# Patient Record
Sex: FEMALE | Race: WHITE | Employment: UNEMPLOYED | URBAN - METROPOLITAN AREA
[De-identification: names, ages, dates, MRNs, and addresses within clinical notes are randomized per-mention and may not be internally consistent; named-entity substitution may affect disease eponyms.]

---

## 2022-05-27 ENCOUNTER — APPOINTMENT (OUTPATIENT)
Dept: NON INVASIVE DIAGNOSTICS | Age: 82
DRG: 246 | End: 2022-05-27
Attending: HOSPITALIST
Payer: MEDICARE

## 2022-05-27 ENCOUNTER — HOSPITAL ENCOUNTER (INPATIENT)
Age: 82
LOS: 5 days | Discharge: HOME HEALTH | DRG: 246 | End: 2022-06-01
Attending: EMERGENCY MEDICINE | Admitting: HOSPITALIST
Payer: MEDICARE

## 2022-05-27 ENCOUNTER — APPOINTMENT (OUTPATIENT)
Dept: GENERAL RADIOLOGY | Age: 82
DRG: 246 | End: 2022-05-27
Attending: EMERGENCY MEDICINE
Payer: MEDICARE

## 2022-05-27 ENCOUNTER — APPOINTMENT (OUTPATIENT)
Dept: GENERAL RADIOLOGY | Age: 82
DRG: 246 | End: 2022-05-27
Attending: HOSPITALIST
Payer: MEDICARE

## 2022-05-27 DIAGNOSIS — I21.4 NSTEMI (NON-ST ELEVATED MYOCARDIAL INFARCTION) (HCC): ICD-10-CM

## 2022-05-27 DIAGNOSIS — I50.23 ACUTE ON CHRONIC SYSTOLIC CONGESTIVE HEART FAILURE (HCC): ICD-10-CM

## 2022-05-27 DIAGNOSIS — I50.9 CONGESTIVE HEART FAILURE, UNSPECIFIED HF CHRONICITY, UNSPECIFIED HEART FAILURE TYPE (HCC): Primary | ICD-10-CM

## 2022-05-27 LAB
ALBUMIN SERPL-MCNC: 3.8 G/DL (ref 3.5–5)
ALBUMIN/GLOB SERPL: 1.3 {RATIO} (ref 1.1–2.2)
ALP SERPL-CCNC: 49 U/L (ref 45–117)
ALT SERPL-CCNC: 33 U/L (ref 12–78)
ANION GAP SERPL CALC-SCNC: 13 MMOL/L (ref 5–15)
AST SERPL W P-5'-P-CCNC: 32 U/L (ref 15–37)
BASOPHILS # BLD: 0 K/UL (ref 0–0.1)
BASOPHILS NFR BLD: 1 % (ref 0–1)
BILIRUB SERPL-MCNC: 1.2 MG/DL (ref 0.2–1)
BNP SERPL-MCNC: ABNORMAL PG/ML
BUN SERPL-MCNC: 13 MG/DL (ref 6–20)
BUN/CREAT SERPL: 16 (ref 12–20)
CA-I BLD-MCNC: 9.1 MG/DL (ref 8.5–10.1)
CHLORIDE SERPL-SCNC: 99 MMOL/L (ref 97–108)
CO2 SERPL-SCNC: 19 MMOL/L (ref 21–32)
CREAT SERPL-MCNC: 0.83 MG/DL (ref 0.55–1.02)
DIFFERENTIAL METHOD BLD: ABNORMAL
EOSINOPHIL # BLD: 0 K/UL (ref 0–0.4)
EOSINOPHIL NFR BLD: 0 % (ref 0–7)
ERYTHROCYTE [DISTWIDTH] IN BLOOD BY AUTOMATED COUNT: 14.6 % (ref 11.5–14.5)
GLOBULIN SER CALC-MCNC: 2.9 G/DL (ref 2–4)
GLUCOSE SERPL-MCNC: 252 MG/DL (ref 65–100)
HCT VFR BLD AUTO: 39.9 % (ref 35–47)
HGB BLD-MCNC: 13 G/DL (ref 11.5–16)
IMM GRANULOCYTES # BLD AUTO: 0 K/UL (ref 0–0.04)
IMM GRANULOCYTES NFR BLD AUTO: 0 % (ref 0–0.5)
LACTATE SERPL-SCNC: 2.2 MMOL/L (ref 0.4–2)
LYMPHOCYTES # BLD: 0.6 K/UL (ref 0.8–3.5)
LYMPHOCYTES NFR BLD: 8 % (ref 12–49)
MCH RBC QN AUTO: 28.4 PG (ref 26–34)
MCHC RBC AUTO-ENTMCNC: 32.6 G/DL (ref 30–36.5)
MCV RBC AUTO: 87.1 FL (ref 80–99)
MONOCYTES # BLD: 0.5 K/UL (ref 0–1)
MONOCYTES NFR BLD: 6 % (ref 5–13)
NEUTS SEG # BLD: 6.4 K/UL (ref 1.8–8)
NEUTS SEG NFR BLD: 85 % (ref 32–75)
NRBC # BLD: 0 K/UL (ref 0–0.01)
NRBC BLD-RTO: 0 PER 100 WBC
PLATELET # BLD AUTO: 376 K/UL (ref 150–400)
PMV BLD AUTO: 8.7 FL (ref 8.9–12.9)
POTASSIUM SERPL-SCNC: 4.2 MMOL/L (ref 3.5–5.1)
PROT SERPL-MCNC: 6.7 G/DL (ref 6.4–8.2)
RBC # BLD AUTO: 4.58 M/UL (ref 3.8–5.2)
SODIUM SERPL-SCNC: 131 MMOL/L (ref 136–145)
TROPONIN-HIGH SENSITIVITY: 114 NG/L (ref 0–51)
WBC # BLD AUTO: 7.6 K/UL (ref 3.6–11)

## 2022-05-27 PROCEDURE — 93005 ELECTROCARDIOGRAM TRACING: CPT

## 2022-05-27 PROCEDURE — 65270000029 HC RM PRIVATE

## 2022-05-27 PROCEDURE — 71045 X-RAY EXAM CHEST 1 VIEW: CPT

## 2022-05-27 PROCEDURE — 99285 EMERGENCY DEPT VISIT HI MDM: CPT

## 2022-05-27 PROCEDURE — 84484 ASSAY OF TROPONIN QUANT: CPT

## 2022-05-27 PROCEDURE — 80053 COMPREHEN METABOLIC PANEL: CPT

## 2022-05-27 PROCEDURE — 87040 BLOOD CULTURE FOR BACTERIA: CPT

## 2022-05-27 PROCEDURE — 83605 ASSAY OF LACTIC ACID: CPT

## 2022-05-27 PROCEDURE — C8929 TTE W OR WO FOL WCON,DOPPLER: HCPCS

## 2022-05-27 PROCEDURE — 74011250636 HC RX REV CODE- 250/636: Performed by: HOSPITALIST

## 2022-05-27 PROCEDURE — 85025 COMPLETE CBC W/AUTO DIFF WBC: CPT

## 2022-05-27 PROCEDURE — 83880 ASSAY OF NATRIURETIC PEPTIDE: CPT

## 2022-05-27 PROCEDURE — 74011000250 HC RX REV CODE- 250: Performed by: HOSPITALIST

## 2022-05-27 PROCEDURE — 36415 COLL VENOUS BLD VENIPUNCTURE: CPT

## 2022-05-27 PROCEDURE — 74011250636 HC RX REV CODE- 250/636: Performed by: EMERGENCY MEDICINE

## 2022-05-27 RX ORDER — ASPIRIN 81 MG/1
81 TABLET ORAL DAILY
Status: ON HOLD | COMMUNITY

## 2022-05-27 RX ORDER — SODIUM CHLORIDE 0.9 % (FLUSH) 0.9 %
5-40 SYRINGE (ML) INJECTION AS NEEDED
Status: DISCONTINUED | OUTPATIENT
Start: 2022-05-27 | End: 2022-06-01 | Stop reason: HOSPADM

## 2022-05-27 RX ORDER — ACETAMINOPHEN 650 MG/1
650 SUPPOSITORY RECTAL
Status: DISCONTINUED | OUTPATIENT
Start: 2022-05-27 | End: 2022-06-01 | Stop reason: HOSPADM

## 2022-05-27 RX ORDER — CHOLECALCIFEROL (VITAMIN D3) 125 MCG
5 CAPSULE ORAL
Status: DISCONTINUED | OUTPATIENT
Start: 2022-05-27 | End: 2022-06-01 | Stop reason: HOSPADM

## 2022-05-27 RX ORDER — ONDANSETRON 4 MG/1
4 TABLET, ORALLY DISINTEGRATING ORAL
Status: DISCONTINUED | OUTPATIENT
Start: 2022-05-27 | End: 2022-06-01 | Stop reason: HOSPADM

## 2022-05-27 RX ORDER — ONDANSETRON 2 MG/ML
4 INJECTION INTRAMUSCULAR; INTRAVENOUS
Status: DISCONTINUED | OUTPATIENT
Start: 2022-05-27 | End: 2022-06-01 | Stop reason: HOSPADM

## 2022-05-27 RX ORDER — ACETAMINOPHEN 325 MG/1
650 TABLET ORAL
Status: DISCONTINUED | OUTPATIENT
Start: 2022-05-27 | End: 2022-06-01 | Stop reason: HOSPADM

## 2022-05-27 RX ORDER — SODIUM CHLORIDE 0.9 % (FLUSH) 0.9 %
5-10 SYRINGE (ML) INJECTION AS NEEDED
Status: DISCONTINUED | OUTPATIENT
Start: 2022-05-27 | End: 2022-06-01 | Stop reason: HOSPADM

## 2022-05-27 RX ORDER — ENOXAPARIN SODIUM 100 MG/ML
40 INJECTION SUBCUTANEOUS DAILY
Status: DISCONTINUED | OUTPATIENT
Start: 2022-05-28 | End: 2022-06-01 | Stop reason: HOSPADM

## 2022-05-27 RX ORDER — SODIUM CHLORIDE 0.9 % (FLUSH) 0.9 %
5-40 SYRINGE (ML) INJECTION EVERY 8 HOURS
Status: DISCONTINUED | OUTPATIENT
Start: 2022-05-27 | End: 2022-06-01 | Stop reason: HOSPADM

## 2022-05-27 RX ORDER — FUROSEMIDE 10 MG/ML
20 INJECTION INTRAMUSCULAR; INTRAVENOUS
Status: COMPLETED | OUTPATIENT
Start: 2022-05-27 | End: 2022-05-27

## 2022-05-27 RX ORDER — POLYETHYLENE GLYCOL 3350 17 G/17G
17 POWDER, FOR SOLUTION ORAL DAILY PRN
Status: DISCONTINUED | OUTPATIENT
Start: 2022-05-27 | End: 2022-06-01 | Stop reason: HOSPADM

## 2022-05-27 RX ADMIN — PERFLUTREN 2 ML: 6.52 INJECTION, SUSPENSION INTRAVENOUS at 16:46

## 2022-05-27 RX ADMIN — FUROSEMIDE 20 MG: 10 INJECTION, SOLUTION INTRAMUSCULAR; INTRAVENOUS at 14:35

## 2022-05-27 RX ADMIN — SODIUM CHLORIDE, PRESERVATIVE FREE 10 ML: 5 INJECTION INTRAVENOUS at 14:36

## 2022-05-27 RX ADMIN — SODIUM CHLORIDE, PRESERVATIVE FREE 10 ML: 5 INJECTION INTRAVENOUS at 22:25

## 2022-05-27 NOTE — PROGRESS NOTES
Verbal shift change report given to [facility] (oncoming nurse) by Koby Granger (offgoing nurse). Report included the following information SBAR.

## 2022-05-27 NOTE — PROGRESS NOTES
Reason for Admission:  CHF                     RUR Score:  N/A                Plan for utilizing home health:   Not at this time        PCP: First and Last name:  None     Name of Practice:    Are you a current patient: Yes/No:    Approximate date of last visit:    Can you participate in a virtual visit with your PCP:                     Current Advanced Directive/Advance Care Plan: Full Code      Healthcare Decision Maker:   Click here to complete [address] including selection of the Healthcare Decision Maker Relationship (ie \"Primary\")   Nay Buckley,  of 59 years, [phone]                          Transition of Care Plan:      Met f/f with Pt and her . Pt stated that she lives with her  of 59 years. Pt stated that they have 3 children and [facility]. Pt stated no HH, do not uses any \"medical messes\"  Pt stated she has an old walker from her . Pt stated that she is independent with  ADL. Pt stated that it has been her choice but that she has not seen a doctor since 1988 and that she takes no medication, and does not have a pharmacy. Pt stated that if she needs medication then they can call it in to [facility] on [address] [address]. Pt  stated that he will give Pt a ride home when she is D/C. Pt is on oxygen in the ED, it appears that she is struggling to breath, Pt may need home 02 when she is D/C. CM Dispo: Home and TBD.

## 2022-05-27 NOTE — ROUTINE PROCESS
TRANSFER - OUT REPORT:    Verbal report given to 5 JENNIFER Villalpando on Carmelina Guzman  being transferred to [address] [place] [address] for routine progression of care       Report consisted of patients Situation, Background, Assessment and   Recommendations(SBAR). Information from the following report(s) SBAR was reviewed with the receiving nurse. Lines:       Opportunity for questions and clarification was provided.       Patient transported with:   O2 @ 2 liters   Monitor

## 2022-05-27 NOTE — Clinical Note
Sheath #1: Sheath: inserted. Sheath inserted/placed in the right radial  artery.  6 [facility]

## 2022-05-27 NOTE — Clinical Note
Contrast Dose Calculator:   Patient's age: 79.   Patient's sex: Female. Patient weight (kg) = 60.8. Creatinine level (mg/dL) = 0.47. Creatinine clearance (mL/min): 89.   Contrast concentration (mg/mL) = 370. MACD = 300 mL. Max Contrast dose per Creatinine Cl calculator = 200.25 mL.

## 2022-05-27 NOTE — Clinical Note
TRANSFER - OUT REPORT:     Verbal report given to: Brittany (at bedside). Report consisted of patient's Situation, Background, Assessment and   Recommendations(SBAR). Opportunity for questions and clarification was provided. Patient transported with a Registered Nurse and Monitor. Patient transported to: PACU.

## 2022-05-27 NOTE — H&P
History and Physical    Subjective:   Chief Complaint : shortness of breath since 1-2 days  Source of information : patient     History of present illness:     79F, who is a JW, with shortness of breath since 1 day    She began experiencing shortness of breath on exertion, mostly when going to bathroom and then noticed orthopnea and got concerned and came to ED. She also mentioned that her legs were skin and bones and now appears swollen    ED: required 2 L NC, and CXR showed pulmonary edema with increased BNP      History reviewed. No pertinent past medical history. History reviewed. No pertinent surgical history. History reviewed. No pertinent family history. Social History     Tobacco Use    Smoking status: Former Smoker    Smokeless tobacco: Never Used   Substance Use Topics    Alcohol use: Never       Prior to Admission medications    Not on File     No Known Allergies          Review of Systems:  Review of Systems   Constitutional: Negative. Negative for appetite change, chills, fatigue and fever. HENT: Negative. Negative for congestion and sinus pain. Eyes: Negative. Negative for pain and visual disturbance. Respiratory: Positive for shortness of breath. Negative for chest tightness. Cardiovascular: Negative. Negative for chest pain. Gastrointestinal: Positive for nausea. Negative for abdominal pain, diarrhea and vomiting. Genitourinary: Negative. Negative for difficulty urinating. No discharge   Musculoskeletal: Negative. Negative for arthralgias. Skin: Negative. Negative for rash. Neurological: Negative. Negative for weakness and headaches. Hematological: Negative. Psychiatric/Behavioral: Negative. Negative for agitation. The patient is not nervous/anxious. All other systems reviewed and are negative.     Vitals:     Visit Vitals  /81 (BP 1 Location: Left upper arm)   Pulse (!) 112   Temp 97.6 °F (36.4 °C)   Resp 24   Ht 5' 6\" (1.676 m)   Wt 60.8 kg (134 lb)   SpO2 92%   BMI 21.63 kg/m²     Physical Exam  Vitals and nursing note reviewed. Constitutional:       General: She is not in acute distress. Appearance: She is well-developed. HENT:      Head: Normocephalic and atraumatic. Nose: Nose normal.      Mouth/Throat:      Mouth: Mucous membranes are moist.      Pharynx: Oropharynx is clear. No oropharyngeal exudate. Eyes:      General:         Right eye: No discharge. Left eye: No discharge. Conjunctiva/sclera: Conjunctivae normal.      Pupils: Pupils are equal, round, and reactive to light. Cardiovascular:      Rate and Rhythm: Regular rhythm. Tachycardia present. Chest Wall: PMI is not displaced. No thrill. Heart sounds: Normal heart sounds. No murmur heard. No friction rub. No gallop. Pulmonary:      Effort: Pulmonary effort is normal. Tachypnea present. No respiratory distress. Breath sounds: Normal breath sounds. No wheezing or rales. Chest:      Chest wall: Edema present. No tenderness. Abdominal:      General: Bowel sounds are normal. There is no distension. Palpations: Abdomen is soft. There is no mass. Tenderness: There is no abdominal tenderness. There is no guarding or rebound. Musculoskeletal:         General: Normal range of motion. Cervical back: Normal range of motion and neck supple. Lymphadenopathy:      Cervical: No cervical adenopathy. Skin:     General: Skin is warm and dry. Capillary Refill: Capillary refill takes less than 2 seconds. Findings: No erythema or rash. Neurological:      Mental Status: She is alert and oriented to person, place, and time. Cranial Nerves: No cranial nerve deficit.       Coordination: Coordination normal.   Psychiatric:         Mood and Affect: Mood normal.         Behavior: Behavior normal.       Data Review:   Recent Results (from the past 24 hour(s))   CBC WITH AUTOMATED DIFF    Collection Time: 05/27/22 11:54 AM Result Value Ref Range    WBC 7.6 3.6 - 11.0 K/uL    RBC 4.58 3.80 - 5.20 M/uL    HGB 13.0 11.5 - 16.0 g/dL    HCT 39.9 35.0 - 47.0 %    MCV 87.1 80.0 - 99.0 FL    MCH 28.4 26.0 - 34.0 PG    MCHC 32.6 30.0 - 36.5 g/dL    RDW 14.6 (H) 11.5 - 14.5 %    PLATELET 784 268 - 366 K/uL    MPV 8.7 (L) 8.9 - 12.9 FL    NRBC 0.0 0.0  WBC    ABSOLUTE NRBC 0.00 0.00 - 0.01 K/uL    NEUTROPHILS 85 (H) 32 - 75 %    LYMPHOCYTES 8 (L) 12 - 49 %    MONOCYTES 6 5 - 13 %    EOSINOPHILS 0 0 - 7 %    BASOPHILS 1 0 - 1 %    IMMATURE GRANULOCYTES 0 0 - 0.5 %    ABS. NEUTROPHILS 6.4 1.8 - 8.0 K/UL    ABS. LYMPHOCYTES 0.6 (L) 0.8 - 3.5 K/UL    ABS. MONOCYTES 0.5 0.0 - 1.0 K/UL    ABS. EOSINOPHILS 0.0 0.0 - 0.4 K/UL    ABS. BASOPHILS 0.0 0.0 - 0.1 K/UL    ABS. IMM. GRANS. 0.0 0.00 - 0.04 K/UL    DF AUTOMATED     METABOLIC PANEL, COMPREHENSIVE    Collection Time: 05/27/22 11:54 AM   Result Value Ref Range    Sodium 131 (L) 136 - 145 mmol/L    Potassium 4.2 3.5 - 5.1 mmol/L    Chloride 99 97 - 108 mmol/L    CO2 19 (L) 21 - 32 mmol/L    Anion gap 13 5 - 15 mmol/L    Glucose 252 (H) 65 - 100 mg/dL    BUN 13 6 - 20 mg/dL    Creatinine 0.83 0.55 - 1.02 mg/dL    BUN/Creatinine ratio 16 12 - 20      GFR est AA >60 >60 ml/min/1.73m2    GFR est non-AA >60 >60 ml/min/1.73m2    Calcium 9.1 8.5 - 10.1 mg/dL    Bilirubin, total 1.2 (H) 0.2 - 1.0 mg/dL    AST (SGOT) 32 15 - 37 U/L    ALT (SGPT) 33 12 - 78 U/L    Alk.  phosphatase 49 45 - 117 U/L    Protein, total 6.7 6.4 - 8.2 g/dL    Albumin 3.8 3.5 - 5.0 g/dL    Globulin 2.9 2.0 - 4.0 g/dL    A-G Ratio 1.3 1.1 - 2.2     TROPONIN-HIGH SENSITIVITY    Collection Time: 05/27/22 11:54 AM   Result Value Ref Range    Troponin-High Sensitivity 114 (H) 0 - 51 ng/L   NT-PRO BNP    Collection Time: 05/27/22 11:54 AM   Result Value Ref Range    NT pro-BNP 21,720 (H) <450 pg/mL   LACTIC ACID    Collection Time: 05/27/22 12:58 PM   Result Value Ref Range    Lactic acid 2.2 (HH) 0.4 - 2.0 mmol/L Assessment and Plan :     (1) acute hypoxic respiratory failurewean to keep saturation > 92    (2) probable new onset CHF:;l asix, [address] [place] [address] , cardiology consult     DVT ppx: lovenox     Dispo: home likely tomorrow once cleared by cards      Signed By: Jacy Osman MD     May 27, 2022

## 2022-05-27 NOTE — Clinical Note
History and physical documented and up to date, allergies reviewed, lab results reviewed, pre-procedure education provided, patient verbalized understanding of procedure, procedural consent signed and patient is NPO. Improved

## 2022-05-27 NOTE — ED PROVIDER NOTES
EMERGENCY DEPARTMENT HISTORY AND PHYSICAL EXAM      Date: 5/27/2022  Patient Name: Laya Steinberg      History of Presenting Illness     Chief Complaint   Patient presents with    Shortness of Breath       History Provided By: Patient    HPI: Laya Steinberg, 79 y.o. female with a past medical history significant Unknown past medical history presents to the ED with cc of feeling weak and tired and short of breath. She says has been going on for a while but got worse over the past 24 hours. She says she has not been to a doctor in over 30 years. She denies any fever, chills, chest pain, rash, diarrhea, headache, night sweats. Have some element of nausea. There are no exacerbating relieving factors and she has not treated with anything. There are no other complaints, changes, or physical findings at this time. PCP: None    Current Facility-Administered Medications   Medication Dose Route Frequency Provider Last Rate Last Admin    sodium chloride (NS) flush 5-10 mL  5-10 mL IntraVENous PRN Kamryn Yu MD        furosemide (LASIX) injection 20 mg  20 mg IntraVENous NOW Kamryn Yu MD           Past History     Past Medical History:  History reviewed. No pertinent past medical history. Past Surgical History:  History reviewed. No pertinent surgical history. Family History:  History reviewed. No pertinent family history. Social History:  Social History     Tobacco Use    Smoking status: Former Smoker    Smokeless tobacco: Never Used   Substance Use Topics    Alcohol use: Never    Drug use: Never       Allergies:  No Known Allergies      Review of Systems     Review of Systems   Constitutional: Negative. Negative for appetite change, chills, fatigue and fever. HENT: Negative. Negative for congestion and sinus pain. Eyes: Negative. Negative for pain and visual disturbance. Respiratory: Positive for shortness of breath. Negative for chest tightness.     Cardiovascular: Negative. Negative for chest pain. Gastrointestinal: Positive for nausea. Negative for abdominal pain, diarrhea and vomiting. Genitourinary: Negative. Negative for difficulty urinating. No discharge   Musculoskeletal: Negative. Negative for arthralgias. Skin: Negative. Negative for rash. Neurological: Negative. Negative for weakness and headaches. Hematological: Negative. Psychiatric/Behavioral: Negative. Negative for agitation. The patient is not nervous/anxious. All other systems reviewed and are negative. Physical Exam     Physical Exam  Vitals and nursing note reviewed. Constitutional:       General: She is not in acute distress. Appearance: She is well-developed. HENT:      Head: Normocephalic and atraumatic. Nose: Nose normal.      Mouth/Throat:      Mouth: Mucous membranes are moist.      Pharynx: Oropharynx is clear. No oropharyngeal exudate. Eyes:      General:         Right eye: No discharge. Left eye: No discharge. Conjunctiva/sclera: Conjunctivae normal.      Pupils: Pupils are equal, round, and reactive to light. Cardiovascular:      Rate and Rhythm: Regular rhythm. Tachycardia present. Chest Wall: PMI is not displaced. No thrill. Heart sounds: Normal heart sounds. No murmur heard. No friction rub. No gallop. Pulmonary:      Effort: Pulmonary effort is normal. Tachypnea present. No respiratory distress. Breath sounds: Normal breath sounds. No wheezing or rales. Chest:      Chest wall: Edema present. No tenderness. Abdominal:      General: Bowel sounds are normal. There is no distension. Palpations: Abdomen is soft. There is no mass. Tenderness: There is no abdominal tenderness. There is no guarding or rebound. Musculoskeletal:         General: Normal range of motion. Cervical back: Normal range of motion and neck supple. Lymphadenopathy:      Cervical: No cervical adenopathy.    Skin:     General: Skin is warm and dry. Capillary Refill: Capillary refill takes less than 2 seconds. Findings: No erythema or rash. Neurological:      Mental Status: She is alert and oriented to person, place, and time. Cranial Nerves: No cranial nerve deficit. Coordination: Coordination normal.   Psychiatric:         Mood and Affect: Mood normal.         Behavior: Behavior normal.         Lab and Diagnostic Study Results     Labs -     Recent Results (from the past 12 hour(s))   CBC WITH AUTOMATED DIFF    Collection Time: 05/27/22 11:54 AM   Result Value Ref Range    WBC 7.6 3.6 - 11.0 K/uL    RBC 4.58 3.80 - 5.20 M/uL    HGB 13.0 11.5 - 16.0 g/dL    HCT 39.9 35.0 - 47.0 %    MCV 87.1 80.0 - 99.0 FL    MCH 28.4 26.0 - 34.0 PG    MCHC 32.6 30.0 - 36.5 g/dL    RDW 14.6 (H) 11.5 - 14.5 %    PLATELET 726 245 - 093 K/uL    MPV 8.7 (L) 8.9 - 12.9 FL    NRBC 0.0 0.0  WBC    ABSOLUTE NRBC 0.00 0.00 - 0.01 K/uL    NEUTROPHILS 85 (H) 32 - 75 %    LYMPHOCYTES 8 (L) 12 - 49 %    MONOCYTES 6 5 - 13 %    EOSINOPHILS 0 0 - 7 %    BASOPHILS 1 0 - 1 %    IMMATURE GRANULOCYTES 0 0 - 0.5 %    ABS. NEUTROPHILS 6.4 1.8 - 8.0 K/UL    ABS. LYMPHOCYTES 0.6 (L) 0.8 - 3.5 K/UL    ABS. MONOCYTES 0.5 0.0 - 1.0 K/UL    ABS. EOSINOPHILS 0.0 0.0 - 0.4 K/UL    ABS. BASOPHILS 0.0 0.0 - 0.1 K/UL    ABS. IMM.  GRANS. 0.0 0.00 - 0.04 K/UL    DF AUTOMATED     METABOLIC PANEL, COMPREHENSIVE    Collection Time: 05/27/22 11:54 AM   Result Value Ref Range    Sodium 131 (L) 136 - 145 mmol/L    Potassium 4.2 3.5 - 5.1 mmol/L    Chloride 99 97 - 108 mmol/L    CO2 19 (L) 21 - 32 mmol/L    Anion gap 13 5 - 15 mmol/L    Glucose 252 (H) 65 - 100 mg/dL    BUN 13 6 - 20 mg/dL    Creatinine 0.83 0.55 - 1.02 mg/dL    BUN/Creatinine ratio 16 12 - 20      GFR est AA >60 >60 ml/min/1.73m2    GFR est non-AA >60 >60 ml/min/1.73m2    Calcium 9.1 8.5 - 10.1 mg/dL    Bilirubin, total 1.2 (H) 0.2 - 1.0 mg/dL    AST (SGOT) 32 15 - 37 U/L    ALT (SGPT) 33 12 - 78 U/L    Alk. phosphatase 49 45 - 117 U/L    Protein, total 6.7 6.4 - 8.2 g/dL    Albumin 3.8 3.5 - 5.0 g/dL    Globulin 2.9 2.0 - 4.0 g/dL    A-G Ratio 1.3 1.1 - 2.2     TROPONIN-HIGH SENSITIVITY    Collection Time: 05/27/22 11:54 AM   Result Value Ref Range    Troponin-High Sensitivity 114 (H) 0 - 51 ng/L   NT-PRO BNP    Collection Time: 05/27/22 11:54 AM   Result Value Ref Range    NT pro-BNP 21,720 (H) <450 pg/mL   LACTIC ACID    Collection Time: 05/27/22 12:58 PM   Result Value Ref Range    Lactic acid 2.2 (HH) 0.4 - 2.0 mmol/L       Radiologic Studies -   [unfilled]  CT Results  (Last 48 hours)    None        CXR Results  (Last 48 hours)               05/27/22 1215  XR CHEST PORT Final result    Narrative:  Chest single view. Hazy central and basilar reticular markings suggests degree dependent   interstitial edema. Moderate to large left pleural effusion. Cardiac and mediastinal structures magnified on this AP view. No pneumothorax             Medical Decision Making and ED Course   - I am the first and primary provider for this patient AND AM THE PRIMARY PROVIDER OF RECORD. - I reviewed the vital signs, available nursing notes, past medical history, past surgical history, family history and social history. - Initial assessment performed. The patients presenting problems have been discussed, and the staff are in agreement with the care plan formulated and outlined with them. I have encouraged them to ask questions as they arise throughout their visit. Vital Signs-Reviewed the patient's vital signs. Patient Vitals for the past 12 hrs:   Temp Pulse Resp BP SpO2   05/27/22 1318 -- (!) 112 24 128/81 --   05/27/22 1135 97.6 °F (36.4 °C) (!) 115 20 (!) 141/86 92 %       EKG interpretation: (Preliminary): Performed at 1141, and read at 46  Ventricular rate 115 bpm, [place] interval unmeasurable, QRS duration 60 ms,  ms.   Interpretation: Accelerated junctional rhythm possible anterior infarct. Abnormal EKG. Assess with basic cardiac labs and chest x-ray  ED Course:              Provider Notes (Medical Decision Making):   54-year-old female with apparent new onset congestive heart failure as well as anemia. We will be admitting to the hospital given her oxygen requirement. MDM           Consultations:       Consultations: -  Hospitalist Consultant: Dr. Juliane Proctor: We have asked for emergent assistance with regard to this patient. We have discussed the patients HPI, ROS, PE and results this far. They will come and evaluate the patient for admission. Procedures and Critical Care       Performed by: Jami Mendez MD  PROCEDURES:  Procedures         HYPERTENSION COUNSELING: Education was provided to the patient today regarding their hypertension. Patient is made aware of their elevated blood pressure and is instructed to follow up this week with their Primary Care for a recheck. Patient is counseled regarding consequences of chronic, uncontrolled hypertension including kidney disease, heart disease, stroke or even death. Patient states their understanding and agrees to follow up this week. Additionally, during their visit, I discussed sodium restriction, maintaining ideal body weight and regular exercise program as physiologic means to achieve blood pressure control. The patient will strive towards this. Disposition     Disposition: Condition stable    Admitted  Diagnosis     Clinical Impression:   1. Congestive heart failure, unspecified HF chronicity, unspecified heart failure type [facility])        Attestations:    Jami Mendez MD    Please note that this dictation was completed with [employer], the computer voice recognition software. Quite often unanticipated grammatical, syntax, homophones, and other interpretive errors are inadvertently transcribed by the computer software. Please disregard these errors. Please excuse any errors that have escaped final proofreading.   Thank you.

## 2022-05-27 NOTE — ED TRIAGE NOTES
Pt c/o SOB over the past 2 days, with and \"uncomfortable feeling\" in her Left breast. New lower leg edema

## 2022-05-27 NOTE — CONSULTS
Consult    NAME: Kina Roth   :  1940   MRN:  [id number]     Date/Time:  2022 3:59 PM    Patient PCP: None  ________________________________________________________________________    Assessment:   Primary cardiologist: None    PROBLEM LIST:  1. Shortness of breath  2.  Remote history of tobacco use  3. Possible family history of premature coronary artery disease  4. Elevated cardiac enzymes in the indeterminate range  5. Elevated troponin-BNP (21,720 pg/mL)    6. Pleural effusion  7. Hyponatremia        []        High complexity decision making was performed        Subjective:   CHIEF COMPLAINT:     HISTORY OF PRESENT ILLNESS:     This 57-year-old  female with no known coronary artery disease presents for evaluation of shortness of breath. There is no history of previous myocardial infarction, or congestive heart failure. The patient indicates she has not seen a physician in over 30 years. She was in her usual state of health until recently when she began to experience increasing shortness of breath and dyspnea on exertion. She also describes orthopnea, and paroxysmal nocturnal dyspnea. There is bilateral lower extremity edema pitting edema noted. Because of the persistence of her symptoms he presented to the emergency department. In the emergency department her clinical history, laboratory findings, and imaging studies were consistent with acute decompensated heart failure. The patient is started on appropriate therapy including IV diuresis. Cardiology is consulted to assist in the evaluation and management. History reviewed. No pertinent past medical history. History reviewed. No pertinent surgical history. No Known Allergies   Meds:  See below  Social History     Tobacco Use    Smoking status: Former Smoker    Smokeless tobacco: Never Used   Substance Use Topics    Alcohol use: Never      History reviewed. No pertinent family history.     REVIEW OF SYSTEMS:    As above, otherwise noncontributory. Objective:      Physical Exam:    Last 24hrs VS reviewed since prior progress note. Most recent are:    Visit Vitals  /86 (BP Patient Position: At rest)   Pulse (!) 103   Temp 97.6 °F (36.4 °C)   Resp 30   Ht 5' 6\" (1.676 m)   Wt 60.8 kg (134 lb)   SpO2 97%   BMI 21.63 kg/m²     No intake or output data in the 24 hours ending 05/27/22 2689     General Appearance: Well developed, in no acute respiratory distress. Ears/Nose/Mouth/Throat: Atraumatic, normocephalic, PERRL. Neck: Supple. Full range of motion, without thyromegaly or lymphadenopathy. Chest: AP diameter for size, clear to auscultation all lung fields, without wheezing, rhonchi, or crackles. Cardiovascular: JVP is not elevated, PMI is not palpable, normal intensity S1 and S2, without S3  Abdomen: Soft, non-tender,-distended, bowel sounds present. Extremities: No edema bilaterally. Skin: Warm and dry. Neuro: CN II-XII grossly intact, without gross motor/sensory deficit. Data:      Telemetry:    EKG:  []  No new EKG for review  XR CHEST PORT   Final Result           Prior to Admission medications    Medication Sig Start Date End Date Taking? Authorizing Provider   aspirin delayed-release 81 mg tablet Take 81 mg by mouth daily.    Yes Provider, Historical       Recent Results (from the past 24 hour(s))   CBC WITH AUTOMATED DIFF    Collection Time: 05/27/22 11:54 AM   Result Value Ref Range    WBC 7.6 3.6 - 11.0 K/uL    RBC 4.58 3.80 - 5.20 M/uL    HGB 13.0 11.5 - 16.0 g/dL    HCT 39.9 35.0 - 47.0 %    MCV 87.1 80.0 - 99.0 FL    MCH 28.4 26.0 - 34.0 PG    MCHC 32.6 30.0 - 36.5 g/dL    RDW 14.6 (H) 11.5 - 14.5 %    PLATELET 090 425 - 469 K/uL    MPV 8.7 (L) 8.9 - 12.9 FL    NRBC 0.0 0.0  WBC    ABSOLUTE NRBC 0.00 0.00 - 0.01 K/uL    NEUTROPHILS 85 (H) 32 - 75 %    LYMPHOCYTES 8 (L) 12 - 49 %    MONOCYTES 6 5 - 13 %    EOSINOPHILS 0 0 - 7 %    BASOPHILS 1 0 - 1 %    IMMATURE GRANULOCYTES 0 0 - 0.5 %    ABS. NEUTROPHILS 6.4 1.8 - 8.0 K/UL    ABS. LYMPHOCYTES 0.6 (L) 0.8 - 3.5 K/UL    ABS. MONOCYTES 0.5 0.0 - 1.0 K/UL    ABS. EOSINOPHILS 0.0 0.0 - 0.4 K/UL    ABS. BASOPHILS 0.0 0.0 - 0.1 K/UL    ABS. IMM. GRANS. 0.0 0.00 - 0.04 K/UL    DF AUTOMATED     METABOLIC PANEL, COMPREHENSIVE    Collection Time: 05/27/22 11:54 AM   Result Value Ref Range    Sodium 131 (L) 136 - 145 mmol/L    Potassium 4.2 3.5 - 5.1 mmol/L    Chloride 99 97 - 108 mmol/L    CO2 19 (L) 21 - 32 mmol/L    Anion gap 13 5 - 15 mmol/L    Glucose 252 (H) 65 - 100 mg/dL    BUN 13 6 - 20 mg/dL    Creatinine 0.83 0.55 - 1.02 mg/dL    BUN/Creatinine ratio 16 12 - 20      GFR est AA >60 >60 ml/min/1.73m2    GFR est non-AA >60 >60 ml/min/1.73m2    Calcium 9.1 8.5 - 10.1 mg/dL    Bilirubin, total 1.2 (H) 0.2 - 1.0 mg/dL    AST (SGOT) 32 15 - 37 U/L    ALT (SGPT) 33 12 - 78 U/L    Alk. phosphatase 49 45 - 117 U/L    Protein, total 6.7 6.4 - 8.2 g/dL    Albumin 3.8 3.5 - 5.0 g/dL    Globulin 2.9 2.0 - 4.0 g/dL    A-G Ratio 1.3 1.1 - 2.2     TROPONIN-HIGH SENSITIVITY    Collection Time: 05/27/22 11:54 AM   Result Value Ref Range    Troponin-High Sensitivity 114 (H) 0 - 51 ng/L   NT-PRO BNP    Collection Time: 05/27/22 11:54 AM   Result Value Ref Range    NT pro-BNP 21,720 (H) <450 pg/mL   LACTIC ACID    Collection Time: 05/27/22 12:58 PM   Result Value Ref Range    Lactic acid 2.2 (HH) 0.4 - 2.0 mmol/L          Plan:   1. Admit to telemetry  2. Conclude serial cardiac enzymes   3. Monitor serum electrolytes, and renal function  4. Monitor fluid balance, and daily weights  5. Obtain complete 2D echocardiogram    6. Continue current cardiovascular medications including enoxaparin  7.   Add intravenous (IV) diureses; i.e furosemide   Russel Gale MD

## 2022-05-28 ENCOUNTER — APPOINTMENT (OUTPATIENT)
Dept: GENERAL RADIOLOGY | Age: 82
DRG: 246 | End: 2022-05-28
Attending: INTERNAL MEDICINE
Payer: MEDICARE

## 2022-05-28 LAB
ANION GAP SERPL CALC-SCNC: 7 MMOL/L (ref 5–15)
ANION GAP SERPL CALC-SCNC: 8 MMOL/L (ref 5–15)
ARTERIAL PATENCY WRIST A: ABNORMAL
ATRIAL RATE: 117 BPM
BASE EXCESS BLDA CALC-SCNC: 0.8 MMOL/L (ref 0–2)
BDY SITE: ABNORMAL
BUN SERPL-MCNC: 12 MG/DL (ref 6–20)
BUN SERPL-MCNC: 13 MG/DL (ref 6–20)
BUN/CREAT SERPL: 20 (ref 12–20)
BUN/CREAT SERPL: 21 (ref 12–20)
CA-I BLD-MCNC: 8.8 MG/DL (ref 8.5–10.1)
CA-I BLD-MCNC: 8.9 MG/DL (ref 8.5–10.1)
CALCULATED R AXIS, ECG10: -25 DEGREES
CALCULATED T AXIS, ECG11: 52 DEGREES
CHLORIDE SERPL-SCNC: 101 MMOL/L (ref 97–108)
CHLORIDE SERPL-SCNC: 98 MMOL/L (ref 97–108)
CO2 SERPL-SCNC: 24 MMOL/L (ref 21–32)
CO2 SERPL-SCNC: 24 MMOL/L (ref 21–32)
CREAT SERPL-MCNC: 0.56 MG/DL (ref 0.55–1.02)
CREAT SERPL-MCNC: 0.66 MG/DL (ref 0.55–1.02)
DIAGNOSIS, 93000: NORMAL
ECHO AO ASC DIAM: 2.8 CM
ECHO AO ASCENDING AORTA INDEX: 1.66 CM/M2
ECHO AO DESC DIAM: 1.8 CM
ECHO AO DESCENDING AORTA INDEX: 1.07 CM/M2
ECHO AO ROOT DIAM: 3 CM
ECHO AO ROOT INDEX: 1.78 CM/M2
ECHO AV AREA PEAK VELOCITY: 1.3 CM2
ECHO AV AREA VTI: 1.2 CM2
ECHO AV AREA/BSA PEAK VELOCITY: 0.8 CM2/M2
ECHO AV AREA/BSA VTI: 0.7 CM2/M2
ECHO AV MEAN GRADIENT: 2 MMHG
ECHO AV MEAN VELOCITY: 0.7 M/S
ECHO AV PEAK GRADIENT: 4 MMHG
ECHO AV PEAK VELOCITY: 1.1 M/S
ECHO AV VELOCITY RATIO: 0.36
ECHO AV VTI: 16.6 CM
ECHO EST RA PRESSURE: 8 MMHG
ECHO IVC PROX: 2.2 CM
ECHO LA AREA 4C: 19.8 CM2
ECHO LA DIAMETER INDEX: 2.78 CM/M2
ECHO LA DIAMETER: 4.7 CM
ECHO LA MAJOR AXIS: 7.1 CM
ECHO LA TO AORTIC ROOT RATIO: 1.57
ECHO LV EDV A4C: 105 ML
ECHO LV EDV INDEX A4C: 62 ML/M2
ECHO LV EJECTION FRACTION A4C: 30 %
ECHO LV EJECTION FRACTION BIPLANE: 33 % (ref 55–100)
ECHO LV ESV A4C: 74 ML
ECHO LV ESV INDEX A4C: 44 ML/M2
ECHO LV FRACTIONAL SHORTENING: 16 % (ref 28–44)
ECHO LV INTERNAL DIMENSION DIASTOLE INDEX: 3.37 CM/M2
ECHO LV INTERNAL DIMENSION DIASTOLIC: 5.7 CM (ref 3.9–5.3)
ECHO LV INTERNAL DIMENSION SYSTOLIC INDEX: 2.84 CM/M2
ECHO LV INTERNAL DIMENSION SYSTOLIC: 4.8 CM
ECHO LV IVSD: 0.7 CM (ref 0.6–0.9)
ECHO LV MASS 2D: 131.9 G (ref 67–162)
ECHO LV MASS INDEX 2D: 78 G/M2 (ref 43–95)
ECHO LV POSTERIOR WALL DIASTOLIC: 0.6 CM (ref 0.6–0.9)
ECHO LV RELATIVE WALL THICKNESS RATIO: 0.21
ECHO LVOT AREA: 3.1 CM2
ECHO LVOT AV VTI INDEX: 0.4
ECHO LVOT DIAM: 2 CM
ECHO LVOT MEAN GRADIENT: 0 MMHG
ECHO LVOT PEAK GRADIENT: 1 MMHG
ECHO LVOT PEAK VELOCITY: 0.4 M/S
ECHO LVOT STROKE VOLUME INDEX: 12.3 ML/M2
ECHO LVOT SV: 20.7 ML
ECHO LVOT VTI: 6.6 CM
ECHO MV AREA VTI: 0.7 CM2
ECHO MV E DECELERATION TIME (DT): 74 MS
ECHO MV E VELOCITY: 1.21 M/S
ECHO MV LVOT VTI INDEX: 4.7
ECHO MV MAX VELOCITY: 1.4 M/S
ECHO MV MEAN GRADIENT: 3 MMHG
ECHO MV MEAN VELOCITY: 0.7 M/S
ECHO MV PEAK GRADIENT: 7 MMHG
ECHO MV REGURGITANT PEAK GRADIENT: 104 MMHG
ECHO MV REGURGITANT PEAK VELOCITY: 5.1 M/S
ECHO MV REGURGITANT VTIA: 148 CM
ECHO MV VTI: 31 CM
ECHO RIGHT VENTRICULAR SYSTOLIC PRESSURE (RVSP): 28 MMHG
ECHO RV TAPSE: 1.4 CM (ref 1.7–?)
ECHO TV REGURGITANT MAX VELOCITY: 2.23 M/S
ECHO TV REGURGITANT PEAK GRADIENT: 20 MMHG
ERYTHROCYTE [DISTWIDTH] IN BLOOD BY AUTOMATED COUNT: 14.8 % (ref 11.5–14.5)
GAS FLOW.O2 O2 DELIVERY SYS: 3 L/MIN
GLUCOSE SERPL-MCNC: 152 MG/DL (ref 65–100)
GLUCOSE SERPL-MCNC: 179 MG/DL (ref 65–100)
HCO3 BLDA-SCNC: 25 MMOL/L (ref 22–26)
HCT VFR BLD AUTO: 37.6 % (ref 35–47)
HGB BLD-MCNC: 12.3 G/DL (ref 11.5–16)
LACTATE SERPL-SCNC: 2.1 MMOL/L (ref 0.4–2)
MAGNESIUM SERPL-MCNC: 1.8 MG/DL (ref 1.6–2.4)
MCH RBC QN AUTO: 28.7 PG (ref 26–34)
MCHC RBC AUTO-ENTMCNC: 32.7 G/DL (ref 30–36.5)
MCV RBC AUTO: 87.6 FL (ref 80–99)
NRBC # BLD: 0 K/UL (ref 0–0.01)
NRBC BLD-RTO: 0 PER 100 WBC
PCO2 BLDA: 34 MMHG (ref 35–45)
PH BLDA: 7.46 [PH] (ref 7.35–7.45)
PLATELET # BLD AUTO: 372 K/UL (ref 150–400)
PMV BLD AUTO: 9 FL (ref 8.9–12.9)
PO2 BLDA: 86 MMHG (ref 75–100)
POTASSIUM SERPL-SCNC: 4 MMOL/L (ref 3.5–5.1)
POTASSIUM SERPL-SCNC: 4.6 MMOL/L (ref 3.5–5.1)
Q-T INTERVAL, ECG07: 436 MS
QRS DURATION, ECG06: 68 MS
QTC CALCULATION (BEZET), ECG08: 603 MS
RBC # BLD AUTO: 4.29 M/UL (ref 3.8–5.2)
SAO2 % BLD: 97 %
SAO2% DEVICE SAO2% SENSOR NAME: ABNORMAL
SODIUM SERPL-SCNC: 130 MMOL/L (ref 136–145)
SODIUM SERPL-SCNC: 132 MMOL/L (ref 136–145)
VENTRICULAR RATE, ECG03: 115 BPM
WBC # BLD AUTO: 10.8 K/UL (ref 3.6–11)

## 2022-05-28 PROCEDURE — 74011250637 HC RX REV CODE- 250/637: Performed by: INTERNAL MEDICINE

## 2022-05-28 PROCEDURE — 80048 BASIC METABOLIC PNL TOTAL CA: CPT

## 2022-05-28 PROCEDURE — 74011000250 HC RX REV CODE- 250: Performed by: HOSPITALIST

## 2022-05-28 PROCEDURE — 92610 EVALUATE SWALLOWING FUNCTION: CPT

## 2022-05-28 PROCEDURE — 82803 BLOOD GASES ANY COMBINATION: CPT

## 2022-05-28 PROCEDURE — 65270000029 HC RM PRIVATE

## 2022-05-28 PROCEDURE — 94640 AIRWAY INHALATION TREATMENT: CPT

## 2022-05-28 PROCEDURE — 83605 ASSAY OF LACTIC ACID: CPT

## 2022-05-28 PROCEDURE — 36600 WITHDRAWAL OF ARTERIAL BLOOD: CPT

## 2022-05-28 PROCEDURE — 74011250636 HC RX REV CODE- 250/636: Performed by: INTERNAL MEDICINE

## 2022-05-28 PROCEDURE — 77010033678 HC OXYGEN DAILY

## 2022-05-28 PROCEDURE — 85027 COMPLETE CBC AUTOMATED: CPT

## 2022-05-28 PROCEDURE — 83735 ASSAY OF MAGNESIUM: CPT

## 2022-05-28 PROCEDURE — 74011000250 HC RX REV CODE- 250: Performed by: INTERNAL MEDICINE

## 2022-05-28 PROCEDURE — 36415 COLL VENOUS BLD VENIPUNCTURE: CPT

## 2022-05-28 PROCEDURE — 74011250636 HC RX REV CODE- 250/636: Performed by: HOSPITALIST

## 2022-05-28 PROCEDURE — 94761 N-INVAS EAR/PLS OXIMETRY MLT: CPT

## 2022-05-28 PROCEDURE — 74011250637 HC RX REV CODE- 250/637: Performed by: HOSPITALIST

## 2022-05-28 RX ORDER — IPRATROPIUM BROMIDE AND ALBUTEROL SULFATE 2.5; .5 MG/3ML; MG/3ML
3 SOLUTION RESPIRATORY (INHALATION)
Status: DISCONTINUED | OUTPATIENT
Start: 2022-05-28 | End: 2022-05-29

## 2022-05-28 RX ORDER — CARVEDILOL 3.12 MG/1
3.12 TABLET ORAL 2 TIMES DAILY WITH MEALS
Status: DISCONTINUED | OUTPATIENT
Start: 2022-05-28 | End: 2022-06-01 | Stop reason: HOSPADM

## 2022-05-28 RX ORDER — ASPIRIN 81 MG/1
81 TABLET ORAL DAILY
Status: DISCONTINUED | OUTPATIENT
Start: 2022-05-28 | End: 2022-06-01 | Stop reason: HOSPADM

## 2022-05-28 RX ORDER — FUROSEMIDE 10 MG/ML
40 INJECTION INTRAMUSCULAR; INTRAVENOUS EVERY 12 HOURS
Status: DISCONTINUED | OUTPATIENT
Start: 2022-05-28 | End: 2022-05-29

## 2022-05-28 RX ADMIN — ASPIRIN 81 MG: 81 TABLET, COATED ORAL at 11:46

## 2022-05-28 RX ADMIN — FUROSEMIDE 40 MG: 10 INJECTION, SOLUTION INTRAMUSCULAR; INTRAVENOUS at 11:47

## 2022-05-28 RX ADMIN — MELATONIN TAB 5 MG 5 MG: 5 TAB at 23:26

## 2022-05-28 RX ADMIN — FUROSEMIDE 40 MG: 10 INJECTION, SOLUTION INTRAMUSCULAR; INTRAVENOUS at 23:26

## 2022-05-28 RX ADMIN — CARVEDILOL 3.12 MG: 3.12 TABLET, FILM COATED ORAL at 14:25

## 2022-05-28 RX ADMIN — SODIUM CHLORIDE, PRESERVATIVE FREE 10 ML: 5 INJECTION INTRAVENOUS at 23:27

## 2022-05-28 RX ADMIN — IPRATROPIUM BROMIDE AND ALBUTEROL SULFATE 3 ML: .5; 2.5 SOLUTION RESPIRATORY (INHALATION) at 19:45

## 2022-05-28 RX ADMIN — ENOXAPARIN SODIUM 40 MG: 100 INJECTION SUBCUTANEOUS at 09:13

## 2022-05-28 NOTE — CONSULTS
PULMONARY CONSULT  [facility]    Name: Navin Scott MRN: [id number]   : 1940 Hospital: [facility]   Date: 2022  Admission date: 2022 Hospital Day: 2       HPI:     Hospital Problems  Never Reviewed          Codes Class Noted POA    [facility]) ([facility] ICD-10-CM: I50.9  ICD-9-CM: 428.0  2022 Unknown                   [x] High complexity decision making was performed  [x] See my orders for details      Subjective/Initial History:     I was asked by Frank Zhao MD to see Navin Scott  a 79 y.o.  female in consultation     Excerpts from admission 2022 or consult notes as follows:   79-year-old lady came in because of shortness of breath and dyspnea, she is now on 4 L nasal cannula did not sleep well seen by cardiologist diastolic dysfunction she is retaining fluid she has not seen a physician in the past 20 years is not on any oxygen no history of sleep apnea she used to smoke quit smoking so admitted and pulmonary consult was called regarding hypoxia and respiratory management. No Known Allergies     MAR reviewed and pertinent medications noted or modified as needed     Current Facility-Administered Medications   Medication    furosemide (LASIX) injection 40 mg    aspirin delayed-release tablet 81 mg    carvediloL (COREG) tablet 3.125 mg    sodium chloride (NS) flush 5-10 mL    sodium chloride (NS) flush 5-40 mL    sodium chloride (NS) flush 5-40 mL    acetaminophen (TYLENOL) tablet 650 mg    Or    acetaminophen (TYLENOL) suppository 650 mg    polyethylene glycol (MIRALAX) packet 17 g    ondansetron (ZOFRAN ODT) tablet 4 mg    Or    ondansetron (ZOFRAN) injection 4 mg    enoxaparin (LOVENOX) injection 40 mg    melatonin tablet 5 mg      Patient PCP: None  PMH:  has no past medical history on file. PSH:   has no past surgical history on file. FHX: family history is not on file.    SHX:  reports that she has quit smoking. She has never used smokeless tobacco. She reports that she does not drink alcohol and does not use drugs. ROS:    Review of Systems   Constitutional: Positive for malaise/fatigue. HENT: Negative. Eyes: Negative. Respiratory: Positive for shortness of breath. Cardiovascular: Positive for orthopnea. Gastrointestinal: Negative. Genitourinary: Negative. Musculoskeletal: Negative. Skin: Negative. Neurological: Negative. Psychiatric/Behavioral: Negative. Objective:     Vital Signs: Telemetry:    normal sinus rhythm Intake/Output:   Visit Vitals  /85   Pulse 96   Temp 97.4 °F (36.3 °C)   Resp 18   Ht 5' 6\" (1.676 m)   Wt 60.8 kg (134 lb)   SpO2 97%   BMI 21.63 kg/m²       Temp (24hrs), Av.1 °F (36.2 °C), Min:94.5 °F (34.7 °C), Max:100 °F (37.8 °C)        O2 Device: Nasal cannula O2 Flow Rate (L/min): 3 l/min       Wt Readings from Last 4 Encounters:   22 60.8 kg (134 lb)          Intake/Output Summary (Last 24 hours) at 2022 1722  Last data filed at 2022 1019  Gross per 24 hour   Intake --   Output 150 ml   Net -150 ml       Last shift:       0701 -  1900  In: -   Out: 150 [Urine:150]  Last 3 shifts: No intake/output data recorded. Physical Exam:     Physical Exam  Constitutional:       Appearance: Normal appearance. HENT:      Head: Normocephalic and atraumatic. Nose: Nose normal.      Mouth/Throat:      Mouth: Mucous membranes are moist.   Eyes:      Pupils: Pupils are equal, round, and reactive to light. Cardiovascular:      Rate and Rhythm: Normal rate and regular rhythm. Pulses: Normal pulses. Heart sounds: Normal heart sounds. Pulmonary:      Effort: Pulmonary effort is normal.      Breath sounds: Rales present. Abdominal:      General: Abdomen is flat. Bowel sounds are normal.      Palpations: Abdomen is soft. Musculoskeletal:         General: Swelling present.       Cervical back: Normal range of motion and neck supple. Right lower leg: Edema present. Left lower leg: Edema present. Neurological:      General: No focal deficit present. Mental Status: She is alert. Psychiatric:         Mood and Affect: Mood normal.          Labs:    Recent Labs     05/28/22  1153 05/27/22  1154   WBC 10.8 7.6   HGB 12.3 13.0    376     Recent Labs     05/28/22  1153 05/28/22  0605 05/27/22  1258 05/27/22  1154   * 132*  --  131*   K 4.0 4.6  --  4.2   CL 98 101  --  99   CO2 24 24  --  19*   * 152*  --  252*   BUN 13 12  --  13   CREA 0.66 0.56  --  0.83   CA 8.9 8.8  --  9.1   MG 1.8  --   --   --    LAC  --  2.1* 2.2*  --    ALB  --   --   --  3.8   ALT  --   --   --  33     Recent Labs     05/28/22  1400   PH 7.46*   PCO2 34*   PO2 86   HCO3 25     No results for input(s): CPK, CKNDX, TROIQ in the last 72 hours. No lab exists for component: CPKMB  No results found for: BNPP, BNP   Lab Results   Component Value Date/Time    Culture result: No growth after 18 hours 05/27/2022 12:59 PM   No results found for: TSH, TSHEXT    Imaging:    CXR Results  (Last 48 hours)               05/27/22 2154  XR CHEST PORT Final result    Impression:  Improved lung aeration. Stable bilateral pleural effusions and   basilar airspace disease. Narrative:  Exam: XR CHEST PORT         Indication:  SOB; Comparison: Chest x-ray performed the same day at 11:58 AM.       Findings:       Stable cardiac mediastinal silhouette. Aeration has improved. Bilateral pleural   effusions and associated airspace disease/atelectasis are not significantly   changed. There is no pneumothorax. Osteopenia. No acute bony abnormality. 05/27/22 1215  XR CHEST PORT Final result    Narrative:  Chest single view. Hazy central and basilar reticular markings suggests degree dependent   interstitial edema. Moderate to large left pleural effusion.     Cardiac and mediastinal structures magnified on this AP view. No pneumothorax           Results from Hospital Encounter encounter on 05/27/22    XR CHEST PORT    Narrative  Exam: XR CHEST PORT    Indication:  SOB; Comparison: Chest x-ray performed the same day at 11:58 AM.    Findings:    Stable cardiac mediastinal silhouette. Aeration has improved. Bilateral pleural  effusions and associated airspace disease/atelectasis are not significantly  changed. There is no pneumothorax. Osteopenia. No acute bony abnormality. Impression  Improved lung aeration. Stable bilateral pleural effusions and  basilar airspace disease. XR CHEST PORT    Narrative  Chest single view. Hazy central and basilar reticular markings suggests degree dependent  interstitial edema. Moderate to large left pleural effusion. Cardiac and mediastinal structures magnified on this AP view. No pneumothorax    No results found for this or any previous visit. IMPRESSION:   1. Acute hypoxic respiratory failure  2. Congestive heart failure diastolic dysfunction  3. Bilateral pleural effusion  4. Pt is requiring Drug therapy requiring intensive monitoring for toxicity  5. Prognosis guarded       RECOMMENDATIONS/PLAN:     3 79-year-old lady with shortness of breath and dyspnea she is on oxygen 4 L nasal cannula she has never been on oxygen at home history of smoking in the past we will start patient on nebulizer treatment not actively wheezing chest x-ray shows congestive changes pleural effusion continue with the Lasix 2D echo to see the right heart pressure and possible she need oxygen when discharged home  2. Supplemental O2 to keep sats > 93%  3. Aspiration precautions  4. Labs to follow electrolytes, renal function and and blood counts  5. Glucose monitoring and SSI  6. Bronchial hygiene with respiratory therapy techniques, bronchodilators  7.  DVT, SUP prophylaxis       This care involved high complexity medical decision making: I personally:  · Reviewed the flowsheet and previous days notes  · Reviewed and summarized records or history from previous days note or discussions with staff, family  · High Risk Drug therapy requiring intensive monitoring for toxicity: eg steroids, pressors, antibiotics  · Reviewed and/or ordered Clinical lab tests  · Reviewed images and/or ordered Radiology tests  · Reviewed the patients ECG / Telemetry  · Reviewed and/or adjusted NiPPV settings  · Called and arranged for Radiologic procedures or interventions  · performed or ordered Diagnostic endoscopies with identified risk factors.   · discussed my assessment/management with : Nursing, Hospitalist and Family for coordination of care          Helen Singh MD

## 2022-05-28 NOTE — PROGRESS NOTES
SPEECH LANGUAGE PATHOLOGY BEDSIDE SWALLOW EVALUATIONS  Patient: Carlo Mclaughlin  (24 y.o. )  Date: 5/28/2022  Primary Diagnosis: CHF  Precautions:  Aspiration, Fall    ASSESSMENT :  Upon entry of room, patient with leg off bed and reports she can't sit still, alert, and cooperative. Patient puts leg back in bed and seen in a neutral position. Patient reports \"I have been swallowing poorly and eating poorly recently. Patient reports bolus sensation and pointing to to sternal notch region. Patient is oriented, though presents as confused and impulsive. Rn present and reports patient's  indicating that patient is on a puree diet at home. Patient denies that she has been assessed for swallow function. Clinician administering po trials: Thin liquids via cup and patient exhibits weak, ineffective coughing, rapid ingestion, and impulsivity. Upon digital palpation: patient's swallow initiation appears delayed and HLE appears weak/reduced. Mildly thick liquids, improved oral phase with increased bolus control and upon digital palpation: improved timeliness and improved HLE. No overt s/sx of aspiration. MM5 and patient exhibits weak, ineffective coughing, prolonged mastication, stasis after the swallow and disorganized oral phase with increase impulsivity. Upon digital palpation: patient's swallow initiation delayed and HLE appears weak/reduced and utilizing multiple swallows  Puree with improved oral phase and upon digital palpation: patient's swallow initiation appears timely and HLE appear WFL. Patient exhibits delayed throat clearing x2 post all po trials and clinician questioning reflux. GI consult recommended. Patient WOB with all po trials. Skilled education provided concerning safe swallow strategies, diet recs, and recommendation of MBS to further investigate swallow function. Patient will benefit from skilled intervention to address the above impairments.   Patients rehabilitation potential is considered to be Good     PLAN :  Recommendations and Planned Interventions:  Diet initiation of puree/mildly thick liquids with STRICT aspiration and GERD precautions advised. All meals and po intake with patient upright in bedside chair. Monitor for overt s/sx of aspiration. ST to follow. MBS to further investigate swallow function. GI consult  Frequency/Duration: Patient will be followed by speech-language pathology daily to address goals. Discharge Recommendations: To Be Determined     SUBJECTIVE:   Patient reports \"I am having problems swallowing. \"    OBJECTIVE:     History reviewed. No pertinent past medical history. CXR Results  (Last 48 hours)                 05/27/22 2154  XR CHEST PORT Final result    Impression:  Improved lung aeration. Stable bilateral pleural effusions and   basilar airspace disease. Narrative:  Exam: XR CHEST PORT         Indication:  SOB; Comparison: Chest x-ray performed the same day at 11:58 AM.       Findings:       Stable cardiac mediastinal silhouette. Aeration has improved. Bilateral pleural   effusions and associated airspace disease/atelectasis are not significantly   changed. There is no pneumothorax. Osteopenia. No acute bony abnormality. 05/27/22 1215  XR CHEST PORT Final result    Narrative:  Chest single view. Hazy central and basilar reticular markings suggests degree dependent   interstitial edema. Moderate to large left pleural effusion. Cardiac and mediastinal structures magnified on this AP view. No pneumothorax             Current Diet:  DIET ADULT     Cognitive and Communication Status:  Neurologic State: Alert,Confused  Orientation Level: Oriented X4  Cognition: Impulsive  Swallowing Evaluation:   Oral Assessment:  Oral Assessment  Labial: No impairment  Dentition: Natural  Oral Hygiene: WNL  Lingual: Incoordinated  P.O.  Trials:  Patient Position: Upright in bed  Vocal quality prior to P.O.: No impairment  Consistency Presented: Thin liquid; Solid;Puree;Mechanical soft; Nectar thick liquid  How Presented: Cup/sip  Bolus Acceptance: No impairment  Bolus Formation/Control: Impaired  Type of Impairment: Mastication  Propulsion: Discoordination;Delayed (# of seconds); Tongue pumping  Oral Residue: 10-50% of bolus  Initiation of Swallow: Delayed (# of seconds)  Laryngeal Elevation: Decreased;Weak  Aspiration Signs/Symptoms: Weak cough;Clear throat; Increase in RR  Pharyngeal Phase Characteristics: Poor endurance;Multiple swallows  Oral Phase Severity: Mild-moderate  Pharyngeal Phase Severity : Moderate-severe  Voice:  Vocal Quality: No impairment     Pain:  0/10    After treatment:   Patient left in no apparent distress in bed and Nursing notified    COMMUNICATION/EDUCATION:   Patient was educated regarding her deficit(s) of dysphagia, though required additional training and education. The patient's plan of care including recommendations, planned interventions, and recommended diet changes were discussed with: Registered nurse. Patient/family agree to work toward stated goals and plan of care. Thank you for this referral.  Joan Hess M.S., CCC-SLP  Time Calculation: 15 mins   Problem: Dysphagia (Adult)  Goal: *Acute Goals and Plan of Care (Insert Text)  Description: Speech Therapy Swallow Goals  Initiated 5/28/2022  -Patient will tolerate Puree/Mildly thick liquids without clinical indicators of aspiration given moderate cues within 1-3 day(s). -Patient will tolerate PO trials without clinical indicators of aspiration given minimal cues within 1-3 day(s). -Patient will participate in modified barium swallow study within 5 day(s). -Patient will demonstrate understanding of swallow safety precautions and aspiration precautions, diet recs with minimal cues within 1-3 day(s).            Outcome: Not Met     Problem: Patient Education: Go to Patient Education Activity  Goal: Patient/Family Education  Outcome: Not Met

## 2022-05-28 NOTE — ACP (ADVANCE CARE PLANNING)
Advance Care Planning   Advance Care Planning Inpatient Note  [facility] Department    Today's Date: 5/28/2022  Unit: [facility]    Received request from IDT member. Upon review of chart and communication with care team, patient's decision making abilities are not in question. Patient was/were present in the room during visit. Goals of ACP Conversation:  Facilitate a discussion related to patient's goals of care as they align with the patient's values and beliefs    Health Care Decision Makers:      Primary Decision Maker: Elliott Quinonez [facility] - [phone]      Summary:  Updated Healthcare Decision Maker    Advance Care Planning Documents (Patient Wishes) on file:  None     Assessment:    Patient stated that she has already completed her AMD with her  and the document is at home. She acknowledge the state's next of kin hierarchy should there be no AMD documents.     Interventions:  Provided education on documents for clarity and greater understanding  Discussed and provided education on state decision maker hierarchy  Encouraged ongoing ACP conversation with future decision makers and loved ones    Care Preferences Communicated:  No    Outcomes/Plan:  ACP Discussion Refused    Chaplain Abelino on 5/28/2022 at 2:48 PM

## 2022-05-28 NOTE — PROGRESS NOTES
Hospitalist Progress Note    NAME: Merari Kumar   :  1940   MRN:  [id number]           Subjective:     Chief Complaint / Reason for Physician Visit  Patient seen and examined at bedside, currently still complaining of shortness of breath, slight improvement since yesterday, still has difficulty sleeping. Discussed with RN events overnight. Review of Systems:  Symptom Y/N Comments  Symptom Y/N Comments   Fever/Chills N   Chest Pain N    Poor Appetite Y   Edema N    Cough Y   Abdominal Pain N    Sputum Y   Joint Pain N    SOB/GOINS Y   Pruritis/Rash N    Nausea/vomit N   Tolerating PT/OT NA    Diarrhea N   Tolerating Diet Y    Constipation N   Other       Could NOT obtain due to:      Objective:     VITALS:   Last 24hrs VS reviewed since prior progress note. Most recent are:  Patient Vitals for the past 24 hrs:   Temp Pulse Resp BP SpO2   22 0809 (!) 94.5 °F (34.7 °C) 98 18 128/86 99 %   22 0032 97.5 °F (36.4 °C) (!) 105 20 132/87 97 %   22 2002 (!) 96 °F (35.6 °C) 78 20 127/81 98 %   22 1641 (!) 94.1 °F (34.5 °C) (!) 102 20 135/83 98 %   22 1419 -- (!) 103 30 131/86 97 %   22 1318 -- (!) 112 24 128/81 --   22 1135 97.6 °F (36.4 °C) (!) 115 20 (!) 141/86 92 %       Intake/Output Summary (Last 24 hours) at 2022 1055  Last data filed at 2022 1019  Gross per 24 hour   Intake --   Output 150 ml   Net -150 ml        PHYSICAL EXAM:  General: Patient appears uncomfortable    EENT:  EOMI. Anicteric sclerae. MMM  Resp:  Decreased air entry bilaterally in bilateral lower lung zones  CV:  Regular  rhythm,  S1 plus S2, no murmurs rubs or gallops no edema  GI:  Soft, Non distended, Non tender. +Bowel sounds  Neurologic:  Alert and oriented X 3, normal speech,   Psych:   Good insight. Not anxious nor agitated  Skin:  No rashes.   No jaundice    Procedures: see electronic medical records for all procedures/Xrays and details which were not copied into this note but were reviewed prior to creation of Plan. LABS:  I reviewed today's most current labs and imaging studies. Pertinent labs include:  Recent Labs     05/27/22  1154   WBC 7.6   HGB 13.0   HCT 39.9        Recent Labs     05/28/22  0605 05/27/22  1154   * 131*   K 4.6 4.2    99   CO2 24 19*   * 252*   BUN 12 13   CREA 0.56 0.83   CA 8.8 9.1   ALB  --  3.8   TBILI  --  1.2*   ALT  --  33       Signed: Samantha Cuba MD    X-ray chest:IMPRESSION  Improved lung aeration. Stable bilateral pleural effusions and  basilar airspace disease. Reviewed most current lab test results and cultures  YES  Reviewed most current radiology test results   YES  Review and summation of old records today    NO  Reviewed patient's current orders and MAR    YES  PMH/ reviewed - no change compared to H&P      Assessment / Plan:  Acute respiratory failure with hypoxia secondary to acute decompensated heart failure with systolic dysfunction- patient presented with above-mentioned symptomatology found to have acute respiratory failure with hypoxia secondary to acute decompensated heart failure with systolic dysfunction, currently diuresing well  Daily weights  Frequent intake and output monitoring  Lasix 40 mg IV twice daily  Attempt to wean oxygen  Patient will likely need cardiac catheterization  Continue to follow cardiology recommendations    Prophylaxis- HSQ  FEN-cardiac diet with fluid restriction, replete potassium and magnesium  Full code, will clarify about surrogate decision-maker  Disposition- pending clinical improvement, weaning off of oxygen, cardiac catheterization, PT OT evaluation      18.5 - 24.9 Normal weight / Body mass index is 21.63 kg/m².     Code status: Full  Prophylaxis: Hep SQ  Recommended Disposition:  PT, OT, RN     ________________________________________________________________________  Care Plan discussed with:    Comments   Patient x    Family      RN x    Care Manager x Consultant  x                     x Multidiciplinary team rounds were held today with , nursing, pharmacist and clinical coordinator. Patient's plan of care was discussed; medications were reviewed and discharge planning was addressed.      ________________________________________________________________________  Total NON critical care TIME:  35   Minutes        Comments   >50% of visit spent in counseling and coordination of care x    ________________________________________________________________________  Damion Negrete MD

## 2022-05-28 NOTE — PROGRESS NOTES
Progress Note      5/28/2022 1:11 AM  NAME: Nelson Gibbons   MRN:  [id number]   Admit Diagnosis: CHF (congestive heart failure) ([facility] 75.) [I50.9]      Problem List:   1. Acute decompensated heart failure  2. Dilated cardiomyopathy (ejection fraction = 30-35%)  3. Heart failure with reduced ejection fraction (HFr EF)  4. Moderate mitral regurgitation  5. Remote history of tobacco use    6. Possible family history of premature coronary artery disease  7. Elevated cardiac enzymes in the indeterminate range  8. Elevated troponin-BNP (21,720 pg/mL)  9. Pleural effusion  10. Hyponatremia       Subjective: The patient is seen and examined in room 571. There were no acute cardiovascular events reported overnight. Currently, she denies any new cardiovascular complaints. She does continues to describe shortness of breath/dyspnea on exertion and lower extremity edema. The results of her echocardiogram including dilated cardiomyopathy, and moderate mitral regurgitation were shared with the patient. We also discussed further evaluation including potential ischemic evaluation via left heart catheterization (LHC disease and potential percutaneous coronary intervention (PCI). She is agreeable.   Medications Personally Reviewed:    Current Facility-Administered Medications   Medication Dose Route Frequency    sodium chloride (NS) flush 5-10 mL  5-10 mL IntraVENous PRN    sodium chloride (NS) flush 5-40 mL  5-40 mL IntraVENous Q8H    sodium chloride (NS) flush 5-40 mL  5-40 mL IntraVENous PRN    acetaminophen (TYLENOL) tablet 650 mg  650 mg Oral Q6H PRN    Or    acetaminophen (TYLENOL) suppository 650 mg  650 mg Rectal Q6H PRN    polyethylene glycol (MIRALAX) packet 17 g  17 g Oral DAILY PRN    ondansetron (ZOFRAN ODT) tablet 4 mg  4 mg Oral Q8H PRN    Or    ondansetron (ZOFRAN) injection 4 mg  4 mg IntraVENous Q6H PRN    enoxaparin (LOVENOX) injection 40 mg  40 mg SubCUTAneous DAILY    melatonin tablet 5 mg  5 mg Oral QHS PRN           Objective:      Physical Exam:  Essentially unchanged  Last 24hrs VS reviewed since prior progress note. Most recent are:    Visit Vitals  /87 (BP 1 Location: Right upper arm, BP Patient Position: At rest;Sitting)   Pulse (!) 105   Temp 97.5 °F (36.4 °C)   Resp 20   Ht 5' 6\" (1.676 m)   Wt 60.8 kg (134 lb)   SpO2 97%   BMI 21.63 kg/m²     No intake or output data in the 24 hours ending 05/28/22 0615     Data Review    Telemetry: Sinus rhythm without ventricular ectopy    EKG:   []  No new EKG for review    Lab Data Personally Reviewed:    Recent Labs     05/27/22  1154   WBC 7.6   HGB 13.0   HCT 39.9        No results for input(s): INR, PTP, APTT, INREXT in the last 72 hours. Recent Labs     05/27/22  1154   *   K 4.2   CL 99   CO2 19*   BUN 13   CREA 0.83   *   CA 9.1     No results for input(s): CPK, CKNDX, TROIQ in the last 72 hours. No lab exists for component: CPKMB  No results found for: CHOL, CHOLX, CHLST, CHOLV, HDL, HDLP, LDL, LDLC, DLDLP, TGLX, TRIGL, TRIGP, CHHD, CHHDX    Recent Labs     05/27/22  1154   AP 49   TP 6.7   ALB 3.8   GLOB 2.9     No results for input(s): PH, PCO2, PO2 in the last 72 hours. Assessment/Plan:   1. Continue telemetry monitoring  2. Continue to monitor serum electrolytes, and renal function  3. Continue to monitor fluid balance, and daily weights  4. Continue current cardiovascular medications including aspirin enoxaparin, furosemide  5. Add beta-blocker    6.   Consider LHC/PCI     Margaux Morris MD

## 2022-05-28 NOTE — PROGRESS NOTES
Spiritual Care Assessment/Progress Note  [facility]      NAME: Tatianna Gardiner      MRN: [id number]  AGE: 79 y.o.  SEX: female  Religion Affiliation: Jehovah witness   Language: English     5/28/2022     Total Time (in minutes): 40     Spiritual Assessment begun in [address] MED/SURG through conversation with:         [x]Patient        [] Family    [] Friend(s)        Reason for Consult: Advance medical directive consult     Spiritual beliefs: (Please include comment if needed)     [x] Identifies with a braeden tradition:  Buddhist       [x] Supported by a braeden community:            [] Claims no spiritual orientation:           [] Seeking spiritual identity:                [] Adheres to an individual form of spirituality:           [] Not able to assess:                           Identified resources for coping:      [] Prayer                               [] Music                  [] Guided Imagery     [x] Family/friends                 [] Pet visits     [x] Devotional reading                         [] Unknown     [] Other:                                            Interventions offered during this visit: (See comments for more details)    Patient Interventions: Advance medical directive consult,Affirmation of emotions/emotional suffering,Affirmation of braeden,Catharsis/review of pertinent events in supportive environment,Initial/Spiritual assessment, patient floor,Iconic (affirming the presence of God/Higher Power),Life review/legacy,Normalization of emotional/spiritual concerns,Religion beliefs/image of God discussed           Plan of Care:     [] Support spiritual and/or cultural needs    [] Support AMD and/or advance care planning process      [] Support grieving process   [] Coordinate Rites and/or Rituals    [] Coordination with community clergy   [] No spiritual needs identified at this time   [] Detailed Plan of Care below (See Comments)  [] Make referral to Music Therapy  [] Make referral to Pet Therapy     [] Make referral to Addiction services  [] Make referral to [facility]  [] Make referral to Spiritual Care Partner  [] No future visits requested        [x] Contact Spiritual Care for further referrals     Comments:  Visited patient in [place] [address] for AMD consult. Patient was alone during the visit. Patient shared about her marriage and their mutual braeden as [employer] Witnesses which seemed important to her and shared about her beliefs and assurances found therein. Stated she has already completed her AMD with her  which is at home. Provided chaplaincy and AMD education along with supportive presence. Listened with empathy and reflection while normalizing her emotions. Affirmed her spirituality, supportive braeden community and familial support along with her health concerns and future care plans. Advised of  availability. Contact chaplains for further referrals. Chaplain Va Bhat M.Div.    can be reached by calling the  at [facility]  [phone]

## 2022-05-29 PROBLEM — I50.23 ACUTE ON CHRONIC SYSTOLIC CONGESTIVE HEART FAILURE (HCC): Status: ACTIVE | Noted: 2022-05-29

## 2022-05-29 PROBLEM — R13.10 DYSPHAGIA: Status: ACTIVE | Noted: 2022-05-29

## 2022-05-29 PROBLEM — J96.01 ACUTE RESPIRATORY FAILURE WITH HYPOXIA (HCC): Status: ACTIVE | Noted: 2022-05-29

## 2022-05-29 LAB
ALBUMIN SERPL-MCNC: 3.6 G/DL (ref 3.5–5)
ALBUMIN/GLOB SERPL: 1.2 {RATIO} (ref 1.1–2.2)
ALP SERPL-CCNC: 52 U/L (ref 45–117)
ALT SERPL-CCNC: 44 U/L (ref 12–78)
ANION GAP SERPL CALC-SCNC: 10 MMOL/L (ref 5–15)
ANION GAP SERPL CALC-SCNC: 13 MMOL/L (ref 5–15)
AST SERPL W P-5'-P-CCNC: 35 U/L (ref 15–37)
BASOPHILS # BLD: 0 K/UL (ref 0–0.1)
BASOPHILS NFR BLD: 1 % (ref 0–1)
BILIRUB SERPL-MCNC: 1 MG/DL (ref 0.2–1)
BUN SERPL-MCNC: 16 MG/DL (ref 6–20)
BUN SERPL-MCNC: 16 MG/DL (ref 6–20)
BUN/CREAT SERPL: 24 (ref 12–20)
BUN/CREAT SERPL: 24 (ref 12–20)
CA-I BLD-MCNC: 8.4 MG/DL (ref 8.5–10.1)
CA-I BLD-MCNC: 8.5 MG/DL (ref 8.5–10.1)
CHLORIDE SERPL-SCNC: 89 MMOL/L (ref 97–108)
CHLORIDE SERPL-SCNC: 90 MMOL/L (ref 97–108)
CO2 SERPL-SCNC: 25 MMOL/L (ref 21–32)
CO2 SERPL-SCNC: 26 MMOL/L (ref 21–32)
CREAT SERPL-MCNC: 0.66 MG/DL (ref 0.55–1.02)
CREAT SERPL-MCNC: 0.67 MG/DL (ref 0.55–1.02)
DIFFERENTIAL METHOD BLD: ABNORMAL
EOSINOPHIL # BLD: 0.1 K/UL (ref 0–0.4)
EOSINOPHIL NFR BLD: 1 % (ref 0–7)
ERYTHROCYTE [DISTWIDTH] IN BLOOD BY AUTOMATED COUNT: 14.5 % (ref 11.5–14.5)
ERYTHROCYTE [DISTWIDTH] IN BLOOD BY AUTOMATED COUNT: 14.6 % (ref 11.5–14.5)
GLOBULIN SER CALC-MCNC: 3 G/DL (ref 2–4)
GLUCOSE SERPL-MCNC: 173 MG/DL (ref 65–100)
GLUCOSE SERPL-MCNC: 173 MG/DL (ref 65–100)
HCT VFR BLD AUTO: 36.3 % (ref 35–47)
HCT VFR BLD AUTO: 37 % (ref 35–47)
HGB BLD-MCNC: 12.1 G/DL (ref 11.5–16)
HGB BLD-MCNC: 12.4 G/DL (ref 11.5–16)
IMM GRANULOCYTES # BLD AUTO: 0.1 K/UL (ref 0–0.04)
IMM GRANULOCYTES NFR BLD AUTO: 1 % (ref 0–0.5)
LACTATE SERPL-SCNC: 1.7 MMOL/L (ref 0.4–2)
LYMPHOCYTES # BLD: 1 K/UL (ref 0.8–3.5)
LYMPHOCYTES NFR BLD: 13 % (ref 12–49)
MAGNESIUM SERPL-MCNC: 1.7 MG/DL (ref 1.6–2.4)
MCH RBC QN AUTO: 28.4 PG (ref 26–34)
MCH RBC QN AUTO: 28.9 PG (ref 26–34)
MCHC RBC AUTO-ENTMCNC: 33.3 G/DL (ref 30–36.5)
MCHC RBC AUTO-ENTMCNC: 33.5 G/DL (ref 30–36.5)
MCV RBC AUTO: 85.2 FL (ref 80–99)
MCV RBC AUTO: 86.2 FL (ref 80–99)
MONOCYTES # BLD: 0.9 K/UL (ref 0–1)
MONOCYTES NFR BLD: 12 % (ref 5–13)
NEUTS SEG # BLD: 5.7 K/UL (ref 1.8–8)
NEUTS SEG NFR BLD: 72 % (ref 32–75)
NRBC # BLD: 0 K/UL (ref 0–0.01)
NRBC # BLD: 0 K/UL (ref 0–0.01)
NRBC BLD-RTO: 0 PER 100 WBC
NRBC BLD-RTO: 0 PER 100 WBC
PLATELET # BLD AUTO: 394 K/UL (ref 150–400)
PLATELET # BLD AUTO: 401 K/UL (ref 150–400)
PMV BLD AUTO: 9.2 FL (ref 8.9–12.9)
PMV BLD AUTO: 9.5 FL (ref 8.9–12.9)
POTASSIUM SERPL-SCNC: 3.4 MMOL/L (ref 3.5–5.1)
POTASSIUM SERPL-SCNC: 3.4 MMOL/L (ref 3.5–5.1)
PROT SERPL-MCNC: 6.6 G/DL (ref 6.4–8.2)
RBC # BLD AUTO: 4.26 M/UL (ref 3.8–5.2)
RBC # BLD AUTO: 4.29 M/UL (ref 3.8–5.2)
SODIUM SERPL-SCNC: 126 MMOL/L (ref 136–145)
SODIUM SERPL-SCNC: 127 MMOL/L (ref 136–145)
WBC # BLD AUTO: 7.5 K/UL (ref 3.6–11)
WBC # BLD AUTO: 7.8 K/UL (ref 3.6–11)

## 2022-05-29 PROCEDURE — 65270000029 HC RM PRIVATE

## 2022-05-29 PROCEDURE — 36415 COLL VENOUS BLD VENIPUNCTURE: CPT

## 2022-05-29 PROCEDURE — 92526 ORAL FUNCTION THERAPY: CPT

## 2022-05-29 PROCEDURE — 77010033678 HC OXYGEN DAILY

## 2022-05-29 PROCEDURE — 94640 AIRWAY INHALATION TREATMENT: CPT

## 2022-05-29 PROCEDURE — 74011250636 HC RX REV CODE- 250/636: Performed by: HOSPITALIST

## 2022-05-29 PROCEDURE — 97161 PT EVAL LOW COMPLEX 20 MIN: CPT

## 2022-05-29 PROCEDURE — 74011250637 HC RX REV CODE- 250/637: Performed by: INTERNAL MEDICINE

## 2022-05-29 PROCEDURE — 74011250637 HC RX REV CODE- 250/637: Performed by: HOSPITALIST

## 2022-05-29 PROCEDURE — 85027 COMPLETE CBC AUTOMATED: CPT

## 2022-05-29 PROCEDURE — 83735 ASSAY OF MAGNESIUM: CPT

## 2022-05-29 PROCEDURE — 83605 ASSAY OF LACTIC ACID: CPT

## 2022-05-29 PROCEDURE — 80048 BASIC METABOLIC PNL TOTAL CA: CPT

## 2022-05-29 PROCEDURE — 74011000250 HC RX REV CODE- 250: Performed by: HOSPITALIST

## 2022-05-29 PROCEDURE — 74011250636 HC RX REV CODE- 250/636: Performed by: INTERNAL MEDICINE

## 2022-05-29 PROCEDURE — 85025 COMPLETE CBC W/AUTO DIFF WBC: CPT

## 2022-05-29 PROCEDURE — 80053 COMPREHEN METABOLIC PANEL: CPT

## 2022-05-29 PROCEDURE — 97530 THERAPEUTIC ACTIVITIES: CPT

## 2022-05-29 PROCEDURE — 74011000250 HC RX REV CODE- 250: Performed by: INTERNAL MEDICINE

## 2022-05-29 RX ORDER — GUAIFENESIN 600 MG/1
1200 TABLET, EXTENDED RELEASE ORAL EVERY 12 HOURS
Status: DISCONTINUED | OUTPATIENT
Start: 2022-05-29 | End: 2022-05-29

## 2022-05-29 RX ORDER — GUAIFENESIN 100 MG/5ML
200 SOLUTION ORAL 4 TIMES DAILY
Status: DISCONTINUED | OUTPATIENT
Start: 2022-05-29 | End: 2022-06-01 | Stop reason: HOSPADM

## 2022-05-29 RX ORDER — IPRATROPIUM BROMIDE AND ALBUTEROL SULFATE 2.5; .5 MG/3ML; MG/3ML
3 SOLUTION RESPIRATORY (INHALATION)
Status: DISCONTINUED | OUTPATIENT
Start: 2022-05-29 | End: 2022-06-01 | Stop reason: HOSPADM

## 2022-05-29 RX ORDER — BENZONATATE 100 MG/1
100 CAPSULE ORAL
Status: DISCONTINUED | OUTPATIENT
Start: 2022-05-29 | End: 2022-06-01 | Stop reason: HOSPADM

## 2022-05-29 RX ORDER — FUROSEMIDE 10 MG/ML
40 INJECTION INTRAMUSCULAR; INTRAVENOUS DAILY
Status: DISCONTINUED | OUTPATIENT
Start: 2022-05-30 | End: 2022-05-31

## 2022-05-29 RX ADMIN — SODIUM CHLORIDE, PRESERVATIVE FREE 10 ML: 5 INJECTION INTRAVENOUS at 09:15

## 2022-05-29 RX ADMIN — ASPIRIN 81 MG: 81 TABLET, COATED ORAL at 09:11

## 2022-05-29 RX ADMIN — SACUBITRIL AND VALSARTAN 1 TABLET: 24; 26 TABLET, FILM COATED ORAL at 21:40

## 2022-05-29 RX ADMIN — CARVEDILOL 3.12 MG: 3.12 TABLET, FILM COATED ORAL at 17:10

## 2022-05-29 RX ADMIN — ENOXAPARIN SODIUM 40 MG: 100 INJECTION SUBCUTANEOUS at 09:11

## 2022-05-29 RX ADMIN — SODIUM CHLORIDE, PRESERVATIVE FREE 10 ML: 5 INJECTION INTRAVENOUS at 17:17

## 2022-05-29 RX ADMIN — MELATONIN TAB 5 MG 5 MG: 5 TAB at 21:40

## 2022-05-29 RX ADMIN — FUROSEMIDE 40 MG: 10 INJECTION, SOLUTION INTRAMUSCULAR; INTRAVENOUS at 09:10

## 2022-05-29 RX ADMIN — CARVEDILOL 3.12 MG: 3.12 TABLET, FILM COATED ORAL at 09:14

## 2022-05-29 RX ADMIN — IPRATROPIUM BROMIDE AND ALBUTEROL SULFATE 3 ML: .5; 3 SOLUTION RESPIRATORY (INHALATION) at 20:30

## 2022-05-29 RX ADMIN — SODIUM CHLORIDE, PRESERVATIVE FREE 5 ML: 5 INJECTION INTRAVENOUS at 22:00

## 2022-05-29 RX ADMIN — IPRATROPIUM BROMIDE AND ALBUTEROL SULFATE 3 ML: .5; 2.5 SOLUTION RESPIRATORY (INHALATION) at 07:22

## 2022-05-29 NOTE — PROGRESS NOTES
PULMONARY NOTE  [facility]    Name: Mimi Roman MRN: [id number]   : 1940 Hospital: [address] [place] [address]   Date: 2022  Admission date: 2022 Hospital Day: 3       HPI:     Hospital Problems  Never Reviewed          Codes Class Noted POA    CHF (congestive heart failure) ([facility] ICD-10-CM: I50.9  ICD-9-CM: 428.0  2022 Unknown                   [x] High complexity decision making was performed  [x] See my orders for details      Subjective/Initial History:     I was asked by Maria Elena Ward MD to see Mimi Roman  a 79 y.o.  female in consultation     Excerpts from admission 2022 or consult notes as follows:   58-year-old lady came in because of shortness of breath and dyspnea, she is now on 4 L nasal cannula did not sleep well seen by cardiologist diastolic dysfunction she is retaining fluid she has not seen a physician in the past 20 years is not on any oxygen no history of sleep apnea she used to smoke quit smoking so admitted and pulmonary consult was called regarding hypoxia and respiratory management. No Known Allergies     MAR reviewed and pertinent medications noted or modified as needed     Current Facility-Administered Medications   Medication    furosemide (LASIX) injection 40 mg    aspirin delayed-release tablet 81 mg    carvediloL (COREG) tablet 3.125 mg    albuterol-ipratropium (DUO-NEB) 2.5 MG-0.5 MG/3 ML    sodium chloride (NS) flush 5-10 mL    sodium chloride (NS) flush 5-40 mL    sodium chloride (NS) flush 5-40 mL    acetaminophen (TYLENOL) tablet 650 mg    Or    acetaminophen (TYLENOL) suppository 650 mg    polyethylene glycol (MIRALAX) packet 17 g    ondansetron (ZOFRAN ODT) tablet 4 mg    Or    ondansetron (ZOFRAN) injection 4 mg    enoxaparin (LOVENOX) injection 40 mg    melatonin tablet 5 mg      Patient PCP: None  PMH:  has no past medical history on file. PSH:   has no past surgical history on file.    FHX: family history is not on file. SHX:  reports that she has quit smoking. She has never used smokeless tobacco. She reports that she does not drink alcohol and does not use drugs. ROS:    Review of Systems   Constitutional: Positive for malaise/fatigue. HENT: Negative. Eyes: Negative. Respiratory: Positive for shortness of breath. Cardiovascular: Positive for orthopnea. Gastrointestinal: Negative. Genitourinary: Negative. Musculoskeletal: Negative. Skin: Negative. Neurological: Negative. Psychiatric/Behavioral: Negative. Objective:     Vital Signs: Telemetry:    normal sinus rhythm Intake/Output:   Visit Vitals  /79 (BP 1 Location: Right upper arm, BP Patient Position: At rest;Supine)   Pulse 85   Temp (!) 94.4 °F (34.7 °C)   Resp 20   Ht 5' 6\" (1.676 m)   Wt 60.8 kg (134 lb)   SpO2 100%   BMI 21.63 kg/m²       Temp (24hrs), Av.3 °F (36.3 °C), Min:94.4 °F (34.7 °C), Max:100 °F (37.8 °C)        O2 Device: Nasal cannula O2 Flow Rate (L/min): 2 l/min       Wt Readings from Last 4 Encounters:   22 60.8 kg (134 lb)          Intake/Output Summary (Last 24 hours) at 2022 0909  Last data filed at 2022 7686  Gross per 24 hour   Intake 360 ml   Output 150 ml   Net 210 ml       Last shift:      No intake/output data recorded. Last 3 shifts:  1901 -  0700  In: 360 [P.O.:360]  Out: 150 [Urine:150]       Physical Exam:     Physical Exam  Constitutional:       Appearance: Normal appearance. HENT:      Head: Normocephalic and atraumatic. Nose: Nose normal.      Mouth/Throat:      Mouth: Mucous membranes are moist.   Eyes:      Pupils: Pupils are equal, round, and reactive to light. Cardiovascular:      Rate and Rhythm: Normal rate and regular rhythm. Pulses: Normal pulses. Heart sounds: Normal heart sounds. Pulmonary:      Effort: Pulmonary effort is normal.      Breath sounds: Rales present. Abdominal:      General: Abdomen is flat.  Bowel sounds are normal.      Palpations: Abdomen is soft. Musculoskeletal:         General: Swelling present. Cervical back: Normal range of motion and neck supple. Right lower leg: Edema present. Left lower leg: Edema present. Neurological:      General: No focal deficit present. Mental Status: She is alert. Psychiatric:         Mood and Affect: Mood normal.          Labs:    Recent Labs     05/28/22  1153 05/27/22  1154   WBC 10.8 7.6   HGB 12.3 13.0    376     Recent Labs     05/28/22  1153 05/28/22  0605 05/27/22  1258 05/27/22  1154   * 132*  --  131*   K 4.0 4.6  --  4.2   CL 98 101  --  99   CO2 24 24  --  19*   * 152*  --  252*   BUN 13 12  --  13   CREA 0.66 0.56  --  0.83   CA 8.9 8.8  --  9.1   MG 1.8  --   --   --    LAC  --  2.1* 2.2*  --    ALB  --   --   --  3.8   ALT  --   --   --  33     Recent Labs     05/28/22  1400   PH 7.46*   PCO2 34*   PO2 86   HCO3 25     No results for input(s): CPK, CKNDX, TROIQ in the last 72 hours. No lab exists for component: CPKMB  No results found for: BNPP, BNP   Lab Results   Component Value Date/Time    Culture result: No growth 2 days 05/27/2022 12:59 PM   No results found for: TSH, TSHEXT, TSHEXT    Imaging:    CXR Results  (Last 48 hours)               05/27/22 2154  XR CHEST PORT Final result    Impression:  Improved lung aeration. Stable bilateral pleural effusions and   basilar airspace disease. Narrative:  Exam: XR CHEST PORT         Indication:  SOB; Comparison: Chest x-ray performed the same day at 11:58 AM.       Findings:       Stable cardiac mediastinal silhouette. Aeration has improved. Bilateral pleural   effusions and associated airspace disease/atelectasis are not significantly   changed. There is no pneumothorax. Osteopenia. No acute bony abnormality. 05/27/22 1215  XR CHEST PORT Final result    Narrative:  Chest single view.        Hazy central and basilar reticular markings suggests degree dependent   interstitial edema. Moderate to large left pleural effusion. Cardiac and mediastinal structures magnified on this AP view. No pneumothorax           Results from Hospital Encounter encounter on 05/27/22    XR CHEST PORT    Narrative  Exam: XR CHEST PORT    Indication:  SOB; Comparison: Chest x-ray performed the same day at 11:58 AM.    Findings:    Stable cardiac mediastinal silhouette. Aeration has improved. Bilateral pleural  effusions and associated airspace disease/atelectasis are not significantly  changed. There is no pneumothorax. Osteopenia. No acute bony abnormality. Impression  Improved lung aeration. Stable bilateral pleural effusions and  basilar airspace disease. XR CHEST PORT    Narrative  Chest single view. Hazy central and basilar reticular markings suggests degree dependent  interstitial edema. Moderate to large left pleural effusion. Cardiac and mediastinal structures magnified on this AP view. No pneumothorax    No results found for this or any previous visit. IMPRESSION:   1. Acute hypoxic respiratory failure  2. Congestive heart failure diastolic dysfunction  3. Bilateral pleural effusion  4. Prognosis guarded       RECOMMENDATIONS/PLAN:     3 79-year-old lady with shortness of breath and dyspnea she is on oxygen 4 L nasal cannula she has never been on oxygen at home history of smoking in the past we will start patient on nebulizer treatment not actively wheezing chest x-ray shows congestive changes pleural effusion continue with the Lasix 2D echo to see the right heart pressure and possible she need oxygen when discharged home  2. She is angry and not happy this morning   3. We will get overnight pulse oximetry to see if she qualifies for home oxygen before discharge  4. Supplemental O2 to keep sats > 93%  5. Aspiration precautions  6. Labs to follow electrolytes, renal function and and blood counts  7.  Glucose monitoring and SSI  8. Bronchial hygiene with respiratory therapy techniques, bronchodilators  9.  DVT, SUP prophylaxis               Sujey Connor MD

## 2022-05-29 NOTE — PROGRESS NOTES
Progress Note      5/29/2022 71:73 AM  NAME: Jordy Batista   MRN:  [id number]   Admit Diagnosis: CHF (congestive heart failure) ([facility].) [I50.9]      Problem List:   1. Acute hypoxic respiratory failure  2. Bilateral pleural effusion  3. Acute decompensated heart failure  4. Dilated cardiomyopathy (ejection fraction = 30-35%)  5. Heart failure with reduced ejection fraction (HFr EF)    6. Moderate mitral regurgitation  7.  Remote history of tobacco use  8.  Possible family history of premature coronary artery disease  9.  Elevated cardiac enzymes in the indeterminate range  10.  Elevated troponin-BNP (21,720 pg/mL)  11.  Hyponatremia       Subjective: The patient is seen and examined in room 571. There were no acute cardiovascular events reported overnight. Currently, he denies any cardiovascular complaints. She does have persistent shortness of breath/dyspnea on exertion. She is responding well to diuresis. We again discussed ischemia evaluation including left heart catheterization (LHC) with possible percutaneous coronary intervention (PCI). She is agreeable. Discussed with RN events overnight.      Medications Personally Reviewed:    Current Facility-Administered Medications   Medication Dose Route Frequency    furosemide (LASIX) injection 40 mg  40 mg IntraVENous Q12H    aspirin delayed-release tablet 81 mg  81 mg Oral DAILY    carvediloL (COREG) tablet 3.125 mg  3.125 mg Oral BID WITH MEALS    albuterol-ipratropium (DUO-NEB) 2.5 MG-0.5 MG/3 ML  3 mL Nebulization Q6HWA RT    sodium chloride (NS) flush 5-10 mL  5-10 mL IntraVENous PRN    sodium chloride (NS) flush 5-40 mL  5-40 mL IntraVENous Q8H    sodium chloride (NS) flush 5-40 mL  5-40 mL IntraVENous PRN    acetaminophen (TYLENOL) tablet 650 mg  650 mg Oral Q6H PRN    Or    acetaminophen (TYLENOL) suppository 650 mg  650 mg Rectal Q6H PRN    polyethylene glycol (MIRALAX) packet 17 g  17 g Oral DAILY PRN    ondansetron (ZOFRAN ODT) tablet 4 mg  4 mg Oral Q8H PRN    Or    ondansetron (ZOFRAN) injection 4 mg  4 mg IntraVENous Q6H PRN    enoxaparin (LOVENOX) injection 40 mg  40 mg SubCUTAneous DAILY    melatonin tablet 5 mg  5 mg Oral QHS PRN           Objective:      Physical Exam:  Essentially unchanged  Last 24hrs VS reviewed since prior progress note. Most recent are:    Visit Vitals  /79 (BP 1 Location: Right upper arm, BP Patient Position: At rest;Supine)   Pulse 85   Temp (!) 94.4 °F (34.7 °C)   Resp 20   Ht 5' 6\" (1.676 m)   Wt 60.8 kg (134 lb)   SpO2 100%   BMI 21.63 kg/m²       Intake/Output Summary (Last 24 hours) at 5/29/2022 1015  Last data filed at 5/29/2022 2982  Gross per 24 hour   Intake 360 ml   Output 150 ml   Net 210 ml        Data Review    Telemetry: Sinus rhythm without ventricular ectopy    EKG:   []  No new EKG for review    Lab Data Personally Reviewed:    Recent Labs     05/29/22  0915 05/29/22  0736   WBC 7.8 7.5   HGB 12.4 12.1   HCT 37.0 36.3    401*     No results for input(s): INR, PTP, APTT, INREXT in the last 72 hours. Recent Labs     05/28/22  1153 05/28/22  0605 05/27/22  1154   * 132* 131*   K 4.0 4.6 4.2   CL 98 101 99   CO2 24 24 19*   BUN 13 12 13   CREA 0.66 0.56 0.83   * 152* 252*   CA 8.9 8.8 9.1   MG 1.8  --   --      No results for input(s): CPK, CKNDX, TROIQ in the last 72 hours. No lab exists for component: CPKMB  No results found for: CHOL, CHOLX, CHLST, CHOLV, HDL, HDLP, LDL, LDLC, DLDLP, TGLX, TRIGL, TRIGP, CHHD, CHHDX    Recent Labs     05/27/22  1154   AP 49   TP 6.7   ALB 3.8   GLOB 2.9     Recent Labs     05/28/22  1400   PH 7.46*   PCO2 34*   PO2 86           Assessment/Plan:   1. Continue telemetry monitor  2. Continue to monitor serum electrolytes, and renal function  3. Continue to monitor fluid balance, daily weights  4. Continue current cardiovascular medications including aspirin, carvedilol, enoxaparin, and furosemide  5.   Add sacubitril-valsartan    6.   Consider LHC/PCI ischemia evaluation     Shmuel Srinivasan MD

## 2022-05-29 NOTE — PROGRESS NOTES
PHYSICAL THERAPY EVALUATION  Patient: Odalis Monreal (10 y.o. female)  Date: 5/29/2022  Primary Diagnosis: CHF (congestive heart failure) ([facility] [address] [I50.9]       Precautions: falls  ASSESSMENT  This is a 80 y/o female who came to  [facility]  ED with c/o  shortness of breath since 1-2 days, admitted on 5/27/22 for CHF. CXR showed pulmonary edema with increased BNP. Pt with no significant PMH. Pt received semi-supine in bed , agreeable for PT eval/tx . Pt is A& O x 4 ,  per pt report , she lives with spouse in a single story home with ramp entrance, was IND with ADL's and mod I for functional transfers/mobility with rollator prior to admission. Other DME owned: raise toilet seat. Per pt's report, pt's spouse uses scooter and might not be help much because of his own limitations. Based on the objective data described below, currently pt on 2L O2, presents with decreased strength , 3+/5 grossly in  b/l LE , balance deficits , generalized weakness , decreased activity tolerance and increased need for assist with functional mobility (  mod I for rolling from side to side  , mod I for supine >sit transfers , intact  static sitting balance , CGA for sit <>stand and bed<>chair transfers , good  static  standing balance , is able to ambulate - 15' with RW, CGA with slow lorena and decreased step length b/l Le, c/o SOB post ambulation, SPO2- 98%, educated for pursed lip DBex's, pt demonstrating and verbalizing understanding. Pt would benefit from continued skilled PT services to address current impairments and improve overall IND and safety with  functional transfers/mobility. Recommend d/c to IRF vs HHPT with family care pending caregiver availability when medically appropriate. Current Level of Function Impacting Discharge (mobility/balance): Pt requires CGA for functional transfers/mobiity    Functional Outcome Measure:   The patient scored 20 on the AM PAC basic mobility outcome measure which is indicative of medium complexity. Other factors to consider for discharge: decline from functional baseline, time since onset         PLAN :  Recommendations and Planned Interventions: transfer training, gait training, therapeutic exercises, neuromuscular re-education, patient and family training/education and therapeutic activities      Frequency/Duration: Patient will be followed by physical therapy:  3-5x/week to address goals. Recommendation for discharge: (in order for the patient to meet his/her long term goals)  [address]      This discharge recommendation:  Has been made in collaboration with the attending provider and/or case management    IF patient discharges home will need the following DME: to be determined (TBD)         SUBJECTIVE:   Patient stated  I can't do much      OBJECTIVE DATA SUMMARY:   HISTORY:    History reviewed. No pertinent past medical history. History reviewed. No pertinent surgical history. Personal factors and/or comorbidities impacting plan of care:     Home Situation  Home Environment: Private residence  Wheelchair Ramp: Yes  One/Two Story Residence: One story  Living Alone: No  Support Systems: Spouse/Significant Other,Child(bryant)  Patient Expects to be Discharged to[de-identified] Home  Current DME Used/Available at Home: Walker, rollator,Raised toilet seat    PLOF: Pt IND for ADLS/IADLS, mod I for mobility with rollator prior to admission. EXAMINATION/PRESENTATION/DECISION MAKING:   Critical Behavior:  Neurologic State: Alert  Orientation Level: Oriented X4  Cognition: Follows commands     Hearing:   Auditory  Auditory Impairment: None  Skin:  Intact where exposed    Range Of Motion:  AROM: Within functional limits  Strength:    Strength: Generally decreased, functional (3+/5 grossly for b/l Le)  Tone & Sensation:   Tone: Normal  Sensation: Intact  Functional Mobility:  Bed Mobility:  Rolling: Modified independent  Supine to Sit: Modified independent     Scooting: Modified independent  Transfers:  Sit to Stand: Contact guard assistance  Stand to Sit: Contact guard assistance        Bed to Chair: Contact guard assistance  Balance:   Sitting: Intact; Without support  Standing: Impaired; Without support  Standing - Static: Good;Constant support  Standing - Dynamic : Fair;Constant support  Ambulation/Gait Training:  Distance (ft): 12 Feet (ft)  Assistive Device: Walker, rolling;Gait belt  Ambulation - Level of Assistance: Contact guard assistance  Speed/Ayana: Slow  Step Length: Right shortened;Left shortened    Functional Measure:    [address] [address] AM-PAC 6 Clicks         Basic Mobility Inpatient Short Form  How much difficulty does the patient currently have. .. Unable A Lot A Little None   1. Turning over in bed (including adjusting bedclothes, sheets and blankets)? [] 1   [] 2   [] 3   [x] 4   2. Sitting down on and standing up from a chair with arms ( e.g., wheelchair, bedside commode, etc.)   [] 1   [] 2   [x] 3   [] 4   3. Moving from lying on back to sitting on the side of the bed? [] 1   [] 2   [] 3   [x] 4          How much help from another person does the patient currently need. .. Total A Lot A Little None   4. Moving to and from a bed to a chair (including a wheelchair)? [] 1   [] 2   [x] 3   [] 4   5. Need to walk in hospital room? [] 1   [] 2   [x] 3   [] 4   6. Climbing 3-5 steps with a railing? [] 1   [] 2   [x] 3   [] 4   © 2007, Trustees of [address] [place] [address], under license to [employer]. All rights reserved     Score:  Initial: 20 Most Recent: X (Date: -5/29/22- )   Interpretation of Tool:  Represents activities that are increasingly more difficult (i.e. Bed mobility, Transfers, Gait).   Score 24 23 22-20 19-15 14-10 9-7 6   Modifier CH CI CJ CK CL CM CN          Physical Therapy Evaluation Charge Determination   History Examination Presentation Decision-Making   LOW Complexity : Zero comorbidities / personal factors that will impact the outcome / POC MEDIUM Complexity : 3 Standardized tests and measures addressing body structure, function, activity limitation and / or participation in recreation  LOW Complexity : Stable, uncomplicated  Other Functional Measure [facility] 6 medium complexity      Based on the above components, the patient evaluation is determined to be of the following complexity level: MEDIUM    Pain Ratin/10    Activity Tolerance:   Fair, requires rest breaks and observed SOB with activity  Please refer to the flowsheet for vital signs taken during this treatment. After treatment patient left in no apparent distress:   Sitting in chair and Call bell within reach    COMMUNICATION/EDUCATION:   The patients plan of care was discussed with: Registered nurse. Fall prevention education was provided and the patient/caregiver indicated understanding. and Patient/family agree to work toward stated goals and plan of care. Problem: Mobility Impaired (Adult and Pediatric)  Goal: *Acute Goals and Plan of Care (Insert Text)  Description: Pt stated goal: I want to get stronger and walk    Pt will be I with LE HEP in 7 days. Pt will perform transfers mod I in 7 days. Pt will amb >100 feet with LRAD safely with mod I, SPO2 >90% in 7 days.    Outcome: Not Met       Thank you for this referral.  Magdaleno Wright   Time Calculation: 37 mins

## 2022-05-29 NOTE — PROGRESS NOTES
Problem: Dysphagia (Adult)  Goal: *Acute Goals and Plan of Care (Insert Text)  Description: Speech Therapy Swallow Goals  Initiated 5/28/2022  -Patient will tolerate Puree/Mildly thick liquids without clinical indicators of aspiration given moderate cues within 1-3 day(s). -Patient will tolerate PO trials without clinical indicators of aspiration given minimal cues within 1-3 day(s). -Patient will participate in modified barium swallow study within 5 day(s). -Patient will demonstrate understanding of swallow safety precautions and aspiration precautions, diet recs with minimal cues within 1-3 day(s). Outcome: Progressing Towards Goal    SPEECH LANGUAGE PATHOLOGY DYSPHAGIA TREATMENT  Patient: Kina Roth (79 y.o. female)  Date: 5/29/2022  Diagnosis: [facility] [I50.9] <principal problem not specified>       Precautions: Aspiration      ASSESSMENT:  Patient positioned upright, alert, cooperative and agreeable to treatment. Nurse reported that the patient had been drinking thin water throughout the night w/minimum coughing, but after education of not being able to drink the regular water, she began to comply. SLP given thin trials via cup. Oral phase: good AP transit. Pharyngeal phase: few delayed swallows noted with weak HLE. Mild weak coughs x2 noted, but maintained dry vocal quality. PLAN:  Recommendations and Planned Interventions:  Diet initiation of puree/mildly thick liquids with STRICT aspiration and GERD precautions advised. All meals and po intake with patient upright in bedside chair. Monitor for overt s/sx of aspiration. Patient continues to benefit from skilled intervention to address the above impairments. Continue treatment per established plan of care. Discharge Recommendations: To Be Determined     SUBJECTIVE:   Patient stated the nurse told me I couldn't have thin liquids.     OBJECTIVE:   Cognitive and Communication Status:  Neurologic State: Alert  Orientation Level: Oriented X4  Cognition: Follows commands     Pain:  Pain Scale 1: Numeric (0 - 10)  Pain Intensity 1: 0       After treatment:   Call bell within reach and Nursing notified    COMMUNICATION/EDUCATION:   Patient was educated regarding her deficit(s) of dysphagia as this relates to her diagnosis of CHF. She demonstrated Good understanding as evidenced by verbal responsiveness. The patient's plan of care including recommendations, planned interventions, and recommended diet changes were discussed with: Registered nurse.      Kely Lentz  Time Calculation: 15 mins

## 2022-05-29 NOTE — PROGRESS NOTES
Hospitalist Progress Note    Subjective:   Daily Progress Note: 5/29/2022 9:08 AM    Patient is resting in chair. Patient becomes somewhat dyspneic while talking. Patient is not on home oxygen. Patient has a cough that is intermittent. Current Facility-Administered Medications   Medication Dose Route Frequency    albuterol-ipratropium (DUO-NEB) 2.5 MG-0.5 MG/3 ML  3 mL Nebulization BID RT    [START ON 5/30/2022] furosemide (LASIX) injection 40 mg  40 mg IntraVENous DAILY    guaiFENesin ER (MUCINEX) tablet 1,200 mg  1,200 mg Oral Q12H    benzonatate (TESSALON) capsule 100 mg  100 mg Oral TID PRN    aspirin delayed-release tablet 81 mg  81 mg Oral DAILY    carvediloL (COREG) tablet 3.125 mg  3.125 mg Oral BID WITH MEALS    sodium chloride (NS) flush 5-10 mL  5-10 mL IntraVENous PRN    sodium chloride (NS) flush 5-40 mL  5-40 mL IntraVENous Q8H    sodium chloride (NS) flush 5-40 mL  5-40 mL IntraVENous PRN    acetaminophen (TYLENOL) tablet 650 mg  650 mg Oral Q6H PRN    Or    acetaminophen (TYLENOL) suppository 650 mg  650 mg Rectal Q6H PRN    polyethylene glycol (MIRALAX) packet 17 g  17 g Oral DAILY PRN    ondansetron (ZOFRAN ODT) tablet 4 mg  4 mg Oral Q8H PRN    Or    ondansetron (ZOFRAN) injection 4 mg  4 mg IntraVENous Q6H PRN    enoxaparin (LOVENOX) injection 40 mg  40 mg SubCUTAneous DAILY    melatonin tablet 5 mg  5 mg Oral QHS PRN        Review of Systems  Review of Systems   Constitutional: Negative. HENT:        + difficulty swallowing   Respiratory: Positive for cough and shortness of breath. Negative for wheezing. Cardiovascular: Positive for leg swelling. Negative for chest pain and palpitations. Gastrointestinal: Negative. All other systems reviewed and are negative.       Objective:     Visit Vitals  /79 (BP 1 Location: Right upper arm, BP Patient Position: At rest;Supine)   Pulse 85   Temp (!) 94.4 °F (34.7 °C)   Resp 20   Ht 5' 6\" (1.676 m)   Wt 60.8 kg (134 lb) SpO2 100%   BMI 21.63 kg/m²    O2 Flow Rate (L/min): 2 l/min O2 Device: Nasal cannula    Temp (24hrs), Av.4 °F (35.8 °C), Min:94.4 °F (34.7 °C), Max:97.5 °F (36.4 °C)      No intake/output data recorded.    1901 -  0700  In: 360 [P.O.:360]  Out: 150 [Urine:150]    Recent Results (from the past 24 hour(s))   BLOOD GAS, ARTERIAL    Collection Time: 22  2:00 PM   Result Value Ref Range    pH 7.46 (H) 7.35 - 7.45      PCO2 34 (L) 35 - 45 mmHg    PO2 86 75 - 100 mmHg    O2 SAT 97 >95 %    BICARBONATE 25 22 - 26 mmol/L    BASE EXCESS 0.8 0 - 2 mmol/L    O2 METHOD Nasal Cannula      O2 FLOW RATE 3.0 L/min    SITE Right Radial      CINDY'S TEST PASS     CBC W/O DIFF    Collection Time: 22  7:36 AM   Result Value Ref Range    WBC 7.5 3.6 - 11.0 K/uL    RBC 4.26 3.80 - 5.20 M/uL    HGB 12.1 11.5 - 16.0 g/dL    HCT 36.3 35.0 - 47.0 %    MCV 85.2 80.0 - 99.0 FL    MCH 28.4 26.0 - 34.0 PG    MCHC 33.3 30.0 - 36.5 g/dL    RDW 14.5 11.5 - 14.5 %    PLATELET 380 (H) 901 - 400 K/uL    MPV 9.2 8.9 - 12.9 FL    NRBC 0.0 0.0  WBC    ABSOLUTE NRBC 0.00 0.00 - 8.67 K/uL   METABOLIC PANEL, BASIC    Collection Time: 22  7:36 AM   Result Value Ref Range    Sodium 126 (L) 136 - 145 mmol/L    Potassium 3.4 (L) 3.5 - 5.1 mmol/L    Chloride 90 (L) 97 - 108 mmol/L    CO2 26 21 - 32 mmol/L    Anion gap 10 5 - 15 mmol/L    Glucose 173 (H) 65 - 100 mg/dL    BUN 16 6 - 20 mg/dL    Creatinine 0.66 0.55 - 1.02 mg/dL    BUN/Creatinine ratio 24 (H) 12 - 20      GFR est AA >60 >60 ml/min/1.73m2    GFR est non-AA >60 >60 ml/min/1.73m2    Calcium 8.4 (L) 8.5 - 10.1 mg/dL   MAGNESIUM    Collection Time: 22  7:36 AM   Result Value Ref Range    Magnesium 1.7 1.6 - 2.4 mg/dL   METABOLIC PANEL, COMPREHENSIVE    Collection Time: 22  7:36 AM   Result Value Ref Range    Sodium 127 (L) 136 - 145 mmol/L    Potassium 3.4 (L) 3.5 - 5.1 mmol/L    Chloride 89 (L) 97 - 108 mmol/L    CO2 25 21 - 32 mmol/L    Anion gap 13 5 - 15 mmol/L    Glucose 173 (H) 65 - 100 mg/dL    BUN 16 6 - 20 mg/dL    Creatinine 0.67 0.55 - 1.02 mg/dL    BUN/Creatinine ratio 24 (H) 12 - 20      GFR est AA >60 >60 ml/min/1.73m2    GFR est non-AA >60 >60 ml/min/1.73m2    Calcium 8.5 8.5 - 10.1 mg/dL    Bilirubin, total 1.0 0.2 - 1.0 mg/dL    AST (SGOT) 35 15 - 37 U/L    ALT (SGPT) 44 12 - 78 U/L    Alk. phosphatase 52 45 - 117 U/L    Protein, total 6.6 6.4 - 8.2 g/dL    Albumin 3.6 3.5 - 5.0 g/dL    Globulin 3.0 2.0 - 4.0 g/dL    A-G Ratio 1.2 1.1 - 2.2     CBC WITH AUTOMATED DIFF    Collection Time: 05/29/22  9:15 AM   Result Value Ref Range    WBC 7.8 3.6 - 11.0 K/uL    RBC 4.29 3.80 - 5.20 M/uL    HGB 12.4 11.5 - 16.0 g/dL    HCT 37.0 35.0 - 47.0 %    MCV 86.2 80.0 - 99.0 FL    MCH 28.9 26.0 - 34.0 PG    MCHC 33.5 30.0 - 36.5 g/dL    RDW 14.6 (H) 11.5 - 14.5 %    PLATELET 906 429 - 809 K/uL    MPV 9.5 8.9 - 12.9 FL    NRBC 0.0 0.0  WBC    ABSOLUTE NRBC 0.00 0.00 - 0.01 K/uL    NEUTROPHILS 72 32 - 75 %    LYMPHOCYTES 13 12 - 49 %    MONOCYTES 12 5 - 13 %    EOSINOPHILS 1 0 - 7 %    BASOPHILS 1 0 - 1 %    IMMATURE GRANULOCYTES 1 (H) 0 - 0.5 %    ABS. NEUTROPHILS 5.7 1.8 - 8.0 K/UL    ABS. LYMPHOCYTES 1.0 0.8 - 3.5 K/UL    ABS. MONOCYTES 0.9 0.0 - 1.0 K/UL    ABS. EOSINOPHILS 0.1 0.0 - 0.4 K/UL    ABS. BASOPHILS 0.0 0.0 - 0.1 K/UL    ABS. IMM. GRANS. 0.1 (H) 0.00 - 0.04 K/UL    DF AUTOMATED     LACTIC ACID    Collection Time: 05/29/22 10:40 AM   Result Value Ref Range    Lactic acid 1.7 0.4 - 2.0 mmol/L        XR CHEST PORT   Final Result   Improved lung aeration. Stable bilateral pleural effusions and   basilar airspace disease. XR CHEST PORT   Final Result      XR SWALLOW FUNC VIDEO    (Results Pending)   XR CHEST PORT    (Results Pending)        PHYSICAL EXAM:    Physical Exam  Vitals and nursing note reviewed. Constitutional:       Appearance: She is ill-appearing. HENT:      Head: Normocephalic and atraumatic.       Mouth/Throat: Comments: Poor dentition  Cardiovascular:      Rate and Rhythm: Normal rate and regular rhythm. Pulmonary:      Effort: No respiratory distress. Breath sounds: No wheezing. Comments: Prolonged exhalation bilaterally on nasal cannula  Abdominal:      General: Bowel sounds are normal. There is no distension. Palpations: Abdomen is soft. Tenderness: There is no abdominal tenderness. Genitourinary:     Comments: No mason present  Musculoskeletal:      Right lower leg: Edema present. Left lower leg: Edema present. Comments: BLE 2+ pitting edema circumferentially around ankle and anterior foot nontender to palpation   Skin:     Comments: BLE: cool   Neurological:      Mental Status: She is alert and oriented to person, place, and time.    Psychiatric:      Comments: Subdued       Data Review    Recent Results (from the past 24 hour(s))   BLOOD GAS, ARTERIAL    Collection Time: 05/28/22  2:00 PM   Result Value Ref Range    pH 7.46 (H) 7.35 - 7.45      PCO2 34 (L) 35 - 45 mmHg    PO2 86 75 - 100 mmHg    O2 SAT 97 >95 %    BICARBONATE 25 22 - 26 mmol/L    BASE EXCESS 0.8 0 - 2 mmol/L    O2 METHOD Nasal Cannula      O2 FLOW RATE 3.0 L/min    SITE Right Radial      CINDY'S TEST PASS     CBC W/O DIFF    Collection Time: 05/29/22  7:36 AM   Result Value Ref Range    WBC 7.5 3.6 - 11.0 K/uL    RBC 4.26 3.80 - 5.20 M/uL    HGB 12.1 11.5 - 16.0 g/dL    HCT 36.3 35.0 - 47.0 %    MCV 85.2 80.0 - 99.0 FL    MCH 28.4 26.0 - 34.0 PG    MCHC 33.3 30.0 - 36.5 g/dL    RDW 14.5 11.5 - 14.5 %    PLATELET 078 (H) 456 - 400 K/uL    MPV 9.2 8.9 - 12.9 FL    NRBC 0.0 0.0  WBC    ABSOLUTE NRBC 0.00 0.00 - 7.81 K/uL   METABOLIC PANEL, BASIC    Collection Time: 05/29/22  7:36 AM   Result Value Ref Range    Sodium 126 (L) 136 - 145 mmol/L    Potassium 3.4 (L) 3.5 - 5.1 mmol/L    Chloride 90 (L) 97 - 108 mmol/L    CO2 26 21 - 32 mmol/L    Anion gap 10 5 - 15 mmol/L    Glucose 173 (H) 65 - 100 mg/dL    BUN 16 6 - 20 mg/dL    Creatinine 0.66 0.55 - 1.02 mg/dL    BUN/Creatinine ratio 24 (H) 12 - 20      GFR est AA >60 >60 ml/min/1.73m2    GFR est non-AA >60 >60 ml/min/1.73m2    Calcium 8.4 (L) 8.5 - 10.1 mg/dL   MAGNESIUM    Collection Time: 05/29/22  7:36 AM   Result Value Ref Range    Magnesium 1.7 1.6 - 2.4 mg/dL   METABOLIC PANEL, COMPREHENSIVE    Collection Time: 05/29/22  7:36 AM   Result Value Ref Range    Sodium 127 (L) 136 - 145 mmol/L    Potassium 3.4 (L) 3.5 - 5.1 mmol/L    Chloride 89 (L) 97 - 108 mmol/L    CO2 25 21 - 32 mmol/L    Anion gap 13 5 - 15 mmol/L    Glucose 173 (H) 65 - 100 mg/dL    BUN 16 6 - 20 mg/dL    Creatinine 0.67 0.55 - 1.02 mg/dL    BUN/Creatinine ratio 24 (H) 12 - 20      GFR est AA >60 >60 ml/min/1.73m2    GFR est non-AA >60 >60 ml/min/1.73m2    Calcium 8.5 8.5 - 10.1 mg/dL    Bilirubin, total 1.0 0.2 - 1.0 mg/dL    AST (SGOT) 35 15 - 37 U/L    ALT (SGPT) 44 12 - 78 U/L    Alk. phosphatase 52 45 - 117 U/L    Protein, total 6.6 6.4 - 8.2 g/dL    Albumin 3.6 3.5 - 5.0 g/dL    Globulin 3.0 2.0 - 4.0 g/dL    A-G Ratio 1.2 1.1 - 2.2     CBC WITH AUTOMATED DIFF    Collection Time: 05/29/22  9:15 AM   Result Value Ref Range    WBC 7.8 3.6 - 11.0 K/uL    RBC 4.29 3.80 - 5.20 M/uL    HGB 12.4 11.5 - 16.0 g/dL    HCT 37.0 35.0 - 47.0 %    MCV 86.2 80.0 - 99.0 FL    MCH 28.9 26.0 - 34.0 PG    MCHC 33.5 30.0 - 36.5 g/dL    RDW 14.6 (H) 11.5 - 14.5 %    PLATELET 644 269 - 682 K/uL    MPV 9.5 8.9 - 12.9 FL    NRBC 0.0 0.0  WBC    ABSOLUTE NRBC 0.00 0.00 - 0.01 K/uL    NEUTROPHILS 72 32 - 75 %    LYMPHOCYTES 13 12 - 49 %    MONOCYTES 12 5 - 13 %    EOSINOPHILS 1 0 - 7 %    BASOPHILS 1 0 - 1 %    IMMATURE GRANULOCYTES 1 (H) 0 - 0.5 %    ABS. NEUTROPHILS 5.7 1.8 - 8.0 K/UL    ABS. LYMPHOCYTES 1.0 0.8 - 3.5 K/UL    ABS. MONOCYTES 0.9 0.0 - 1.0 K/UL    ABS. EOSINOPHILS 0.1 0.0 - 0.4 K/UL    ABS. BASOPHILS 0.0 0.0 - 0.1 K/UL    ABS. IMM.  GRANS. 0.1 (H) 0.00 - 0.04 K/UL    DF AUTOMATED     LACTIC ACID Collection Time: 05/29/22 10:40 AM   Result Value Ref Range    Lactic acid 1.7 0.4 - 2.0 mmol/L        Assessment/Plan:     Active Problems:    CHF (congestive heart failure) ([facility] [address] (5/27/2022)      Acute respiratory failure with hypoxia ([address] (5/29/2022)      Acute on chronic systolic congestive heart failure ([address] (5/29/2022)      Dysphagia (5/29/2022)        Hospital Course:    William Horan is a 79year old female with a PMH of tobacco use who presented with GOINS and orthopnea. In ED tachypneic and tachycardic. Initial lab work significant for sodium of 131, glucose of 252, troponin of 112, [place] [address], and lactic of 2.2. Chest x-ray showed hazy central and basilar reticular markings with moderate to large left pleural effusion. Patient be admitted for further work-up. Patient started on Lasix. Cardiology consulted. Echo showed LV EF  30-35% with LV dilation. Cardiology may do cardiac catheterization. Acute respiratory failure with hypoxia  Pleural effusions  Currently on 2L n/c wean as tolerated    Acute exacerbation of systolic heart failure  Continue on lasix IV and carvedilol  Echo showed LV EF 30-35% with LV dilation  Cardiology following    Dysphagia   SLP recommending pureed diet  MBS on 3/31    DVT Prophylaxis: lovenox  GI Prophylaxis: tolerating po diet  Discharge and disposition barriers: cardiac clearance/catherization 24-48 hrs    Care Plan discussed with: Patient/Family, Nurse and     Total time spent with patient: 35 minutes.

## 2022-05-29 NOTE — PROGRESS NOTES
OT eval order received and acknowledged and attempted at [phone] however pt requesting to rest at this time as pt was up with therapy earlier in day. Will continue to follow pt and attempt OT eval at a later time. Thank you.

## 2022-05-30 ENCOUNTER — APPOINTMENT (OUTPATIENT)
Dept: GENERAL RADIOLOGY | Age: 82
DRG: 246 | End: 2022-05-30
Attending: INTERNAL MEDICINE
Payer: MEDICARE

## 2022-05-30 LAB
ALBUMIN SERPL-MCNC: 3.4 G/DL (ref 3.5–5)
ALBUMIN/GLOB SERPL: 1.2 {RATIO} (ref 1.1–2.2)
ALP SERPL-CCNC: 52 U/L (ref 45–117)
ALT SERPL-CCNC: 42 U/L (ref 12–78)
ANION GAP SERPL CALC-SCNC: 11 MMOL/L (ref 5–15)
AST SERPL W P-5'-P-CCNC: 29 U/L (ref 15–37)
BASOPHILS # BLD: 0 K/UL (ref 0–0.1)
BASOPHILS NFR BLD: 1 % (ref 0–1)
BILIRUB SERPL-MCNC: 1.4 MG/DL (ref 0.2–1)
BUN SERPL-MCNC: 14 MG/DL (ref 6–20)
BUN/CREAT SERPL: 30 (ref 12–20)
CA-I BLD-MCNC: 8.7 MG/DL (ref 8.5–10.1)
CHLORIDE SERPL-SCNC: 85 MMOL/L (ref 97–108)
CHOLEST SERPL-MCNC: 139 MG/DL
CO2 SERPL-SCNC: 28 MMOL/L (ref 21–32)
CREAT SERPL-MCNC: 0.47 MG/DL (ref 0.55–1.02)
DIFFERENTIAL METHOD BLD: ABNORMAL
EOSINOPHIL # BLD: 0 K/UL (ref 0–0.4)
EOSINOPHIL NFR BLD: 0 % (ref 0–7)
ERYTHROCYTE [DISTWIDTH] IN BLOOD BY AUTOMATED COUNT: 14.3 % (ref 11.5–14.5)
GLOBULIN SER CALC-MCNC: 2.9 G/DL (ref 2–4)
GLUCOSE SERPL-MCNC: 158 MG/DL (ref 65–100)
HCT VFR BLD AUTO: 37.8 % (ref 35–47)
HDLC SERPL-MCNC: 47 MG/DL
HDLC SERPL: 3 {RATIO} (ref 0–5)
HGB BLD-MCNC: 12.9 G/DL (ref 11.5–16)
IMM GRANULOCYTES # BLD AUTO: 0.1 K/UL (ref 0–0.04)
IMM GRANULOCYTES NFR BLD AUTO: 1 % (ref 0–0.5)
LDLC SERPL CALC-MCNC: 71.2 MG/DL (ref 0–100)
LIPID PROFILE,FLP: NORMAL
LYMPHOCYTES # BLD: 0.7 K/UL (ref 0.8–3.5)
LYMPHOCYTES NFR BLD: 9 % (ref 12–49)
MCH RBC QN AUTO: 28.4 PG (ref 26–34)
MCHC RBC AUTO-ENTMCNC: 34.1 G/DL (ref 30–36.5)
MCV RBC AUTO: 83.1 FL (ref 80–99)
MONOCYTES # BLD: 1 K/UL (ref 0–1)
MONOCYTES NFR BLD: 12 % (ref 5–13)
NEUTS SEG # BLD: 6 K/UL (ref 1.8–8)
NEUTS SEG NFR BLD: 77 % (ref 32–75)
NRBC # BLD: 0 K/UL (ref 0–0.01)
NRBC BLD-RTO: 0 PER 100 WBC
PLATELET # BLD AUTO: 382 K/UL (ref 150–400)
PMV BLD AUTO: 9 FL (ref 8.9–12.9)
POTASSIUM SERPL-SCNC: 3.4 MMOL/L (ref 3.5–5.1)
PROT SERPL-MCNC: 6.3 G/DL (ref 6.4–8.2)
RBC # BLD AUTO: 4.55 M/UL (ref 3.8–5.2)
SODIUM SERPL-SCNC: 124 MMOL/L (ref 136–145)
TRIGL SERPL-MCNC: 104 MG/DL (ref ?–150)
VLDLC SERPL CALC-MCNC: 20.8 MG/DL
WBC # BLD AUTO: 7.8 K/UL (ref 3.6–11)

## 2022-05-30 PROCEDURE — 74011250637 HC RX REV CODE- 250/637: Performed by: HOSPITALIST

## 2022-05-30 PROCEDURE — 74011250637 HC RX REV CODE- 250/637: Performed by: INTERNAL MEDICINE

## 2022-05-30 PROCEDURE — 80053 COMPREHEN METABOLIC PANEL: CPT

## 2022-05-30 PROCEDURE — 74011000250 HC RX REV CODE- 250: Performed by: INTERNAL MEDICINE

## 2022-05-30 PROCEDURE — 94761 N-INVAS EAR/PLS OXIMETRY MLT: CPT

## 2022-05-30 PROCEDURE — 74011000250 HC RX REV CODE- 250: Performed by: HOSPITALIST

## 2022-05-30 PROCEDURE — 74011250636 HC RX REV CODE- 250/636: Performed by: STUDENT IN AN ORGANIZED HEALTH CARE EDUCATION/TRAINING PROGRAM

## 2022-05-30 PROCEDURE — 74011250636 HC RX REV CODE- 250/636: Performed by: HOSPITALIST

## 2022-05-30 PROCEDURE — 94640 AIRWAY INHALATION TREATMENT: CPT

## 2022-05-30 PROCEDURE — 74011250637 HC RX REV CODE- 250/637: Performed by: STUDENT IN AN ORGANIZED HEALTH CARE EDUCATION/TRAINING PROGRAM

## 2022-05-30 PROCEDURE — 92526 ORAL FUNCTION THERAPY: CPT

## 2022-05-30 PROCEDURE — 80061 LIPID PANEL: CPT

## 2022-05-30 PROCEDURE — 77010033678 HC OXYGEN DAILY

## 2022-05-30 PROCEDURE — 85025 COMPLETE CBC W/AUTO DIFF WBC: CPT

## 2022-05-30 PROCEDURE — 65270000029 HC RM PRIVATE

## 2022-05-30 PROCEDURE — 36415 COLL VENOUS BLD VENIPUNCTURE: CPT

## 2022-05-30 PROCEDURE — 97530 THERAPEUTIC ACTIVITIES: CPT

## 2022-05-30 RX ORDER — SODIUM BICARBONATE 650 MG/1
325 TABLET ORAL 2 TIMES DAILY
Status: DISCONTINUED | OUTPATIENT
Start: 2022-05-30 | End: 2022-05-31

## 2022-05-30 RX ADMIN — GUAIFENESIN 200 MG: 200 SOLUTION ORAL at 17:04

## 2022-05-30 RX ADMIN — GUAIFENESIN 200 MG: 200 SOLUTION ORAL at 21:58

## 2022-05-30 RX ADMIN — ENOXAPARIN SODIUM 40 MG: 100 INJECTION SUBCUTANEOUS at 08:12

## 2022-05-30 RX ADMIN — SODIUM BICARBONATE 325 MG: 650 TABLET ORAL at 14:24

## 2022-05-30 RX ADMIN — MELATONIN TAB 5 MG 5 MG: 5 TAB at 21:58

## 2022-05-30 RX ADMIN — SODIUM CHLORIDE, PRESERVATIVE FREE 10 ML: 5 INJECTION INTRAVENOUS at 13:43

## 2022-05-30 RX ADMIN — FUROSEMIDE 40 MG: 10 INJECTION, SOLUTION INTRAMUSCULAR; INTRAVENOUS at 08:12

## 2022-05-30 RX ADMIN — SODIUM CHLORIDE, PRESERVATIVE FREE 10 ML: 5 INJECTION INTRAVENOUS at 08:13

## 2022-05-30 RX ADMIN — IPRATROPIUM BROMIDE AND ALBUTEROL SULFATE 3 ML: .5; 3 SOLUTION RESPIRATORY (INHALATION) at 20:00

## 2022-05-30 RX ADMIN — SODIUM CHLORIDE, PRESERVATIVE FREE 10 ML: 5 INJECTION INTRAVENOUS at 23:42

## 2022-05-30 RX ADMIN — GUAIFENESIN 200 MG: 200 SOLUTION ORAL at 14:24

## 2022-05-30 RX ADMIN — IPRATROPIUM BROMIDE AND ALBUTEROL SULFATE 3 ML: .5; 3 SOLUTION RESPIRATORY (INHALATION) at 07:36

## 2022-05-30 RX ADMIN — SACUBITRIL AND VALSARTAN 1 TABLET: 24; 26 TABLET, FILM COATED ORAL at 21:58

## 2022-05-30 RX ADMIN — SODIUM BICARBONATE 325 MG: 650 TABLET ORAL at 21:59

## 2022-05-30 RX ADMIN — SACUBITRIL AND VALSARTAN 1 TABLET: 24; 26 TABLET, FILM COATED ORAL at 08:12

## 2022-05-30 RX ADMIN — CARVEDILOL 3.12 MG: 3.12 TABLET, FILM COATED ORAL at 08:12

## 2022-05-30 RX ADMIN — GUAIFENESIN 200 MG: 200 SOLUTION ORAL at 03:14

## 2022-05-30 RX ADMIN — ASPIRIN 81 MG: 81 TABLET, COATED ORAL at 08:12

## 2022-05-30 RX ADMIN — GUAIFENESIN 200 MG: 200 SOLUTION ORAL at 08:12

## 2022-05-30 NOTE — PROGRESS NOTES
PULMONARY NOTE  [facility]    Name: Joanne Baez MRN: [id number]   : 1940 Hospital: [address] [place] [address]   Date: 2022  Admission date: 2022 Hospital Day: 4       HPI:     Hospital Problems  Date Reviewed: 2022          Codes Class Noted POA    Acute respiratory failure with hypoxia [facility]) ICD-10-CM: J96.01  ICD-9-CM: 518.81  2022 Unknown        Acute on chronic systolic congestive heart failure (HCC) ICD-10-CM: I50.23  ICD-9-CM: 428.23, 428.0  2022 Unknown        Dysphagia ICD-10-CM: R13.10  ICD-9-CM: 787.20  2022 Unknown        CHF (congestive heart failure) (HCC) ICD-10-CM: I50.9  ICD-9-CM: 428.0  2022 Unknown                   [x] High complexity decision making was performed  [x] See my orders for details      Subjective/Initial History:     I was asked by Catie Zaman MD to see Joanne Baez  a 79 y.o.  female in consultation     Excerpts from admission 2022 or consult notes as follows:   44-year-old lady came in because of shortness of breath and dyspnea, she is now on 4 L nasal cannula did not sleep well seen by cardiologist diastolic dysfunction she is retaining fluid she has not seen a physician in the past 20 years is not on any oxygen no history of sleep apnea she used to smoke quit smoking so admitted and pulmonary consult was called regarding hypoxia and respiratory management.       No Known Allergies     MAR reviewed and pertinent medications noted or modified as needed     Current Facility-Administered Medications   Medication    albuterol-ipratropium (DUO-NEB) 2.5 MG-0.5 MG/3 ML    furosemide (LASIX) injection 40 mg    benzonatate (TESSALON) capsule 100 mg    sacubitriL-valsartan (ENTRESTO) 24-26 mg tablet 1 Tablet    guaiFENesin (ROBITUSSIN) 100 mg/5 mL oral liquid 200 mg    aspirin delayed-release tablet 81 mg    carvediloL (COREG) tablet 3.125 mg    sodium chloride (NS) flush 5-10 mL    sodium chloride (NS) flush 5-40 mL    sodium chloride (NS) flush 5-40 mL    acetaminophen (TYLENOL) tablet 650 mg    Or    acetaminophen (TYLENOL) suppository 650 mg    polyethylene glycol (MIRALAX) packet 17 g    ondansetron (ZOFRAN ODT) tablet 4 mg    Or    ondansetron (ZOFRAN) injection 4 mg    enoxaparin (LOVENOX) injection 40 mg    melatonin tablet 5 mg      Patient PCP: None  PMH:  has no past medical history on file. PSH:   has no past surgical history on file. FHX: family history is not on file. SHX:  reports that she has quit smoking. She has never used smokeless tobacco. She reports that she does not drink alcohol and does not use drugs. ROS:    Review of Systems   Constitutional: Positive for malaise/fatigue. HENT: Negative. Eyes: Negative. Respiratory: Positive for shortness of breath. Cardiovascular: Positive for orthopnea. Gastrointestinal: Negative. Genitourinary: Negative. Musculoskeletal: Negative. Skin: Negative. Neurological: Negative. Psychiatric/Behavioral: Negative. Objective:     Vital Signs: Telemetry:    normal sinus rhythm Intake/Output:   Visit Vitals  /70 (BP 1 Location: Right upper arm, BP Patient Position: At rest;Sitting)   Pulse 71   Temp 97.2 °F (36.2 °C)   Resp 18   Ht 5' 6\" (1.676 m)   Wt 60.8 kg (134 lb)   SpO2 98%   BMI 21.63 kg/m²       Temp (24hrs), Av.4 °F (35.8 °C), Min:94.7 °F (34.8 °C), Max:97.4 °F (36.3 °C)        O2 Device: Nasal cannula O2 Flow Rate (L/min): 1 l/min       Wt Readings from Last 4 Encounters:   22 60.8 kg (134 lb)        No intake or output data in the 24 hours ending 22 0944    Last shift:      No intake/output data recorded. Last 3 shifts:  1901 -  0700  In: 360 [P.O.:360]  Out: -        Physical Exam:     Physical Exam  Constitutional:       Appearance: Normal appearance. HENT:      Head: Normocephalic and atraumatic.       Nose: Nose normal.      Mouth/Throat:      Mouth: Mucous membranes are moist.   Eyes:      Pupils: Pupils are equal, round, and reactive to light. Cardiovascular:      Rate and Rhythm: Normal rate and regular rhythm. Pulses: Normal pulses. Heart sounds: Normal heart sounds. Pulmonary:      Effort: Pulmonary effort is normal.      Breath sounds: Rales present. Abdominal:      General: Abdomen is flat. Bowel sounds are normal.      Palpations: Abdomen is soft. Musculoskeletal:         General: Swelling present. Cervical back: Normal range of motion and neck supple. Right lower leg: Edema present. Left lower leg: Edema present. Neurological:      General: No focal deficit present. Mental Status: She is alert. Psychiatric:         Mood and Affect: Mood normal.          Labs:    Recent Labs     05/30/22  0733 05/29/22  0915 05/29/22  0736   WBC 7.8 7.8 7.5   HGB 12.9 12.4 12.1    394 401*     Recent Labs     05/30/22  0733 05/29/22  1040 05/29/22  0736 05/28/22  1153 05/28/22  0605 05/28/22  0605 05/27/22  1258 05/27/22  1154 05/27/22  1154   *  --  127*  126* 130*   < > 132*  --    < > 131*   K 3.4*  --  3.4*  3.4* 4.0   < > 4.6  --    < > 4.2   CL 85*  --  89*  90* 98   < > 101  --    < > 99   CO2 28  --  25  26 24   < > 24  --    < > 19*   *  --  173*  173* 179*   < > 152*  --    < > 252*   BUN 14  --  16  16 13   < > 12  --    < > 13   CREA 0.47*  --  0.67  0.66 0.66   < > 0.56  --    < > 0.83   CA 8.7  --  8.5  8.4* 8.9   < > 8.8  --    < > 9.1   MG  --   --  1.7 1.8  --   --   --   --   --    LAC  --  1.7  --   --   --  2.1* 2.2*  --   --    ALB 3.4*  --  3.6  --   --   --   --   --  3.8   ALT 42  --  44  --   --   --   --   --  33    < > = values in this interval not displayed. Recent Labs     05/28/22  1400   PH 7.46*   PCO2 34*   PO2 86   HCO3 25     No results for input(s): CPK, CKNDX, TROIQ in the last 72 hours.     No lab exists for component: CPKMB  No results found for: BNPP, BNP   Lab Results   Component Value Date/Time    Culture result: No growth 3 days 05/27/2022 12:59 PM   No results found for: TSH, TSHEXT, TSHEXT    Imaging:    CXR Results  (Last 48 hours)    None        Results from Hospital Encounter encounter on 05/27/22    XR CHEST PORT    Narrative  Exam: XR CHEST PORT    Indication:  SOB; Comparison: Chest x-ray performed the same day at 11:58 AM.    Findings:    Stable cardiac mediastinal silhouette. Aeration has improved. Bilateral pleural  effusions and associated airspace disease/atelectasis are not significantly  changed. There is no pneumothorax. Osteopenia. No acute bony abnormality. Impression  Improved lung aeration. Stable bilateral pleural effusions and  basilar airspace disease. XR CHEST PORT    Narrative  Chest single view. Hazy central and basilar reticular markings suggests degree dependent  interstitial edema. Moderate to large left pleural effusion. Cardiac and mediastinal structures magnified on this AP view. No pneumothorax    No results found for this or any previous visit. IMPRESSION:   1. Acute hypoxic respiratory failure  2. Congestive heart failure diastolic dysfunction  3. HFrEF ejection fraction 30 to 35%  4. Bilateral pleural effusion  5. Prognosis guarded       RECOMMENDATIONS/PLAN:     3 18-year-old lady with shortness of breath and dyspnea she is on oxygen 4 L nasal cannula she has never been on oxygen at home history of smoking in the past we will start patient on nebulizer treatment not actively wheezing chest x-ray shows congestive changes pleural effusion continue with the Lasix 2D echo to see the right heart pressure and possible she need oxygen when discharged home  2.  Cardiac work-up possible need cardiac cath                 Myranda Queen MD

## 2022-05-30 NOTE — PROGRESS NOTES
Problem: Falls - Risk of  Goal: *Absence of Falls  Description: Document Angella Alva Fall Risk and appropriate interventions in the flowsheet.   Outcome: Progressing Towards Goal  Note: Fall Risk Interventions:  Mobility Interventions: Bed/chair exit alarm,Patient to call before getting OOB         Medication Interventions: Bed/chair exit alarm,Patient to call before getting OOB,Teach patient to arise slowly    Elimination Interventions: Bed/chair exit alarm,Call light in reach,Patient to call for help with toileting needs              Problem: Patient Education: Go to Patient Education Activity  Goal: Patient/Family Education  Outcome: Progressing Towards Goal     Problem: Patient Education: Go to Patient Education Activity  Goal: Patient/Family Education  Outcome: Progressing Towards Goal

## 2022-05-30 NOTE — PROGRESS NOTES
PHYSICAL THERAPY TREATMENT  Patient: Moses Steward (8 y.o. female)  Date: 5/30/2022  Diagnosis: CHF (congestive heart failure) ([facility] [I50.9] <principal problem not specified>       Precautions:    Chart, physical therapy assessment, plan of care and goals were reviewed. ASSESSMENT  Patient received semi-supine in bed , agreeable for PT treatment . Currently, pt on 1L O2, is mod I for rolling to R side , mod I for supine<>sit transfers , demonstrates Intact static/dynamic sitting balance with support . Completed there ex's in unsupported sitting position for b/l LE strengthening . Pt performed STS  with SBA/CGA, CGA for bed<>chair transfers . Pt ambulated - 79'  with Rw, CGA, demonstrates  slow lorena and needs cues to increased step length . Overall, pt tolerates session fair today demonstrating improved ability to perform transfers and ambulation with decreased level of assist of SBA/CGA . Increased ambulatory distance of 79' with RW, SBA/CGA, SPO2 - 98% post ambulation. Pt progressing towards the goals, continues with skilled PT services to address deficits with static/dynamic standing balance , activity tolerance and strength b/l Le impacting overall performance of  transfers/mobility. Recommend d/c to IRF vs HHPT with family care pending caregiver availability. Current Level of Function Impacting Discharge (mobility/balance): Pt requires SBA/CGA for functional transfers/mobility    Other factors to consider for discharge: time since onset, decline from functional baseline         PLAN :  Patient continues to benefit from skilled intervention to address the above impairments. Continue treatment per established plan of care. to address goals.     Recommendation for discharge: (in order for the patient to meet his/her long term goals)  [address]     This discharge recommendation:  Has been made in collaboration with the attending provider and/or case management    IF patient discharges home will need the following DME: none       SUBJECTIVE:   Patient stated  My spouse cannot help me much at home     OBJECTIVE DATA SUMMARY:   Critical Behavior:  Neurologic State: Alert  Orientation Level: Oriented X4  Cognition: Follows commands     Functional Mobility Training:  Bed Mobility:  Rolling: Modified independent  Supine to Sit: Modified independent  Sit to Supine: Modified independent  Scooting: Modified independent        Transfers:  Sit to Stand: Stand-by assistance;Contact guard assistance  Stand to Sit: Stand-by assistance;Contact guard assistance        Bed to Chair: Contact guard assistance    Balance:  Sitting: Intact; Without support  Standing: Impaired; Without support  Standing - Static: Good;Constant support  Standing - Dynamic : Fair;Constant support  Ambulation/Gait Training:  Distance (ft): 70 Feet (ft)  Assistive Device: Walker, rolling;Gait belt  Ambulation - Level of Assistance: Contact guard assistance    Speed/Ayana: Slow  Step Length: Right shortened;Left shortened    Therapeutic Exercises:   AP, LAQ, seated marches - 10 reps  Pain Ratin/10    Activity Tolerance:   Fair and observed SOB with activity  Please refer to the flowsheet for vital signs taken during this treatment. After treatment patient left in no apparent distress:   Supine in bed and Call bell within reach    COMMUNICATION/COLLABORATION:   The patients plan of care was discussed with: Registered nurse. Problem: Mobility Impaired (Adult and Pediatric)  Goal: *Acute Goals and Plan of Care (Insert Text)  Description: Pt stated goal: I want to get stronger and walk    Pt will be I with LE HEP in 7 days. Pt will perform transfers mod I in 7 days. Pt will amb >100 feet with LRAD safely with mod I, SPO2 >90% in 7 days.    Outcome: Progressing Towards Goal       Waqas Wright   Time Calculation: 23 mins

## 2022-05-30 NOTE — PROGRESS NOTES
Progress Note      5/30/2022 5:71 AM  NAME: Navin Scott   MRN:  [id number]   Admit Diagnosis: CHF (congestive heart failure) ([facility] [I50.9]      Problem List:   1. Acute hypoxic respiratory failure  2. Bilateral pleural effusion  3. Acute decompensated heart failure  4.  Dilated cardiomyopathy (ejection fraction = 30-35%)  5.  Heart failure with reduced ejection fraction (HFr EF)     6.  Moderate mitral regurgitation  7.  Remote history of tobacco use  8.  Possible family history of premature coronary artery disease  9.  Elevated cardiac enzymes in the indeterminate range  10.  Elevated troponin-BNP (21,720 pg/mL)  11.  Hyponatremia       Subjective: The patient is seen and examined in room 571. There were no acute cardiovascular events reported overnight. Currently, she denies any cardiovascular complaints. Specifically, she denies chest pain or shortness of breath. We again discussed planned left heart catheterization (LHC) and possible percutaneous coronary intervention (PCI).     Medications Personally Reviewed:    Current Facility-Administered Medications   Medication Dose Route Frequency    albuterol-ipratropium (DUO-NEB) 2.5 MG-0.5 MG/3 ML  3 mL Nebulization BID RT    furosemide (LASIX) injection 40 mg  40 mg IntraVENous DAILY    benzonatate (TESSALON) capsule 100 mg  100 mg Oral TID PRN    sacubitriL-valsartan (ENTRESTO) 24-26 mg tablet 1 Tablet  1 Tablet Oral BID    guaiFENesin (ROBITUSSIN) 100 mg/5 mL oral liquid 200 mg  200 mg Oral QID    aspirin delayed-release tablet 81 mg  81 mg Oral DAILY    carvediloL (COREG) tablet 3.125 mg  3.125 mg Oral BID WITH MEALS    sodium chloride (NS) flush 5-10 mL  5-10 mL IntraVENous PRN    sodium chloride (NS) flush 5-40 mL  5-40 mL IntraVENous Q8H    sodium chloride (NS) flush 5-40 mL  5-40 mL IntraVENous PRN    acetaminophen (TYLENOL) tablet 650 mg  650 mg Oral Q6H PRN    Or    acetaminophen (TYLENOL) suppository 650 mg  650 mg Rectal Q6H PRN    polyethylene glycol (MIRALAX) packet 17 g  17 g Oral DAILY PRN    ondansetron (ZOFRAN ODT) tablet 4 mg  4 mg Oral Q8H PRN    Or    ondansetron (ZOFRAN) injection 4 mg  4 mg IntraVENous Q6H PRN    enoxaparin (LOVENOX) injection 40 mg  40 mg SubCUTAneous DAILY    melatonin tablet 5 mg  5 mg Oral QHS PRN           Objective:      Physical Exam:  Essentially unchanged  Last 24hrs VS reviewed since prior progress note. Most recent are:    Visit Vitals  /70 (BP 1 Location: Right upper arm, BP Patient Position: At rest;Sitting)   Pulse 71   Temp 97.2 °F (36.2 °C)   Resp 18   Ht 5' 6\" (1.676 m)   Wt 60.8 kg (134 lb)   SpO2 98%   BMI 21.63 kg/m²     No intake or output data in the 24 hours ending 05/30/22 0828     Data Review    Telemetry: Sinus rhythm without ventricular ectopy    EKG:   []  No new EKG for review    Lab Data Personally Reviewed:    Recent Labs     05/29/22  0915 05/29/22  0736   WBC 7.8 7.5   HGB 12.4 12.1   HCT 37.0 36.3    401*     No results for input(s): INR, PTP, APTT, INREXT in the last 72 hours. Recent Labs     05/29/22  0736 05/28/22  1153 05/28/22  0605   *  126* 130* 132*   K 3.4*  3.4* 4.0 4.6   CL 89*  90* 98 101   CO2 25  26 24 24   BUN 16  16 13 12   CREA 0.67  0.66 0.66 0.56   *  173* 179* 152*   CA 8.5  8.4* 8.9 8.8   MG 1.7 1.8  --      No results for input(s): CPK, CKNDX, TROIQ in the last 72 hours. No lab exists for component: CPKMB  No results found for: CHOL, CHOLX, CHLST, CHOLV, HDL, HDLP, LDL, LDLC, DLDLP, Sonido Doyne, CHHD, CHHDX    Recent Labs     05/29/22  0736 05/27/22  1154   AP 52 49   TP 6.6 6.7   ALB 3.6 3.8   GLOB 3.0 2.9     Recent Labs     05/28/22  1400   PH 7.46*   PCO2 34*   PO2 86           Assessment/Plan:   1. Continue telemetry monitor  2. Continue to monitor serum electrolytes, and renal function  3. Continue to monitor fluid balance, and daily weights  4.   Continue current cardiovascular medications including splinting, carvedilol, enoxaparin, furosemide, and sacubitril-valsartan  5.   Proceed with planned LHC/PCI tomorrow     Shmuel Srinivasan MD

## 2022-05-30 NOTE — PROGRESS NOTES
Problem: Falls - Risk of  Goal: *Absence of Falls  Description: Document Marie Locket Fall Risk and appropriate interventions in the flowsheet.   Outcome: Progressing Towards Goal  Note: Fall Risk Interventions:  Mobility Interventions: Bed/chair exit alarm         Medication Interventions: Bed/chair exit alarm    Elimination Interventions: Bed/chair exit alarm

## 2022-05-30 NOTE — PROGRESS NOTES
Problem: Dysphagia (Adult)  Goal: *Acute Goals and Plan of Care (Insert Text)  Description: Speech Therapy Swallow Goals  Initiated 5/28/2022  -Patient will tolerate Puree/Mildly thick liquids without clinical indicators of aspiration given moderate cues within 1-3 day(s). -Patient will tolerate PO trials without clinical indicators of aspiration given minimal cues within 1-3 day(s). -Patient will participate in modified barium swallow study within 5 day(s). -Patient will demonstrate understanding of swallow safety precautions and aspiration precautions, diet recs with minimal cues within 1-3 day(s). Outcome: Progressing Towards Goal    SPEECH LANGUAGE PATHOLOGY DYSPHAGIA TREATMENT  Patient: Sabino Baez (79 y.o. female)  Date: 5/30/2022  Diagnosis: CHF (congestive heart failure) ([facility] [I50.9] <principal problem not specified>       Precautions: Aspiration       ASSESSMENT:  Patient positioned upright, alert, cooperative, oriented x2, and agreeable to treatment. Patient reporting she is trying to get used to the pureed and mildly thick liquids and really does not like the food or drinks. SLP provided trials of regular ice water via cup. Oral phase: good AP transit. Pharyngeal phase: no swallow delays noted this day with good HLE via digital palpation. Noted x2 throat clearings, but maintained dry vocal quality. PLAN:  Recommendations and Planned Interventions:  Continue with puree/mildly thick liquids with STRICT aspiration and GERD precautions advised. All meals and po intake with patient upright in bedside chair. Monitor for overt s/sx of aspiration. Patient continues to benefit from skilled intervention to address the above impairments. Continue treatment per established plan of care. Discharge Recommendations: To Be Determined     SUBJECTIVE:   Patient stated I don't like this food or liquids.     OBJECTIVE:   Cognitive and Communication Status:  Neurologic State: Alert  Orientation Level: Oriented X4  Cognition: Follows commands           Pain:  Pain Scale 1: Numeric (0 - 10)  Pain Intensity 1: 0       After treatment:   Call bell within reach and Nursing notified    COMMUNICATION/EDUCATION:   Patient was educated regarding her deficit(s) of dysphagia, diet recommendations, and use of compensatory strategies. She demonstrated Good understanding as evidenced by verbal responsiveness. The patient's plan of care including recommendations, planned interventions, and recommended diet changes were discussed with: Registered nurse.      Kely Lentz  Time Calculation: 15 mins

## 2022-05-30 NOTE — PROGRESS NOTES
Hospitalist Progress Note    Subjective:   Daily Progress Note: 5/30/2022 9:08 AM    Patient breathing and swelling has improved. Patient was able to tolerate swallowing water today without coughing. Current Facility-Administered Medications   Medication Dose Route Frequency    albuterol-ipratropium (DUO-NEB) 2.5 MG-0.5 MG/3 ML  3 mL Nebulization BID RT    furosemide (LASIX) injection 40 mg  40 mg IntraVENous DAILY    benzonatate (TESSALON) capsule 100 mg  100 mg Oral TID PRN    sacubitriL-valsartan (ENTRESTO) 24-26 mg tablet 1 Tablet  1 Tablet Oral BID    guaiFENesin (ROBITUSSIN) 100 mg/5 mL oral liquid 200 mg  200 mg Oral QID    aspirin delayed-release tablet 81 mg  81 mg Oral DAILY    carvediloL (COREG) tablet 3.125 mg  3.125 mg Oral BID WITH MEALS    sodium chloride (NS) flush 5-10 mL  5-10 mL IntraVENous PRN    sodium chloride (NS) flush 5-40 mL  5-40 mL IntraVENous Q8H    sodium chloride (NS) flush 5-40 mL  5-40 mL IntraVENous PRN    acetaminophen (TYLENOL) tablet 650 mg  650 mg Oral Q6H PRN    Or    acetaminophen (TYLENOL) suppository 650 mg  650 mg Rectal Q6H PRN    polyethylene glycol (MIRALAX) packet 17 g  17 g Oral DAILY PRN    ondansetron (ZOFRAN ODT) tablet 4 mg  4 mg Oral Q8H PRN    Or    ondansetron (ZOFRAN) injection 4 mg  4 mg IntraVENous Q6H PRN    enoxaparin (LOVENOX) injection 40 mg  40 mg SubCUTAneous DAILY    melatonin tablet 5 mg  5 mg Oral QHS PRN        Review of Systems  Review of Systems   Constitutional: Negative. HENT:        + difficulty swallowing   Respiratory: Positive for shortness of breath. Negative for cough and wheezing. Cardiovascular: Positive for leg swelling. Negative for chest pain and palpitations. Gastrointestinal: Negative. All other systems reviewed and are negative.       Objective:     Visit Vitals  /70 (BP 1 Location: Right upper arm, BP Patient Position: At rest;Sitting)   Pulse 71   Temp 97.2 °F (36.2 °C)   Resp 18   Ht 5' 6\" (1.676 m)   Wt 60.8 kg (134 lb)   SpO2 98%   BMI 21.63 kg/m²    O2 Flow Rate (L/min): 1 l/min O2 Device: Nasal cannula    Temp (24hrs), Av.4 °F (35.8 °C), Min:94.7 °F (34.8 °C), Max:97.4 °F (36.3 °C)      No intake/output data recorded.  1901 -  0700  In: 360 [P.O.:360]  Out: -     Recent Results (from the past 24 hour(s))   CBC WITH AUTOMATED DIFF    Collection Time: 22  9:15 AM   Result Value Ref Range    WBC 7.8 3.6 - 11.0 K/uL    RBC 4.29 3.80 - 5.20 M/uL    HGB 12.4 11.5 - 16.0 g/dL    HCT 37.0 35.0 - 47.0 %    MCV 86.2 80.0 - 99.0 FL    MCH 28.9 26.0 - 34.0 PG    MCHC 33.5 30.0 - 36.5 g/dL    RDW 14.6 (H) 11.5 - 14.5 %    PLATELET 081 495 - 882 K/uL    MPV 9.5 8.9 - 12.9 FL    NRBC 0.0 0.0  WBC    ABSOLUTE NRBC 0.00 0.00 - 0.01 K/uL    NEUTROPHILS 72 32 - 75 %    LYMPHOCYTES 13 12 - 49 %    MONOCYTES 12 5 - 13 %    EOSINOPHILS 1 0 - 7 %    BASOPHILS 1 0 - 1 %    IMMATURE GRANULOCYTES 1 (H) 0 - 0.5 %    ABS. NEUTROPHILS 5.7 1.8 - 8.0 K/UL    ABS. LYMPHOCYTES 1.0 0.8 - 3.5 K/UL    ABS. MONOCYTES 0.9 0.0 - 1.0 K/UL    ABS. EOSINOPHILS 0.1 0.0 - 0.4 K/UL    ABS. BASOPHILS 0.0 0.0 - 0.1 K/UL    ABS. IMM. GRANS. 0.1 (H) 0.00 - 0.04 K/UL    DF AUTOMATED     LACTIC ACID    Collection Time: 22 10:40 AM   Result Value Ref Range    Lactic acid 1.7 0.4 - 2.0 mmol/L        XR CHEST PORT   Final Result   Improved lung aeration. Stable bilateral pleural effusions and   basilar airspace disease. XR CHEST PORT   Final Result      XR SWALLOW FUNC VIDEO    (Results Pending)   XR CHEST PORT    (Results Pending)        PHYSICAL EXAM:    Physical Exam  Vitals and nursing note reviewed. Constitutional:       Appearance: She is ill-appearing. HENT:      Head: Normocephalic and atraumatic. Mouth/Throat:      Mouth: Mucous membranes are dry. Comments: Poor dentition  Cardiovascular:      Rate and Rhythm: Normal rate and regular rhythm.    Pulmonary:      Effort: No respiratory distress. Breath sounds: No wheezing. Comments: Prolonged exhalation bilaterally on nasal cannula  Abdominal:      General: Bowel sounds are normal. There is no distension. Palpations: Abdomen is soft. Tenderness: There is no abdominal tenderness. Genitourinary:     Comments: No mason present  Musculoskeletal:      Right lower leg: Edema present. Left lower leg: Edema present. Comments: RLE: 2+ pitting edema anterior shin to anterior foot nontender to palpation    LLE: 1+ pitting edema circumferentially around ankle and anterior foot nontender to palpation   Skin:     Comments: BLE: cool   Neurological:      Mental Status: She is alert and oriented to person, place, and time. Psychiatric:      Comments: Subdued       Data Review    Recent Results (from the past 24 hour(s))   CBC WITH AUTOMATED DIFF    Collection Time: 05/29/22  9:15 AM   Result Value Ref Range    WBC 7.8 3.6 - 11.0 K/uL    RBC 4.29 3.80 - 5.20 M/uL    HGB 12.4 11.5 - 16.0 g/dL    HCT 37.0 35.0 - 47.0 %    MCV 86.2 80.0 - 99.0 FL    MCH 28.9 26.0 - 34.0 PG    MCHC 33.5 30.0 - 36.5 g/dL    RDW 14.6 (H) 11.5 - 14.5 %    PLATELET 220 769 - 139 K/uL    MPV 9.5 8.9 - 12.9 FL    NRBC 0.0 0.0  WBC    ABSOLUTE NRBC 0.00 0.00 - 0.01 K/uL    NEUTROPHILS 72 32 - 75 %    LYMPHOCYTES 13 12 - 49 %    MONOCYTES 12 5 - 13 %    EOSINOPHILS 1 0 - 7 %    BASOPHILS 1 0 - 1 %    IMMATURE GRANULOCYTES 1 (H) 0 - 0.5 %    ABS. NEUTROPHILS 5.7 1.8 - 8.0 K/UL    ABS. LYMPHOCYTES 1.0 0.8 - 3.5 K/UL    ABS. MONOCYTES 0.9 0.0 - 1.0 K/UL    ABS. EOSINOPHILS 0.1 0.0 - 0.4 K/UL    ABS. BASOPHILS 0.0 0.0 - 0.1 K/UL    ABS. IMM.  GRANS. 0.1 (H) 0.00 - 0.04 K/UL    DF AUTOMATED     LACTIC ACID    Collection Time: 05/29/22 10:40 AM   Result Value Ref Range    Lactic acid 1.7 0.4 - 2.0 mmol/L        Assessment/Plan:     Active Problems:    CHF (congestive heart failure) ([facility] (5/27/2022)      Acute respiratory failure with hypoxia (HCC) (5/29/2022)      Acute on chronic systolic congestive heart failure ([address] (5/29/2022)      Dysphagia (5/29/2022)        Hospital Course:    Beckie Jalloh is a 79year old female with a PMH of tobacco use who presented with GOINS and orthopnea. In ED tachypneic and tachycardic. Initial lab work significant for sodium of 131, glucose of 252, troponin of 112, [place] [address], and lactic of 2.2. Chest x-ray showed hazy central and basilar reticular markings with moderate to large left pleural effusion. Patient be admitted for further work-up. Patient started on Lasix. Cardiology and pulmnology consulted. Echo showed LV EF  30-35% with LV dilation. Cardiology considering LHC/PCI. Acute respiratory failure with hypoxia  Pleural effusions, bilateral  Currently on 1L n/c wean as tolerated  Duonebs every 2 hours    Acute exacerbation of systolic heart failure  Continue on carvedilol and entresto  Wean IV lasix as tolerated  Echo showed LV EF 30-35% with LV dilation  Cardiology considering LHC/PCI  Cardiology following    Hyponatremia  2/2 diuresis  Will monitor and give sodium bicarb as to not worsen AEHF    Dysphagia   SLP recommending pureed diet  MBS on 3/31    Debility  PT recommending IRF    DVT Prophylaxis: lovenox  GI Prophylaxis: tolerating po diet  Discharge and disposition barriers: cardiac clearance, IV lasix, O2 weaning, 24 - 48 hrs    Care Plan discussed with: Patient/Family, Nurse and     Total time spent with patient: 35 minutes.

## 2022-05-31 ENCOUNTER — APPOINTMENT (OUTPATIENT)
Dept: GENERAL RADIOLOGY | Age: 82
DRG: 246 | End: 2022-05-31
Attending: INTERNAL MEDICINE
Payer: MEDICARE

## 2022-05-31 LAB
ALBUMIN SERPL-MCNC: 3 G/DL (ref 3.5–5)
ALBUMIN/GLOB SERPL: 1.2 {RATIO} (ref 1.1–2.2)
ALP SERPL-CCNC: 43 U/L (ref 45–117)
ALT SERPL-CCNC: 30 U/L (ref 12–78)
ANION GAP SERPL CALC-SCNC: 8 MMOL/L (ref 5–15)
AST SERPL W P-5'-P-CCNC: 20 U/L (ref 15–37)
BASOPHILS # BLD: 0 K/UL (ref 0–0.1)
BASOPHILS NFR BLD: 0 % (ref 0–1)
BILIRUB SERPL-MCNC: 1.1 MG/DL (ref 0.2–1)
BUN SERPL-MCNC: 13 MG/DL (ref 6–20)
BUN/CREAT SERPL: 24 (ref 12–20)
CA-I BLD-MCNC: 8.4 MG/DL (ref 8.5–10.1)
CHLORIDE SERPL-SCNC: 86 MMOL/L (ref 97–108)
CO2 SERPL-SCNC: 31 MMOL/L (ref 21–32)
CREAT SERPL-MCNC: 0.54 MG/DL (ref 0.55–1.02)
DIFFERENTIAL METHOD BLD: ABNORMAL
EOSINOPHIL # BLD: 0 K/UL (ref 0–0.4)
EOSINOPHIL NFR BLD: 1 % (ref 0–7)
ERYTHROCYTE [DISTWIDTH] IN BLOOD BY AUTOMATED COUNT: 14.6 % (ref 11.5–14.5)
GLOBULIN SER CALC-MCNC: 2.5 G/DL (ref 2–4)
GLUCOSE SERPL-MCNC: 129 MG/DL (ref 65–100)
HCT VFR BLD AUTO: 36.8 % (ref 35–47)
HGB BLD-MCNC: 12.8 G/DL (ref 11.5–16)
IMM GRANULOCYTES # BLD AUTO: 0.1 K/UL (ref 0–0.04)
IMM GRANULOCYTES NFR BLD AUTO: 1 % (ref 0–0.5)
LYMPHOCYTES # BLD: 0.6 K/UL (ref 0.8–3.5)
LYMPHOCYTES NFR BLD: 7 % (ref 12–49)
MCH RBC QN AUTO: 28.8 PG (ref 26–34)
MCHC RBC AUTO-ENTMCNC: 34.8 G/DL (ref 30–36.5)
MCV RBC AUTO: 82.7 FL (ref 80–99)
MONOCYTES # BLD: 1.1 K/UL (ref 0–1)
MONOCYTES NFR BLD: 13 % (ref 5–13)
NEUTS SEG # BLD: 6.5 K/UL (ref 1.8–8)
NEUTS SEG NFR BLD: 78 % (ref 32–75)
NRBC # BLD: 0 K/UL (ref 0–0.01)
NRBC BLD-RTO: 0 PER 100 WBC
PLATELET # BLD AUTO: 330 K/UL (ref 150–400)
PMV BLD AUTO: 8.9 FL (ref 8.9–12.9)
POTASSIUM SERPL-SCNC: 2.8 MMOL/L (ref 3.5–5.1)
PROT SERPL-MCNC: 5.5 G/DL (ref 6.4–8.2)
RBC # BLD AUTO: 4.45 M/UL (ref 3.8–5.2)
SODIUM SERPL-SCNC: 125 MMOL/L (ref 136–145)
TROPONIN-HIGH SENSITIVITY: 510 NG/L (ref 0–51)
TROPONIN-HIGH SENSITIVITY: 670 NG/L (ref 0–51)
TROPONIN-HIGH SENSITIVITY: 700 NG/L (ref 0–51)
WBC # BLD AUTO: 8.3 K/UL (ref 3.6–11)

## 2022-05-31 PROCEDURE — 94761 N-INVAS EAR/PLS OXIMETRY MLT: CPT

## 2022-05-31 PROCEDURE — 74011000250 HC RX REV CODE- 250: Performed by: INTERNAL MEDICINE

## 2022-05-31 PROCEDURE — 85025 COMPLETE CBC W/AUTO DIFF WBC: CPT

## 2022-05-31 PROCEDURE — 2709999900 HC NON-CHARGEABLE SUPPLY: Performed by: INTERNAL MEDICINE

## 2022-05-31 PROCEDURE — 92611 MOTION FLUOROSCOPY/SWALLOW: CPT

## 2022-05-31 PROCEDURE — 92928 PRQ TCAT PLMT NTRAC ST 1 LES: CPT | Performed by: INTERNAL MEDICINE

## 2022-05-31 PROCEDURE — 77030025703 HC SYR ANGI VACLOK MRTM -A: Performed by: INTERNAL MEDICINE

## 2022-05-31 PROCEDURE — 77030016700 HC CATH ANGI DX INFN2 CARD -B: Performed by: INTERNAL MEDICINE

## 2022-05-31 PROCEDURE — 76210000002 HC OR PH I REC 3 TO 3.5 HR: Performed by: INTERNAL MEDICINE

## 2022-05-31 PROCEDURE — 74011250637 HC RX REV CODE- 250/637: Performed by: INTERNAL MEDICINE

## 2022-05-31 PROCEDURE — 93005 ELECTROCARDIOGRAM TRACING: CPT

## 2022-05-31 PROCEDURE — 74011250636 HC RX REV CODE- 250/636: Performed by: INTERNAL MEDICINE

## 2022-05-31 PROCEDURE — 93010 ELECTROCARDIOGRAM REPORT: CPT | Performed by: INTERNAL MEDICINE

## 2022-05-31 PROCEDURE — 77030008542 HC TBNG MON PRSS EDWD -A: Performed by: INTERNAL MEDICINE

## 2022-05-31 PROCEDURE — 84484 ASSAY OF TROPONIN QUANT: CPT

## 2022-05-31 PROCEDURE — 4A023N7 MEASUREMENT OF CARDIAC SAMPLING AND PRESSURE, LEFT HEART, PERCUTANEOUS APPROACH: ICD-10-PCS | Performed by: INTERNAL MEDICINE

## 2022-05-31 PROCEDURE — C1894 INTRO/SHEATH, NON-LASER: HCPCS | Performed by: INTERNAL MEDICINE

## 2022-05-31 PROCEDURE — 94640 AIRWAY INHALATION TREATMENT: CPT

## 2022-05-31 PROCEDURE — 74011250637 HC RX REV CODE- 250/637: Performed by: HOSPITALIST

## 2022-05-31 PROCEDURE — 027034Z DILATION OF CORONARY ARTERY, ONE ARTERY WITH DRUG-ELUTING INTRALUMINAL DEVICE, PERCUTANEOUS APPROACH: ICD-10-PCS | Performed by: INTERNAL MEDICINE

## 2022-05-31 PROCEDURE — 71045 X-RAY EXAM CHEST 1 VIEW: CPT

## 2022-05-31 PROCEDURE — B2111ZZ FLUOROSCOPY OF MULTIPLE CORONARY ARTERIES USING LOW OSMOLAR CONTRAST: ICD-10-PCS | Performed by: INTERNAL MEDICINE

## 2022-05-31 PROCEDURE — 93458 L HRT ARTERY/VENTRICLE ANGIO: CPT | Performed by: INTERNAL MEDICINE

## 2022-05-31 PROCEDURE — 65270000029 HC RM PRIVATE

## 2022-05-31 PROCEDURE — C1887 CATHETER, GUIDING: HCPCS | Performed by: INTERNAL MEDICINE

## 2022-05-31 PROCEDURE — 36415 COLL VENOUS BLD VENIPUNCTURE: CPT

## 2022-05-31 PROCEDURE — 77030013516 HC DEV INFL ANGI MRTM -B: Performed by: INTERNAL MEDICINE

## 2022-05-31 PROCEDURE — 74011000250 HC RX REV CODE- 250: Performed by: HOSPITALIST

## 2022-05-31 PROCEDURE — 77030029997 HC DEV COM RDL R BND TELE -B: Performed by: INTERNAL MEDICINE

## 2022-05-31 PROCEDURE — 80053 COMPREHEN METABOLIC PANEL: CPT

## 2022-05-31 PROCEDURE — 77030040934 HC CATH DIAG DXTERITY MEDT -A: Performed by: INTERNAL MEDICINE

## 2022-05-31 PROCEDURE — C1725 CATH, TRANSLUMIN NON-LASER: HCPCS | Performed by: INTERNAL MEDICINE

## 2022-05-31 PROCEDURE — 99152 MOD SED SAME PHYS/QHP 5/>YRS: CPT | Performed by: INTERNAL MEDICINE

## 2022-05-31 PROCEDURE — 77030012468 HC VLV BLEEDBK CNTRL ABBT -B: Performed by: INTERNAL MEDICINE

## 2022-05-31 PROCEDURE — 77010033678 HC OXYGEN DAILY

## 2022-05-31 PROCEDURE — 74011000258 HC RX REV CODE- 258: Performed by: INTERNAL MEDICINE

## 2022-05-31 PROCEDURE — 99153 MOD SED SAME PHYS/QHP EA: CPT | Performed by: INTERNAL MEDICINE

## 2022-05-31 PROCEDURE — 74230 X-RAY XM SWLNG FUNCJ C+: CPT

## 2022-05-31 PROCEDURE — C1874 STENT, COATED/COV W/DEL SYS: HCPCS | Performed by: INTERNAL MEDICINE

## 2022-05-31 PROCEDURE — C1769 GUIDE WIRE: HCPCS | Performed by: INTERNAL MEDICINE

## 2022-05-31 PROCEDURE — 74011000636 HC RX REV CODE- 636: Performed by: INTERNAL MEDICINE

## 2022-05-31 DEVICE — STENT RONYX30022UX RESOLUTE ONYX 3.00X22
Type: IMPLANTABLE DEVICE | Status: FUNCTIONAL
Brand: RESOLUTE ONYX™

## 2022-05-31 RX ORDER — ATORVASTATIN CALCIUM 40 MG/1
40 TABLET, FILM COATED ORAL
Status: DISCONTINUED | OUTPATIENT
Start: 2022-05-31 | End: 2022-06-01 | Stop reason: HOSPADM

## 2022-05-31 RX ORDER — NITROGLYCERIN 5 MG/ML
INJECTION, SOLUTION INTRAVENOUS AS NEEDED
Status: DISCONTINUED | OUTPATIENT
Start: 2022-05-31 | End: 2022-05-31 | Stop reason: HOSPADM

## 2022-05-31 RX ORDER — CLOPIDOGREL BISULFATE 75 MG/1
75 TABLET ORAL DAILY
Status: DISCONTINUED | OUTPATIENT
Start: 2022-05-31 | End: 2022-06-01 | Stop reason: HOSPADM

## 2022-05-31 RX ORDER — HEPARIN SODIUM 1000 [USP'U]/ML
INJECTION, SOLUTION INTRAVENOUS; SUBCUTANEOUS AS NEEDED
Status: DISCONTINUED | OUTPATIENT
Start: 2022-05-31 | End: 2022-05-31 | Stop reason: HOSPADM

## 2022-05-31 RX ORDER — HEPARIN SODIUM 200 [USP'U]/100ML
INJECTION, SOLUTION INTRAVENOUS
Status: COMPLETED | OUTPATIENT
Start: 2022-05-31 | End: 2022-05-31

## 2022-05-31 RX ORDER — ALPRAZOLAM 0.5 MG/1
0.5 TABLET ORAL ONCE
Status: COMPLETED | OUTPATIENT
Start: 2022-05-31 | End: 2022-05-31

## 2022-05-31 RX ORDER — SODIUM CHLORIDE 0.9 % (FLUSH) 0.9 %
5-40 SYRINGE (ML) INJECTION EVERY 8 HOURS
Status: DISCONTINUED | OUTPATIENT
Start: 2022-05-31 | End: 2022-06-01 | Stop reason: HOSPADM

## 2022-05-31 RX ORDER — FENTANYL CITRATE 50 UG/ML
INJECTION, SOLUTION INTRAMUSCULAR; INTRAVENOUS AS NEEDED
Status: DISCONTINUED | OUTPATIENT
Start: 2022-05-31 | End: 2022-05-31 | Stop reason: HOSPADM

## 2022-05-31 RX ORDER — SODIUM CHLORIDE 0.9 % (FLUSH) 0.9 %
5-40 SYRINGE (ML) INJECTION AS NEEDED
Status: DISCONTINUED | OUTPATIENT
Start: 2022-05-31 | End: 2022-06-01 | Stop reason: HOSPADM

## 2022-05-31 RX ORDER — POTASSIUM CHLORIDE 1.5 G/1.77G
40 POWDER, FOR SOLUTION ORAL 2 TIMES DAILY WITH MEALS
Status: DISCONTINUED | OUTPATIENT
Start: 2022-05-31 | End: 2022-06-01 | Stop reason: HOSPADM

## 2022-05-31 RX ORDER — SODIUM CHLORIDE 9 MG/ML
INJECTION, SOLUTION INTRAVENOUS
Status: COMPLETED | OUTPATIENT
Start: 2022-05-31 | End: 2022-05-31

## 2022-05-31 RX ORDER — CLOPIDOGREL 300 MG/1
TABLET, FILM COATED ORAL AS NEEDED
Status: DISCONTINUED | OUTPATIENT
Start: 2022-05-31 | End: 2022-05-31 | Stop reason: HOSPADM

## 2022-05-31 RX ADMIN — ATORVASTATIN CALCIUM 40 MG: 40 TABLET, FILM COATED ORAL at 21:55

## 2022-05-31 RX ADMIN — IPRATROPIUM BROMIDE AND ALBUTEROL SULFATE 3 ML: .5; 3 SOLUTION RESPIRATORY (INHALATION) at 20:58

## 2022-05-31 RX ADMIN — SODIUM CHLORIDE, PRESERVATIVE FREE 10 ML: 5 INJECTION INTRAVENOUS at 14:30

## 2022-05-31 RX ADMIN — SODIUM CHLORIDE, PRESERVATIVE FREE 10 ML: 5 INJECTION INTRAVENOUS at 04:38

## 2022-05-31 RX ADMIN — SODIUM CHLORIDE, PRESERVATIVE FREE 10 ML: 5 INJECTION INTRAVENOUS at 14:00

## 2022-05-31 RX ADMIN — IPRATROPIUM BROMIDE AND ALBUTEROL SULFATE 3 ML: .5; 3 SOLUTION RESPIRATORY (INHALATION) at 07:18

## 2022-05-31 RX ADMIN — BARIUM SULFATE 90 ML: 0.81 POWDER, FOR SUSPENSION ORAL at 14:06

## 2022-05-31 RX ADMIN — ALPRAZOLAM 0.5 MG: 0.5 TABLET ORAL at 00:20

## 2022-05-31 RX ADMIN — GUAIFENESIN 200 MG: 200 SOLUTION ORAL at 14:59

## 2022-05-31 RX ADMIN — SODIUM CHLORIDE, PRESERVATIVE FREE 10 ML: 5 INJECTION INTRAVENOUS at 22:00

## 2022-05-31 RX ADMIN — BARIUM SULFATE 10 ML: 400 SUSPENSION ORAL at 14:30

## 2022-05-31 RX ADMIN — BARIUM SULFATE 25 ML: 400 SUSPENSION ORAL at 14:30

## 2022-05-31 RX ADMIN — BARIUM SULFATE 15 ML: 400 PASTE ORAL at 14:00

## 2022-05-31 RX ADMIN — ASPIRIN 81 MG: 81 TABLET, COATED ORAL at 14:59

## 2022-05-31 RX ADMIN — SODIUM CHLORIDE, PRESERVATIVE FREE 10 ML: 5 INJECTION INTRAVENOUS at 22:01

## 2022-05-31 RX ADMIN — POTASSIUM CHLORIDE 40 MEQ: 1.5 POWDER, FOR SOLUTION ORAL at 14:59

## 2022-05-31 RX ADMIN — GUAIFENESIN 200 MG: 200 SOLUTION ORAL at 21:55

## 2022-05-31 RX ADMIN — MELATONIN TAB 5 MG 5 MG: 5 TAB at 21:55

## 2022-05-31 NOTE — PROGRESS NOTES
Hospitalist Progress Note    Subjective:   Daily Progress Note: 7/75/4140 3:49 AM    Adriana Seymour is a 79year old female with a PMH of tobacco use who presented with GOINS and orthopnea. In ED tachypneic and tachycardic. Initial lab work significant for sodium of 131, glucose of 252, troponin of 112, [place] [address], and lactic of 2.2. Chest x-ray showed hazy central and basilar reticular markings with moderate to large left pleural effusion. Patient be admitted for further work-up. Patient started on Lasix. Cardiology and pulmnology consulted. Echo showed LV EF  30-35% with LV dilation. Cardiology considering LHC/PCI.   LHC performed today on 5/31, stent placed and will transfer to     Current Facility-Administered Medications   Medication Dose Route Frequency    atorvastatin (LIPITOR) tablet 40 mg  40 mg Oral QHS    clopidogreL (PLAVIX) tablet 75 mg  75 mg Oral DAILY    sodium chloride (NS) flush 5-40 mL  5-40 mL IntraVENous Q8H    sodium chloride (NS) flush 5-40 mL  5-40 mL IntraVENous PRN    albuterol-ipratropium (DUO-NEB) 2.5 MG-0.5 MG/3 ML  3 mL Nebulization BID RT    benzonatate (TESSALON) capsule 100 mg  100 mg Oral TID PRN    [Held by provider] sacubitriL-valsartan (ENTRESTO) 24-26 mg tablet 1 Tablet  1 Tablet Oral BID    guaiFENesin (ROBITUSSIN) 100 mg/5 mL oral liquid 200 mg  200 mg Oral QID    aspirin delayed-release tablet 81 mg  81 mg Oral DAILY    [Held by provider] carvediloL (COREG) tablet 3.125 mg  3.125 mg Oral BID WITH MEALS    sodium chloride (NS) flush 5-10 mL  5-10 mL IntraVENous PRN    sodium chloride (NS) flush 5-40 mL  5-40 mL IntraVENous Q8H    sodium chloride (NS) flush 5-40 mL  5-40 mL IntraVENous PRN    acetaminophen (TYLENOL) tablet 650 mg  650 mg Oral Q6H PRN    Or    acetaminophen (TYLENOL) suppository 650 mg  650 mg Rectal Q6H PRN    polyethylene glycol (MIRALAX) packet 17 g  17 g Oral DAILY PRN    ondansetron (ZOFRAN ODT) tablet 4 mg  4 mg Oral Q8H PRN    Or    ondansetron (ZOFRAN) injection 4 mg  4 mg IntraVENous Q6H PRN    enoxaparin (LOVENOX) injection 40 mg  40 mg SubCUTAneous DAILY    melatonin tablet 5 mg  5 mg Oral QHS PRN        Review of Systems  Review of Systems   Constitutional: Negative. HENT:        + difficulty swallowing   Respiratory: Positive for shortness of breath. Negative for cough and wheezing. Cardiovascular: Positive for leg swelling. Negative for chest pain and palpitations. Gastrointestinal: Negative. All other systems reviewed and are negative. Objective:     Visit Vitals  BP (!) 109/57   Pulse 68   Temp 97 °F (36.1 °C)   Resp 23   Ht 5' 6\" (1.676 m)   Wt 60.8 kg (134 lb)   SpO2 98%   BMI 21.63 kg/m²    O2 Flow Rate (L/min): 3 l/min O2 Device: Nasal cannula    Temp (24hrs), Av.2 °F (36.8 °C), Min:97 °F (36.1 °C), Max:98.9 °F (37.2 °C)      No intake/output data recorded.  1901 -  0700  In: 150 [P.O.:150]  Out: 450 [Urine:450]    Recent Results (from the past 24 hour(s))   TROPONIN-HIGH SENSITIVITY    Collection Time: 22  1:28 AM   Result Value Ref Range    Troponin-High Sensitivity 670 (HH) 0 - 51 ng/L   METABOLIC PANEL, COMPREHENSIVE    Collection Time: 22  7:00 AM   Result Value Ref Range    Sodium 125 (L) 136 - 145 mmol/L    Potassium 2.8 (L) 3.5 - 5.1 mmol/L    Chloride 86 (L) 97 - 108 mmol/L    CO2 31 21 - 32 mmol/L    Anion gap 8 5 - 15 mmol/L    Glucose 129 (H) 65 - 100 mg/dL    BUN 13 6 - 20 mg/dL    Creatinine 0.54 (L) 0.55 - 1.02 mg/dL    BUN/Creatinine ratio 24 (H) 12 - 20      GFR est AA >60 >60 ml/min/1.73m2    GFR est non-AA >60 >60 ml/min/1.73m2    Calcium 8.4 (L) 8.5 - 10.1 mg/dL    Bilirubin, total 1.1 (H) 0.2 - 1.0 mg/dL    AST (SGOT) 20 15 - 37 U/L    ALT (SGPT) 30 12 - 78 U/L    Alk.  phosphatase 43 (L) 45 - 117 U/L    Protein, total 5.5 (L) 6.4 - 8.2 g/dL    Albumin 3.0 (L) 3.5 - 5.0 g/dL    Globulin 2.5 2.0 - 4.0 g/dL    A-G Ratio 1.2 1.1 - 2.2     CBC WITH AUTOMATED DIFF Collection Time: 05/31/22  7:00 AM   Result Value Ref Range    WBC 8.3 3.6 - 11.0 K/uL    RBC 4.45 3.80 - 5.20 M/uL    HGB 12.8 11.5 - 16.0 g/dL    HCT 36.8 35.0 - 47.0 %    MCV 82.7 80.0 - 99.0 FL    MCH 28.8 26.0 - 34.0 PG    MCHC 34.8 30.0 - 36.5 g/dL    RDW 14.6 (H) 11.5 - 14.5 %    PLATELET 655 796 - 568 K/uL    MPV 8.9 8.9 - 12.9 FL    NRBC 0.0 0.0  WBC    ABSOLUTE NRBC 0.00 0.00 - 0.01 K/uL    NEUTROPHILS 78 (H) 32 - 75 %    LYMPHOCYTES 7 (L) 12 - 49 %    MONOCYTES 13 5 - 13 %    EOSINOPHILS 1 0 - 7 %    BASOPHILS 0 0 - 1 %    IMMATURE GRANULOCYTES 1 (H) 0 - 0.5 %    ABS. NEUTROPHILS 6.5 1.8 - 8.0 K/UL    ABS. LYMPHOCYTES 0.6 (L) 0.8 - 3.5 K/UL    ABS. MONOCYTES 1.1 (H) 0.0 - 1.0 K/UL    ABS. EOSINOPHILS 0.0 0.0 - 0.4 K/UL    ABS. BASOPHILS 0.0 0.0 - 0.1 K/UL    ABS. IMM. GRANS. 0.1 (H) 0.00 - 0.04 K/UL    DF AUTOMATED     TROPONIN-HIGH SENSITIVITY    Collection Time: 05/31/22  7:00 AM   Result Value Ref Range    Troponin-High Sensitivity 510 (HH) 0 - 51 ng/L        XR CHEST PORT   Final Result   No significant change. XR CHEST PORT   Final Result   Improved lung aeration. Stable bilateral pleural effusions and   basilar airspace disease. XR CHEST PORT   Final Result      XR SWALLOW FUNC VIDEO    (Results Pending)   XR CHEST PORT    (Results Pending)        PHYSICAL EXAM:    Physical Exam  Vitals and nursing note reviewed. Constitutional:       Appearance: She is ill-appearing. HENT:      Head: Normocephalic and atraumatic. Mouth/Throat:      Mouth: Mucous membranes are dry. Comments: Poor dentition  Cardiovascular:      Rate and Rhythm: Normal rate and regular rhythm. Pulmonary:      Effort: No respiratory distress. Breath sounds: No wheezing. Comments: Prolonged exhalation bilaterally on nasal cannula  Abdominal:      General: Bowel sounds are normal. There is no distension. Palpations: Abdomen is soft. Tenderness: There is no abdominal tenderness. Genitourinary:     Comments: No mason present  Musculoskeletal:      Right lower leg: Edema present. Left lower leg: Edema present. Comments: RLE: 2+ pitting edema anterior shin to anterior foot nontender to palpation    LLE: 1+ pitting edema circumferentially around ankle and anterior foot nontender to palpation   Skin:     Comments: BLE: cool   Neurological:      Mental Status: She is alert and oriented to person, place, and time. Psychiatric:      Comments: Subdued       Data Review    Recent Results (from the past 24 hour(s))   TROPONIN-HIGH SENSITIVITY    Collection Time: 05/31/22  1:28 AM   Result Value Ref Range    Troponin-High Sensitivity 670 (HH) 0 - 51 ng/L   METABOLIC PANEL, COMPREHENSIVE    Collection Time: 05/31/22  7:00 AM   Result Value Ref Range    Sodium 125 (L) 136 - 145 mmol/L    Potassium 2.8 (L) 3.5 - 5.1 mmol/L    Chloride 86 (L) 97 - 108 mmol/L    CO2 31 21 - 32 mmol/L    Anion gap 8 5 - 15 mmol/L    Glucose 129 (H) 65 - 100 mg/dL    BUN 13 6 - 20 mg/dL    Creatinine 0.54 (L) 0.55 - 1.02 mg/dL    BUN/Creatinine ratio 24 (H) 12 - 20      GFR est AA >60 >60 ml/min/1.73m2    GFR est non-AA >60 >60 ml/min/1.73m2    Calcium 8.4 (L) 8.5 - 10.1 mg/dL    Bilirubin, total 1.1 (H) 0.2 - 1.0 mg/dL    AST (SGOT) 20 15 - 37 U/L    ALT (SGPT) 30 12 - 78 U/L    Alk.  phosphatase 43 (L) 45 - 117 U/L    Protein, total 5.5 (L) 6.4 - 8.2 g/dL    Albumin 3.0 (L) 3.5 - 5.0 g/dL    Globulin 2.5 2.0 - 4.0 g/dL    A-G Ratio 1.2 1.1 - 2.2     CBC WITH AUTOMATED DIFF    Collection Time: 05/31/22  7:00 AM   Result Value Ref Range    WBC 8.3 3.6 - 11.0 K/uL    RBC 4.45 3.80 - 5.20 M/uL    HGB 12.8 11.5 - 16.0 g/dL    HCT 36.8 35.0 - 47.0 %    MCV 82.7 80.0 - 99.0 FL    MCH 28.8 26.0 - 34.0 PG    MCHC 34.8 30.0 - 36.5 g/dL    RDW 14.6 (H) 11.5 - 14.5 %    PLATELET 460 948 - 238 K/uL    MPV 8.9 8.9 - 12.9 FL    NRBC 0.0 0.0  WBC    ABSOLUTE NRBC 0.00 0.00 - 0.01 K/uL    NEUTROPHILS 78 (H) 32 - 75 % LYMPHOCYTES 7 (L) 12 - 49 %    MONOCYTES 13 5 - 13 %    EOSINOPHILS 1 0 - 7 %    BASOPHILS 0 0 - 1 %    IMMATURE GRANULOCYTES 1 (H) 0 - 0.5 %    ABS. NEUTROPHILS 6.5 1.8 - 8.0 K/UL    ABS. LYMPHOCYTES 0.6 (L) 0.8 - 3.5 K/UL    ABS. MONOCYTES 1.1 (H) 0.0 - 1.0 K/UL    ABS. EOSINOPHILS 0.0 0.0 - 0.4 K/UL    ABS. BASOPHILS 0.0 0.0 - 0.1 K/UL    ABS. IMM. GRANS. 0.1 (H) 0.00 - 0.04 K/UL    DF AUTOMATED     TROPONIN-HIGH SENSITIVITY    Collection Time: 05/31/22  7:00 AM   Result Value Ref Range    Troponin-High Sensitivity 510 (HH) 0 - 51 ng/L        Assessment/Plan:     Active Problems:    CHF (congestive heart failure) ([facility] (5/27/2022)      Acute respiratory failure with hypoxia ([facility] (5/29/2022)      Acute on chronic systolic congestive heart failure ([address] (5/29/2022)      Dysphagia (5/29/2022)        Hospital Course:    Darron Rivera is a 79year old female with a PMH of tobacco use who presented with GOINS and orthopnea. In ED tachypneic and tachycardic. Initial lab work significant for sodium of 131, glucose of 252, troponin of 112, [place] [address], and lactic of 2.2. Chest x-ray showed hazy central and basilar reticular markings with moderate to large left pleural effusion. Patient be admitted for further work-up. Patient started on Lasix. Cardiology and pulmnology consulted. Echo showed LV EF  30-35% with LV dilation. Cardiology considering LHC/PCI.     Acute respiratory failure with hypoxia  Pleural effusions, bilateral  Currently on 1L n/c wean as tolerated  Duonebs every 2 hours    Acute exacerbation of systolic heart failure  Continue on carvedilol and entresto  Wean IV lasix as tolerated  Echo showed LV EF 30-35% with LV dilation  Cardiology considering LHC/PCI  Cardiology following    Hyponatremia  2/2 diuresis  Will monitor and give sodium bicarb as to not worsen AEHF    Dysphagia   SLP recommending pureed diet  MBS on 3/31    Debility  PT recommending IRF    DVT Prophylaxis: lovenox  GI Prophylaxis: tolerating po diet  Discharge and disposition barriers: cardiac clearance, IV lasix, O2 weaning, 24 - 48 hrs    Care Plan discussed with: Patient/Family, Nurse and     Total time spent with patient: 35 minutes.

## 2022-05-31 NOTE — PROGRESS NOTES
Problem: Dysphagia (Adult)  Goal: *Acute Goals and Plan of Care (Insert Text)  Description: Speech Therapy Swallow Goals  Initiated 5/28/2022  -Patient will tolerate Puree/Mildly thick liquids without clinical indicators of aspiration given moderate cues within 1-3 day(s). -Patient will tolerate PO trials without clinical indicators of aspiration given minimal cues within 1-3 day(s). -Patient will participate in modified barium swallow study within 5 day(s). MET      -Patient will demonstrate understanding of swallow safety precautions and aspiration precautions, diet recs with minimal cues within 1-3 day(s). Outcome: Progressing Towards Goal   SPEECH PATHOLOGY MODIFIED BARIUM SWALLOW STUDY  Patient: Laya Steinberg (76 y.o. female)  1940   Date: 5/31/2022  Primary Diagnosis: CHF (congestive heart failure) ([facility]) [I50.9]  Procedure(s) (LRB):  LEFT HEART CATH / CORONARY ANGIOGRAPHY (N/A)  PERCUTANEOUS CORONARY INTERVENTION (N/A) Day of Surgery   Precautions: aspiration       ASSESSMENT :  Based on the objective data described below, the patient presents with mild-moderate oropharyngeal  Dysphagia with suspected underlying esophageal dysfunction. Oral phase c/b mild disorganization with bolus formation and control. Reduced efficacy with prolonged mastication. Pt able to clear oral cavity. Pharyngeal phase c/b persistent mild delay with vallecular and pyriform pooling prior to pharyngeal phase initiation. Once initiated there is reduced epiglottic inversion and tongue base retraction as well as reduced degree and duration of UES relaxation. Persistent mild-mod vallecular and pyriform residue that increases with increased viscosity. Elicited double swallow partially clears residue. Trace penetration to level of TVF with thin via cup observed with inconsistent spontaneous throat clear.  Trace kia aspiration along posterior tracheal wall due to pyriform overflow on third of three sequential straw sips thin. Weak cough is present. There is trace to mild esophageal retention just below the level of the UES. There appears to be possible further esophageal retention in the mid esophagus; however, there is obscuration from pt's should and is unable to be adequately viewed. Patient will benefit from skilled intervention to address the above impairments. Patients rehabilitation potential is considered to be Fair     PLAN :  Recommendations and Planned Interventions: At this time, recommend cont puree/mildly thick liquids diet with 1:1 assistance with ALL PO intake, double swallows, alternate solids and liquids, STRICT aspiration and GERD precautions, monitor pt closely for s/s aspiration, meds crushed if able in puree, FEED ONLY IF AWAKE AND ALERT. Recommend further esophageal assessment as medically appropriate due to above findings. Frequency/Duration: Patient will be followed by speech-language pathology 5 times a week to address goals. Discharge Recommendations: cont SLP tx for dysphagia, diet tolerance and sw safety. SUBJECTIVE:   Patient seen for MBS, alert, agreeable. Pt is s/p cardiac cath this date. Pt appropriate to participate in MBS per nsg. Pt with reported cough and discomfort following meds with applesauce previously this date. MBS purpose and protocol discussed with pt prior to initiation of study. OBJECTIVE:     History reviewed. No pertinent past medical history. History reviewed. No pertinent surgical history. CXR Results  (Last 48 hours)                 05/31/22 0051  XR CHEST PORT Final result    Impression:  No significant change. Narrative:  XR CHEST PORT       Comparison:  5/27/2022. Single view:  Stable pleural effusions and bibasilar atelectasis/pneumonia (left   greater than right). No pneumothorax apparent. The heart size is stable. Stable   hemodynamic status.                  Current Diet:  Puree/mildly thick    Radiologist:  Socrates   Video Flouroscopic Procedures  [x] Lateral View   [] A-P View [] Scanned to level of Sternum    [x] Seated at 90 deg.   [] Other:    Presentation:   [x] Spoon   [x] Cup   [x] Straw   [] Syringe   [x] Consecutive Swallows  [] Other:    Consistencies:   [x] Ba+ liquid   [x] Ba+ liquid (nectar)   [x] Ba+ liquid (honey)     [x] Ba+ pudding   [] Ba+ crunched cookie   [x] Ba+ cracker   [] Other:     Testing Discontinued: [] Due to:    Treatment Techniques Attempted     [] Head Turn: [] Right [] Left     [] Head Tilt: [] Right [] Left     [] Chin Down:  [] Thermal Sensitization:  [] Supraglottic Swallow:  [] Mendelson's Maneuver:  [] Other:    Results  Dysphagia Present:    [x] Yes  [] No    Ratings of Dysphagia:    [x] Mild  [x] Moderate [] Severe    Stages of Breakdown:   [x] Oral [x] Pharyngeal   [x] Esophageal    Aspiration: [x] Yes    [] No  [] At Risk  [x] Trace (<10%) [] Significant (>10%):     %  [x] Penetration:  Level of: TVF   Cough: [x] Yes      [] No    Consistency Aspirated:  [x] Thin Liquid   [] Nectar   [] Honey   [] Pureed     [] Mech-soft  [] Solid    Motility Problems with:  [] Lip Closure:   [] Sucking:   [x] Mastication:   [x] Bolus Formation:   [x] Bolus Control:  [] A-P Transport:  [x] Posterior Tongue Elevation:  [] Swallow Response (delayed):  [] Velopharyngeal Closure:  [x] Epiglottic inversion  [] Laryngeal Elevation:  [] Laryngeal Adduction:  [x] Cricopharyngeal Relaxation:  [x] Esophageal Peristalsis: suspected dysfunction  [] Reflux:  [] Other:    Timing of Aspiration:  [x] Before Swallow:  [] During Swallow:  [] After Swallow:    Transit Time Delay:  [] >1 Second  Oral  [] >1 Second Pharyngeal  [] >20 Second Esophageal     Residuals:  [] Buccal Cavity   [] Velum/posterior pharyngeal wall  [x] Valleculae  [x] Pyriforms      COMMUNICATION/EDUCATION:   Patient receptive of education regarding MBS results, aspiration risk and possible sequela, diet recs, swallow safety and recs for further esophageal assessment. Patient demonstrated Good understanding as evidenced by verbal responsiveness, needs reinforcement. The patients plan of care including findings from [facility], recommendations, planned interventions, and recommended diet changes were discussed with: Physician. Patient/family have participated as able in goal setting and plan of care. Patient/family agree to work toward stated goals and plan of care.     Thank you for this referral.  Britton Mann M.S., CCC-SLP  Time Calculation: 23 mins

## 2022-05-31 NOTE — PROGRESS NOTES
Spoke with patient about phase 2 outpatient cardiac rehab. Patient was given a choice of facilities to be enrolled and chose [facility]. Patient was scheduled for and made aware of, verbally and in writing, an initial evaluation appointment to begin cardiac rehab on June 28 at 10:30. Patient was made aware of this verbally and in writing. Patients information was forwarded to this facility. Patient verbalized understanding and was given printed teaching materials, my contact information in case she needs further assistance, and the address and phone number for the cardiac rehab facility to which she has been referred. Provided patient education about and explained the importance of medication compliance. Patient stated that she can afford the antiplatelet medication prescribed. Patient was advised that if the antiplatelet medication becomes unaffordable, she must notify the cardiologist immediately so that an alternate plan for antiplatelet therapy can be made. Patient stated that she will fill this prescription before or upon discharge so that it will be available for the next scheduled dose after discharge. Patient asked appropriate questions and verbalized understanding during this consultation and was given printed teaching materials and my contact information in case I can provide further assistance.

## 2022-05-31 NOTE — PROGRESS NOTES
Progress Note      5/31/2022 54:89 PM  NAME: Ana Holguin   MRN:  [id number]   Admit Diagnosis: CHF (congestive heart failure) ([facility] [I50.9]      Problem List:   1.  Acute hypoxic respiratory failure  2.  Bilateral pleural effusion  3.  Acute decompensated heart failure  4.  Dilated cardiomyopathy (ejection fraction = 30-35%)  5.  Heart failure with reduced ejection fraction (HFr EF)  6. Coronary artery disease       6a. PCI of the mid LAD (5/31/2022)       6b. RCA disease to be treated medically  7.  Moderate mitral regurgitation  8.  Possible family history of premature coronary artery disease  9.  Elevated cardiac enzymes in the indeterminate range  10.  Elevated troponin-BNP (21,720 pg/mL)    11. Dysphagia  12. Electrolyte abnormalities (hyponatremia, hypokalemia, hypochloremia, and hypocalcemia)       Subjective: The patient is seen and examined in the post anesthesia care unit (PACU). She had uncomplicated left heart catheterization (LHC) requiring percutaneous coronary intervention (PCI) of the mid left anterior descending (LAD). The patient also has coronary artery disease of the right coronary artery (RCA) which will be treated medically for now. Her blood pressure is noted to be low normal, and it she is also hypokalemic. Discussed with RN events overnight.      Medications Personally Reviewed:    Current Facility-Administered Medications   Medication Dose Route Frequency    atorvastatin (LIPITOR) tablet 40 mg  40 mg Oral QHS    clopidogreL (PLAVIX) tablet 75 mg  75 mg Oral DAILY    sodium chloride (NS) flush 5-40 mL  5-40 mL IntraVENous Q8H    sodium chloride (NS) flush 5-40 mL  5-40 mL IntraVENous PRN    albuterol-ipratropium (DUO-NEB) 2.5 MG-0.5 MG/3 ML  3 mL Nebulization BID RT    benzonatate (TESSALON) capsule 100 mg  100 mg Oral TID PRN    [Held by provider] sacubitriL-valsartan (ENTRESTO) 24-26 mg tablet 1 Tablet  1 Tablet Oral BID    guaiFENesin (ROBITUSSIN) 100 mg/5 mL oral liquid 200 mg  200 mg Oral QID    aspirin delayed-release tablet 81 mg  81 mg Oral DAILY    [Held by provider] carvediloL (COREG) tablet 3.125 mg  3.125 mg Oral BID WITH MEALS    sodium chloride (NS) flush 5-10 mL  5-10 mL IntraVENous PRN    sodium chloride (NS) flush 5-40 mL  5-40 mL IntraVENous Q8H    sodium chloride (NS) flush 5-40 mL  5-40 mL IntraVENous PRN    acetaminophen (TYLENOL) tablet 650 mg  650 mg Oral Q6H PRN    Or    acetaminophen (TYLENOL) suppository 650 mg  650 mg Rectal Q6H PRN    polyethylene glycol (MIRALAX) packet 17 g  17 g Oral DAILY PRN    ondansetron (ZOFRAN ODT) tablet 4 mg  4 mg Oral Q8H PRN    Or    ondansetron (ZOFRAN) injection 4 mg  4 mg IntraVENous Q6H PRN    enoxaparin (LOVENOX) injection 40 mg  40 mg SubCUTAneous DAILY    melatonin tablet 5 mg  5 mg Oral QHS PRN           Objective:      Physical Exam:  Essentially unchanged  Last 24hrs VS reviewed since prior progress note. Most recent are:    Visit Vitals  /65 (BP 1 Location: Left upper arm)   Pulse 80   Temp 97.8 °F (36.6 °C)   Resp 20   Ht 5' 6\" (1.676 m)   Wt 60.8 kg (134 lb)   SpO2 97%   BMI 21.63 kg/m²       Intake/Output Summary (Last 24 hours) at 5/31/2022 1236  Last data filed at 5/31/2022 0607  Gross per 24 hour   Intake 150 ml   Output 450 ml   Net -300 ml        Data Review    Telemetry: Sinus rhythm without ventricular ectopy    EKG:   []  No new EKG for review    Lab Data Personally Reviewed:    Recent Labs     05/31/22  0700 05/30/22  0733   WBC 8.3 7.8   HGB 12.8 12.9   HCT 36.8 37.8    382     No results for input(s): INR, PTP, APTT, INREXT in the last 72 hours.    Recent Labs     05/31/22  0700 05/30/22  0733 05/29/22  0736   * 124* 127*  126*   K 2.8* 3.4* 3.4*  3.4*   CL 86* 85* 89*  90*   CO2 31 28 25  26   BUN 13 14 16  16   CREA 0.54* 0.47* 0.67  0.66   * 158* 173*  173*   CA 8.4* 8.7 8.5  8.4*   MG  --   --  1.7     No results for input(s): CPK, CKNDX, CAROLIQ in the last 72 hours. No lab exists for component: CPKMB  Lab Results   Component Value Date/Time    Cholesterol, total 139 05/30/2022 07:33 AM    HDL Cholesterol 47 05/30/2022 07:33 AM    LDL, calculated 71.2 05/30/2022 07:33 AM    Triglyceride 104 05/30/2022 07:33 AM    CHOL/HDL Ratio 3.0 05/30/2022 07:33 AM       Recent Labs     05/31/22  0700 05/30/22  0733 05/29/22  0736   AP 43* 52 52   TP 5.5* 6.3* 6.6   ALB 3.0* 3.4* 3.6   GLOB 2.5 2.9 3.0     Recent Labs     05/28/22  1400   PH 7.46*   PCO2 34*   PO2 86           Assessment/Plan:   1. Continue telemetry monitoring  2. Replace, and recheck serum potassium  3. Continue to monitor fluid balance, and daily weights  4. Continue current cardiovascular medications including aspirin, atorvastatin, clopidogrel, and inactive. 5.  Continue to hold carvedilol, and sacubitril-valsartan    6.   Further recommendations depending on above results, and clinical course     More Boyer MD

## 2022-05-31 NOTE — ROUTINE PROCESS
[facility] said the lab called with a trop of 670 so I just talked to Dr. Roman Grace and told him the cxray has not resulted but the trop was 670, he said ok. I told him she is finally sleeping and he said no further orders.

## 2022-05-31 NOTE — ROUTINE PROCESS
Dr. Emily Merchant called with the ekg results and [facility]. From respiratory explained the artifacts on the ekg to the doctor.

## 2022-05-31 NOTE — PROGRESS NOTES
PULMONARY NOTE  [facility]    Name: Aakash Kan MRN: [id number]   : 1940 Hospital: [address] [place] [address]   Date: 2022  Admission date: 2022 Hospital Day: 5       HPI:     Hospital Problems  Date Reviewed: 2022          Codes Class Noted POA    Acute respiratory failure with hypoxia [facility]) ICD-10-CM: J96.01  ICD-9-CM: 518.81  2022 Unknown        Acute on chronic systolic congestive heart failure (HCC) ICD-10-CM: I50.23  ICD-9-CM: 428.23, 428.0  2022 Unknown        Dysphagia ICD-10-CM: R13.10  ICD-9-CM: 787.20  2022 Unknown        CHF (congestive heart failure) (HCC) ICD-10-CM: I50.9  ICD-9-CM: 428.0  2022 Unknown                   [x] High complexity decision making was performed  [x] See my orders for details      Subjective/Initial History:     I was asked by Braden Holden MD to see Aakash Kan  a 79 y.o.  female in consultation     Excerpts from admission 2022 or consult notes as follows:   79-year-old lady came in because of shortness of breath and dyspnea, she is now on 4 L nasal cannula did not sleep well seen by cardiologist diastolic dysfunction she is retaining fluid she has not seen a physician in the past 20 years is not on any oxygen no history of sleep apnea she used to smoke quit smoking so admitted and pulmonary consult was called regarding hypoxia and respiratory management.       No Known Allergies     MAR reviewed and pertinent medications noted or modified as needed     Current Facility-Administered Medications   Medication    atorvastatin (LIPITOR) tablet 40 mg    clopidogreL (PLAVIX) tablet 75 mg    sodium chloride (NS) flush 5-40 mL    sodium chloride (NS) flush 5-40 mL    potassium chloride (KLOR-CON) packet for solution 40 mEq    albuterol-ipratropium (DUO-NEB) 2.5 MG-0.5 MG/3 ML    benzonatate (TESSALON) capsule 100 mg    [Held by provider] sacubitriL-valsartan (ENTRESTO) 24-26 mg tablet 1 Tablet    guaiFENesin (ROBITUSSIN) 100 mg/5 mL oral liquid 200 mg    aspirin delayed-release tablet 81 mg    [Held by provider] carvediloL (COREG) tablet 3.125 mg    sodium chloride (NS) flush 5-10 mL    sodium chloride (NS) flush 5-40 mL    sodium chloride (NS) flush 5-40 mL    acetaminophen (TYLENOL) tablet 650 mg    Or    acetaminophen (TYLENOL) suppository 650 mg    polyethylene glycol (MIRALAX) packet 17 g    ondansetron (ZOFRAN ODT) tablet 4 mg    Or    ondansetron (ZOFRAN) injection 4 mg    enoxaparin (LOVENOX) injection 40 mg    melatonin tablet 5 mg      Patient PCP: None  PMH:  has no past medical history on file. PSH:   has no past surgical history on file. FHX: family history is not on file. SHX:  reports that she has quit smoking. She has never used smokeless tobacco. She reports that she does not drink alcohol and does not use drugs. ROS:    Review of Systems   Constitutional: Positive for malaise/fatigue. HENT: Negative. Eyes: Negative. Respiratory: Positive for shortness of breath. Cardiovascular: Positive for orthopnea. Gastrointestinal: Negative. Genitourinary: Negative. Musculoskeletal: Negative. Skin: Negative. Neurological: Negative. Psychiatric/Behavioral: Negative.          Objective:     Vital Signs: Telemetry:    normal sinus rhythm Intake/Output:   Visit Vitals  /64 (BP 1 Location: Left upper arm, BP Patient Position: At rest;Semi fowlers)   Pulse 73   Temp 98 °F (36.7 °C)   Resp 20   Ht 5' 6\" (1.676 m)   Wt 60.8 kg (134 lb)   SpO2 97%   BMI 21.63 kg/m²       Temp (24hrs), Av.1 °F (36.7 °C), Min:97 °F (36.1 °C), Max:98.9 °F (37.2 °C)        O2 Device: Nasal cannula O2 Flow Rate (L/min): 3 l/min       Wt Readings from Last 4 Encounters:   22 60.8 kg (134 lb)          Intake/Output Summary (Last 24 hours) at 2022 1559  Last data filed at 2022 1314  Gross per 24 hour   Intake 150 ml   Output 450 ml   Net -300 ml       Last shift: No intake/output data recorded. Last 3 shifts: 05/29 1901 - 05/31 0700  In: 150 [P.O.:150]  Out: 450 [Urine:450]       Physical Exam:     Physical Exam  Constitutional:       Appearance: Normal appearance. HENT:      Head: Normocephalic and atraumatic. Nose: Nose normal.      Mouth/Throat:      Mouth: Mucous membranes are moist.   Eyes:      Pupils: Pupils are equal, round, and reactive to light. Cardiovascular:      Rate and Rhythm: Normal rate and regular rhythm. Pulses: Normal pulses. Heart sounds: Normal heart sounds. Pulmonary:      Effort: Pulmonary effort is normal.      Breath sounds: Rales present. Abdominal:      General: Abdomen is flat. Bowel sounds are normal.      Palpations: Abdomen is soft. Musculoskeletal:         General: Swelling present. Cervical back: Normal range of motion and neck supple. Right lower leg: Edema present. Left lower leg: Edema present. Neurological:      General: No focal deficit present. Mental Status: She is alert. Psychiatric:         Mood and Affect: Mood normal.          Labs:    Recent Labs     05/31/22  0700 05/30/22  0733 05/29/22  0915   WBC 8.3 7.8 7.8   HGB 12.8 12.9 12.4    382 394     Recent Labs     05/31/22  0700 05/30/22  0733 05/29/22  1040 05/29/22  0736   * 124*  --  127*  126*   K 2.8* 3.4*  --  3.4*  3.4*   CL 86* 85*  --  89*  90*   CO2 31 28  --  25  26   * 158*  --  173*  173*   BUN 13 14  --  16  16   CREA 0.54* 0.47*  --  0.67  0.66   CA 8.4* 8.7  --  8.5  8.4*   MG  --   --   --  1.7   LAC  --   --  1.7  --    ALB 3.0* 3.4*  --  3.6   ALT 30 42  --  44     No results for input(s): PH, PCO2, PO2, HCO3, FIO2 in the last 72 hours. No results for input(s): CPK, CKNDX, TROIQ in the last 72 hours.     No lab exists for component: CPKMB  No results found for: BNPP, BNP   Lab Results   Component Value Date/Time    Culture result: No growth 4 days 05/27/2022 12:59 PM   No results found for: TSH, TSHEXT, TSHEXT    Imaging:    CXR Results  (Last 48 hours)               05/31/22 0051  XR CHEST PORT Final result    Impression:  No significant change. Narrative:  XR CHEST PORT       Comparison:  5/27/2022. Single view:  Stable pleural effusions and bibasilar atelectasis/pneumonia (left   greater than right). No pneumothorax apparent. The heart size is stable. Stable   hemodynamic status. Results from Hospital Encounter encounter on 05/27/22    XR CHEST PORT    Narrative  XR CHEST PORT    Comparison:  5/27/2022. Single view:  Stable pleural effusions and bibasilar atelectasis/pneumonia (left  greater than right). No pneumothorax apparent. The heart size is stable. Stable  hemodynamic status. Impression  No significant change. XR SWALLOW FUNC VIDEO    Narrative  Modified barium swallow    Mild delay in initiation of the swallowing reflex. At times, incomplete epiglottic inversion. With small volume thin consistency oral contrast, there is deep penetration. No  demonstrated aspiration. With larger volume thin consistency oral contrast and use of cup and straw,  aspiration occurs. With nectar, honey, pudding, and semisolid consistency oral contrast, there is  no demonstrated aspiration or penetration. Retention through vallecula more so  than piriform sinuses noted with thicker consistency oral contrast.    Study findings reviewed with speech pathology. 138.6 DAP/1:44 min      XR CHEST PORT    Narrative  Exam: XR CHEST PORT    Indication:  SOB; Comparison: Chest x-ray performed the same day at 11:58 AM.    Findings:    Stable cardiac mediastinal silhouette. Aeration has improved. Bilateral pleural  effusions and associated airspace disease/atelectasis are not significantly  changed. There is no pneumothorax. Osteopenia. No acute bony abnormality. Impression  Improved lung aeration.  Stable bilateral pleural effusions and  basilar airspace disease. No results found for this or any previous visit. IMPRESSION:   1. Acute hypoxic respiratory failure  2. Congestive heart failure diastolic dysfunction  3. HFrEF ejection fraction 30 to 35%  4. Bilateral pleural effusion  5. Prognosis guarded       RECOMMENDATIONS/PLAN:     3 79-year-old lady with shortness of breath and dyspnea she is on oxygen 4 L nasal cannula she has never been on oxygen at home history of smoking in the past we will start patient on nebulizer treatment not actively wheezing chest x-ray shows congestive changes pleural effusion continue with the Lasix 2D echo to see the right heart pressure and possible she need oxygen when discharged home  2.  Cardiac work-up EF 30-35%                 Dedra Patel MD

## 2022-05-31 NOTE — ROUTINE PROCESS
I called Dr. Carley Marshall because patient keeps saying \"I can't breathe\" and her sats are 95%. I told him her bp was under 100 sys 94 sitting on side of bed. He gave me orders stat which I am going to put in now.

## 2022-06-01 VITALS
WEIGHT: 134 LBS | TEMPERATURE: 97.9 F | RESPIRATION RATE: 20 BRPM | BODY MASS INDEX: 21.53 KG/M2 | OXYGEN SATURATION: 96 % | SYSTOLIC BLOOD PRESSURE: 104 MMHG | HEART RATE: 86 BPM | DIASTOLIC BLOOD PRESSURE: 65 MMHG | HEIGHT: 66 IN

## 2022-06-01 LAB
ALBUMIN SERPL-MCNC: 3 G/DL (ref 3.5–5)
ALBUMIN/GLOB SERPL: 1.2 {RATIO} (ref 1.1–2.2)
ALP SERPL-CCNC: 45 U/L (ref 45–117)
ALT SERPL-CCNC: 25 U/L (ref 12–78)
ANION GAP SERPL CALC-SCNC: 7 MMOL/L (ref 5–15)
AST SERPL W P-5'-P-CCNC: 18 U/L (ref 15–37)
ATRIAL RATE: 66 BPM
ATRIAL RATE: 72 BPM
ATRIAL RATE: 73 BPM
BASOPHILS # BLD: 0 K/UL (ref 0–0.1)
BASOPHILS NFR BLD: 0 % (ref 0–1)
BILIRUB SERPL-MCNC: 1 MG/DL (ref 0.2–1)
BUN SERPL-MCNC: 9 MG/DL (ref 6–20)
BUN/CREAT SERPL: 20 (ref 12–20)
CA-I BLD-MCNC: 8.2 MG/DL (ref 8.5–10.1)
CALCULATED P AXIS, ECG09: -21 DEGREES
CALCULATED P AXIS, ECG09: 10 DEGREES
CALCULATED P AXIS, ECG09: 24 DEGREES
CALCULATED R AXIS, ECG10: -23 DEGREES
CALCULATED R AXIS, ECG10: -25 DEGREES
CALCULATED R AXIS, ECG10: -29 DEGREES
CALCULATED T AXIS, ECG11: -59 DEGREES
CALCULATED T AXIS, ECG11: -70 DEGREES
CALCULATED T AXIS, ECG11: 169 DEGREES
CHLORIDE SERPL-SCNC: 86 MMOL/L (ref 97–108)
CO2 SERPL-SCNC: 30 MMOL/L (ref 21–32)
CREAT SERPL-MCNC: 0.44 MG/DL (ref 0.55–1.02)
DIAGNOSIS, 93000: NORMAL
DIFFERENTIAL METHOD BLD: ABNORMAL
EOSINOPHIL # BLD: 0.1 K/UL (ref 0–0.4)
EOSINOPHIL NFR BLD: 1 % (ref 0–7)
ERYTHROCYTE [DISTWIDTH] IN BLOOD BY AUTOMATED COUNT: 14.9 % (ref 11.5–14.5)
GLOBULIN SER CALC-MCNC: 2.5 G/DL (ref 2–4)
GLUCOSE SERPL-MCNC: 134 MG/DL (ref 65–100)
HCT VFR BLD AUTO: 38.5 % (ref 35–47)
HGB BLD-MCNC: 13 G/DL (ref 11.5–16)
IMM GRANULOCYTES # BLD AUTO: 0.1 K/UL (ref 0–0.04)
IMM GRANULOCYTES NFR BLD AUTO: 1 % (ref 0–0.5)
LYMPHOCYTES # BLD: 0.5 K/UL (ref 0.8–3.5)
LYMPHOCYTES NFR BLD: 7 % (ref 12–49)
MCH RBC QN AUTO: 28.8 PG (ref 26–34)
MCHC RBC AUTO-ENTMCNC: 33.8 G/DL (ref 30–36.5)
MCV RBC AUTO: 85.4 FL (ref 80–99)
MONOCYTES # BLD: 1.2 K/UL (ref 0–1)
MONOCYTES NFR BLD: 15 % (ref 5–13)
NEUTS SEG # BLD: 6.2 K/UL (ref 1.8–8)
NEUTS SEG NFR BLD: 76 % (ref 32–75)
NRBC # BLD: 0 K/UL (ref 0–0.01)
NRBC BLD-RTO: 0 PER 100 WBC
P-R INTERVAL, ECG05: 168 MS
P-R INTERVAL, ECG05: 218 MS
P-R INTERVAL, ECG05: 238 MS
PLATELET # BLD AUTO: 334 K/UL (ref 150–400)
PMV BLD AUTO: 9.3 FL (ref 8.9–12.9)
POTASSIUM SERPL-SCNC: 3.3 MMOL/L (ref 3.5–5.1)
PROT SERPL-MCNC: 5.5 G/DL (ref 6.4–8.2)
Q-T INTERVAL, ECG07: 494 MS
Q-T INTERVAL, ECG07: 498 MS
Q-T INTERVAL, ECG07: 630 MS
QRS DURATION, ECG06: 148 MS
QRS DURATION, ECG06: 68 MS
QRS DURATION, ECG06: 90 MS
QTC CALCULATION (BEZET), ECG08: 544 MS
QTC CALCULATION (BEZET), ECG08: 545 MS
QTC CALCULATION (BEZET), ECG08: 660 MS
RBC # BLD AUTO: 4.51 M/UL (ref 3.8–5.2)
SODIUM SERPL-SCNC: 123 MMOL/L (ref 136–145)
VENTRICULAR RATE, ECG03: 66 BPM
VENTRICULAR RATE, ECG03: 72 BPM
VENTRICULAR RATE, ECG03: 73 BPM
WBC # BLD AUTO: 8 K/UL (ref 3.6–11)

## 2022-06-01 PROCEDURE — 74011250637 HC RX REV CODE- 250/637: Performed by: HOSPITALIST

## 2022-06-01 PROCEDURE — 36415 COLL VENOUS BLD VENIPUNCTURE: CPT

## 2022-06-01 PROCEDURE — 74011250637 HC RX REV CODE- 250/637: Performed by: INTERNAL MEDICINE

## 2022-06-01 PROCEDURE — 97530 THERAPEUTIC ACTIVITIES: CPT

## 2022-06-01 PROCEDURE — 94640 AIRWAY INHALATION TREATMENT: CPT

## 2022-06-01 PROCEDURE — 80053 COMPREHEN METABOLIC PANEL: CPT

## 2022-06-01 PROCEDURE — 74011250636 HC RX REV CODE- 250/636: Performed by: HOSPITALIST

## 2022-06-01 PROCEDURE — 97116 GAIT TRAINING THERAPY: CPT

## 2022-06-01 PROCEDURE — 92526 ORAL FUNCTION THERAPY: CPT

## 2022-06-01 PROCEDURE — 74011000250 HC RX REV CODE- 250: Performed by: INTERNAL MEDICINE

## 2022-06-01 PROCEDURE — 93010 ELECTROCARDIOGRAM REPORT: CPT | Performed by: INTERNAL MEDICINE

## 2022-06-01 PROCEDURE — 74011000250 HC RX REV CODE- 250: Performed by: HOSPITALIST

## 2022-06-01 PROCEDURE — 94761 N-INVAS EAR/PLS OXIMETRY MLT: CPT

## 2022-06-01 PROCEDURE — 97165 OT EVAL LOW COMPLEX 30 MIN: CPT

## 2022-06-01 PROCEDURE — 85025 COMPLETE CBC W/AUTO DIFF WBC: CPT

## 2022-06-01 PROCEDURE — 77010033678 HC OXYGEN DAILY

## 2022-06-01 RX ORDER — POTASSIUM CHLORIDE 1.5 G/1.77G
20 POWDER, FOR SOLUTION ORAL 2 TIMES DAILY WITH MEALS
Qty: 20 PACKET | Refills: 0 | Status: SHIPPED | OUTPATIENT
Start: 2022-06-01 | End: 2022-08-03

## 2022-06-01 RX ORDER — CLOPIDOGREL BISULFATE 75 MG/1
75 TABLET ORAL DAILY
Qty: 90 TABLET | Refills: 0 | Status: ON HOLD | OUTPATIENT
Start: 2022-06-02

## 2022-06-01 RX ORDER — BENZONATATE 100 MG/1
100 CAPSULE ORAL
Qty: 20 CAPSULE | Refills: 0 | Status: SHIPPED | OUTPATIENT
Start: 2022-06-01 | End: 2022-06-08

## 2022-06-01 RX ORDER — CARVEDILOL 3.12 MG/1
3.12 TABLET ORAL 2 TIMES DAILY WITH MEALS
Qty: 60 TABLET | Refills: 0 | Status: SHIPPED | OUTPATIENT
Start: 2022-06-01 | End: 2022-08-09

## 2022-06-01 RX ORDER — ATORVASTATIN CALCIUM 40 MG/1
40 TABLET, FILM COATED ORAL
Qty: 30 TABLET | Refills: 0 | Status: ON HOLD | OUTPATIENT
Start: 2022-06-01

## 2022-06-01 RX ADMIN — ASPIRIN 81 MG: 81 TABLET, COATED ORAL at 08:43

## 2022-06-01 RX ADMIN — IPRATROPIUM BROMIDE AND ALBUTEROL SULFATE 3 ML: .5; 3 SOLUTION RESPIRATORY (INHALATION) at 07:56

## 2022-06-01 RX ADMIN — POTASSIUM CHLORIDE 40 MEQ: 1.5 POWDER, FOR SOLUTION ORAL at 08:43

## 2022-06-01 RX ADMIN — SODIUM CHLORIDE, PRESERVATIVE FREE 10 ML: 5 INJECTION INTRAVENOUS at 05:41

## 2022-06-01 RX ADMIN — GUAIFENESIN 200 MG: 200 SOLUTION ORAL at 08:49

## 2022-06-01 RX ADMIN — SODIUM CHLORIDE, PRESERVATIVE FREE 10 ML: 5 INJECTION INTRAVENOUS at 13:44

## 2022-06-01 RX ADMIN — ENOXAPARIN SODIUM 40 MG: 100 INJECTION SUBCUTANEOUS at 08:43

## 2022-06-01 RX ADMIN — GUAIFENESIN 200 MG: 200 SOLUTION ORAL at 13:43

## 2022-06-01 RX ADMIN — SODIUM CHLORIDE, PRESERVATIVE FREE 10 ML: 5 INJECTION INTRAVENOUS at 13:45

## 2022-06-01 RX ADMIN — CLOPIDOGREL BISULFATE 75 MG: 75 TABLET ORAL at 08:43

## 2022-06-01 NOTE — PROGRESS NOTES
CM met with patient to discuss DCP. Patient currently listed as self pay however patient reports she has Medicare. Patient would like to have [facility] on DC, referral sent via [facility] to [facility], [facility] has accepted patient for [facility] however will not know if patient has insurance until the AM.      Patient is clear to d/c from CM at this time, CM to follow up with patient after DC regarding [facility] if able to accept or not.

## 2022-06-01 NOTE — PROGRESS NOTES
Problem: Mobility Impaired (Adult and Pediatric)  Goal: *Acute Goals and Plan of Care (Insert Text)  Description: Pt stated goal: I want to get stronger and walk    Pt will be I with LE HEP in 7 days. Pt will perform transfers mod I in 7 days. Pt will amb >100 feet with LRAD safely with mod I, SPO2 >90% in 7 days. Outcome: Progressing Towards Goal    PHYSICAL THERAPY TREATMENT  Patient: Aakash Kan (1 y.o. female)  Date: 6/1/2022  Diagnosis: CHF (congestive heart failure) ([facility]) [I50.9] <principal problem not specified>  Procedure(s) (LRB):  LEFT HEART CATH / CORONARY ANGIOGRAPHY (N/A)  PERCUTANEOUS CORONARY INTERVENTION (N/A) 1 Day Post-Op  Precautions:    Chart, physical therapy assessment, plan of care and goals were reviewed. ASSESSMENT  Patient continues with skilled PT services and is progressing towards goals. Pt seen for reassessment as pt had cardiac cath with stent placed yesterday. Pt sitting in chair upon PT arrival, agreeable to work with PT. Nurse in room upon PT arrival, cleared pt to work with PT. Pt CGA for sit to stand from chair. Pt able to amb approx 50ft in room with RW and CGAx1. No LOB during amb, pt fatigues quickly. Pt amb on O2 via NC at 2L, no SOB noted. Pt overall is progressing well with therapy. Pt states her son is home with her as well as her , but son is not there all day, but can help get her set up for the day. Pt will benefit from continued therapy to address her functional deficits. Recommend HHPT at this time. PLAN :  Patient continues to benefit from skilled intervention to address the above impairments. Continue treatment per established plan of care. to address goals.     Recommendation for discharge: (in order for the patient to meet his/her long term goals)  Home with Home Health Therapy           SUBJECTIVE:   Patient sitting in chair upon PT arrival, agreeable to work with PT    OBJECTIVE DATA SUMMARY:   Critical Behavior:  Neurologic State: Alert  Orientation Level: Oriented X4  Cognition: Appropriate decision making,Follows commands,Appropriate safety awareness     Functional Mobility Training:  Bed Mobility:     Supine to Sit: Minimum assistance     Scooting: Stand-by assistance        Transfers:  Sit to Stand: Contact guard assistance  Stand to Sit: Contact guard assistance        Bed to Chair: Contact guard assistance                    Balance:  Sitting: Intact; Without support  Standing: Impaired;Pull to stand; With support  Standing - Static: Fair;Constant support  Standing - Dynamic : Fair;Constant support  Ambulation/Gait Training:  Distance (ft): 50 Feet (ft)  Assistive Device: Walker, rolling  Ambulation - Level of Assistance: Contact guard assistance         Pain Rating:  No c/o pain during session    Activity Tolerance:   Fair  Please refer to the flowsheet for vital signs taken during this treatment. After treatment patient left in no apparent distress:   Sitting in chair and Call bell within reach    COMMUNICATION/COLLABORATION:   The patients plan of care was discussed with: Registered nurse and Case management.      Claudio Marina   15 min

## 2022-06-01 NOTE — PROGRESS NOTES
Son requesting the doctor to come in prior to discharge. Provider contacted at [phone], primary RN notified.

## 2022-06-01 NOTE — ROUTINE PROCESS
Dual RN skin assessment revealed blanchable redness to buttocks. Patient also has a right radial cath site with some ecchymosis surrounding site. Pulse palpable +2 and site soft.

## 2022-06-01 NOTE — PROGRESS NOTES
Progress Note      6/1/2022 4:36 AM  NAME: Alfonso Barrientos   MRN:  [id number]   Admit Diagnosis: CHF (congestive heart failure) ([facility] [I50.9]      Problem List:   1.  Acute hypoxic respiratory failure  2.  Bilateral pleural effusion  3.  Acute decompensated heart failure  4.  Dilated cardiomyopathy (ejection fraction = 30-35%)  5.  Heart failure with reduced ejection fraction (HFr EF)  6. Coronary artery disease       6a. PCI of the mid LAD (5/31/2022)       6b. RCA disease to be treated medically  7.  Moderate mitral regurgitation  8.  Possible family history of premature coronary artery disease  9.  Elevated cardiac enzymes in the indeterminate range  10.  Elevated troponin-BNP (21,720 pg/mL)     11. Dysphagia  12. Electrolyte abnormalities (hyponatremia, hypokalemia, hypochloremia, and hypocalcemia)       Subjective: The patient is seen and examined in room 480. There were no acute cardiovascular events reported overnight. She denies any chest pain, pressure tightness. She does complain of excessive phlegm. A review of telemetry reveals no significant dysrhythmia. The patient is informed that she will benefit from outpatient stage percutaneous coronary intervention (PCI) of her right coronary artery (RCA) stenosis.     Medications Personally Reviewed:    Current Facility-Administered Medications   Medication Dose Route Frequency    atorvastatin (LIPITOR) tablet 40 mg  40 mg Oral QHS    clopidogreL (PLAVIX) tablet 75 mg  75 mg Oral DAILY    sodium chloride (NS) flush 5-40 mL  5-40 mL IntraVENous Q8H    sodium chloride (NS) flush 5-40 mL  5-40 mL IntraVENous PRN    potassium chloride (KLOR-CON) packet for solution 40 mEq  40 mEq Oral BID WITH MEALS    albuterol-ipratropium (DUO-NEB) 2.5 MG-0.5 MG/3 ML  3 mL Nebulization BID RT    benzonatate (TESSALON) capsule 100 mg  100 mg Oral TID PRN    [Held by provider] sacubitriL-valsartan (ENTRESTO) 24-26 mg tablet 1 Tablet  1 Tablet Oral BID  guaiFENesin (ROBITUSSIN) 100 mg/5 mL oral liquid 200 mg  200 mg Oral QID    aspirin delayed-release tablet 81 mg  81 mg Oral DAILY    [Held by provider] carvediloL (COREG) tablet 3.125 mg  3.125 mg Oral BID WITH MEALS    sodium chloride (NS) flush 5-10 mL  5-10 mL IntraVENous PRN    sodium chloride (NS) flush 5-40 mL  5-40 mL IntraVENous Q8H    sodium chloride (NS) flush 5-40 mL  5-40 mL IntraVENous PRN    acetaminophen (TYLENOL) tablet 650 mg  650 mg Oral Q6H PRN    Or    acetaminophen (TYLENOL) suppository 650 mg  650 mg Rectal Q6H PRN    polyethylene glycol (MIRALAX) packet 17 g  17 g Oral DAILY PRN    ondansetron (ZOFRAN ODT) tablet 4 mg  4 mg Oral Q8H PRN    Or    ondansetron (ZOFRAN) injection 4 mg  4 mg IntraVENous Q6H PRN    enoxaparin (LOVENOX) injection 40 mg  40 mg SubCUTAneous DAILY    melatonin tablet 5 mg  5 mg Oral QHS PRN           Objective:      Physical Exam:  Last 24hrs VS reviewed since prior progress note. Most recent are:    Visit Vitals  BP (!) 102/58   Pulse 97   Temp 97.4 °F (36.3 °C)   Resp 20   Ht 5' 6\" (1.676 m)   Wt 60.8 kg (134 lb)   SpO2 98%   BMI 21.63 kg/m²       Intake/Output Summary (Last 24 hours) at 6/1/2022 0802  Last data filed at 5/31/2022 1747  Gross per 24 hour   Intake --   Output 300 ml   Net -300 ml        General Appearance: Well developed, in no acute respiratory distress. Chest: Lungs clear to auscultation bilaterally. Cardiovascular: JVP is not elevated, PMI is not attempted, normal intensity S1 and S2, without S3. Abdomen: Soft, non-tender, bowel sounds are active. Extremities: No edema bilaterally. Right upper extremity intact neurovascularly distally.     Data Review    Telemetry: Sinus rhythm without ventricular ectopy    EKG:   []  No new EKG for review    Lab Data Personally Reviewed:    Recent Labs     05/31/22  0700 05/30/22  0733   WBC 8.3 7.8   HGB 12.8 12.9   HCT 36.8 37.8    382     No results for input(s): INR, PTP, APTT, INREXT in the last 72 hours. Recent Labs     05/31/22  0700 05/30/22  0733   * 124*   K 2.8* 3.4*   CL 86* 85*   CO2 31 28   BUN 13 14   CREA 0.54* 0.47*   * 158*   CA 8.4* 8.7     No results for input(s): CPK, CKNDX, TROIQ in the last 72 hours. No lab exists for component: CPKMB  Lab Results   Component Value Date/Time    Cholesterol, total 139 05/30/2022 07:33 AM    HDL Cholesterol 47 05/30/2022 07:33 AM    LDL, calculated 71.2 05/30/2022 07:33 AM    Triglyceride 104 05/30/2022 07:33 AM    CHOL/HDL Ratio 3.0 05/30/2022 07:33 AM       Recent Labs     05/31/22  0700 05/30/22  0733   AP 43* 52   TP 5.5* 6.3*   ALB 3.0* 3.4*   GLOB 2.5 2.9     No results for input(s): PH, PCO2, PO2 in the last 72 hours. Assessment/Plan:   1. From a cardiovascular standpoint the patient may be discharged home  2. Continue to monitor serum electrolytes, and renal function  3. Continue current cardiovascular medications including aspirin, atorvastatin, clopidogrel, and potassium chloride  4. Consider resuming outpatient carvedilol, and sacubitril-valsartan combination  5.   The patient is to follow-up in office within one to 2 weeks after discharge     Quentin Pollard MD

## 2022-06-01 NOTE — PROGRESS NOTES
SPEECH LANGUAGE PATHOLOGY DYSPHAGIA TREATMENT  Patient: Rm Zuluaga (79 y.o. female)  Date: 6/1/2022  Diagnosis: CHF (congestive heart failure) ([facility]) [I50.9] <principal problem not specified>  Procedure(s) (LRB):  LEFT HEART CATH / CORONARY ANGIOGRAPHY (N/A)  PERCUTANEOUS CORONARY INTERVENTION (N/A) 1 Day Post-Op  Precautions: aspiration     ASSESSMENT:  Patient seen sitting in chair. Minimal intake of puree breakfast observed. Patient reports dislike to texture and taste. Cups of thin liquid H20 and thin ginger ale w/ straw present on table. Educated patient to MBS results, aspiration, aspiration risk and diet modifications of mildly thick liquids. Patient states she has been coughing up frothy, sticky secretions. Baseline cough present w/o p.o. intake. Administered mildly thick liquids via cup. Swallow initiation timely, HLE and protraction adequate to palpation, audible swallow to auscultation. No clinical indicators of penetration or aspiration noted. Administered solid cracker to trial. Slow mastication s/t sparse dentition. Slow oral transit. Minimal oral residual. No pharyngeal difficulty noted. Patient continues to be an aspiration risk. PLAN:  Recommendations and Planned Interventions:  Diet advancement to Minced and moist, mildly thick liquids. Aspiration precautions. Small bites, sips, slow rate. GERD precautions. GI consult as indicated    Patient continues to benefit from skilled intervention to address the above impairments. Continue treatment per established plan of care. Frequency/Duration: Patient will be followed by speech-language pathology 5 times a week to address goals. Discharge Recommendations: To Be Determined     SUBJECTIVE:   Patient alert and seen sitting in chair. She is slightly confused. OBJECTIVE:     CXR Results  (Last 48 hours)                 05/31/22 0051  XR CHEST PORT Final result    Impression:  No significant change.        Narrative:  XR CHEST PORT       Comparison:  5/27/2022. Single view:  Stable pleural effusions and bibasilar atelectasis/pneumonia (left   greater than right). No pneumothorax apparent. The heart size is stable. Stable   hemodynamic status. CT Results  (Last 48 hours)      None           Cognitive and Communication Status:  Neurologic State: Alert  Orientation Level: Oriented X4  Cognition: Appropriate decision making,Follows commands,Appropriate safety awareness          Pain:  Pain Scale 1: Numeric (0 - 10)  Pain Intensity 1: 0       After treatment:   Patient left in no apparent distress sitting up in chair and Call bell within reach    COMMUNICATION/EDUCATION:   Patient was educated regarding her deficit(s) of dysphagia, swallow safety precautions, diet recs and POC. She demonstrated Fair understanding as evidenced by verbal understanding. The patient's plan of care including recommendations, planned interventions, and recommended diet changes were discussed with: Registered nurse and Physician. Marie Abdalla, SLP M.S. CCC-SLP  Time Calculation: 13 mins           Problem: Dysphagia (Adult)  Goal: *Acute Goals and Plan of Care (Insert Text)  Description: Speech Therapy Swallow Goals  Initiated 5/28/2022  -Patient will tolerate Puree/Mildly thick liquids without clinical indicators of aspiration given moderate cues within 1-3 day(s). [MET]        -Patient will tolerate PO trials without clinical indicators of aspiration given minimal cues within 1-3 day(s). -Patient will participate in modified barium swallow study within 5 day(s). MET    -Patient will tolerate Mm5, mildly thick liquids w/o clinical indicators of penetration or aspiration within 1-3 days.       -Patient will demonstrate understanding of swallow safety precautions and aspiration precautions, diet recs with minimal cues within 1-3 day(s).            Outcome: Progressing Towards Goal

## 2022-06-01 NOTE — PROGRESS NOTES
Problem: Falls - Risk of  Goal: *Absence of Falls  Description: Document Meredith Chavez Fall Risk and appropriate interventions in the flowsheet. Outcome: Progressing Towards Goal  Note: Fall Risk Interventions:  Mobility Interventions: Bed/chair exit alarm,PT Consult for mobility concerns,Utilize walker, cane, or other assistive device         Medication Interventions: Bed/chair exit alarm,Teach patient to arise slowly,Patient to call before getting OOB    Elimination Interventions: Bed/chair exit alarm,Call light in reach,Patient to call for help with toileting needs              Problem: Patient Education: Go to Patient Education Activity  Goal: Patient/Family Education  Outcome: Progressing Towards Goal     Problem: Pressure Injury - Risk of  Goal: *Prevention of pressure injury  Description: Document Manfred Scale and appropriate interventions in the flowsheet.   Outcome: Progressing Towards Goal  Note: Pressure Injury Interventions:  Sensory Interventions: Assess changes in LOC,Keep linens dry and wrinkle-free,Float heels,Maintain/enhance activity level,Minimize linen layers    Moisture Interventions: Absorbent underpads,Apply protective barrier, creams and emollients,Internal/External urinary devices    Activity Interventions: PT/OT evaluation,Increase time out of bed    Mobility Interventions: PT/OT evaluation,Float heels    Nutrition Interventions: Document food/fluid/supplement intake,Offer support with meals,snacks and hydration    Friction and Shear Interventions: Apply protective barrier, creams and emollients,Minimize layers                Problem: Patient Education: Go to Patient Education Activity  Goal: Patient/Family Education  Outcome: Progressing Towards Goal

## 2022-06-01 NOTE — DISCHARGE SUMMARY
Hospitalist Discharge Summary     Patient ID:    Christina Pizano  [id number]  37 y.o.  1940    Admit date: 5/27/2022    Discharge date : 6/1/2022      Final Diagnoses: Active Problems:    CHF (congestive heart failure) ([address] (5/27/2022)      Acute respiratory failure with hypoxia ([address] (5/29/2022)      Acute on chronic systolic congestive heart failure ([address] (5/29/2022)      Dysphagia (5/29/2022)        Reason for Hospitalization:  Christina Pizano is a 79year old female with a PMH of tobacco use who presented with GOINS and orthopnea. In ED tachypneic and tachycardic. Initial lab work significant for sodium of 131, glucose of 252, troponin of 112[place] [address], and lactic of 2.2. Chest x-ray showed hazy central and basilar reticular markings with moderate to large left pleural effusion. Patient be admitted for further work-up. Patient started on Lasix. Cardiology and pulmnology consulted. Echo showed LV EF  30-35% with LV dilation. Cardiology considering LHC/PCI. LHC performed today on 5/31, stent placed and will transfer to [facility] Course:     Patient managed in consult with Dr. Alvares/Cardiology and Dr. Gonzales/Pulmonology. Patient feels well otherwise. No active CP, SOB. Will DC home with [facility] as per Dr. Alvares/Cardiology    Per Dr. Chichi Leyva:  1.  Acute hypoxic respiratory failure  2.  Bilateral pleural effusion  3.  Acute decompensated heart failure  4.  Dilated cardiomyopathy (ejection fraction = 30-35%)  5.  Heart failure with reduced ejection fraction (HFr EF)  6.  Coronary artery disease       6a.  PCI of the mid LAD (5/31/2022)       6b.  RCA disease to be treated medically  7.  Moderate mitral regurgitation  8.  Possible family history of premature coronary artery disease  9.  Elevated cardiac enzymes in the indeterminate range  10.  Elevated troponin-BNP (21,720 pg/mL)  Assessment/Plan:          1.   From a cardiovascular standpoint the patient may be discharged home  2. Continue to monitor serum electrolytes, and renal function  3. Continue current cardiovascular medications including aspirin, atorvastatin, clopidogrel, and potassium chloride  4. Consider resuming outpatient carvedilol, and sacubitril-valsartan combination  5. The patient is to follow-up in office within one to 2 weeks after discharge             Discharge Medications:     1 [facility] for BP UNTIL RE-EVALUATED by PCP or CARDIOLOGY. DO NOT TAKE IF BP < 543 Systolic      Current Discharge Medication List      START taking these medications    Details   atorvastatin (LIPITOR) 40 mg tablet Take 1 Tablet by mouth nightly. Qty: 30 Tablet, Refills: 0  Start date: 6/1/2022      benzonatate (TESSALON) 100 mg capsule Take 1 Capsule by mouth three (3) times daily as needed for Cough for up to 7 days. Qty: 20 Capsule, Refills: 0  Start date: 6/1/2022, End date: 6/8/2022      clopidogreL (PLAVIX) 75 mg tab Take 1 Tablet by mouth daily. Indications: blood clot prevention following percutaneous coronary intervention  Qty: 90 Tablet, Refills: 0  Start date: 6/2/2022      potassium chloride (KLOR-CON) 20 mEq pack Take 1 Packet by mouth two (2) times daily (with meals). Indications: low amount of potassium in the blood  Qty: 20 Packet, Refills: 0  Start date: 6/1/2022      sacubitril-valsartan (ENTRESTO) 24 mg/26 mg tablet Take 1 Tablet by mouth two (2) times a day. Indications: chronic heart failure  Qty: 60 Tablet, Refills: 0  Start date: 6/1/2022      carvediloL (COREG) 3.125 mg tablet Take 1 Tablet by mouth two (2) times daily (with meals). Indications: heart failure with reduced ejection fraction due to dilated cardiomyopathy  Qty: 60 Tablet, Refills: 0  Start date: 6/1/2022         CONTINUE these medications which have NOT CHANGED    Details   aspirin delayed-release 81 mg tablet Take 81 mg by mouth daily. Follow up Care:    1. PCP or Tata Leija NP in 3-5 days  2.  Dr. Barbara Obando < 2 weeks  3. Dr. Rubina Munoz < 2 weeks    BMP upon follow up with PCP    Patient Follow Up Instructions: Activity: PT/OT per Home Health  Diet:  Cardiac Diet    Condition at Discharge:  Stable  __________________________________________________________________    Disposition  Home Health Care Svc  ____________________________________________________________________    Code Status:  Full Code  ___________________________________________________________________    Discharge Exam:  Patient seen and examined by me on discharge day. Pertinent Findings:  Gen:    NAD. Coherent. Sitting in chair. Chest: Clear lungs  CVS:   Regular rhythm. No edema  Abd:  Soft, not distended, not tender  Neuro:  Alert    CONSULTATIONS: Cardiology and Pulmonary/Intensive care    Significant Diagnostic Studies:   Recent Results (from the past 24 hour(s))   METABOLIC PANEL, COMPREHENSIVE    Collection Time: 06/01/22  8:32 AM   Result Value Ref Range    Sodium 123 (L) 136 - 145 mmol/L    Potassium 3.3 (L) 3.5 - 5.1 mmol/L    Chloride 86 (L) 97 - 108 mmol/L    CO2 30 21 - 32 mmol/L    Anion gap 7 5 - 15 mmol/L    Glucose 134 (H) 65 - 100 mg/dL    BUN 9 6 - 20 mg/dL    Creatinine 0.44 (L) 0.55 - 1.02 mg/dL    BUN/Creatinine ratio 20 12 - 20      GFR est AA >60 >60 ml/min/1.73m2    GFR est non-AA >60 >60 ml/min/1.73m2    Calcium 8.2 (L) 8.5 - 10.1 mg/dL    Bilirubin, total 1.0 0.2 - 1.0 mg/dL    AST (SGOT) 18 15 - 37 U/L    ALT (SGPT) 25 12 - 78 U/L    Alk.  phosphatase 45 45 - 117 U/L    Protein, total 5.5 (L) 6.4 - 8.2 g/dL    Albumin 3.0 (L) 3.5 - 5.0 g/dL    Globulin 2.5 2.0 - 4.0 g/dL    A-G Ratio 1.2 1.1 - 2.2     CBC WITH AUTOMATED DIFF    Collection Time: 06/01/22  8:32 AM   Result Value Ref Range    WBC 8.0 3.6 - 11.0 K/uL    RBC 4.51 3.80 - 5.20 M/uL    HGB 13.0 11.5 - 16.0 g/dL    HCT 38.5 35.0 - 47.0 %    MCV 85.4 80.0 - 99.0 FL    MCH 28.8 26.0 - 34.0 PG    MCHC 33.8 30.0 - 36.5 g/dL    RDW 14.9 (H) 11.5 - 14.5 %    PLATELET 984 814 - 400 K/uL    MPV 9.3 8.9 - 12.9 FL    NRBC 0.0 0.0  WBC    ABSOLUTE NRBC 0.00 0.00 - 0.01 K/uL    NEUTROPHILS 76 (H) 32 - 75 %    LYMPHOCYTES 7 (L) 12 - 49 %    MONOCYTES 15 (H) 5 - 13 %    EOSINOPHILS 1 0 - 7 %    BASOPHILS 0 0 - 1 %    IMMATURE GRANULOCYTES 1 (H) 0 - 0.5 %    ABS. NEUTROPHILS 6.2 1.8 - 8.0 K/UL    ABS. LYMPHOCYTES 0.5 (L) 0.8 - 3.5 K/UL    ABS. MONOCYTES 1.2 (H) 0.0 - 1.0 K/UL    ABS. EOSINOPHILS 0.1 0.0 - 0.4 K/UL    ABS. BASOPHILS 0.0 0.0 - 0.1 K/UL    ABS. IMM. GRANS. 0.1 (H) 0.00 - 0.04 K/UL    DF AUTOMATED       XR SWALLOW FUNC VIDEO   Final Result      XR CHEST PORT   Final Result   No significant change. XR CHEST PORT   Final Result   Improved lung aeration. Stable bilateral pleural effusions and   basilar airspace disease.             XR CHEST PORT   Final Result              Signed:  Julien Jacobo MD  6/1/2022  3:30 PM

## 2022-06-01 NOTE — PROGRESS NOTES
PULMONARY NOTE  [facility]    Name: Beckie Jalloh MRN: [id number]   : 1940 Hospital: [address] [place] [address]   Date: 2022  Admission date: 2022 Hospital Day: 6       HPI:     Hospital Problems  Date Reviewed: 2022          Codes Class Noted POA    Acute respiratory failure with hypoxia [facility]) ICD-10-CM: J96.01  ICD-9-CM: 518.81  2022 Unknown        Acute on chronic systolic congestive heart failure (HCC) ICD-10-CM: I50.23  ICD-9-CM: 428.23, 428.0  2022 Unknown        Dysphagia ICD-10-CM: R13.10  ICD-9-CM: 787.20  2022 Unknown        CHF (congestive heart failure) (HCC) ICD-10-CM: I50.9  ICD-9-CM: 428.0  2022 Unknown                   [x] High complexity decision making was performed  [x] See my orders for details      Subjective/Initial History:     I was asked by Aliya Reardon MD to see Beckie Jalloh  a 79 y.o.  female in consultation     Excerpts from admission 2022 or consult notes as follows:   60-year-old lady came in because of shortness of breath and dyspnea, she is now on 4 L nasal cannula did not sleep well seen by cardiologist diastolic dysfunction she is retaining fluid she has not seen a physician in the past 20 years is not on any oxygen no history of sleep apnea she used to smoke quit smoking so admitted and pulmonary consult was called regarding hypoxia and respiratory management.       No Known Allergies     MAR reviewed and pertinent medications noted or modified as needed     Current Facility-Administered Medications   Medication    atorvastatin (LIPITOR) tablet 40 mg    clopidogreL (PLAVIX) tablet 75 mg    sodium chloride (NS) flush 5-40 mL    sodium chloride (NS) flush 5-40 mL    potassium chloride (KLOR-CON) packet for solution 40 mEq    albuterol-ipratropium (DUO-NEB) 2.5 MG-0.5 MG/3 ML    benzonatate (TESSALON) capsule 100 mg    [Held by provider] sacubitriL-valsartan (ENTRESTO) 24-26 mg tablet 1 Tablet    guaiFENesin (ROBITUSSIN) 100 mg/5 mL oral liquid 200 mg    aspirin delayed-release tablet 81 mg    [Held by provider] carvediloL (COREG) tablet 3.125 mg    sodium chloride (NS) flush 5-10 mL    sodium chloride (NS) flush 5-40 mL    sodium chloride (NS) flush 5-40 mL    acetaminophen (TYLENOL) tablet 650 mg    Or    acetaminophen (TYLENOL) suppository 650 mg    polyethylene glycol (MIRALAX) packet 17 g    ondansetron (ZOFRAN ODT) tablet 4 mg    Or    ondansetron (ZOFRAN) injection 4 mg    enoxaparin (LOVENOX) injection 40 mg    melatonin tablet 5 mg      Patient PCP: None  PMH:  has no past medical history on file. PSH:   has no past surgical history on file. FHX: family history is not on file. SHX:  reports that she has quit smoking. She has never used smokeless tobacco. She reports that she does not drink alcohol and does not use drugs. ROS:    Review of Systems   Constitutional: Positive for malaise/fatigue. HENT: Negative. Eyes: Negative. Respiratory: Positive for shortness of breath. Cardiovascular: Positive for orthopnea. Gastrointestinal: Negative. Genitourinary: Negative. Musculoskeletal: Negative. Skin: Negative. Neurological: Negative. Psychiatric/Behavioral: Negative.          Objective:     Vital Signs: Telemetry:    normal sinus rhythm Intake/Output:   Visit Vitals  /62 (BP Patient Position: At rest;Semi fowlers)   Pulse 78   Temp 97.5 °F (36.4 °C)   Resp 20   Ht 5' 6\" (1.676 m)   Wt 60.8 kg (134 lb)   SpO2 98%   BMI 21.63 kg/m²       Temp (24hrs), Av.6 °F (36.4 °C), Min:97.4 °F (36.3 °C), Max:98 °F (36.7 °C)        O2 Device: Nasal cannula O2 Flow Rate (L/min): 2 l/min       Wt Readings from Last 4 Encounters:   22 60.8 kg (134 lb)          Intake/Output Summary (Last 24 hours) at 2022 1027  Last data filed at 2022 1747  Gross per 24 hour   Intake --   Output 300 ml   Net -300 ml       Last shift:      No intake/output data recorded. Last 3 shifts: 05/30 1901 - 06/01 0700  In: 150 [P.O.:150]  Out: 750 [Urine:750]       Physical Exam:     Physical Exam  Constitutional:       Appearance: Normal appearance. HENT:      Head: Normocephalic and atraumatic. Nose: Nose normal.      Mouth/Throat:      Mouth: Mucous membranes are moist.   Eyes:      Pupils: Pupils are equal, round, and reactive to light. Cardiovascular:      Rate and Rhythm: Normal rate and regular rhythm. Pulses: Normal pulses. Heart sounds: Normal heart sounds. Pulmonary:      Effort: Pulmonary effort is normal.      Breath sounds: Rales present. Abdominal:      General: Abdomen is flat. Bowel sounds are normal.      Palpations: Abdomen is soft. Musculoskeletal:         General: Swelling present. Cervical back: Normal range of motion and neck supple. Right lower leg: Edema present. Left lower leg: Edema present. Neurological:      General: No focal deficit present. Mental Status: She is alert. Psychiatric:         Mood and Affect: Mood normal.          Labs:    Recent Labs     06/01/22  0832 05/31/22  0700 05/30/22  0733   WBC 8.0 8.3 7.8   HGB 13.0 12.8 12.9    330 382     Recent Labs     05/31/22  0700 05/30/22  0733 05/29/22  1040   * 124*  --    K 2.8* 3.4*  --    CL 86* 85*  --    CO2 31 28  --    * 158*  --    BUN 13 14  --    CREA 0.54* 0.47*  --    CA 8.4* 8.7  --    LAC  --   --  1.7   ALB 3.0* 3.4*  --    ALT 30 42  --      No results for input(s): PH, PCO2, PO2, HCO3, FIO2 in the last 72 hours. No results for input(s): CPK, CKNDX, TROIQ in the last 72 hours.     No lab exists for component: CPKMB  No results found for: BNPP, BNP   Lab Results   Component Value Date/Time    Culture result: No growth 5 days 05/27/2022 12:59 PM   No results found for: TSH, TSHEXT, TSHEXT    Imaging:    CXR Results  (Last 48 hours)               05/31/22 0051  XR CHEST PORT Final result    Impression:  No significant change. Narrative:  XR CHEST PORT       Comparison:  5/27/2022. Single view:  Stable pleural effusions and bibasilar atelectasis/pneumonia (left   greater than right). No pneumothorax apparent. The heart size is stable. Stable   hemodynamic status. Results from Hospital Encounter encounter on 05/27/22    XR CHEST PORT    Narrative  XR CHEST PORT    Comparison:  5/27/2022. Single view:  Stable pleural effusions and bibasilar atelectasis/pneumonia (left  greater than right). No pneumothorax apparent. The heart size is stable. Stable  hemodynamic status. Impression  No significant change. XR SWALLOW FUNC VIDEO    Narrative  Modified barium swallow    Mild delay in initiation of the swallowing reflex. At times, incomplete epiglottic inversion. With small volume thin consistency oral contrast, there is deep penetration. No  demonstrated aspiration. With larger volume thin consistency oral contrast and use of cup and straw,  aspiration occurs. With nectar, honey, pudding, and semisolid consistency oral contrast, there is  no demonstrated aspiration or penetration. Retention through vallecula more so  than piriform sinuses noted with thicker consistency oral contrast.    Study findings reviewed with speech pathology. 138.6 DAP/1:44 min      XR CHEST PORT    Narrative  Exam: XR CHEST PORT    Indication:  SOB; Comparison: Chest x-ray performed the same day at 11:58 AM.    Findings:    Stable cardiac mediastinal silhouette. Aeration has improved. Bilateral pleural  effusions and associated airspace disease/atelectasis are not significantly  changed. There is no pneumothorax. Osteopenia. No acute bony abnormality. Impression  Improved lung aeration. Stable bilateral pleural effusions and  basilar airspace disease. No results found for this or any previous visit. IMPRESSION:   1. Acute hypoxic respiratory failure  2.  Congestive heart failure diastolic dysfunction  3. HFrEF ejection fraction 30 to 35%  4. Bilateral pleural effusion  5. Prognosis guarded       RECOMMENDATIONS/PLAN:     3 24-year-old lady with shortness of breath and dyspnea she is on oxygen 2 L nasal cannula she has never been on oxygen at home history of smoking in the past we will start patient on nebulizer treatment not actively wheezing chest x-ray shows congestive changes pleural effusion continue with the Lasix 2D echo to see the right heart pressure and possible she need oxygen when discharged home  2. Cardiac work-up EF 30-35% s/p cardiac cath with PCI stent   3.  Replace potassium                 Pramod Carter MD

## 2022-06-01 NOTE — PROGRESS NOTES
OCCUPATIONAL THERAPY EVALUATION  Patient: Clint Hammer (89 y.o. female)  Date: 6/1/2022  Primary Diagnosis: CHF (congestive heart failure) ([facility] [address] [I50.9]  Procedure(s) (LRB):  LEFT HEART CATH / CORONARY ANGIOGRAPHY (N/A)  PERCUTANEOUS CORONARY INTERVENTION (N/A) 1 Day Post-Op   Precautions: fall risk       ASSESSMENT  Pt is a [age] y/o F with no significant PMH presenting to [facility] with c/o SOB and orthopnea, admitted 5/27  and being treated for CHF. Chest x-ray shows hazy central and basilar reticular markings with moderate to large L pleural effusion. Echo showed LV EF  30-35% with LV dilation. Pt received semi-supine in bed upon arrival, AXO x4, and agreeable to OT evaluation at this time. Per pt report, pt lives with  in a one-story home with ramp, was IND with ADLs and Mod I using RW for mobility at [facility]. Pt reports she experienced increased difficultly in ADLs/mobility since last Friday. No O2 at home (currently on 4L). Other DME owned includes: RW, shower chair, BSC     Based on current observations, pt presents with deficits in generalized strength/AROM, bed mobility, static/dynamic standing balance, and functional activity tolerance impacting overall performance of ADLs and functional transfers/mobility. Pt currently requires min A for sup>sit transfer and req'd SBA to scoot toward EOB. She req'd CGA using RW for STS, bed>bathroom, min A for bathroom commode transfer, and CGA using RW for bathroom>chair transfer; she demo'd slow, steady gait. She req'd SUP for adjusting B socks at EOB, SUP for anterior pericare on bathroom commode, SUP/CGA for standing hand hygiene, and set-up for donning/doffing hospital gown in chair. She was left in chair w/ all needs/call bell in reach and food tray in front of her. OT educated pt to complete BUE exs in chair including elbow flex/ext, wrist flex/ext, and shoulder flex/ext; pt demo'd good understanding.  Overall, pt tolerates session fair with decreased functional activity tolerance, however, good safety awareness during ambulation. O2 remained >90% throughout session. Pt would benefit from continued skilled OT services to address current impairments and improve IND and safety with self cares and functional transfers/mobility. Current OT d/c recommendation [facility] vs. [address] pending pulmonary progress once medically appropriate. Other factors to consider for discharge: family/social support, DME, time since onset, severity of deficits, decline from functional baseline     Patient will benefit from skilled therapy intervention to address the above noted impairments. PLAN :  Recommendations and Planned Interventions: self care training, functional mobility training, therapeutic exercise, balance training, therapeutic activities, endurance activities, patient education and home safety training    Frequency/Duration: Patient will be followed by occupational therapy:  3-5x/week to address goals. Recommendation for discharge: (in order for the patient to meet his/her long term goals)  [facility] vs. [address] pending pulmonary progress    This discharge recommendation:  Has been made in collaboration with the attending provider and/or case management    IF patient discharges home will need the following DME: none at this time        SUBJECTIVE:   Patient stated I keep coughing up phelm.     OBJECTIVE DATA SUMMARY:   HISTORY:   History reviewed. No pertinent past medical history. History reviewed. No pertinent surgical history.     Expanded or extensive additional review of patient history:     Home Situation  Home Environment: Private residence  Wheelchair Ramp: Yes  One/Two Story Residence: One story  Living Alone: No  Support Systems: Spouse/Significant Other  Patient Expects to be Discharged to[de-identified] Home  Current DME Used/Available at Home: Hospital bed,Commode, bedside,Grab bars,Shower chair,Walker, rolling  Tub or Shower Type: Tub/Shower combination (pt takes sponge baths)    Hand dominance: Right    EXAMINATION OF PERFORMANCE DEFICITS:  Cognitive/Behavioral Status:  Neurologic State: Alert  Orientation Level: Oriented X4  Cognition: Appropriate decision making; Follows commands; Appropriate safety awareness               Hearing: Auditory  Auditory Impairment: None    Range of Motion:  AROM: Generally decreased, functional                         Strength:  Strength: Generally decreased, functional                Coordination:     Fine Motor Skills-Upper: Left Intact; Right Intact    Gross Motor Skills-Upper: Left Intact; Right Intact    Tone & Sensation:     Sensation: Intact                      Balance:  Sitting: Intact; Without support  Standing: Impaired; With support  Standing - Static: Fair;Occasional  Standing - Dynamic : Fair;Constant support    Functional Mobility and Transfers for ADLs:  Bed Mobility:  Supine to Sit: Minimum assistance  Scooting: Stand-by assistance    Transfers:  Sit to Stand: Contact guard assistance  Stand to Sit: Contact guard assistance  Bed to Chair: Contact guard assistance  Bathroom Mobility: Contact guard assistance  Toilet Transfer : Minimum assistance  Assistive Device : Walker, rolling      ADL Intervention and task modifications:       Grooming  Washing Hands: Supervision;Contact guard assistance              Upper Body [address]: Set-up (d/t lines )    Lower Body Dressing Assistance  Socks: Supervision  Position Performed: Seated edge of bed    Toileting  Bladder Hygiene: Supervision         Therapeutic Exercise:  Pt will benefit from BUE HEP to improve participation in ADLs and mobility. Plan will be initiated at next session. Functional Measure:    [address] [address] AM-PACTM \"6 Clicks\"                                                       Daily Activity Inpatient Short Form  How much help from another person does the patient currently need. .. Total; A Lot A Little None   1.   Putting on and taking off regular lower body clothing? []  1 []  2 [x]  3 []  4   2. Bathing (including washing, rinsing, drying)? []  1 []  2 [x]  3 []  4   3. Toileting, which includes using toilet, bedpan or urinal? [] 1 []  2 [x]  3 []  4   4. Putting on and taking off regular upper body clothing? []  1 []  2 [x]  3 []  4   5. Taking care of personal grooming such as brushing teeth? []  1 []  2 [x]  3 []  4   6. Eating meals? []  1 []  2 []  3 [x]  4   © 2007, Trustees [address] [address], under license to [employer]. All rights reserved     Score: 19/24     Interpretation of Tool:  Represents clinically-significant functional categories (i.e. Activities of daily living). Percentage of Impairment CH    0%   CI    1-19% CJ    20-39% CK    40-59% CL    60-79% CM    80-99% CN     100%   [facility]  Score 6-24 24 23 20-22 15-19 10-14 7-9 6         Occupational Therapy Evaluation Charge Determination   History Examination Decision-Making   LOW Complexity : Brief history review  LOW Complexity : 1-3 performance deficits relating to physical, cognitive , or psychosocial skils that result in activity limitations and / or participation restrictions  MEDIUM Complexity : Patient may present with comorbidities that affect occupational performnce. Miniml to moderate modification of tasks or assistance (eg, physical or verbal ) with assesment(s) is necessary to enable patient to complete evaluation       Based on the above components, the patient evaluation is determined to be of the following complexity level: LOW   Pain Rating:  No pain just discomfort; Nsg in room    Activity Tolerance:   Fair    After treatment patient left in no apparent distress:    Sitting in chair and Call bell within reach    COMMUNICATION/EDUCATION:   The patients plan of care was discussed with: Registered nurse. Patient/family have participated as able in goal setting and plan of care. and Patient/family agree to work toward stated goals and plan of care.     This patients plan of care is appropriate for delegation to [facility]EE[facility]. Thank you for this referral.  Starlett Sacks, OT  Time Calculation: 47 mins   Problem: Self Care Deficits Care Plan (Adult)  Goal: *Acute Goals and Plan of Care (Insert Text)  Description: Pt stated goal \"I want to go home\".      Pt will be IND sup <>sit in prep for EOB ADLs  Pt will be Mod I grooming sitting/standing at sinktop  Pt will be IND LE dressing sitting EOB/long sit  Pt will be Mod I sit <>  prep for toileting LRAD  Pt will be Mod I toileting/toilet transfer/cloth mgmt LRAD  Pt will be IND following UE HEP in prep for self care tasks     Outcome: Not Met

## 2022-06-02 LAB
BACTERIA SPEC CULT: NORMAL
SPECIAL REQUESTS,SREQ: NORMAL

## 2022-06-21 ENCOUNTER — APPOINTMENT (OUTPATIENT)
Dept: GENERAL RADIOLOGY | Age: 82
End: 2022-06-21
Attending: EMERGENCY MEDICINE

## 2022-06-21 ENCOUNTER — HOSPITAL ENCOUNTER (EMERGENCY)
Age: 82
Discharge: HOME | End: 2022-06-21
Attending: EMERGENCY MEDICINE

## 2022-06-21 VITALS
SYSTOLIC BLOOD PRESSURE: 122 MMHG | HEART RATE: 83 BPM | TEMPERATURE: 97.8 F | WEIGHT: 123 LBS | OXYGEN SATURATION: 96 % | BODY MASS INDEX: 21.79 KG/M2 | DIASTOLIC BLOOD PRESSURE: 72 MMHG | RESPIRATION RATE: 16 BRPM | HEIGHT: 63 IN

## 2022-06-21 DIAGNOSIS — J90 PLEURAL EFFUSION: Primary | ICD-10-CM

## 2022-06-21 DIAGNOSIS — I50.9 CONGESTIVE HEART FAILURE, UNSPECIFIED HF CHRONICITY, UNSPECIFIED HEART FAILURE TYPE (HCC): ICD-10-CM

## 2022-06-21 LAB
ALBUMIN SERPL-MCNC: 3.2 G/DL (ref 3.5–5)
ALBUMIN/GLOB SERPL: 1 {RATIO} (ref 1.1–2.2)
ALP SERPL-CCNC: 52 U/L (ref 45–117)
ALT SERPL-CCNC: 23 U/L (ref 12–78)
ANION GAP SERPL CALC-SCNC: 7 MMOL/L (ref 5–15)
AST SERPL W P-5'-P-CCNC: 30 U/L (ref 15–37)
ATRIAL RATE: 84 BPM
BASOPHILS # BLD: 0.1 K/UL (ref 0–0.1)
BASOPHILS NFR BLD: 1 % (ref 0–1)
BILIRUB SERPL-MCNC: 1 MG/DL (ref 0.2–1)
BNP SERPL-MCNC: ABNORMAL PG/ML
BUN SERPL-MCNC: 12 MG/DL (ref 6–20)
BUN/CREAT SERPL: 36 (ref 12–20)
CA-I BLD-MCNC: 8.7 MG/DL (ref 8.5–10.1)
CALCULATED P AXIS, ECG09: -23 DEGREES
CALCULATED R AXIS, ECG10: 4 DEGREES
CALCULATED T AXIS, ECG11: -30 DEGREES
CHLORIDE SERPL-SCNC: 104 MMOL/L (ref 97–108)
CO2 SERPL-SCNC: 24 MMOL/L (ref 21–32)
CREAT SERPL-MCNC: 0.33 MG/DL (ref 0.55–1.02)
DIAGNOSIS, 93000: NORMAL
DIFFERENTIAL METHOD BLD: ABNORMAL
EOSINOPHIL # BLD: 0.1 K/UL (ref 0–0.4)
EOSINOPHIL NFR BLD: 2 % (ref 0–7)
ERYTHROCYTE [DISTWIDTH] IN BLOOD BY AUTOMATED COUNT: 15.5 % (ref 11.5–14.5)
GLOBULIN SER CALC-MCNC: 3.1 G/DL (ref 2–4)
GLUCOSE SERPL-MCNC: 161 MG/DL (ref 65–100)
HCT VFR BLD AUTO: 37.9 % (ref 35–47)
HGB BLD-MCNC: 11.9 G/DL (ref 11.5–16)
IMM GRANULOCYTES # BLD AUTO: 0 K/UL (ref 0–0.04)
IMM GRANULOCYTES NFR BLD AUTO: 0 % (ref 0–0.5)
LYMPHOCYTES # BLD: 0.9 K/UL (ref 0.8–3.5)
LYMPHOCYTES NFR BLD: 15 % (ref 12–49)
MCH RBC QN AUTO: 27.7 PG (ref 26–34)
MCHC RBC AUTO-ENTMCNC: 31.4 G/DL (ref 30–36.5)
MCV RBC AUTO: 88.3 FL (ref 80–99)
MONOCYTES # BLD: 0.7 K/UL (ref 0–1)
MONOCYTES NFR BLD: 12 % (ref 5–13)
NEUTS SEG # BLD: 4.2 K/UL (ref 1.8–8)
NEUTS SEG NFR BLD: 70 % (ref 32–75)
NRBC # BLD: 0 K/UL (ref 0–0.01)
NRBC BLD-RTO: 0 PER 100 WBC
P-R INTERVAL, ECG05: 188 MS
PLATELET # BLD AUTO: 370 K/UL (ref 150–400)
PMV BLD AUTO: 9 FL (ref 8.9–12.9)
POTASSIUM SERPL-SCNC: 4.5 MMOL/L (ref 3.5–5.1)
PROT SERPL-MCNC: 6.3 G/DL (ref 6.4–8.2)
Q-T INTERVAL, ECG07: 396 MS
QRS DURATION, ECG06: 68 MS
QTC CALCULATION (BEZET), ECG08: 467 MS
RBC # BLD AUTO: 4.29 M/UL (ref 3.8–5.2)
SODIUM SERPL-SCNC: 135 MMOL/L (ref 136–145)
TROPONIN-HIGH SENSITIVITY: 26 NG/L (ref 0–51)
VENTRICULAR RATE, ECG03: 84 BPM
WBC # BLD AUTO: 6 K/UL (ref 3.6–11)

## 2022-06-21 PROCEDURE — 85025 COMPLETE CBC W/AUTO DIFF WBC: CPT

## 2022-06-21 PROCEDURE — 36415 COLL VENOUS BLD VENIPUNCTURE: CPT

## 2022-06-21 PROCEDURE — 93005 ELECTROCARDIOGRAM TRACING: CPT

## 2022-06-21 PROCEDURE — 80053 COMPREHEN METABOLIC PANEL: CPT

## 2022-06-21 PROCEDURE — 84484 ASSAY OF TROPONIN QUANT: CPT

## 2022-06-21 PROCEDURE — 96374 THER/PROPH/DIAG INJ IV PUSH: CPT

## 2022-06-21 PROCEDURE — 83880 ASSAY OF NATRIURETIC PEPTIDE: CPT

## 2022-06-21 PROCEDURE — 71045 X-RAY EXAM CHEST 1 VIEW: CPT

## 2022-06-21 PROCEDURE — 99285 EMERGENCY DEPT VISIT HI MDM: CPT

## 2022-06-21 PROCEDURE — 74011250636 HC RX REV CODE- 250/636: Performed by: EMERGENCY MEDICINE

## 2022-06-21 RX ORDER — ASPIRIN 325 MG
325 TABLET ORAL
Status: DISCONTINUED | OUTPATIENT
Start: 2022-06-21 | End: 2022-06-21 | Stop reason: HOSPADM

## 2022-06-21 RX ORDER — FUROSEMIDE 10 MG/ML
40 INJECTION INTRAMUSCULAR; INTRAVENOUS
Status: COMPLETED | OUTPATIENT
Start: 2022-06-21 | End: 2022-06-21

## 2022-06-21 RX ADMIN — FUROSEMIDE 40 MG: 10 INJECTION, SOLUTION INTRAMUSCULAR; INTRAVENOUS at 11:54

## 2022-06-21 NOTE — DISCHARGE INSTRUCTIONS
Thank you! Thank you for allowing me to care for you in the emergency department. I sincerely hope that you are satisfied with your visit today. It is my goal to provide you with excellent care. Below you will find a list of your labs and imaging from your visit today. Should you have any questions regarding these results please do not hesitate to call the emergency department. Labs -     Recent Results (from the past 12 hour(s))   TROPONIN-HIGH SENSITIVITY    Collection Time: 06/21/22  7:45 AM   Result Value Ref Range    Troponin-High Sensitivity 26 0 - 51 ng/L   CBC WITH AUTOMATED DIFF    Collection Time: 06/21/22  9:14 AM   Result Value Ref Range    WBC 6.0 3.6 - 11.0 K/uL    RBC 4.29 3.80 - 5.20 M/uL    HGB 11.9 11.5 - 16.0 g/dL    HCT 37.9 35.0 - 47.0 %    MCV 88.3 80.0 - 99.0 FL    MCH 27.7 26.0 - 34.0 PG    MCHC 31.4 30.0 - 36.5 g/dL    RDW 15.5 (H) 11.5 - 14.5 %    PLATELET 952 158 - 230 K/uL    MPV 9.0 8.9 - 12.9 FL    NRBC 0.0 0.0  WBC    ABSOLUTE NRBC 0.00 0.00 - 0.01 K/uL    NEUTROPHILS 70 32 - 75 %    LYMPHOCYTES 15 12 - 49 %    MONOCYTES 12 5 - 13 %    EOSINOPHILS 2 0 - 7 %    BASOPHILS 1 0 - 1 %    IMMATURE GRANULOCYTES 0 0 - 0.5 %    ABS. NEUTROPHILS 4.2 1.8 - 8.0 K/UL    ABS. LYMPHOCYTES 0.9 0.8 - 3.5 K/UL    ABS. MONOCYTES 0.7 0.0 - 1.0 K/UL    ABS. EOSINOPHILS 0.1 0.0 - 0.4 K/UL    ABS. BASOPHILS 0.1 0.0 - 0.1 K/UL    ABS. IMM.  GRANS. 0.0 0.00 - 0.04 K/UL    DF AUTOMATED     METABOLIC PANEL, COMPREHENSIVE    Collection Time: 06/21/22  9:14 AM   Result Value Ref Range    Sodium 135 (L) 136 - 145 mmol/L    Potassium 4.5 3.5 - 5.1 mmol/L    Chloride 104 97 - 108 mmol/L    CO2 24 21 - 32 mmol/L    Anion gap 7 5 - 15 mmol/L    Glucose 161 (H) 65 - 100 mg/dL    BUN 12 6 - 20 mg/dL    Creatinine 0.33 (L) 0.55 - 1.02 mg/dL    BUN/Creatinine ratio 36 (H) 12 - 20      GFR est AA >60 >60 ml/min/1.73m2    GFR est non-AA >60 >60 ml/min/1.73m2    Calcium 8.7 8.5 - 10.1 mg/dL    Bilirubin, total 1.0 0.2 - 1.0 mg/dL    AST (SGOT) 30 15 - 37 U/L    ALT (SGPT) 23 12 - 78 U/L    Alk. phosphatase 52 45 - 117 U/L    Protein, total 6.3 (L) 6.4 - 8.2 g/dL    Albumin 3.2 (L) 3.5 - 5.0 g/dL    Globulin 3.1 2.0 - 4.0 g/dL    A-G Ratio 1.0 (L) 1.1 - 2.2     NT-PRO BNP    Collection Time: 06/21/22  9:14 AM   Result Value Ref Range    NT pro-BNP 14,456 (H) <450 pg/mL       Radiologic Studies -   XR CHEST PORT   Final Result        CT Results  (Last 48 hours)      None          CXR Results  (Last 48 hours)                 06/21/22 0754  XR CHEST PORT Final result    Narrative:  Chest single view. Comparison single view chest May 31, 2022. Left greater than right moderate volume dependent pleural effusions persist.   Mild dependent pulmonary interstitial edema. Bibasilar atelectasis. Cardiac and   mediastinal structures unchanged. No pneumothorax. If you feel that you have not received excellent quality care or timely care, please ask to speak to the nurse manager. Please choose us in the future for your continued health care needs. ------------------------------------------------------------------------------------------------------------  The exam and treatment you received in the Emergency Department were for an urgent problem and are not intended as complete care. It is important that you follow-up with a doctor, nurse practitioner, or physician assistant to:  (1) confirm your diagnosis,  (2) re-evaluation of changes in your illness and treatment, and  (3) for ongoing care. If your symptoms become worse or you do not improve as expected and you are unable to reach your usual health care provider, you should return to the Emergency Department. We are available 24 hours a day. Please take your discharge instructions with you when you go to your follow-up appointment. If you have any problem arranging a follow-up appointment, contact the Emergency Department immediately.     If a prescription has been provided, please have it filled as soon as possible to prevent a delay in treatment. Read the entire medication instruction sheet provided to you by the pharmacy. If you have any questions or reservations about taking the medication due to side effects or interactions with other medications, please call your primary care physician or contact the ER to speak with the charge nurse. Make an appointment with your family doctor or the physician you were referred to for follow-up of this visit as instructed on your discharge paperwork, as this is a mandatory follow-up. Return to the ER if you are unable to be seen or if you are unable to be seen in a timely manner. If you have any problem arranging the follow-up visit, contact the Emergency Department immediately.

## 2022-06-21 NOTE — ED TRIAGE NOTES
[facility] 15 pt stated that she has been having increasing SOB since the end of May.  Pt also stated that she has a tickle in her throat that at times makes it hard for her swallow and breath

## 2022-06-21 NOTE — ED PROVIDER NOTES
EMERGENCY DEPARTMENT HISTORY AND PHYSICAL EXAM      Date: 6/21/2022  Patient Name: Carmenza Orourke    History of Presenting Illness     Chief Complaint   Patient presents with    Shortness of Breath       History Provided By: pt    HPI: Alfredo Bales Caswell, 79 y.o. female with a past medical history significant htn presents to the ED with chief complaint of Shortness of Breath  . 25-year-old female presents with  Her throat occasional cough no fevers no severe shortness of breath or coughing. She has noticed a slight progressive shortness of breath since May. Was told that she may have some issues with her heart unclear if she has a diagnosis of congestive heart failure. Does have a cardiologist that she can see. Has been taking her medications including Plavix and Coreg as prescribed. There are no other complaints, changes, or physical findings at this time. PCP: None    Current Outpatient Medications   Medication Sig Dispense Refill    atorvastatin (LIPITOR) 40 mg tablet Take 1 Tablet by mouth nightly. 30 Tablet 0    clopidogreL (PLAVIX) 75 mg tab Take 1 Tablet by mouth daily. Indications: blood clot prevention following percutaneous coronary intervention 90 Tablet 0    potassium chloride (KLOR-CON) 20 mEq pack Take 1 Packet by mouth two (2) times daily (with meals). Indications: low amount of potassium in the blood 20 Packet 0    sacubitril-valsartan (ENTRESTO) 24 mg/26 mg tablet Take 1 Tablet by mouth two (2) times a day. Indications: chronic heart failure 60 Tablet 0    carvediloL (COREG) 3.125 mg tablet Take 1 Tablet by mouth two (2) times daily (with meals). 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 Past History     Past Medical History:  Past Medical History:   Diagnosis Date    Heart failure [facility])        Past Surgical History:  History reviewed.  No pertinent surgical history. Family History:  History reviewed. No pertinent family history. Social History:  Social History     Tobacco Use    Smoking status: Former Smoker    Smokeless tobacco: Never Used   Vaping Use    Vaping Use: Never used   Substance Use Topics    Alcohol use: Not Currently    Drug use: Never       Allergies:  No Known Allergies      Review of Systems   Review of Systems   Constitutional: Negative. Negative for chills, fatigue and fever. HENT: Negative. Negative for congestion, nosebleeds and sore throat. Eyes: Negative. Negative for pain, discharge and visual disturbance. Respiratory: Positive for shortness of breath. Negative for cough and chest tightness. Cardiovascular: Negative for chest pain, palpitations and leg swelling. Gastrointestinal: Negative for abdominal pain, blood in stool, constipation, diarrhea, nausea and vomiting. Endocrine: Negative. Genitourinary: Negative. Negative for difficulty urinating, dysuria, pelvic pain and vaginal bleeding. Musculoskeletal: Negative. Negative for arthralgias, back pain and myalgias. Skin: Negative. Negative for rash and wound. Allergic/Immunologic: Negative. Neurological: Negative. Negative for dizziness, syncope, weakness, numbness and headaches. Hematological: Negative. Psychiatric/Behavioral: Negative. Negative for agitation, confusion and suicidal ideas. All other systems reviewed and are negative. Physical Exam   Physical Exam  Vitals and nursing note reviewed. Exam conducted with a chaperone present. Constitutional:       Appearance: Normal appearance. She is normal weight. HENT:      Head: Normocephalic and atraumatic. Nose: Nose normal.      Mouth/Throat:      Mouth: Mucous membranes are moist.      Pharynx: Oropharynx is clear. Eyes:      Extraocular Movements: Extraocular movements intact.       Conjunctiva/sclera: Conjunctivae normal.      Pupils: Pupils are equal, round, and reactive to light. Cardiovascular:      Rate and Rhythm: Normal rate and regular rhythm. Pulses: Normal pulses. Heart sounds: Normal heart sounds. Pulmonary:      Effort: Pulmonary effort is normal. Tachypnea present. No respiratory distress. Breath sounds: Rhonchi present. Abdominal:      General: Abdomen is flat. Bowel sounds are normal. There is no distension. Palpations: Abdomen is soft. Tenderness: There is no abdominal tenderness. There is no guarding. Musculoskeletal:         General: No swelling, tenderness, deformity or signs of injury. Normal range of motion. Cervical back: Normal range of motion and neck supple. Right lower leg: No edema. Left lower leg: No edema. Skin:     General: Skin is warm and dry. Capillary Refill: Capillary refill takes less than 2 seconds. Findings: No lesion or rash. Neurological:      General: No focal deficit present. Mental Status: She is alert and oriented to person, place, and time. Mental status is at baseline. Cranial Nerves: No cranial nerve deficit. Psychiatric:         Mood and Affect: Mood normal.         Behavior: Behavior normal.         Thought Content: Thought content normal.         Judgment: Judgment normal.         Diagnostic Study Results     Labs -  06/21/22 0942  CBC WITH AUTOMATED DIFF   Collected: 06/21/22 0914  Final result  Specimen: Whole Blood     WBC 6.0 K/uL   RBC 4.29 M/uL   HGB 11.9 g/dL   HCT 37.9 %   MCV 88.3 FL   MCH 27.7 PG   MCHC 31.4 g/dL   RDW 15.5 High  %   PLATELET 737 K/uL   MPV 9.0 FL   NRBC 0.0  WBC   ABSOLUTE NRBC 0.00 K/uL   NEUTROPHILS 70 %   LYMPHOCYTES 15 %   MONOCYTES 12 %   EOSINOPHILS 2 %   BASOPHILS 1 %   IMMATURE GRANULOCYTES 0 %   ABS. NEUTROPHILS 4.2 K/UL   ABS. LYMPHOCYTES 0.9 K/UL   ABS. MONOCYTES 0.7 K/UL   ABS. EOSINOPHILS 0.1 K/UL   ABS. BASOPHILS 0.1 K/UL   ABS. IMM.  GRANS. 0.0 K/UL   DF AUTOMATED            06/21/22 0058 TROPONIN-HIGH SENSITIVITY   Collected: 06/21/22 0745  Final result  Specimen: Plasma     Troponin-High Sensitivity 26 ng/L          06/21/22 0952  NT-PRO BNP   Collected: 06/21/22 0914  Final result  Specimen: Serum or Plasma     NT pro-BNP 14,456 High  pg/mL           57/34/68 6026  METABOLIC PANEL, COMPREHENSIVE   Collected: 06/21/22 0914  Final result  Specimen: Serum or Plasma     Sodium 135 Low  mmol/L   Potassium 4.5 mmol/L    Chloride 104 mmol/L   CO2 24 mmol/L   Anion gap 7 mmol/L   Glucose 161 High  mg/dL   BUN 12 mg/dL   Creatinine 0.33 Low  mg/dL   BUN/Creatinine ratio 36 High      GFR est AA >60 ml/min/1.73m2   GFR est non-AA >60 ml/min/1.73m2    Calcium 8.7 mg/dL   Bilirubin, total 1.0 mg/dL   AST (SGOT) 30 U/L    ALT (SGPT) 23 U/L   Alk. phosphatase 52 U/L   Protein, total 6.3 Low  g/dL   Albumin 3.2 Low  g/dL   Globulin 3.1 g/dL   A-G Ratio 1.0 Low             06/21/22 0942  CBC WITH AUTOMATED DIFF          No results found for this or any previous visit (from the past 12 hour(s)). Radiologic Studies -   XR CHEST PORT   Final Result        CT Results  (Last 48 hours)    None        CXR Results  (Last 48 hours)               06/21/22 0754  XR CHEST PORT Final result    Narrative:  Chest single view. Comparison single view chest May 31, 2022. Left greater than right moderate volume dependent pleural effusions persist.   Mild dependent pulmonary interstitial edema. Bibasilar atelectasis. Cardiac and   mediastinal structures unchanged. No pneumothorax. Medical Decision Making and ED Course   I am the first provider for this patient. I reviewed the vital signs, available nursing notes, past medical history, past surgical history, family history and social history. Vital Signs-Reviewed the patient's vital signs.   Patient Vitals for the past 12 hrs:   Temp Pulse Resp BP SpO2   06/21/22 0741 97.8 °F (36.6 °C) 83 16 122/72 96 %       EKG interpretation:         Records Reviewed: Previous Hospital chart. EMS run report      ED Course:   Initial assessment performed. The patients presenting problems have been discussed, and they are in agreement with the care plan formulated and outlined with them. I have encouraged them to ask questions as they arise throughout their visit. Orders Placed This Encounter    XR CHEST PORT     Standing Status:   Standing     Number of Occurrences:   1     Order Specific Question:   Reason for Exam     Answer:   SOB    CBC WITH AUTOMATED DIFF     Standing Status:   Standing     Number of Occurrences:   1    METABOLIC PANEL, COMPREHENSIVE     Standing Status:   Standing     Number of Occurrences:   1    TROPONIN-HIGH SENSITIVITY     Standing Status:   Standing     Number of Occurrences:   1    BNP (NT-PRO)     Standing Status:   Standing     Number of Occurrences:   1    EKG 12 LEAD INITIAL     Standing Status:   Standing     Number of Occurrences:   1     Order Specific Question:   Reason for Exam:     Answer:   SOB                 Provider Notes (Medical Decision Making):   79-year-old female with a history of congestive heart failure presents with progressive shortness of breath. Patient however is not hypoxic not tachypneic stable vitals. She does have a stable pleural effusion. We will have her follow-up with pulmonology. Known heart failure with mild CHF will give a dose of diuresis here in the ER encouraged her to take her medications as prescribed. At this point no admission for diuresis is indicated. Consults        ED Course as of 06/24/22 2234   Tue Jun 21, 2022   0804 EKG at 738. Normal sinus rhythm rate of 84. No ST changes. T wave inversion present V4 V5 V6 with biphasic T wave V3. Reason rule out dysrhythmia. Interpreted by ER physician.  [HP]   1001 NT pro-BNP(!): 14,456 [HP]      ED Course User Index  [HP] Laverne Alex MD             Procedures                       Disposition       Emergency Department Disposition:  dc      Diagnosis     Clinical Impression: pleural effusion    chf exacerbation  Attestations:    Lauri Quigley MD    Please note that this dictation was completed with [employer], the computer voice recognition software. Quite often unanticipated grammatical, syntax, homophones, and other interpretive errors are inadvertently transcribed by the computer software. Please disregard these errors. Please excuse any errors that have escaped final proofreading. Thank you.

## 2022-06-24 DIAGNOSIS — J96.01 ACUTE RESPIRATORY FAILURE WITH HYPOXIA (HCC): ICD-10-CM

## 2022-06-24 DIAGNOSIS — I50.23 ACUTE ON CHRONIC SYSTOLIC CONGESTIVE HEART FAILURE (HCC): ICD-10-CM

## 2022-06-27 ENCOUNTER — OFFICE VISIT (OUTPATIENT)
Dept: GASTROENTEROLOGY | Age: 82
End: 2022-06-27

## 2022-06-27 VITALS
BODY MASS INDEX: 21.86 KG/M2 | HEIGHT: 63 IN | TEMPERATURE: 98 F | DIASTOLIC BLOOD PRESSURE: 62 MMHG | SYSTOLIC BLOOD PRESSURE: 113 MMHG | WEIGHT: 123.4 LBS | HEART RATE: 68 BPM | RESPIRATION RATE: 18 BRPM | OXYGEN SATURATION: 98 %

## 2022-06-27 DIAGNOSIS — R63.4 WEIGHT LOSS, ABNORMAL: ICD-10-CM

## 2022-06-27 DIAGNOSIS — R13.12 OROPHARYNGEAL DYSPHAGIA: Primary | ICD-10-CM

## 2022-06-27 DIAGNOSIS — I42.0 DILATED CARDIOMYOPATHY (HCC): ICD-10-CM

## 2022-06-27 DIAGNOSIS — I50.9 CHRONIC CONGESTIVE HEART FAILURE, UNSPECIFIED HEART FAILURE TYPE (HCC): ICD-10-CM

## 2022-06-27 PROCEDURE — 1123F ACP DISCUSS/DSCN MKR DOCD: CPT | Performed by: INTERNAL MEDICINE

## 2022-06-27 PROCEDURE — 99204 OFFICE O/P NEW MOD 45 MIN: CPT | Performed by: INTERNAL MEDICINE

## 2022-06-27 RX ORDER — PANTOPRAZOLE SODIUM 40 MG/1
40 TABLET, DELAYED RELEASE ORAL DAILY
COMMUNITY
Start: 2022-06-06 | End: 2022-11-02

## 2022-06-27 RX ORDER — LISINOPRIL 5 MG/1
5 TABLET ORAL DAILY
Status: ON HOLD | COMMUNITY
Start: 2022-06-06

## 2022-06-27 NOTE — PROGRESS NOTES
Sada Mullins is a 79 y.o. female who presents today for the following:  Chief Complaint   Patient presents with    Dysphagia    Weight Loss         Allergies   Allergen Reactions    [place] Itching       Current Outpatient Medications   Medication Sig    lisinopriL (PRINIVIL, ZESTRIL) 5 mg tablet Take 5 mg by mouth daily.  pantoprazole (PROTONIX) 40 mg tablet Take 40 mg by mouth daily.  atorvastatin (LIPITOR) 40 mg tablet Take 1 Tablet by mouth nightly.  clopidogreL (PLAVIX) 75 mg tab Take 1 Tablet by mouth daily. Indications: blood clot prevention following percutaneous coronary intervention    potassium chloride (KLOR-CON) 20 mEq pack Take 1 Packet by mouth two (2) times daily (with meals). Indications: low amount of potassium in the blood    carvediloL (COREG) 3.125 mg tablet Take 1 Tablet by mouth two (2) times daily (with meals). 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 No current facility-administered medications for this visit.        Past Medical History:   Diagnosis Date    [facility])     EF 30-35%    Dysphagia     Heart failure (HCC)        Past Surgical History:   Procedure Laterality Date    HX CORONARY STENT PLACEMENT  05/31/2022    HX HEART CATHETERIZATION  05/31/2022    STENT PLACED       Family History   Problem Relation Age of Onset    Diabetes Other     Heart Disease Other        Social History     Socioeconomic History    Marital status:      Spouse name: Not on file    Number of children: Not on file    Years of education: Not on file    Highest education level: Not on file   Occupational History    Not on file   Tobacco Use    Smoking status: Former Smoker    Smokeless tobacco: Never Used   Vaping Use    Vaping Use: Never used   Substance and Sexual Activity    Alcohol use: Not Currently    Drug use: Never    Sexual activity: Not Currently   Other Topics Concern    Not on file   Social History Narrative    Not on file     Social Determinants of Health     Financial Resource Strain:     Difficulty of Paying Living Expenses: Not on file   Food Insecurity:     Worried About Running Out of Food in the Last Year: Not on file    [place] of Food in the Last Year: Not on file   Transportation Needs:     Lack of Transportation (Medical): Not on file    Lack of Transportation (Non-Medical): Not on file   Physical Activity:     Days of Exercise per Week: Not on file    Minutes of Exercise per Session: Not on file   Stress:     Feeling of Stress : Not on file   Social Connections:     Frequency of Communication with Friends and Family: Not on file    Frequency of Social Gatherings with Friends and Family: Not on file    Attends [employer] Services: Not on file    Active Member of [address] [place] [address] [employer] or Organizations: Not on file    Attends Club or Organization Meetings: Not on file    Marital Status: Not on file   Intimate Partner Violence:     Fear of Current or Ex-Partner: Not on file    Emotionally Abused: Not on file    Physically Abused: Not on file    Sexually Abused: Not on file   Housing Stability:     Unable to Pay for Housing in the Last Year: Not on file    Number of [place] in the Last Year: Not on file    Unstable Housing in the Last Year: Not on file         HPI  79-year-old female with history of hypertensive atherosclerotic cardiovascular disease with coronary artery disease, hyperlipidemia, dilated cardiomyopathy, and congestive heart failure who comes in for evaluation of dysphagia and marked weight loss. Modified barium swallow done on 5/3/2022 show evidence of oral pharyngeal dysphagia. He also had a speech therapy evaluation around that time. Patient states over the last year she has lost over 100 pounds of weight without trying. She states she cannot swallow solids or even water at times.   Is all forms of food or drainage is her problems. She states even her saliva she has spit back up instead of trying to swallow it. No history of heartburn. She has never had a previous CVA that she is aware of no real abdominal pain. She states her appetite is good she just cannot eat. No nausea or vomiting. Review of Systems   Constitutional: Negative. HENT: Negative. Negative for nosebleeds. Eyes: Negative. Respiratory: Negative. Cardiovascular: Negative. Gastrointestinal: Negative for abdominal pain, blood in stool, constipation, diarrhea, heartburn, melena, nausea and vomiting. Genitourinary: Negative. Musculoskeletal: Positive for joint pain. Skin: Negative. Neurological: Negative. Endo/Heme/Allergies: Negative. Psychiatric/Behavioral: Negative. All other systems reviewed and are negative. Visit Vitals  /62 (BP 1 Location: Right arm, BP Patient Position: Sitting, BP Cuff Size: Adult)   Pulse 68   Temp 98 °F (36.7 °C) (Oral)   Resp 18   Ht 5' 3\" (1.6 m)   Wt 56 kg (123 lb 6.4 oz)   SpO2 98%   BMI 21.86 kg/m²     Physical Exam  Vitals and nursing note reviewed. Constitutional:       Appearance: Normal appearance. She is normal weight. HENT:      Head: Normocephalic and atraumatic. Nose: Nose normal.      Mouth/Throat:      Mouth: Mucous membranes are moist.      Pharynx: Oropharynx is clear. Eyes:      General: No scleral icterus. Conjunctiva/sclera: Conjunctivae normal.      Pupils: Pupils are equal, round, and reactive to light. Cardiovascular:      Rate and Rhythm: Normal rate and regular rhythm. Pulses: Normal pulses. Heart sounds: Normal heart sounds. Pulmonary:      Effort: Pulmonary effort is normal.      Breath sounds: Normal breath sounds. Abdominal:      General: Bowel sounds are normal. There is no distension. Palpations: Abdomen is soft. There is no mass. Tenderness: There is no abdominal tenderness.  There is no right CVA tenderness, left CVA tenderness, guarding or rebound. Hernia: No hernia is present. Musculoskeletal:         General: Normal range of motion. Cervical back: Normal range of motion and neck supple. Right lower leg: No edema. Left lower leg: No edema. Skin:     General: Skin is warm and dry. Coloration: Skin is not jaundiced. Neurological:      General: No focal deficit present. Mental Status: She is alert and oriented to person, place, and time. Psychiatric:         Mood and Affect: Mood normal.         Behavior: Behavior normal.         Thought Content: Thought content normal.         Judgment: Judgment normal.            1. Oropharyngeal dysphagia  . We will evaluate patient with an EGD and treat accordingly  - UPPER GI ENDOSCOPY,DIAGNOSIS; Future  - [place] DILATE ESOPHAGUS    2. Weight loss, abnormal  Secondary to poor oral intake    3. Dilated cardiomyopathy ([address]  Will get patient cleared by her cardiologist prior to the procedure. .    4. Chronic congestive heart failure, unspecified heart failure type ([address]

## 2022-06-27 NOTE — H&P (VIEW-ONLY)
John Mendez is a 79 y.o. female who presents today for the following:  Chief Complaint   Patient presents with    Dysphagia    Weight Loss         Allergies   Allergen Reactions    [place] Itching       Current Outpatient Medications   Medication Sig    lisinopriL (PRINIVIL, ZESTRIL) 5 mg tablet Take 5 mg by mouth daily.  pantoprazole (PROTONIX) 40 mg tablet Take 40 mg by mouth daily.  atorvastatin (LIPITOR) 40 mg tablet Take 1 Tablet by mouth nightly.  clopidogreL (PLAVIX) 75 mg tab Take 1 Tablet by mouth daily. Indications: blood clot prevention following percutaneous coronary intervention    potassium chloride (KLOR-CON) 20 mEq pack Take 1 Packet by mouth two (2) times daily (with meals). Indications: low amount of potassium in the blood    carvediloL (COREG) 3.125 mg tablet Take 1 Tablet by mouth two (2) times daily (with meals). 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 No current facility-administered medications for this visit.        Past Medical History:   Diagnosis Date    CHF [facility]congestive heart failure) ([facility])     EF 30-35%    Dysphagia     Heart failure ([facility])        Past Surgical History:   Procedure Laterality Date    HX CORONARY STENT PLACEMENT  05/31/2022    HX HEART CATHETERIZATION  05/31/2022    STENT PLACED       Family History   Problem Relation Age of Onset    Diabetes Other     Heart Disease Other        Social History     Socioeconomic History    Marital status:      Spouse name: Not on file    Number of children: Not on file    Years of education: Not on file    Highest education level: Not on file   Occupational History    Not on file   Tobacco Use    Smoking status: Former Smoker    Smokeless tobacco: Never Used   Vaping Use    Vaping Use: Never used   Substance and Sexual Activity    Alcohol use: Not Currently    Drug use: Never    Sexual activity: Not Currently   Other Topics Concern    Not on file   Social History Narrative    Not on file     Social Determinants of Health     Financial Resource Strain:     Difficulty of Paying Living Expenses: Not on file   Food Insecurity:     Worried About Running Out of Food in the Last Year: Not on file    [place] of Food in the Last Year: Not on file   Transportation Needs:     Lack of Transportation (Medical): Not on file    Lack of Transportation (Non-Medical): Not on file   Physical Activity:     Days of Exercise per Week: Not on file    Minutes of Exercise per Session: Not on file   Stress:     Feeling of Stress : Not on file   Social Connections:     Frequency of Communication with Friends and Family: Not on file    Frequency of Social Gatherings with Friends and Family: Not on file    Attends [employer] Services: Not on file    Active Member of [address] [place], [place] [address] or Organizations: Not on file    Attends Club or Organization Meetings: Not on file    Marital Status: Not on file   Intimate Partner Violence:     Fear of Current or Ex-Partner: Not on file    Emotionally Abused: Not on file    Physically Abused: Not on file    Sexually Abused: Not on file   Housing Stability:     Unable to Pay for Housing in the Last Year: Not on file    Number of [place] in the Last Year: Not on file    Unstable Housing in the Last Year: Not on file         HPI  25-year-old female with history of hypertensive atherosclerotic cardiovascular disease with coronary artery disease, hyperlipidemia, dilated cardiomyopathy, and congestive heart failure who comes in for evaluation of dysphagia and marked weight loss. Modified barium swallow done on 5/3/2022 show evidence of oral pharyngeal dysphagia. He also had a speech therapy evaluation around that time. Patient states over the last year she has lost over 100 pounds of weight without trying. She states she cannot swallow solids or even water at times.   Is all forms of food or drainage is her problems. She states even her saliva she has spit back up instead of trying to swallow it. No history of heartburn. She has never had a previous CVA that she is aware of no real abdominal pain. She states her appetite is good she just cannot eat. No nausea or vomiting. Review of Systems   Constitutional: Negative. HENT: Negative. Negative for nosebleeds. Eyes: Negative. Respiratory: Negative. Cardiovascular: Negative. Gastrointestinal: Negative for abdominal pain, blood in stool, constipation, diarrhea, heartburn, melena, nausea and vomiting. Genitourinary: Negative. Musculoskeletal: Positive for joint pain. Skin: Negative. Neurological: Negative. Endo/Heme/Allergies: Negative. Psychiatric/Behavioral: Negative. All other systems reviewed and are negative. Visit Vitals  /62 (BP 1 Location: Right arm, BP Patient Position: Sitting, BP Cuff Size: Adult)   Pulse 68   Temp 98 °F (36.7 °C) (Oral)   Resp 18   Ht 5' 3\" (1.6 m)   Wt 56 kg (123 lb 6.4 oz)   SpO2 98%   BMI 21.86 kg/m²     Physical Exam  Vitals and nursing note reviewed. Constitutional:       Appearance: Normal appearance. She is normal weight. HENT:      Head: Normocephalic and atraumatic. Nose: Nose normal.      Mouth/Throat:      Mouth: Mucous membranes are moist.      Pharynx: Oropharynx is clear. Eyes:      General: No scleral icterus. Conjunctiva/sclera: Conjunctivae normal.      Pupils: Pupils are equal, round, and reactive to light. Cardiovascular:      Rate and Rhythm: Normal rate and regular rhythm. Pulses: Normal pulses. Heart sounds: Normal heart sounds. Pulmonary:      Effort: Pulmonary effort is normal.      Breath sounds: Normal breath sounds. Abdominal:      General: Bowel sounds are normal. There is no distension. Palpations: Abdomen is soft. There is no mass. Tenderness: There is no abdominal tenderness.  There is no right CVA tenderness, left CVA tenderness, guarding or rebound. Hernia: No hernia is present. Musculoskeletal:         General: Normal range of motion. Cervical back: Normal range of motion and neck supple. Right lower leg: No edema. Left lower leg: No edema. Skin:     General: Skin is warm and dry. Coloration: Skin is not jaundiced. Neurological:      General: No focal deficit present. Mental Status: She is alert and oriented to person, place, and time. Psychiatric:         Mood and Affect: Mood normal.         Behavior: Behavior normal.         Thought Content: Thought content normal.         Judgment: Judgment normal.            1. Oropharyngeal dysphagia  . We will evaluate patient with an EGD and treat accordingly  - UPPER GI ENDOSCOPY,DIAGNOSIS; Future  - [place] DILATE ESOPHAGUS    2. Weight loss, abnormal  Secondary to poor oral intake    3. Dilated cardiomyopathy ([address]  Will get patient cleared by her cardiologist prior to the procedure. .    4. Chronic congestive heart failure, unspecified heart failure type ([address]

## 2022-06-27 NOTE — PROGRESS NOTES
Chief Complaint   Patient presents with    Dysphagia    Weight Loss     1. Have you been to the ER, urgent care clinic since your last visit? Hospitalized since your last visit? No    2. Have you seen or consulted any other health care providers outside of the [address] [place] [address] since your last visit? Include any pap smears or colon screening. No   Visit Vitals  /62 (BP 1 Location: Right arm, BP Patient Position: Sitting, BP Cuff Size: Adult)   Pulse 68   Temp 98 °F (36.7 °C) (Oral)   Resp 18   Ht 5' 3\" (1.6 m)   Wt 56 kg (123 lb 6.4 oz)   SpO2 98%   BMI 21.86 kg/m²     Patient seen by Dr Chrissy Alarcon and EGD ordered, Patient is on Plavix and needs cardiac clearance first. Patient states she has not seen a cardiogist since her stents was placed in May. Cardiac clearance sent to her PCP Dr Sai Orozco. Advised patient and her sign we will wait to hear back from the cardiac clearance before we set a date for the EGD.  06/28/22 Received clearance from PCP to stop Plavix. Patient was last seen in office on 06/06/22 by Dr Sai Orozco. Patient has Medicare so no opre auth required.

## 2022-06-28 ENCOUNTER — APPOINTMENT (OUTPATIENT)
Dept: CARDIAC REHAB | Age: 82
End: 2022-06-28

## 2022-07-06 ENCOUNTER — HOSPITAL ENCOUNTER (OUTPATIENT)
Age: 82
Setting detail: OUTPATIENT SURGERY
Discharge: HOME | End: 2022-07-06
Attending: INTERNAL MEDICINE | Admitting: INTERNAL MEDICINE

## 2022-07-06 ENCOUNTER — ANESTHESIA EVENT (OUTPATIENT)
Dept: ENDOSCOPY | Age: 82
End: 2022-07-06

## 2022-07-06 ENCOUNTER — ANESTHESIA (OUTPATIENT)
Dept: ENDOSCOPY | Age: 82
End: 2022-07-06

## 2022-07-06 VITALS
SYSTOLIC BLOOD PRESSURE: 176 MMHG | DIASTOLIC BLOOD PRESSURE: 77 MMHG | TEMPERATURE: 97.4 F | HEIGHT: 62 IN | HEART RATE: 101 BPM | BODY MASS INDEX: 22.26 KG/M2 | OXYGEN SATURATION: 98 % | RESPIRATION RATE: 16 BRPM | WEIGHT: 121 LBS

## 2022-07-06 PROCEDURE — 74011250636 HC RX REV CODE- 250/636: Performed by: INTERNAL MEDICINE

## 2022-07-06 PROCEDURE — 2709999900 HC NON-CHARGEABLE SUPPLY: Performed by: INTERNAL MEDICINE

## 2022-07-06 PROCEDURE — 76060000031 HC ANESTHESIA FIRST 0.5 HR: Performed by: INTERNAL MEDICINE

## 2022-07-06 PROCEDURE — 74011250636 HC RX REV CODE- 250/636: Performed by: NURSE ANESTHETIST, CERTIFIED REGISTERED

## 2022-07-06 PROCEDURE — 77030021593 HC FCPS BIOP ENDOSC BSC -A: Performed by: INTERNAL MEDICINE

## 2022-07-06 PROCEDURE — 43239 EGD BIOPSY SINGLE/MULTIPLE: CPT | Performed by: INTERNAL MEDICINE

## 2022-07-06 PROCEDURE — 76040000019: Performed by: INTERNAL MEDICINE

## 2022-07-06 PROCEDURE — 74011000250 HC RX REV CODE- 250: Performed by: NURSE ANESTHETIST, CERTIFIED REGISTERED

## 2022-07-06 PROCEDURE — 88305 TISSUE EXAM BY PATHOLOGIST: CPT

## 2022-07-06 RX ORDER — SODIUM CHLORIDE 0.9 % (FLUSH) 0.9 %
5-40 SYRINGE (ML) INJECTION AS NEEDED
Status: DISCONTINUED | OUTPATIENT
Start: 2022-07-06 | End: 2022-07-06 | Stop reason: HOSPADM

## 2022-07-06 RX ORDER — ONDANSETRON 2 MG/ML
INJECTION INTRAMUSCULAR; INTRAVENOUS AS NEEDED
Status: DISCONTINUED | OUTPATIENT
Start: 2022-07-06 | End: 2022-07-06 | Stop reason: HOSPADM

## 2022-07-06 RX ORDER — SODIUM CHLORIDE 9 MG/ML
125 INJECTION, SOLUTION INTRAVENOUS CONTINUOUS
Status: DISCONTINUED | OUTPATIENT
Start: 2022-07-06 | End: 2022-07-06 | Stop reason: HOSPADM

## 2022-07-06 RX ORDER — LIDOCAINE HYDROCHLORIDE 20 MG/ML
INJECTION, SOLUTION EPIDURAL; INFILTRATION; INTRACAUDAL; PERINEURAL AS NEEDED
Status: DISCONTINUED | OUTPATIENT
Start: 2022-07-06 | End: 2022-07-06 | Stop reason: HOSPADM

## 2022-07-06 RX ORDER — SODIUM CHLORIDE 0.9 % (FLUSH) 0.9 %
5-40 SYRINGE (ML) INJECTION EVERY 8 HOURS
Status: DISCONTINUED | OUTPATIENT
Start: 2022-07-06 | End: 2022-07-06 | Stop reason: HOSPADM

## 2022-07-06 RX ORDER — PROPOFOL 10 MG/ML
INJECTION, EMULSION INTRAVENOUS AS NEEDED
Status: DISCONTINUED | OUTPATIENT
Start: 2022-07-06 | End: 2022-07-06 | Stop reason: HOSPADM

## 2022-07-06 RX ORDER — SODIUM CHLORIDE 9 MG/ML
150 INJECTION, SOLUTION INTRAVENOUS CONTINUOUS
Status: DISCONTINUED | OUTPATIENT
Start: 2022-07-06 | End: 2022-07-06 | Stop reason: HOSPADM

## 2022-07-06 RX ADMIN — PROPOFOL 50 MG: 10 INJECTION, EMULSION INTRAVENOUS at 12:34

## 2022-07-06 RX ADMIN — LIDOCAINE HYDROCHLORIDE 100 MG: 20 INJECTION, SOLUTION EPIDURAL; INFILTRATION; INTRACAUDAL at 12:32

## 2022-07-06 RX ADMIN — ONDANSETRON 4 MG: 2 INJECTION INTRAMUSCULAR; INTRAVENOUS at 12:32

## 2022-07-06 RX ADMIN — PROPOFOL 50 MG: 10 INJECTION, EMULSION INTRAVENOUS at 12:32

## 2022-07-06 NOTE — OP NOTES
EGD Procedure Note        Patient: Claudia Nelson MRN: [id number]  SSN: [id number]    YOB: 1940  Age: 79 y.o. Sex: female        Date/Time:  7/6/2022 12:43 PM         IMPRESSION:       1. Distal esophagitis (grade 2)  2. Antral gastritis  3. No obstructing lesions noted in the esophagus. RECOMMENDATIONS:    1. Check biopsy results. 2. Continue the pantoprazole 40 mg daily. 3. Continue to work with speech therapy/pathology to assist with swallowing techniques. Procedure: Esophagogastroduodenoscopy with cold biopsies    Indication: Oral pharyngeal dysphagia    Endoscopist:  Sam Palomino MD    Referring Provider:   Eugenia Gardiner MD    History: The history and physical exam were reviewed and updated. Endoscope: GIF H180 Olympus video endoscope    Extent of Exam: Second part of the duodenum    ASA: Grade 3    Anethesia/Sedation:  TIVA    Description of the procedure: The procedure was discussed with the patient including risks, benefits, alternatives including risks of iv sedation, bleeding, perforation and aspiration. A safety timeout was performed. The patient was placed in the left lateral decubitus position. A bite block was placed. The patient was using standard protocol. The patients vital signs were monitored at all times including heart rate/rhythm, blood pressure and oxygen saturation. The endoscope was then passed under direct visualization to the second part of the duodenum. The endoscope was then slowly withdrawn while visualizing the mucosa. In the stomach a retroflexion was performed and gastric fundus and cardia visualized. The patient was then transferred to recovery in stable condition. Findings:   Esophagus: The esophageal mucosa was inflamed in the distal esophagus consistent with a grade 2 esophagitis. There was some decreased lower esophageal sphincter tone. No obstructing lesions were noted throughout the esophagus.   The larynx was observed and no gross abnormality noted there. .  Stomach: The gastric mucosa was inflamed throughout the gastric antrum. Biopsies were taken there. .   Duodenum: The duodenum mucosa was normal with no ulceration, mass, stricture and no evidence of villous atrophy. Therapies:  None    Specimens:   ID Type Source Tests Collected by Time Destination   1 : gastric antrum Preservative   Park Love MD 7/6/2022 1238 Pathology     Assistants: Alba Harper Postal  Scrub TechKhai Ray           EBL:Minimal    Complications:   None; patient tolerated the procedure well.      Implants: None    Discharge disposition:  Out of the recovery area when discharge criteria met         Ella Gonzalez MD  July 6, 2022  12:43 PM

## 2022-07-06 NOTE — INTERVAL H&P NOTE
Update History & Physical    The Patient's History and Physical of July 6, 2022,  was reviewed with the patient and I examined the patient. There was no change. The surgical site was confirmed by the patient and me. Plan:  The risk, benefits, expected outcome, and alternative to the recommended procedure have been discussed with the patient. Patient understands and wants to proceed with the procedure.     Electronically signed by Burke Mane MD on 7/6/2022 at 10:34 AM

## 2022-07-06 NOTE — DISCHARGE INSTRUCTIONS

## 2022-07-06 NOTE — ANESTHESIA POSTPROCEDURE EVALUATION
Procedure(s):  ESOPHAGOGASTRODUODENOSCOPY (EGD) (ANES TIVA).     MAC    Anesthesia Post Evaluation        Patient location during evaluation: bedside  Patient participation: complete - patient participated  Level of consciousness: awake and alert  Pain score: 0  Pain management: adequate  Airway patency: patent  Anesthetic complications: no  Cardiovascular status: acceptable  Respiratory status: acceptable  Hydration status: acceptable  Post anesthesia nausea and vomiting:  none  Final Post Anesthesia Temperature Assessment:  Normothermia (36.0-37.5 degrees C)      INITIAL Post-op Vital signs:   Vitals Value Taken Time   /58 07/06/22 1245   Temp 36.3 °C (97.4 °F) 07/06/22 1245   Pulse 79 07/06/22 1245   Resp 16 07/06/22 1245   SpO2 94 % 07/06/22 1245
No indicators present

## 2022-07-06 NOTE — ANESTHESIA PREPROCEDURE EVALUATION
Relevant Problems   CARDIOVASCULAR   (+) Acute on chronic systolic congestive heart failure (HCC)   (+) CHF (congestive heart failure) (HCC)       Anesthetic History   No history of anesthetic complications            Review of Systems / Medical History  Patient summary reviewed, nursing notes reviewed and pertinent labs reviewed    Pulmonary          Shortness of breath      Comments: sputum   Neuro/Psych         Psychiatric history     Cardiovascular    Hypertension                   GI/Hepatic/Renal  Within defined limits              Endo/Other  Within defined limits           Other Findings              Physical Exam    Airway  Mallampati: I  TM Distance: 4 - 6 cm  Neck ROM: normal range of motion   Mouth opening: Normal     Cardiovascular  Regular rate and rhythm,  S1 and S2 normal,  no murmur, click, rub, or gallop             Dental    Dentition: Poor dentition     Pulmonary      Decreased breath sounds           Abdominal  GI exam deferred       Other Findings            Anesthetic Plan    ASA: 3  Anesthesia type: MAC    Monitoring Plan: Continuous noninvasive hemodynamic monitoring      Induction: Intravenous  Anesthetic plan and risks discussed with: Patient

## 2022-07-06 NOTE — PROGRESS NOTES
RICHA Clarke verbally ordered  4L of O2 due to stats dropping to 87%. Will continue to monitor for duration of stay.

## 2022-07-14 DIAGNOSIS — A04.8 HELICOBACTER PYLORI INFECTION: Primary | ICD-10-CM

## 2022-07-14 RX ORDER — AMOXICILLIN 500 MG/1
1000 CAPSULE ORAL 2 TIMES DAILY
Qty: 56 CAPSULE | Refills: 0 | Status: SHIPPED | OUTPATIENT
Start: 2022-07-14 | End: 2022-08-03

## 2022-07-14 RX ORDER — METRONIDAZOLE 500 MG/1
500 TABLET ORAL 3 TIMES DAILY
Qty: 28 TABLET | Refills: 0 | Status: SHIPPED | OUTPATIENT
Start: 2022-07-14 | End: 2022-08-03

## 2022-07-15 ENCOUNTER — TELEPHONE (OUTPATIENT)
Dept: GASTROENTEROLOGY | Age: 82
End: 2022-07-15

## 2022-07-15 NOTE — PROGRESS NOTES
Tell patient that the biopsies taken and her stomach showed gastritis/inflammation with a Helicobacter pylori infection. This infection will need to be treated. We will send in 2 antibiotics and she should continue the pantoprazole 40 mg daily. Please give patient a follow-up visit in a couple of months.

## 2022-07-15 NOTE — TELEPHONE ENCOUNTER
Patient and  notified that the biopsies taken and her stomach showed gastritis/inflammation with a Helicobacter pylori infection.  This infection will need to be treated. Jose Manuel Benoit will send in 2 antibiotics and she should continue the pantoprazole 40 mg daily. Will make f/u after talking with son to see when he can bring them.

## 2022-08-02 ENCOUNTER — HOSPITAL ENCOUNTER (INPATIENT)
Age: 82
LOS: 7 days | Discharge: NURSING, REHAB OR LONG-TERM CARE | DRG: 291 | End: 2022-08-09
Attending: EMERGENCY MEDICINE | Admitting: FAMILY MEDICINE
Payer: MEDICARE

## 2022-08-02 ENCOUNTER — APPOINTMENT (OUTPATIENT)
Dept: GENERAL RADIOLOGY | Age: 82
DRG: 291 | End: 2022-08-02
Attending: EMERGENCY MEDICINE
Payer: MEDICARE

## 2022-08-02 DIAGNOSIS — J90 PLEURAL EFFUSION, BILATERAL: ICD-10-CM

## 2022-08-02 DIAGNOSIS — I50.9 ACUTE ON CHRONIC CONGESTIVE HEART FAILURE, UNSPECIFIED HEART FAILURE TYPE (HCC): Primary | ICD-10-CM

## 2022-08-02 DIAGNOSIS — R06.02 SOB (SHORTNESS OF BREATH): ICD-10-CM

## 2022-08-02 LAB
ALBUMIN SERPL-MCNC: 3.2 G/DL (ref 3.5–5)
ALBUMIN/GLOB SERPL: 1.2 {RATIO} (ref 1.1–2.2)
ALP SERPL-CCNC: 45 U/L (ref 45–117)
ALT SERPL-CCNC: 27 U/L (ref 12–78)
ANION GAP SERPL CALC-SCNC: 9 MMOL/L (ref 5–15)
AST SERPL W P-5'-P-CCNC: 25 U/L (ref 15–37)
BASOPHILS # BLD: 0.1 K/UL (ref 0–0.1)
BASOPHILS NFR BLD: 1 % (ref 0–1)
BILIRUB SERPL-MCNC: 1.1 MG/DL (ref 0.2–1)
BNP SERPL-MCNC: ABNORMAL PG/ML
BUN SERPL-MCNC: 10 MG/DL (ref 6–20)
BUN/CREAT SERPL: 23 (ref 12–20)
CA-I BLD-MCNC: 8 MG/DL (ref 8.5–10.1)
CHLORIDE SERPL-SCNC: 104 MMOL/L (ref 97–108)
CO2 SERPL-SCNC: 25 MMOL/L (ref 21–32)
CREAT SERPL-MCNC: 0.43 MG/DL (ref 0.55–1.02)
DIFFERENTIAL METHOD BLD: ABNORMAL
EOSINOPHIL # BLD: 0.1 K/UL (ref 0–0.4)
EOSINOPHIL NFR BLD: 1 % (ref 0–7)
ERYTHROCYTE [DISTWIDTH] IN BLOOD BY AUTOMATED COUNT: 16.9 % (ref 11.5–14.5)
GLOBULIN SER CALC-MCNC: 2.6 G/DL (ref 2–4)
GLUCOSE SERPL-MCNC: 135 MG/DL (ref 65–100)
HCT VFR BLD AUTO: 35.6 % (ref 35–47)
HGB BLD-MCNC: 11.6 G/DL (ref 11.5–16)
IMM GRANULOCYTES # BLD AUTO: 0 K/UL (ref 0–0.04)
IMM GRANULOCYTES NFR BLD AUTO: 0 % (ref 0–0.5)
LYMPHOCYTES # BLD: 0.9 K/UL (ref 0.8–3.5)
LYMPHOCYTES NFR BLD: 11 % (ref 12–49)
MAGNESIUM SERPL-MCNC: 1.8 MG/DL (ref 1.6–2.4)
MCH RBC QN AUTO: 28.6 PG (ref 26–34)
MCHC RBC AUTO-ENTMCNC: 32.6 G/DL (ref 30–36.5)
MCV RBC AUTO: 87.9 FL (ref 80–99)
MONOCYTES # BLD: 1 K/UL (ref 0–1)
MONOCYTES NFR BLD: 13 % (ref 5–13)
NEUTS SEG # BLD: 6 K/UL (ref 1.8–8)
NEUTS SEG NFR BLD: 74 % (ref 32–75)
NRBC # BLD: 0 K/UL (ref 0–0.01)
NRBC BLD-RTO: 0 PER 100 WBC
PLATELET # BLD AUTO: 322 K/UL (ref 150–400)
PMV BLD AUTO: 9.2 FL (ref 8.9–12.9)
POTASSIUM SERPL-SCNC: 3.5 MMOL/L (ref 3.5–5.1)
PROT SERPL-MCNC: 5.8 G/DL (ref 6.4–8.2)
RBC # BLD AUTO: 4.05 M/UL (ref 3.8–5.2)
SODIUM SERPL-SCNC: 138 MMOL/L (ref 136–145)
TROPONIN-HIGH SENSITIVITY: 39 NG/L (ref 0–51)
WBC # BLD AUTO: 8 K/UL (ref 3.6–11)

## 2022-08-02 PROCEDURE — 93005 ELECTROCARDIOGRAM TRACING: CPT

## 2022-08-02 PROCEDURE — 65270000029 HC RM PRIVATE

## 2022-08-02 PROCEDURE — 84484 ASSAY OF TROPONIN QUANT: CPT

## 2022-08-02 PROCEDURE — 36415 COLL VENOUS BLD VENIPUNCTURE: CPT

## 2022-08-02 PROCEDURE — 80053 COMPREHEN METABOLIC PANEL: CPT

## 2022-08-02 PROCEDURE — 83735 ASSAY OF MAGNESIUM: CPT

## 2022-08-02 PROCEDURE — 96374 THER/PROPH/DIAG INJ IV PUSH: CPT

## 2022-08-02 PROCEDURE — 85025 COMPLETE CBC W/AUTO DIFF WBC: CPT

## 2022-08-02 PROCEDURE — 83880 ASSAY OF NATRIURETIC PEPTIDE: CPT

## 2022-08-02 PROCEDURE — 99285 EMERGENCY DEPT VISIT HI MDM: CPT

## 2022-08-02 PROCEDURE — 74011250636 HC RX REV CODE- 250/636: Performed by: EMERGENCY MEDICINE

## 2022-08-02 PROCEDURE — 71045 X-RAY EXAM CHEST 1 VIEW: CPT

## 2022-08-02 RX ORDER — ASPIRIN 81 MG/1
81 TABLET ORAL DAILY
Status: DISCONTINUED | OUTPATIENT
Start: 2022-08-03 | End: 2022-08-09 | Stop reason: HOSPADM

## 2022-08-02 RX ORDER — PANTOPRAZOLE SODIUM 40 MG/1
40 TABLET, DELAYED RELEASE ORAL DAILY
Status: DISCONTINUED | OUTPATIENT
Start: 2022-08-03 | End: 2022-08-09 | Stop reason: HOSPADM

## 2022-08-02 RX ORDER — POTASSIUM CHLORIDE 1.5 G/1.77G
20 POWDER, FOR SOLUTION ORAL 2 TIMES DAILY WITH MEALS
Status: DISCONTINUED | OUTPATIENT
Start: 2022-08-03 | End: 2022-08-09 | Stop reason: HOSPADM

## 2022-08-02 RX ORDER — LISINOPRIL 5 MG/1
5 TABLET ORAL DAILY
Status: DISCONTINUED | OUTPATIENT
Start: 2022-08-03 | End: 2022-08-09 | Stop reason: HOSPADM

## 2022-08-02 RX ORDER — FUROSEMIDE 10 MG/ML
40 INJECTION INTRAMUSCULAR; INTRAVENOUS 2 TIMES DAILY
Status: DISCONTINUED | OUTPATIENT
Start: 2022-08-03 | End: 2022-08-09 | Stop reason: HOSPADM

## 2022-08-02 RX ORDER — CARVEDILOL 3.12 MG/1
3.12 TABLET ORAL 2 TIMES DAILY WITH MEALS
Status: DISCONTINUED | OUTPATIENT
Start: 2022-08-03 | End: 2022-08-03

## 2022-08-02 RX ORDER — ATORVASTATIN CALCIUM 40 MG/1
40 TABLET, FILM COATED ORAL
Status: DISCONTINUED | OUTPATIENT
Start: 2022-08-02 | End: 2022-08-09 | Stop reason: HOSPADM

## 2022-08-02 RX ORDER — CLOPIDOGREL BISULFATE 75 MG/1
75 TABLET ORAL DAILY
Status: DISCONTINUED | OUTPATIENT
Start: 2022-08-03 | End: 2022-08-09 | Stop reason: HOSPADM

## 2022-08-02 RX ORDER — FUROSEMIDE 10 MG/ML
80 INJECTION INTRAMUSCULAR; INTRAVENOUS
Status: COMPLETED | OUTPATIENT
Start: 2022-08-02 | End: 2022-08-02

## 2022-08-02 RX ADMIN — FUROSEMIDE 80 MG: 10 INJECTION, SOLUTION INTRAMUSCULAR; INTRAVENOUS at 19:55

## 2022-08-02 NOTE — ED PROVIDER NOTES
EMERGENCY DEPARTMENT HISTORY AND PHYSICAL EXAM      Date: 8/2/2022  Patient Name: Omar Orourke      History of Presenting Illness     Chief Complaint   Patient presents with    Shortness of Breath       History Provided By: Patient    HPI: Omar Orourke, 79 y.o. female with a past medical history significant  for CHF  presents to the ED with cc of shortness of breath for \"some time\" but worse over the last 2 days. Patient also reports bilateral lower extremity pitting edema. Patient does report history of cardiac stents as well as CHF. Patient denies fevers or chills, denies nausea or vomiting. Patient denies any chest pain. There are no other complaints, changes, or physical findings at this time. PCP: Dandre Lu MD    Current Outpatient Medications   Medication Sig Dispense Refill    amoxicillin (AMOXIL) 500 mg capsule Take 2 Capsules by mouth two (2) times a day. Indications: inflammation of the stomach lining caused by H. pylori 56 Capsule 0    metroNIDAZOLE (FLAGYL) 500 mg tablet Take 1 Tablet by mouth three (3) times daily. Indications: inflammation of the stomach lining caused by H. pylori 28 Tablet 0    lisinopriL (PRINIVIL, ZESTRIL) 5 mg tablet Take 5 mg by mouth daily. pantoprazole (PROTONIX) 40 mg tablet Take 40 mg by mouth daily. atorvastatin (LIPITOR) 40 mg tablet Take 1 Tablet by mouth nightly. 30 Tablet 0    clopidogreL (PLAVIX) 75 mg tab Take 1 Tablet by mouth daily. Indications: blood clot prevention following percutaneous coronary intervention 90 Tablet 0    potassium chloride (KLOR-CON) 20 mEq pack Take 1 Packet by mouth two (2) times daily (with meals). Indications: low amount of potassium in the blood (Patient not taking: Reported on 7/6/2022) 20 Packet 0    carvediloL (COREG) 3.125 mg tablet Take 1 Tablet by mouth two (2) times daily (with meals).  Indications: heart failure with reduced ejection fraction due to dilated cardiomyopathy 60 Tablet 0    aspirin delayed-release 81 mg tablet Take 81 mg by mouth daily. Past History   Past Medical History:  Past Medical History:   Diagnosis Date    CHF (congestive heart failure) (HCC)     EF 30-35%    Dysphagia     Heart failure (HCC)     Hyperlipidemia     Hypertension     Ill-defined condition     patient unaware of any diagnosis due to recieving minimal medical care for the past 57 years    Psychiatric disorder     anxiety       Past Surgical History:  Past Surgical History:   Procedure Laterality Date    HX CORONARY STENT PLACEMENT  2022    HX HEART CATHETERIZATION  2022    STENT PLACED       Family History:  Family History   Problem Relation Age of Onset    Diabetes Other     Heart Disease Other     Heart Disease Mother     Diabetes Mother     Diabetes Father     Heart Disease Father        Social History:  Social History     Tobacco Use    Smoking status: Former     Types: Cigarettes     Quit date: 1964     Years since quittin.6    Smokeless tobacco: Never   Vaping Use    Vaping Use: Never used   Substance Use Topics    Alcohol use: Never    Drug use: Never       Allergies: Allergies   Allergen Reactions    [place] Itching    Peanut Butter Flavor Not Reported This Time     possible allergy     Review of Systems   Review of Systems  Review of Systems   Constitutional: Negative for chills and fever. HENT: Negative for sinus pressure and sinus pain. Eyes: Negative for photophobia and redness. Respiratory: Positive for shortness of breath and negative for wheezing. Cardiovascular: Negative for chest pain and palpitations. Gastrointestinal: Negative for abdominal pain and nausea. Genitourinary: Negative for flank pain and hematuria. Musculoskeletal: Negative for arthralgias and gait problem. Skin: Negative for color change and pallor. Neurological: Negative for dizziness and weakness.      Physical Exam   Physical Exam  Physical Exam  Constitutional:       General: Uncomfortable appearing. Appearance: Normal appearance. Not toxic-appearing. HENT:      Head: Normocephalic and atraumatic. Nose: Nose normal.      Mouth/Throat:      Mouth: Mucous membranes are moist.   Eyes:      Extraocular Movements: Extraocular movements intact. Pupils: Pupils are equal, round, and reactive to light. Cardiovascular:      Rate and Rhythm: Normal rate. Pulses: Normal pulses. Pulmonary:      Effort: Pulmonary effort is mildly tachypneic. Breath sounds: No stridor, clear to auscultation bilaterally  Abdominal:      General: Abdomen is flat. There is no distension. Musculoskeletal:         General: Normal range of motion. Cervical back: Normal range of motion and neck supple. Skin:     General: Skin is warm and dry. 2+ bilateral lower extremity pitting edema noted     Capillary Refill: Capillary refill takes less than 2 seconds. Neurological:      General: No focal deficit present. Mental Status: Alert and oriented to person, place, and time. Psychiatric:         Mood and Affect: Anxious appearing         Behavior: Behavior normal.     Lab and Diagnostic Study Results   Labs -     Recent Results (from the past 12 hour(s))   CBC WITH AUTOMATED DIFF    Collection Time: 08/02/22  8:02 PM   Result Value Ref Range    WBC 8.0 3.6 - 11.0 K/uL    RBC 4.05 3.80 - 5.20 M/uL    HGB 11.6 11.5 - 16.0 g/dL    HCT 35.6 35.0 - 47.0 %    MCV 87.9 80.0 - 99.0 FL    MCH 28.6 26.0 - 34.0 PG    MCHC 32.6 30.0 - 36.5 g/dL    RDW 16.9 (H) 11.5 - 14.5 %    PLATELET 756 392 - 196 K/uL    MPV 9.2 8.9 - 12.9 FL    NRBC 0.0 0.0  WBC    ABSOLUTE NRBC 0.00 0.00 - 0.01 K/uL    NEUTROPHILS 74 32 - 75 %    LYMPHOCYTES 11 (L) 12 - 49 %    MONOCYTES 13 5 - 13 %    EOSINOPHILS 1 0 - 7 %    BASOPHILS 1 0 - 1 %    IMMATURE GRANULOCYTES 0 0 - 0.5 %    ABS. NEUTROPHILS 6.0 1.8 - 8.0 K/UL    ABS. LYMPHOCYTES 0.9 0.8 - 3.5 K/UL    ABS. MONOCYTES 1.0 0.0 - 1.0 K/UL    ABS.  EOSINOPHILS 0.1 0.0 - 0.4 K/UL    ABS. BASOPHILS 0.1 0.0 - 0.1 K/UL    ABS. IMM. GRANS. 0.0 0.00 - 0.04 K/UL    DF AUTOMATED     METABOLIC PANEL, COMPREHENSIVE    Collection Time: 08/02/22  8:02 PM   Result Value Ref Range    Sodium 138 136 - 145 mmol/L    Potassium 3.5 3.5 - 5.1 mmol/L    Chloride 104 97 - 108 mmol/L    CO2 25 21 - 32 mmol/L    Anion gap 9 5 - 15 mmol/L    Glucose 135 (H) 65 - 100 mg/dL    BUN 10 6 - 20 mg/dL    Creatinine 0.43 (L) 0.55 - 1.02 mg/dL    BUN/Creatinine ratio 23 (H) 12 - 20      GFR est AA >60 >60 ml/min/1.73m2    GFR est non-AA >60 >60 ml/min/1.73m2    Calcium 8.0 (L) 8.5 - 10.1 mg/dL    Bilirubin, total 1.1 (H) 0.2 - 1.0 mg/dL    AST (SGOT) 25 15 - 37 U/L    ALT (SGPT) 27 12 - 78 U/L    Alk. phosphatase 45 45 - 117 U/L    Protein, total 5.8 (L) 6.4 - 8.2 g/dL    Albumin 3.2 (L) 3.5 - 5.0 g/dL    Globulin 2.6 2.0 - 4.0 g/dL    A-G Ratio 1.2 1.1 - 2.2     NT-PRO BNP    Collection Time: 08/02/22  8:02 PM   Result Value Ref Range    NT pro-BNP 16,068 (H) <450 pg/mL   TROPONIN-HIGH SENSITIVITY    Collection Time: 08/02/22  8:02 PM   Result Value Ref Range    Troponin-High Sensitivity 39 0 - 51 ng/L   MAGNESIUM    Collection Time: 08/02/22  8:02 PM   Result Value Ref Range    Magnesium 1.8 1.6 - 2.4 mg/dL       Radiologic Studies -   [unfilled]  CT Results  (Last 48 hours)      None          CXR Results  (Last 48 hours)                 08/02/22 5695  XR CHEST PORT Final result    Impression:      1. Increased mild right and moderate left pleural effusions with increased   bibasilar atelectasis. 2. Unchanged mild edema. Narrative:  EXAM: XR CHEST PORT       HISTORY: sob, h/o CHF. COMPARISON: 6/21/2022       FINDINGS: Portable AP. The hilar is mildly enlarged. There are moderate left and   mild right pleural effusions which have increased in the interval. There is   increased hazy opacification of the right lower lobe. There is increased   atelectasis left lower lobe.  There is unchanged mild edema. The bones are   osteopenic. Multilevel spondylosis spine. Medical Decision Making and ED Course   - I am the first and primary provider for this patient AND AM THE PRIMARY PROVIDER OF RECORD. I reviewed the vital signs, available nursing notes, past medical history, past surgical history, family history and social history. - Initial assessment performed. The patients presenting problems have been discussed, and the staff are in agreement with the care plan formulated and outlined with them. I have encouraged them to ask questions as they arise throughout their visit. Differential Diagnosis & Medical Decision Making Provider Note:   Patient with known heart failure, EF 30 to 35%, worsening pleural effusions on x-ray. Not hypoxic but tachypneic. Good diuresis initially in the ER but still short of breath. Will admit. MDM       Vital Signs-Reviewed the patient's vital signs. Patient Vitals for the past 12 hrs:   Temp Pulse Resp BP SpO2   08/02/22 1709 97.5 °F (36.4 °C) 92 20 (!) 143/86 97 %       Procedures and Critical Care       Disposition: Admitted to Floor Medical Floor the case was discussed with the admitting physician     Admitted  Diagnosis/Clinical Impression     Clinical Impression:   1. Acute on chronic congestive heart failure, unspecified heart failure type ([address]    2. SOB (shortness of breath)    3. Pleural effusion, bilateral        Attestations: I, Yamileth Rico MD, am the primary clinician of record. Please note that this dictation was completed with [employer], the computer voice recognition software. Quite often unanticipated grammatical, syntax, homophones, and other interpretive errors are inadvertently transcribed by the computer software. Please disregard these errors. Please excuse any errors that have escaped final proofreading. Thank you.

## 2022-08-02 NOTE — ED TRIAGE NOTES
Arrives with shortness of breath, bilateral lower extremity pitting edema.      Cardiac stent placed in may     22L FA

## 2022-08-03 LAB
ATRIAL RATE: 97 BPM
CALCULATED P AXIS, ECG09: 8 DEGREES
CALCULATED R AXIS, ECG10: 8 DEGREES
CALCULATED T AXIS, ECG11: -35 DEGREES
DIAGNOSIS, 93000: NORMAL
P-R INTERVAL, ECG05: 168 MS
Q-T INTERVAL, ECG07: 346 MS
QRS DURATION, ECG06: 76 MS
QTC CALCULATION (BEZET), ECG08: 439 MS
VENTRICULAR RATE, ECG03: 97 BPM

## 2022-08-03 PROCEDURE — 65270000029 HC RM PRIVATE

## 2022-08-03 PROCEDURE — 74011250636 HC RX REV CODE- 250/636: Performed by: FAMILY MEDICINE

## 2022-08-03 PROCEDURE — 92610 EVALUATE SWALLOWING FUNCTION: CPT

## 2022-08-03 PROCEDURE — 74011250637 HC RX REV CODE- 250/637: Performed by: FAMILY MEDICINE

## 2022-08-03 RX ORDER — NITROGLYCERIN 0.4 MG/1
0.4 TABLET SUBLINGUAL
Status: ON HOLD | COMMUNITY

## 2022-08-03 RX ORDER — METOPROLOL SUCCINATE 25 MG/1
25 TABLET, EXTENDED RELEASE ORAL
Status: COMPLETED | OUTPATIENT
Start: 2022-08-03 | End: 2022-08-03

## 2022-08-03 RX ORDER — METOPROLOL SUCCINATE 25 MG/1
25 TABLET, EXTENDED RELEASE ORAL DAILY
Status: DISCONTINUED | OUTPATIENT
Start: 2022-08-04 | End: 2022-08-09 | Stop reason: HOSPADM

## 2022-08-03 RX ORDER — HYDROXYZINE HYDROCHLORIDE 25 MG/ML
25 INJECTION, SOLUTION INTRAMUSCULAR
Status: DISCONTINUED | OUTPATIENT
Start: 2022-08-03 | End: 2022-08-06

## 2022-08-03 RX ADMIN — ASPIRIN 81 MG: 81 TABLET, COATED ORAL at 08:32

## 2022-08-03 RX ADMIN — FUROSEMIDE 40 MG: 10 INJECTION, SOLUTION INTRAMUSCULAR; INTRAVENOUS at 08:32

## 2022-08-03 RX ADMIN — POTASSIUM CHLORIDE 20 MEQ: 1.5 POWDER, FOR SOLUTION ORAL at 08:33

## 2022-08-03 RX ADMIN — ATORVASTATIN CALCIUM 40 MG: 40 TABLET, FILM COATED ORAL at 20:15

## 2022-08-03 RX ADMIN — METOPROLOL SUCCINATE 25 MG: 25 TABLET, EXTENDED RELEASE ORAL at 10:05

## 2022-08-03 RX ADMIN — CLOPIDOGREL BISULFATE 75 MG: 75 TABLET ORAL at 08:31

## 2022-08-03 RX ADMIN — PANTOPRAZOLE SODIUM 40 MG: 40 TABLET, DELAYED RELEASE ORAL at 08:32

## 2022-08-03 RX ADMIN — LISINOPRIL 5 MG: 10 TABLET ORAL at 08:31

## 2022-08-03 RX ADMIN — FUROSEMIDE 40 MG: 10 INJECTION, SOLUTION INTRAMUSCULAR; INTRAVENOUS at 20:17

## 2022-08-03 RX ADMIN — ATORVASTATIN CALCIUM 40 MG: 40 TABLET, FILM COATED ORAL at 01:26

## 2022-08-03 RX ADMIN — HYDROXYZINE HYDROCHLORIDE 25 MG: 25 INJECTION, SOLUTION INTRAMUSCULAR at 02:56

## 2022-08-03 NOTE — CONSULTS
PULMONARY CONSULT  [facility]    Name: Catalino Hammond MRN: [id number]   : 1940 Hospital: [facility]   Date: 8/3/2022  Admission date: 2022 Hospital Day: 2       HPI:     Hospital Problems  Date Reviewed: 2022            Codes Class Noted POA    CHF exacerbation [facility]) ICD-10-CM: I50.9  ICD-9-CM: 428.0  2022 Unknown                [x] High complexity decision making was performed  [x] See my orders for details      Subjective/Initial History:     I was asked by Miguel Nixon MD to see Catalino Hammond  a 79 y.o.  female in consultation     Excerpts from admission 2022 or consult notes as follows:   58-year-old lady came in because of shortness of breath and dyspnea past medical history of congestive heart failure, hypertension, hyperlipidemia and psychiatric illness she is noncompliant not happy in the emergency room she is complaining about dyspnea on exertion she is on room air also having swelling of the lower extremities chest x-ray shows bilateral pleural effusions the patient admitted and pulmonary consult was called.   She had a remote history of tobacco abuse last 2D echo showed ejection fraction of 35% she has coronary artery disease stent placed 531      Allergies   Allergen Reactions    [place] Itching    Peanut Butter Flavor Not Reported This Time     possible allergy        MAR reviewed and pertinent medications noted or modified as needed     Current Facility-Administered Medications   Medication    hydrOXYzine (VISTARIL) 25 mg/mL injection 25 mg    atorvastatin (LIPITOR) tablet 40 mg    clopidogreL (PLAVIX) tablet 75 mg    potassium chloride (KLOR-CON) packet for solution 20 mEq    aspirin delayed-release tablet 81 mg    lisinopriL (PRINIVIL, ZESTRIL) tablet 5 mg    pantoprazole (PROTONIX) tablet 40 mg    furosemide (LASIX) injection 40 mg     Current Outpatient Medications   Medication Sig    lisinopriL (PRINIVIL, ZESTRIL) 5 mg tablet Take 5 mg by mouth daily. pantoprazole (PROTONIX) 40 mg tablet Take 40 mg by mouth daily. atorvastatin (LIPITOR) 40 mg tablet Take 1 Tablet by mouth nightly. clopidogreL (PLAVIX) 75 mg tab Take 1 Tablet by mouth daily. Indications: blood clot prevention following percutaneous coronary intervention    aspirin delayed-release 81 mg tablet Take 81 mg by mouth daily. amoxicillin (AMOXIL) 500 mg capsule Take 2 Capsules by mouth two (2) times a day. Indications: inflammation of the stomach lining caused by H. pylori (Patient not taking: Reported on 8/2/2022)    metroNIDAZOLE (FLAGYL) 500 mg tablet Take 1 Tablet by mouth three (3) times daily. Indications: inflammation of the stomach lining caused by H. pylori (Patient not taking: Reported on 8/2/2022)    potassium chloride (KLOR-CON) 20 mEq pack Take 1 Packet by mouth two (2) times daily (with meals). Indications: low amount of potassium in the blood (Patient not taking: Reported on 7/6/2022)    carvediloL (COREG) 3.125 mg tablet Take 1 Tablet by mouth two (2) times daily (with meals). Indications: heart failure with reduced ejection fraction due to dilated cardiomyopathy (Patient not taking: Reported on 8/2/2022)      Patient PCP: Ankita Garces MD  PMH:  has a past medical history of CHF (congestive heart failure) ([address] Dysphagia, Heart failure ([address] Hyperlipidemia, Hypertension, Ill-defined condition, and Psychiatric disorder. PSH:   has a past surgical history that includes hx coronary stent placement (05/31/2022) and hx heart catheterization (05/27/2022). FHX: family history includes Diabetes in her father, mother, and another family member; Heart Disease in her father, mother, and another family member. SHX:  reports that she quit smoking about 58 years ago. She has never used smokeless tobacco. She reports that she does not drink alcohol and does not use drugs. ROS:    Review of Systems   Constitutional: Negative. HENT: Negative.      Eyes: Negative. Respiratory:  Positive for shortness of breath. Cardiovascular:  Positive for orthopnea and leg swelling. Gastrointestinal: Negative. Genitourinary: Negative. Musculoskeletal: Negative. Skin: Negative. Neurological: Negative. Psychiatric/Behavioral: Negative. Objective:     Vital Signs: Telemetry:    normal sinus rhythm Intake/Output:   Visit Vitals  BP (!) 155/94   Pulse (!) 109   Temp 97.5 °F (36.4 °C)   Resp 21   Ht 5' 2\" (1.575 m)   Wt 54.9 kg (121 lb)   SpO2 90%   BMI 22.13 kg/m²       Temp (24hrs), Av.5 °F (36.4 °C), Min:97.5 °F (36.4 °C), Max:97.5 °F (36.4 °C)        O2 Device: None (Room air)         Wt Readings from Last 4 Encounters:   22 54.9 kg (121 lb)   22 54.9 kg (121 lb)   22 56 kg (123 lb 6.4 oz)   22 55.8 kg (123 lb)          Intake/Output Summary (Last 24 hours) at 8/3/2022 1019  Last data filed at 8/3/2022 0311  Gross per 24 hour   Intake --   Output 800 ml   Net -800 ml       Last shift:      No intake/output data recorded. Last 3 shifts:  1901 -  0700  In: -   Out: 800 [Urine:800]       Physical Exam:     Physical Exam  Constitutional:       Appearance: Normal appearance. HENT:      Head: Normocephalic and atraumatic. Nose: Nose normal.      Mouth/Throat:      Mouth: Mucous membranes are moist.   Eyes:      Pupils: Pupils are equal, round, and reactive to light. Cardiovascular:      Rate and Rhythm: Normal rate. Pulses: Normal pulses. Pulmonary:      Breath sounds: Rales present. Abdominal:      General: Abdomen is flat. Bowel sounds are normal.      Palpations: Abdomen is soft. Musculoskeletal:         General: Swelling present. Cervical back: Normal range of motion. Right lower leg: Edema present. Left lower leg: Edema present. Skin:     General: Skin is warm. Neurological:      General: No focal deficit present. Mental Status: She is alert.    Psychiatric:         Mood and Affect: Mood normal.        Labs:    Recent Labs     08/02/22 2002   WBC 8.0   HGB 11.6        Recent Labs     08/02/22 2002      K 3.5      CO2 25   *   BUN 10   CREA 0.43*   CA 8.0*   MG 1.8   ALB 3.2*   ALT 27     No results for input(s): PH, PCO2, PO2, HCO3, FIO2 in the last 72 hours. No results for input(s): CPK, CKNDX, TROIQ in the last 72 hours. No lab exists for component: CPKMB  No results found for: BNPP, BNP   Lab Results   Component Value Date/Time    Culture result: No growth 6 days 05/27/2022 12:59 PM   No results found for: TSH, TSHEXT    Imaging:    CXR Results  (Last 48 hours)                 08/02/22 1745  XR CHEST PORT Final result    Impression:      1. Increased mild right and moderate left pleural effusions with increased   bibasilar atelectasis. 2. Unchanged mild edema. Narrative:  EXAM: XR CHEST PORT       HISTORY: sob, h/o CHF. COMPARISON: 6/21/2022       FINDINGS: Portable AP. The hilar is mildly enlarged. There are moderate left and   mild right pleural effusions which have increased in the interval. There is   increased hazy opacification of the right lower lobe. There is increased   atelectasis left lower lobe. There is unchanged mild edema. The bones are   osteopenic. Multilevel spondylosis spine. Results from Hospital Encounter encounter on 08/02/22    XR CHEST PORT    Narrative  EXAM: XR CHEST PORT    HISTORY: sob, h/o CHF. COMPARISON: 6/21/2022    FINDINGS: Portable AP. The hilar is mildly enlarged. There are moderate left and  mild right pleural effusions which have increased in the interval. There is  increased hazy opacification of the right lower lobe. There is increased  atelectasis left lower lobe. There is unchanged mild edema. The bones are  osteopenic. Multilevel spondylosis spine. Impression  1. Increased mild right and moderate left pleural effusions with increased  bibasilar atelectasis.   2. Unchanged mild edema. Results from [place] encounter on 06/21/22    XR CHEST PORT    Narrative  Chest single view. Comparison single view chest May 31, 2022. Left greater than right moderate volume dependent pleural effusions persist.  Mild dependent pulmonary interstitial edema. Bibasilar atelectasis. Cardiac and  mediastinal structures unchanged. No pneumothorax. Results from Hospital Encounter encounter on 05/27/22    XR CHEST PORT    Narrative  XR CHEST PORT    Comparison:  5/27/2022. Single view:  Stable pleural effusions and bibasilar atelectasis/pneumonia (left  greater than right). No pneumothorax apparent. The heart size is stable. Stable  hemodynamic status. Impression  No significant change. No results found for this or any previous visit.         IMPRESSION:   Congestive heart failure  HFrEF  Dilated cardiomyopathy ejection fraction 30 to 35%  Coronary artery disease with history of PCI to mid LAD 5/31/2022 and RCA disease treated medically  Moderate mitral regurgitation  Pleural effusion  Additional workup outlined below  Pt is requiring Drug therapy requiring intensive monitoring for toxicity  Pt is unstable, unpredictable needing inpatient monitoring; is acutely ill and at high risk of sudden decline and decompensation with severe consequenses and continued end organ dysfunction and failure  Prognosis guarded       RECOMMENDATIONS/PLAN:     59-year-old lady came in because of shortness of breath and dyspnea generalized weakness swelling of the lower extremities given history of coronary artery disease congestive heart failure low ejection fraction chest x-ray shows bilateral pleural effusion shortness of breath most likely secondary to underlying congestive heart failure normal white count continue with the Lasix aggressive diuresis  Supplemental O2 to keep sats > 93%  Aspiration precautions  Labs to follow electrolytes, renal function and and blood counts  Glucose monitoring and SSI  Bronchial hygiene with respiratory therapy techniques, bronchodilators  DVT, SUP prophylaxis         This care involved high complexity medical decision making: I personally:  Reviewed the flowsheet and previous days notes  Reviewed and summarized records or history from previous days note or discussions with staff, family  High Risk Drug therapy requiring intensive monitoring for toxicity: eg steroids, pressors, antibiotics  Reviewed and/or ordered Clinical lab tests  Reviewed images and/or ordered Radiology tests  Reviewed the patients ECG / Telemetry  Reviewed and/or adjusted NiPPV settings  Called and arranged for Radiologic procedures or interventions  performed or ordered Diagnostic endoscopies with identified risk factors.   discussed my assessment/management with : Nursing, Hospitalist and Family for coordination of care          Jose Bates MD

## 2022-08-03 NOTE — DISCHARGE INSTRUCTIONS
Discharge Instructions       PATIENT ID: Christo Rahman  MRN: [id number]   YOB: 1940    DATE OF ADMISSION: [unfilled]    DATE OF DISCHARGE: 8/8/2022    PRIMARY CARE PROVIDER: @PCP@     ATTENDING PHYSICIAN: [unfilled]  DISCHARGING PROVIDER: Yakov Jurado MD    To contact this individual call [phone] and ask the  to page. If unavailable ask to be transferred the Adult Hospitalist Department. DISCHARGE DIAGNOSES congestive heart failure    CONSULTATIONS: [unfilled]    PROCEDURES/SURGERIES: * No surgery found *    PENDING TEST RESULTS:   At the time of discharge the following test results are still pending: None    [facility]   [facility]     ADDITIONAL CARE RECOMMENDATIONS: PT OT    DIET: Cardiac Diet      ACTIVITY: Activity as tolerated    Wound care: Wound Care Order: submitted to [facility] Please view [email or web address]    EQUIPMENT needed: ***      DISCHARGE MEDICATIONS:   See Medication Reconciliation Form    It is important that you take the medication exactly as they are prescribed. Keep your medication in the bottles provided by the pharmacist and keep a list of the medication names, dosages, and times to be taken in your wallet. Do not take other medications without consulting your doctor. NOTIFY YOUR PHYSICIAN FOR ANY OF THE FOLLOWING:   Fever over 101 degrees for 24 hours. Chest pain, shortness of breath, fever, chills, nausea, vomiting, diarrhea, change in mentation, falling, weakness, bleeding. Severe pain or pain not relieved by medications. Or, any other signs or symptoms that you may have questions about. DISPOSITION:    Home With:   OT  PT    RN       SNF/Inpatient Rehab/LTAC    Independent/assisted living    Hospice    Other:         PROBLEM LIST Updated:  Yes ***       Signed:   Yakov Jurado MD  8/8/2022  12:29 PM    Discharge Instructions       PATIENT ID: Christo Rahman  MRN: [id number]   DATE OF BIRTH: 1940    DATE OF ADMISSION: [unfilled]    DATE OF DISCHARGE: 8/8/2022    PRIMARY CARE PROVIDER: @PCP@     ATTENDING PHYSICIAN: [unfilled]  DISCHARGING PROVIDER: Tyler Youngblood MD    To contact this individual call [phone] and ask the  to page. If unavailable ask to be transferred the Adult Hospitalist Department. DISCHARGE DIAGNOSES ***    CONSULTATIONS: [unfilled]    PROCEDURES/SURGERIES: * No surgery found *    PENDING TEST RESULTS:   At the time of discharge the following test results are still pending: ***    [facility]   [facility]     ADDITIONAL CARE RECOMMENDATIONS: ***    DIET: {diet:00909}      ACTIVITY: {discharge activity:13187}    Wound care: {[facility] Wound Care Instructions:17757}    EQUIPMENT needed: ***      DISCHARGE MEDICATIONS:   See Medication Reconciliation Form    It is important that you take the medication exactly as they are prescribed. Keep your medication in the bottles provided by the pharmacist and keep a list of the medication names, dosages, and times to be taken in your wallet. Do not take other medications without consulting your doctor. NOTIFY YOUR PHYSICIAN FOR ANY OF THE FOLLOWING:   Fever over 101 degrees for 24 hours. Chest pain, shortness of breath, fever, chills, nausea, vomiting, diarrhea, change in mentation, falling, weakness, bleeding. Severe pain or pain not relieved by medications. Or, any other signs or symptoms that you may have questions about.       DISPOSITION:    Home With:   OT  PT  HH  RN       SNF/Inpatient Rehab/LTAC    Independent/assisted living    Hospice    Other:         PROBLEM LIST Updated:  Yes ***       Signed:   Tyler Youngblood MD  8/8/2022  12:29 PM

## 2022-08-03 NOTE — CONSULTS
Consult    NAME: Deja Orourke   :  1940   MRN:  [id number]     Date/Time:  8/3/2022 12:46 PM    Patient PCP: Butch Pelaez MD  ________________________________________________________________________    Assessment:   Primary cardiologist: [facility]  Pinky Mccallum M.D.)    PROBLEM LIST:  1. Noncompliance  2. Patient presents for evaluation of shortness of breath  3. Acute/chronic heart failure  4. Chronic heartfailure with reduced ejection fraction (HFr EF)  5. Dilated cardiomyopathy (ejection fraction = 30-35%)  6. Coronary artery disease       6a. PCI of the mid LAD (2022)       6b. RCA disease to be treated medically  7. Moderate mitral regurgitation  8. Possible family history of premature coronary artery disease  9. Former smoker  8. Elevated troponin-BNP (16,068 pg/mL)    11. Pleural effusions  12. Dysphagia  13. Hypocalcemia        []        High complexity decision making was performed        Subjective:   CHIEF COMPLAINT:     HISTORY OF PRESENT ILLNESS:     This 68-year-old  female with known coronary disease presents for evaluation of shortness of breath. The patient was treated at this facility for dilated cardiomyopathy 3 months ago. At that time she had left heart catheterization revealing coronary artery disease of the mid left anterior descending (LAD), and right coronary artery. There was stenting of the LAD, and the right coronary is to be treated medically. Since discharge the patient was in her usual state of health until recently. She began to notice increasing shortness of breath, and dyspnea on exertion. They also has bilateral lower extremity pitting edema. The patient is noncompliant with office visits. She does maintain compliance with her medication. Because of the persistence of her symptoms she presented to the emergency department. She was treated according to the \"chest pain protocol\".   Her twelve-lead EKG showed no evidence of acute infarction or ischemia. Her cardiac enzymes were unremarkable. Her BNP was markedly elevated. She was begun on appropriate therapy including diuretics, and is admitted. Cardiology was consulted to assist in evaluation and management. Past Medical History:   Diagnosis Date    CHF (congestive heart failure) (HCC)     EF 30-35%    Dysphagia     Heart failure (HCC)     Hyperlipidemia     Hypertension     Ill-defined condition     patient unaware of any diagnosis due to recieving minimal medical care for the past 57 years    Psychiatric disorder     anxiety      Past Surgical History:   Procedure Laterality Date    HX CORONARY STENT PLACEMENT  2022    HX HEART CATHETERIZATION  2022    STENT PLACED     Allergies   Allergen Reactions    [place] Itching    Peanut Butter Flavor Not Reported This Time     possible allergy      Meds:  See below  Social History     Tobacco Use    Smoking status: Former     Types: Cigarettes     Quit date: 1964     Years since quittin.6    Smokeless tobacco: Never   Substance Use Topics    Alcohol use: Never      Family History   Problem Relation Age of Onset    Diabetes Other     Heart Disease Other     Heart Disease Mother     Diabetes Mother     Diabetes Father     Heart Disease Father        REVIEW OF SYSTEMS:    As above, otherwise noncontributory. Objective:      Physical Exam:    Last 24hrs VS reviewed since prior progress note. Most recent are:    Visit Vitals  /87   Pulse 81   Temp 97.5 °F (36.4 °C)   Resp 27   Ht 5' 2\" (1.575 m)   Wt 54.9 kg (121 lb)   SpO2 96%   BMI 22.13 kg/m²       Intake/Output Summary (Last 24 hours) at 8/3/2022 1246  Last data filed at 8/3/2022 1209  Gross per 24 hour   Intake --   Output 1500 ml   Net -1500 ml        General Appearance: Well developed, in no acute respiratory distress. The patient is examined without supplemental oxygen.   Ears/Nose/Mouth/Throat: Atraumatic, normocephalic, PERRL. Neck: Supple. JVP within normal limits. Chest: Lungs clear to auscultation bilaterally posterior bibasilar fine crackles. Cardiovascular: JVP is not elevated, PMI is not attempted, normotensive S1 and S2, without S3. Abdomen: Soft, non-tender, bowel sounds are active. No organomegaly. Extremities: Lateral lower extremity pitting edema. Skin: Warm and dry. Neuro: CN II-XII grossly intact, no gross motor/sensory deficit. Data:      Telemetry:    EKG:  []  No new EKG for review  XR CHEST PORT   Final Result      1. Increased mild right and moderate left pleural effusions with increased   bibasilar atelectasis. 2. Unchanged mild edema. Prior to Admission medications    Medication Sig Start Date End Date Taking? Authorizing Provider   lisinopriL (PRINIVIL, ZESTRIL) 5 mg tablet Take 5 mg by mouth daily. 6/6/22  Yes Provider, Historical   pantoprazole (PROTONIX) 40 mg tablet Take 40 mg by mouth daily. 6/6/22  Yes Provider, Historical   atorvastatin (LIPITOR) 40 mg tablet Take 1 Tablet by mouth nightly. 6/1/22  Yes Crow Reno MD   clopidogreL (PLAVIX) 75 mg tab Take 1 Tablet by mouth daily. Indications: blood clot prevention following percutaneous coronary intervention 6/2/22  Yes Crow Reno MD   aspirin delayed-release 81 mg tablet Take 81 mg by mouth daily. Yes Provider, Historical   amoxicillin (AMOXIL) 500 mg capsule Take 2 Capsules by mouth two (2) times a day. Indications: inflammation of the stomach lining caused by H. pylori  Patient not taking: Reported on 8/2/2022 7/14/22   Sue Yuen MD   metroNIDAZOLE (FLAGYL) 500 mg tablet Take 1 Tablet by mouth three (3) times daily. Indications: inflammation of the stomach lining caused by H. pylori  Patient not taking: Reported on 8/2/2022 7/14/22   Sue Yuen MD   potassium chloride (KLOR-CON) 20 mEq pack Take 1 Packet by mouth two (2) times daily (with meals).  Indications: low amount of potassium in the blood  Patient not taking: Reported on 7/6/2022 6/1/22   Soundra Osgood, MD   carvediloL (COREG) 3.125 mg tablet Take 1 Tablet by mouth two (2) times daily (with meals). Indications: heart failure with reduced ejection fraction due to dilated cardiomyopathy  Patient not taking: Reported on 8/2/2022 6/1/22   Soundra Osgood, MD       Recent Results (from the past 24 hour(s))   CBC WITH AUTOMATED DIFF    Collection Time: 08/02/22  8:02 PM   Result Value Ref Range    WBC 8.0 3.6 - 11.0 K/uL    RBC 4.05 3.80 - 5.20 M/uL    HGB 11.6 11.5 - 16.0 g/dL    HCT 35.6 35.0 - 47.0 %    MCV 87.9 80.0 - 99.0 FL    MCH 28.6 26.0 - 34.0 PG    MCHC 32.6 30.0 - 36.5 g/dL    RDW 16.9 (H) 11.5 - 14.5 %    PLATELET 908 140 - 416 K/uL    MPV 9.2 8.9 - 12.9 FL    NRBC 0.0 0.0  WBC    ABSOLUTE NRBC 0.00 0.00 - 0.01 K/uL    NEUTROPHILS 74 32 - 75 %    LYMPHOCYTES 11 (L) 12 - 49 %    MONOCYTES 13 5 - 13 %    EOSINOPHILS 1 0 - 7 %    BASOPHILS 1 0 - 1 %    IMMATURE GRANULOCYTES 0 0 - 0.5 %    ABS. NEUTROPHILS 6.0 1.8 - 8.0 K/UL    ABS. LYMPHOCYTES 0.9 0.8 - 3.5 K/UL    ABS. MONOCYTES 1.0 0.0 - 1.0 K/UL    ABS. EOSINOPHILS 0.1 0.0 - 0.4 K/UL    ABS. BASOPHILS 0.1 0.0 - 0.1 K/UL    ABS. IMM. GRANS. 0.0 0.00 - 0.04 K/UL    DF AUTOMATED     METABOLIC PANEL, COMPREHENSIVE    Collection Time: 08/02/22  8:02 PM   Result Value Ref Range    Sodium 138 136 - 145 mmol/L    Potassium 3.5 3.5 - 5.1 mmol/L    Chloride 104 97 - 108 mmol/L    CO2 25 21 - 32 mmol/L    Anion gap 9 5 - 15 mmol/L    Glucose 135 (H) 65 - 100 mg/dL    BUN 10 6 - 20 mg/dL    Creatinine 0.43 (L) 0.55 - 1.02 mg/dL    BUN/Creatinine ratio 23 (H) 12 - 20      GFR est AA >60 >60 ml/min/1.73m2    GFR est non-AA >60 >60 ml/min/1.73m2    Calcium 8.0 (L) 8.5 - 10.1 mg/dL    Bilirubin, total 1.1 (H) 0.2 - 1.0 mg/dL    AST (SGOT) 25 15 - 37 U/L    ALT (SGPT) 27 12 - 78 U/L    Alk.  phosphatase 45 45 - 117 U/L    Protein, total 5.8 (L) 6.4 - 8.2 g/dL    Albumin 3.2 (L) 3.5 - 5.0 g/dL Globulin 2.6 2.0 - 4.0 g/dL    A-G Ratio 1.2 1.1 - 2.2     NT-PRO BNP    Collection Time: 08/02/22  8:02 PM   Result Value Ref Range    NT pro-BNP 16,068 (H) <450 pg/mL   TROPONIN-HIGH SENSITIVITY    Collection Time: 08/02/22  8:02 PM   Result Value Ref Range    Troponin-High Sensitivity 39 0 - 51 ng/L   MAGNESIUM    Collection Time: 08/02/22  8:02 PM   Result Value Ref Range    Magnesium 1.8 1.6 - 2.4 mg/dL   EKG, 12 LEAD, INITIAL    Collection Time: 08/02/22  8:04 PM   Result Value Ref Range    Ventricular Rate 97 BPM    Atrial Rate 97 BPM    P-R Interval 168 ms    QRS Duration 76 ms    Q-T Interval 346 ms    QTC Calculation (Bezet) 439 ms    Calculated P Axis 8 degrees    Calculated R Axis 8 degrees    Calculated T Axis -35 degrees    Diagnosis       Sinus rhythm with occasional Premature ventricular complexes and Premature   atrial complexes  Nonspecific T wave abnormality  Abnormal ECG  When compared with ECG of 21-JUN-2022 07:38,  Premature ventricular complexes are now Present  Premature atrial complexes are now Present  T wave inversion no longer evident in Lateral leads  Confirmed by [facility], [address] ([id number]) on 8/3/2022 10:51:30 AM            Plan:   1. Admit to telemetry  2. Conclude serial cardiac enzymes  3. Monitor serum electrolytes, renal function  4. Monitor fluid balance, and daily weights  5. Continue current cardiovascular medications including aspirin, atorvastatin, clopidogrel, furosemide, lisinopril, and metoprolol    6.   Add intravenous (IV) furosemide   Suzi Aponte MD

## 2022-08-03 NOTE — PROGRESS NOTES
Reason for Admission:   CHF                    RUR Score:    15%              PCP: First and Last name:   Priyanka Stubbs MD     Name of Practice:    Are you a current patient: Yes/No: Yes   Approximate date of last visit: Seen last week. Can you participate in a virtual visit if needed: Yes/Call    Do you (patient/family) have any concerns for transition/discharge? No, not @ this time. Plan for utilizing home health:   Pt signed Choice Letter to continue services with [facility]. Referral sent via [employer]. Pt uses walker/w/c. Current Advanced Directive/Advance Care Plan:  Prior      Healthcare Decision Maker:             Primary Decision Maker: [facility] - [phone]    Transition of Care Plan:  D/C Plan is home with /family , home health, & son to transport. Call Rxs [facility] on [address].

## 2022-08-03 NOTE — PROGRESS NOTES
8/3/22 Pt accepted to [facility] @ [phone] upon discharge - per Carlos. Arnold SOC: 8/5/22. [facility] Daily Progress     Patient Name: Belia Rodriguez  Date: 6/2/2017  Visit #: 2  PTA visit #:  1  Referral DiagnosisReferral Diagnosis: right foot drop, leg weakness, bilateral   Referring provider: Raysa Kitchen C*  Visit Diagnosis:     ICD-10-CM    1. Acute on chronic diastolic congestive heart failure I50.33    2. Difficulty balancing R29.818    3. Lumbar radiculitis M54.16    4. Muscle weakness (generalized) M62.81    5. Lumbar stenosis with neurogenic claudication M48.06    6. Lumbar spinal stenosis M48.06    7. Acute diastolic CHF (congestive heart failure) I50.31    8. Sinus bradycardia R00.1    9. Paroxysmal atrial fibrillation I48.0    10. Chronic thoracic aortic dissection I71.01    11. ESRD (end stage renal disease) N18.6    12. Aortic dissection I71.00    13. Aortic aneurysm rupture I71.8    14. Tachy-amanda syndrome I49.5          Assessment:   Despite almost cancelling PT as she was not feeling well, pt tolerated full session with 3 brief rest periods  Patient demonstrates understanding/independence with home program.  Patient is benefitting from skilled physical therapy and is making steady progress toward functional goals.  Patient is appropriate to continue with skilled physical therapy intervention, as indicated by initial plan of care.    Goal Status:  Pt. will be independent with home exercise program in : 6 weeks  Pt. will improve gait speed : > 1.0m/s;for decreased risk of falls;in 6 weeks  Patient will ascend / descend: stairs;with railing;without assistive device;with less difficulty;in 6 weeks  Patient will increase : Lopez score;for improved quality of function;in 6 weeks;by _ points  by ___ points: 10   Patient will decrease : TUG score;for improved quality of function;in 6 weeks    Plan / Patient Education:     Plan to work on gait speed, perform 2 minute walk test, progress LE strength as tolerated  Pt to wear compression socks and bring AFO to PT  Review  "stair climbing and use of SEC per pt request  Monitor SAO2 and HR as needed  Subjective:     Pain Ratin but LB is sore  Feeling extra tired the last few days, thinks she may have caught \"something\"   Not up for much today, will do what she can    Objective:   SAO2 96-98%, HR 61-88 bpm  On 2LPM of O2, usually only worn at night  Treatment Today     TREATMENT MINUTES COMMENTS   Evaluation     Self-care/ Home management     Manual therapy     Neuromuscular Re-education     Therapeutic Activity     Therapeutic Exercises 55 Nustep L2, set per pt preference x 10' (slow pace, pt usually does 20')  Standing LE exercises added to HEP see flowsheet. Pt to do standing exercises as able,if bad day perform seated exercise.  STS x5, x 3,light HHA   Gait training     Modality__________________                Total 55    Blank areas are intentional and mean the treatment did not include these items.       Leandra Montenegro PTA,CLT  2017    "

## 2022-08-03 NOTE — PROGRESS NOTES
SPEECH LANGUAGE PATHOLOGY BEDSIDE SWALLOW EVALUATION  Patient: Jitendra Trivedi. Demond Paulson (79 y.o. female)  Date: 8/3/2022  Primary Diagnosis: [facility] [I50.9]       Precautions: aspiration       ASSESSMENT :  Based on the objective data described below, the patient presents with moderate pharyngeal dysphagia w/ risk of aspiration. Patient w/ hx of dysphagia and aspiration of thin. Per records review, MBS completed 5/31 w/ penetration of thin to the level of the TVF and trace aspiration s/t overflow and delayed pharyngeal clearance. Patient was recommended a puree diet and mildly thick liquids. EGD was completed 7/6 by Dr. Quin Morin. Per note, time stamp 12:43, IMPRESSION:       Distal esophagitis (grade 2)  Antral gastritis  No obstructing lesions noted in the esophagus. Patient's biopsy also came back for H. Pylori  and she was placed on medication. Patient reports swallowing improved after EGD and getting it \"stretched,\" however no obstruction/stricture was noted. Patient reports consuming soft foods, shakes and thin liquids at home. She reports minimal intakes and weight loss. Oral motor c/b some missing teeth. ROM adequate. Patient w/ multiple swallows 4-5 swallows w/ thin liquid trials. Reduced HLE and protraction. Weak cough response after the swallow. Multiple swallows continued w/ mildly thick liquids, however overt s/sx mitigated. Patient w/ intermittent difficulty w/ puree ( applesauce ) and increased effort in swallow. No overt s/sx of aspiration, liquid wash administered. Solids administered. Effortful swallow response w/ minimal oral residual.     Concerns for pharyngeal dysphagia w/ reduced pharyngeal clearance and aspiration continue. Patient will benefit from skilled intervention to address the above impairments. Patients rehabilitation potential is considered to be Good     PLAN :  Recommendations and Planned Interventions:  Puree diet, mildly thick liquids.  Medications crushed. Repeat MBS as indicated. GI consult as indicated s/t recent H.Pylori dx   Aspiration precautions. RD consult for nutritional shakes. Frequency/Duration: Patient will be followed by speech-language pathology 5 times a week to address goals. Discharge Recommendations: To Be Determined     SUBJECTIVE:   Patient alert and seen at bedside in the ED. OBJECTIVE:     CXR Results  (Last 48 hours)                 08/02/22 1745  XR CHEST PORT Final result    Impression:      1. Increased mild right and moderate left pleural effusions with increased   bibasilar atelectasis. 2. Unchanged mild edema. Narrative:  EXAM: XR CHEST PORT       HISTORY: sob, h/o CHF. COMPARISON: 6/21/2022       FINDINGS: Portable AP. The hilar is mildly enlarged. There are moderate left and   mild right pleural effusions which have increased in the interval. There is   increased hazy opacification of the right lower lobe. There is increased   atelectasis left lower lobe. There is unchanged mild edema. The bones are   osteopenic. Multilevel spondylosis spine. CT Results  (Last 48 hours)      None             Past Medical History:   Diagnosis Date    CHF (congestive heart failure) (HCC)     EF 30-35%    Dysphagia     Heart failure (HCC)     Hyperlipidemia     Hypertension     Ill-defined condition     patient unaware of any diagnosis due to recieving minimal medical care for the past 57 years    Psychiatric disorder     anxiety     Past Surgical History:   Procedure Laterality Date    HX CORONARY STENT PLACEMENT  05/31/2022    HX HEART CATHETERIZATION  05/27/2022    STENT PLACED     Prior Level of Function/Home Situation: unknown  Home Situation  Support Systems: Spouse/Significant Other, Child(bryant)  Patient Expects to be Discharged to[de-identified] Home with home health (Lives with /family & son to transport. Call Rxs to [facility] on [employer]. )  Diet prior to admission: chopped, thin?   Current Diet: soft and bite size, thin       After treatment:   Patient left in no apparent distress in bed, Call bell within reach, and Nursing notified    COMMUNICATION/EDUCATION:   Patient was educated regarding purpose of SLP assessment, POC, diet recs and sw safety precautions. Patient demonstrated Good understanding as evidenced by verbal understanding. The patient's plan of care including recommendations, planned interventions, and recommended diet changes were discussed with: Registered nurse. Patient/family have participated as able in goal setting and plan of care. Thank you for this referral.  Zina Wong, SLP M.S. [id number] [facility]  Time Calculation: 16 mins           Problem: Dysphagia (Adult)  Goal: *Acute Goals and Plan of Care (Insert Text)  Description: Speech Therapy Swallow Goals  Initiated 8/3/2022  -Patient will tolerate full diet with nectar thick liquids without clinical indicators of aspiration given minimal cues within 5-7day(s). -Patient will tolerate PO trials without clinical indicators of aspiration given minimal cues within 5-7day(s). -Patient will participate in modified barium swallow study within 5-7 day(s). -Patient will demonstrate understanding of swallow safety precautions and aspiration precautions, diet recs with minimal cues within 5-7 day(s).     -Patient/caregiver goal: \"to swallow better\"          Outcome: Not Progressing Towards Goal

## 2022-08-03 NOTE — ROUTINE PROCESS
TRANSFER - OUT REPORT:      Fax report given to [facility] on [facility] [address] Sharad  being transferred to  for routine progression of care       Report consisted of patients Situation, Background, Assessment and   Recommendations(SBAR). Information from the following report(s) SBAR, ED Summary, Recent Results, and Cardiac Rhythm NRS  was reviewed with the receiving nurse. Lines:   Peripheral IV 08/03/22 Right Wrist (Active)        Opportunity for questions and clarification was provided.       Patient transported with:   Monitor  Tech

## 2022-08-03 NOTE — ED NOTES
Pt calling nursing staff into room multiple times stating that \"I don't know what's wrong, I just can't hold still and I can't go to sleep. I haven't been able to sleep in a long time and I just move around all the time\". Dr. Luna Rothman called for order, message left.

## 2022-08-03 NOTE — MED STUDENT NOTES
History and Physical    NAME: Terence Orourke   :  1940   MRN:  [id number]     Date/Time:  8/3/2022 9:10 AM    Patient PCP: Shakir Conti MD  ______________________________________________________________________             Subjective:     CHIEF COMPLAINT: Patient comes to the ED with complains of shortness of breath and swelling in her lower extremities. HISTORY OF PRESENT ILLNESS:       Patient is a 79y.o. year old female with a significant history of CHF, cardiac stents placement on May 31st, hypertension, hyperlipidemia, and psychiatric illness, coming to the ED complaining for shortness of breath over the last two days that has gotten progressively worse. Patient reports bilateral lower extremity edema. Left Ventricle: Severely reduced left ventricular systolic function with a visually estimated EF of 30 - 35%. Left ventricle is mildly dilated. Normal wall thickness    8/3    Patient was awake, alert, and resting in bed. She states that she is still feeling short on breath and that her stomach is cramping because she has to urinate but is worried about her catheter. She also complained of being sore on her backside because she has been in the same position for hours and no one has come by to turn her. She is upset because she feels like she has no idea how to manage her heart failure and no one is addressing her problems. Patient is having trouble swallowing and asked to be on a softer diet. She has no eaten much because of this.     Patients BNP- 16,068         Past Medical History:   Diagnosis Date    CHF (congestive heart failure) (HCC)     EF 30-35%    Dysphagia     Heart failure (HCC)     Hyperlipidemia     Hypertension     Ill-defined condition     patient unaware of any diagnosis due to recieving minimal medical care for the past 57 years    Psychiatric disorder     anxiety        Past Surgical History:   Procedure Laterality Date    HX CORONARY STENT PLACEMENT 2022    HX HEART CATHETERIZATION  2022    STENT PLACED       Social History     Tobacco Use    Smoking status: Former     Types: Cigarettes     Quit date: 1964     Years since quittin.6    Smokeless tobacco: Never   Substance Use Topics    Alcohol use: Never        Family History   Problem Relation Age of Onset    Diabetes Other     Heart Disease Other     Heart Disease Mother     Diabetes Mother     Diabetes Father     Heart Disease Father        Allergies   Allergen Reactions    W[place]laco Itching    Peanut Butter Flavor Not Reported This Time     possible allergy        Prior to Admission medications    Medication Sig Start Date End Date Taking? Authorizing Provider   lisinopriL (PRINIVIL, ZESTRIL) 5 mg tablet Take 5 mg by mouth daily. 22  Yes Provider, Historical   pantoprazole (PROTONIX) 40 mg tablet Take 40 mg by mouth daily. 22  Yes Provider, Historical   atorvastatin (LIPITOR) 40 mg tablet Take 1 Tablet by mouth nightly. 22  Yes Louie Zuluaga MD   clopidogreL (PLAVIX) 75 mg tab Take 1 Tablet by mouth daily. Indications: blood clot prevention following percutaneous coronary intervention 22  Yes Louie Zuluaga MD   aspirin delayed-release 81 mg tablet Take 81 mg by mouth daily. Yes Provider, Historical   amoxicillin (AMOXIL) 500 mg capsule Take 2 Capsules by mouth two (2) times a day. Indications: inflammation of the stomach lining caused by H. pylori  Patient not taking: Reported on 2022   April Fonseca MD   metroNIDAZOLE (FLAGYL) 500 mg tablet Take 1 Tablet by mouth three (3) times daily. Indications: inflammation of the stomach lining caused by H. pylori  Patient not taking: Reported on 2022   April Fonseca MD   potassium chloride (KLOR-CON) 20 mEq pack Take 1 Packet by mouth two (2) times daily (with meals).  Indications: low amount of potassium in the blood  Patient not taking: Reported on 2022   Louie Zuluaga MD carvediloL (COREG) 3.125 mg tablet Take 1 Tablet by mouth two (2) times daily (with meals). Indications: heart failure with reduced ejection fraction due to dilated cardiomyopathy  Patient not taking: Reported on 8/2/2022 6/1/22   Rhonda Bravo MD         Current Facility-Administered Medications:     hydrOXYzine (VISTARIL) 25 mg/mL injection 25 mg, 25 mg, IntraMUSCular, Q6H PRN, Janelle Carrillo MD, 25 mg at 08/03/22 0256    metoprolol succinate (TOPROL-XL) XL tablet 25 mg, 25 mg, Oral, NOW, Janelle Carrillo MD    atorvastatin (LIPITOR) tablet 40 mg, 40 mg, Oral, QHS, Janelle Carrillo MD, 40 mg at 08/03/22 0126    clopidogreL (PLAVIX) tablet 75 mg, 75 mg, Oral, DAILY, Janelle Carrillo MD, 75 mg at 08/03/22 0831    potassium chloride (KLOR-CON) packet for solution 20 mEq, 20 mEq, Oral, BID WITH MEALS, Janelle Carrillo MD, 20 mEq at 08/03/22 8107    aspirin delayed-release tablet 81 mg, 81 mg, Oral, DAILY, Janelle Carrillo MD, 81 mg at 08/03/22 6696    lisinopriL (PRINIVIL, ZESTRIL) tablet 5 mg, 5 mg, Oral, DAILY, Janelle Carrillo MD, 5 mg at 08/03/22 0831    pantoprazole (PROTONIX) tablet 40 mg, 40 mg, Oral, DAILY, Janelle Carrillo MD, 40 mg at 08/03/22 0503    furosemide (LASIX) injection 40 mg, 40 mg, IntraVENous, BID, Janelle Carrillo MD, 40 mg at 08/03/22 0252    Current Outpatient Medications:     lisinopriL (PRINIVIL, ZESTRIL) 5 mg tablet, Take 5 mg by mouth daily. , Disp: , Rfl:     pantoprazole (PROTONIX) 40 mg tablet, Take 40 mg by mouth daily. , Disp: , Rfl:     atorvastatin (LIPITOR) 40 mg tablet, Take 1 Tablet by mouth nightly., Disp: 30 Tablet, Rfl: 0    clopidogreL (PLAVIX) 75 mg tab, Take 1 Tablet by mouth daily. Indications: blood clot prevention following percutaneous coronary intervention, Disp: 90 Tablet, Rfl: 0    aspirin delayed-release 81 mg tablet, Take 81 mg by mouth daily. , Disp: , Rfl:     amoxicillin (AMOXIL) 500 mg capsule, Take 2 Capsules by mouth two (2) times a day. Indications: inflammation of the stomach lining caused by H. pylori (Patient not taking: Reported on 8/2/2022), Disp: 56 Capsule, Rfl: 0    metroNIDAZOLE (FLAGYL) 500 mg tablet, Take 1 Tablet by mouth three (3) times daily. Indications: inflammation of the stomach lining caused by H. pylori (Patient not taking: Reported on 8/2/2022), Disp: 28 Tablet, Rfl: 0    potassium chloride (KLOR-CON) 20 mEq pack, Take 1 Packet by mouth two (2) times daily (with meals). Indications: low amount of potassium in the blood (Patient not taking: Reported on 7/6/2022), Disp: 20 Packet, Rfl: 0    carvediloL (COREG) 3.125 mg tablet, Take 1 Tablet by mouth two (2) times daily (with meals). Indications: heart failure with reduced ejection fraction due to dilated cardiomyopathy (Patient not taking: Reported on 8/2/2022), Disp: 60 Tablet, Rfl: 0    LAB DATA REVIEWED:    Recent Results (from the past 24 hour(s))   CBC WITH AUTOMATED DIFF    Collection Time: 08/02/22  8:02 PM   Result Value Ref Range    WBC 8.0 3.6 - 11.0 K/uL    RBC 4.05 3.80 - 5.20 M/uL    HGB 11.6 11.5 - 16.0 g/dL    HCT 35.6 35.0 - 47.0 %    MCV 87.9 80.0 - 99.0 FL    MCH 28.6 26.0 - 34.0 PG    MCHC 32.6 30.0 - 36.5 g/dL    RDW 16.9 (H) 11.5 - 14.5 %    PLATELET 203 199 - 035 K/uL    MPV 9.2 8.9 - 12.9 FL    NRBC 0.0 0.0  WBC    ABSOLUTE NRBC 0.00 0.00 - 0.01 K/uL    NEUTROPHILS 74 32 - 75 %    LYMPHOCYTES 11 (L) 12 - 49 %    MONOCYTES 13 5 - 13 %    EOSINOPHILS 1 0 - 7 %    BASOPHILS 1 0 - 1 %    IMMATURE GRANULOCYTES 0 0 - 0.5 %    ABS. NEUTROPHILS 6.0 1.8 - 8.0 K/UL    ABS. LYMPHOCYTES 0.9 0.8 - 3.5 K/UL    ABS. MONOCYTES 1.0 0.0 - 1.0 K/UL    ABS. EOSINOPHILS 0.1 0.0 - 0.4 K/UL    ABS. BASOPHILS 0.1 0.0 - 0.1 K/UL    ABS. IMM.  GRANS. 0.0 0.00 - 0.04 K/UL    DF AUTOMATED     METABOLIC PANEL, COMPREHENSIVE    Collection Time: 08/02/22  8:02 PM   Result Value Ref Range    Sodium 138 136 - 145 mmol/L    Potassium 3.5 3.5 - 5.1 mmol/L    Chloride 104 97 - 108 mmol/L CO2 25 21 - 32 mmol/L    Anion gap 9 5 - 15 mmol/L    Glucose 135 (H) 65 - 100 mg/dL    BUN 10 6 - 20 mg/dL    Creatinine 0.43 (L) 0.55 - 1.02 mg/dL    BUN/Creatinine ratio 23 (H) 12 - 20      GFR est AA >60 >60 ml/min/1.73m2    GFR est non-AA >60 >60 ml/min/1.73m2    Calcium 8.0 (L) 8.5 - 10.1 mg/dL    Bilirubin, total 1.1 (H) 0.2 - 1.0 mg/dL    AST (SGOT) 25 15 - 37 U/L    ALT (SGPT) 27 12 - 78 U/L    Alk. phosphatase 45 45 - 117 U/L    Protein, total 5.8 (L) 6.4 - 8.2 g/dL    Albumin 3.2 (L) 3.5 - 5.0 g/dL    Globulin 2.6 2.0 - 4.0 g/dL    A-G Ratio 1.2 1.1 - 2.2     NT-PRO BNP    Collection Time: 08/02/22  8:02 PM   Result Value Ref Range    NT pro-BNP 16,068 (H) <450 pg/mL   TROPONIN-HIGH SENSITIVITY    Collection Time: 08/02/22  8:02 PM   Result Value Ref Range    Troponin-High Sensitivity 39 0 - 51 ng/L   MAGNESIUM    Collection Time: 08/02/22  8:02 PM   Result Value Ref Range    Magnesium 1.8 1.6 - 2.4 mg/dL       XR Results (most recent):  Results from Hospital Encounter encounter on 08/02/22    XR CHEST PORT    Narrative  EXAM: XR CHEST PORT    HISTORY: sob, h/o CHF. COMPARISON: 6/21/2022    FINDINGS: Portable AP. The hilar is mildly enlarged. There are moderate left and  mild right pleural effusions which have increased in the interval. There is  increased hazy opacification of the right lower lobe. There is increased  atelectasis left lower lobe. There is unchanged mild edema. The bones are  osteopenic. Multilevel spondylosis spine. Impression  1. Increased mild right and moderate left pleural effusions with increased  bibasilar atelectasis. 2. Unchanged mild edema. XR CHEST PORT   Final Result      1. Increased mild right and moderate left pleural effusions with increased   bibasilar atelectasis. 2. Unchanged mild edema. Review of Systems:  Constitutional: Negative for chills and fever. HENT: Negative. Eyes: Negative.     Respiratory: Shortness of breath Cardiovascular: Negative. Gastrointestinal: Negative for abdominal pain and nausea. Skin: Negative. Neurological: Negative. Objective:   VITALS:    Visit Vitals  BP (!) 146/76   Pulse 89   Temp 97.5 °F (36.4 °C)   Resp (!) 32   Ht 5' 2\" (1.575 m)   Wt 54.9 kg (121 lb)   SpO2 100%   BMI 22.13 kg/m²       Physical Exam:   Constitutional: pt is oriented to person, place, and time. HENT:   Head: Normocephalic and atraumatic. Eyes: Pupils are equal, round, and reactive to light. EOM are normal.   Cardiovascular: Tachycardic  Pulmonary/Chest: Breathing labored, tachypnea. Exhibits no tenderness. Abdominal: Soft. Bowel sounds are normal. There is no abdominal tenderness. There is no rebound and no guarding. Musculoskeletal: Lower extremity edema bilaterally   Neurological: pt is alert and oriented to person, place, and time. Alert. Normal strength. No cranial nerve deficit or sensory deficit. Displays a negative Romberg sign.         ASSESSMENT:  CHF (EF 30-35%)   Dilated cardiomypathy  Moderate mitral regurgitation  Pleural effusion, bilateral  Hx of cardiac stents  Diabetes  Dysphagia  Hyperlipidemia   Hypertension  Anxiety  Gastritis        PLAN:  Continue medications-    Aspirin, 81mg daily  Atorvastatin 40mg daily   Clopidogrel 75mg daily  Furosemide injection 40mg BID   Lisinopril 5mg daily  Metoprolol succinate 25mg   Pantoprazole 40mg daily  Potassium Chloride 20mEq BID    Consult cardiology  Augment diet to softer diet  initiate supplemental O2 therapy  Follow renal function, blood counts, electrolytes, glucose        Current Facility-Administered Medications:     hydrOXYzine (VISTARIL) 25 mg/mL injection 25 mg, 25 mg, IntraMUSCular, Q6H PRN, Janelle Carrillo MD, 25 mg at 08/03/22 0256    metoprolol succinate (TOPROL-XL) XL tablet 25 mg, 25 mg, Oral, NOW, Janelle Carrillo MD    atorvastatin (LIPITOR) tablet 40 mg, 40 mg, Oral, QHS, Janelle Carrillo MD, 40 mg at 08/03/22 0126    clopidogreL (PLAVIX) tablet 75 mg, 75 mg, Oral, DAILY, Janelle Carrillo MD, 75 mg at 08/03/22 0831    potassium chloride (KLOR-CON) packet for solution 20 mEq, 20 mEq, Oral, BID WITH MEALS, Janelle Carrillo MD, 20 mEq at 08/03/22 1113    aspirin delayed-release tablet 81 mg, 81 mg, Oral, DAILY, Janelle Carrillo MD, 81 mg at 08/03/22 0160    lisinopriL (PRINIVIL, ZESTRIL) tablet 5 mg, 5 mg, Oral, DAILY, Janelle Carrillo MD, 5 mg at 08/03/22 0831    pantoprazole (PROTONIX) tablet 40 mg, 40 mg, Oral, DAILY, Janelle Carrillo MD, 40 mg at 08/03/22 9039    furosemide (LASIX) injection 40 mg, 40 mg, IntraVENous, BID, Janelle Carrillo MD, 40 mg at 08/03/22 2172    Current Outpatient Medications:     lisinopriL (PRINIVIL, ZESTRIL) 5 mg tablet, Take 5 mg by mouth daily. , Disp: , Rfl:     pantoprazole (PROTONIX) 40 mg tablet, Take 40 mg by mouth daily. , Disp: , Rfl:     atorvastatin (LIPITOR) 40 mg tablet, Take 1 Tablet by mouth nightly., Disp: 30 Tablet, Rfl: 0    clopidogreL (PLAVIX) 75 mg tab, Take 1 Tablet by mouth daily. Indications: blood clot prevention following percutaneous coronary intervention, Disp: 90 Tablet, Rfl: 0    aspirin delayed-release 81 mg tablet, Take 81 mg by mouth daily. , Disp: , Rfl:     amoxicillin (AMOXIL) 500 mg capsule, Take 2 Capsules by mouth two (2) times a day. Indications: inflammation of the stomach lining caused by H. pylori (Patient not taking: Reported on 8/2/2022), Disp: 56 Capsule, Rfl: 0    metroNIDAZOLE (FLAGYL) 500 mg tablet, Take 1 Tablet by mouth three (3) times daily. Indications: inflammation of the stomach lining caused by H. pylori (Patient not taking: Reported on 8/2/2022), Disp: 28 Tablet, Rfl: 0    potassium chloride (KLOR-CON) 20 mEq pack, Take 1 Packet by mouth two (2) times daily (with meals).  Indications: low amount of potassium in the blood (Patient not taking: Reported on 7/6/2022), Disp: 20 Packet, Rfl: 0    carvediloL (COREG) 3.125 mg tablet, Take 1 Tablet by mouth two (2) times daily (with meals).  Indications: heart failure with reduced ejection fraction due to dilated cardiomyopathy (Patient not taking: Reported on 8/2/2022), Disp: 60 Tablet, Rfl: 0     ________________________________________________________________________    Signed: Ligia Harwood

## 2022-08-03 NOTE — PROGRESS NOTES
Admission Medication Reconciliation:    Information obtained from:  Pharmacy ([facility])    Comments/Recommendations: Reviewed PTA medications and patient's allergies. Removed amoxicillin 500 mg  Removed metronidazole 500 mg  Removed potassium cl 20 meq        Allergies:  Newnan and Peanut butter flavor    Significant PMH/Disease States:   Past Medical History:   Diagnosis Date    CHF (congestive heart failure) (HCC)     EF 30-35%    Dysphagia     Heart failure (HCC)     Hyperlipidemia     Hypertension     Ill-defined condition     patient unaware of any diagnosis due to recieving minimal medical care for the past 62 years    Psychiatric disorder     anxiety     Chief Complaint for this Admission:    Chief Complaint   Patient presents with    Shortness of Breath     Prior to Admission Medications:   Prior to Admission Medications   Prescriptions Last Dose Informant Patient Reported? Taking?   aspirin delayed-release 81 mg tablet  Other Yes Yes   Sig: Take 81 mg by mouth daily. atorvastatin (LIPITOR) 40 mg tablet 2022 Other No Yes   Sig: Take 1 Tablet by mouth nightly. carvediloL (COREG) 3.125 mg tablet  Other No Yes   Sig: Take 1 Tablet by mouth two (2) times daily (with meals). Indications: heart failure with reduced ejection fraction due to dilated cardiomyopathy   clopidogreL (PLAVIX) 75 mg tab  Other No Yes   Sig: Take 1 Tablet by mouth daily. Indications: blood clot prevention following percutaneous coronary intervention   lisinopriL (PRINIVIL, ZESTRIL) 5 mg tablet  Other Yes Yes   Sig: Take 5 mg by mouth daily. nitroglycerin (NITROSTAT) 0.4 mg SL tablet  Other Yes Yes   Si.4 mg by SubLINGual route every five (5) minutes as needed for Chest Pain. Up to 3 doses. pantoprazole (PROTONIX) 40 mg tablet  Other Yes Yes   Sig: Take 40 mg by mouth daily.       Facility-Administered Medications: None       Bindu Contreras

## 2022-08-03 NOTE — NURSE NAVIGATOR
Heart Failure Nurse Navigator: Heart Failure Education    RN performs hand hygiene. RN Introduces herself to the patientr and informs the patient of the reasoning for the visit. Patient Verbalizes Understanding for visit, is accepting of discussion    Persons present for Education: Patient    Time Spent: At least 60minutes    Method of teaching:  Teach-back, demonstration, verbal, visual    Education Packets Given: Heart Failure Handbook, Heart Failure symptom management calendar/tracker, Heart Failure Self Check plan, Sodium tracker, and Eat Smart with Nutrition labels.    -Confirmation of working scales & that the patient can read numbers    Patient's  is disabled and her son sets up her medications the night before. Patient raises concerns on not having as much help at home. Patient states her  cooks, but he has a hard time moving around himself. Patient does have Home Health. RN-NN advises the patient discuss options with the . Patient verbalizes understanding. Education:  RN provided education on Daily weights to include same time daily, on same scale, and documenting weights on calendar. RN provided education trigger management/ signs and symptoms of Heart Failure. Patient is advised on Yellow Days to call MD office and on Red Days to come to the ER or call 911. RN provides Nutritional education that includes how to calculate and restrict sodium and fluid intake. Encouraged fresh vegetable choice over canned/processed goods. Demonstrated how to log meals/intake. RN discusses lifestyle changes including cessation of smoking and increasing activity level. In addition, RN discusses the importance of keeping appointments and adherence to guideline directed medical therapies. Medication education provided: types of medication, actions of medications, signs and symptoms of medications, and importance of medication adherence.     Patient was able to teach back to the RN the above information. Patient will need reinforcement of education provided. RN-NN informs the patient to ask for RN-NN if herself or family has questions about heart failure. Patient verbalizes understanding.

## 2022-08-03 NOTE — H&P
History and Physical    NAME: iHtesh Orourke   :  1940   MRN:  [id number]     Date/Time:  8/3/2022 9:10 AM    Patient PCP: David Adams MD  ______________________________________________________________________             Subjective:     CHIEF COMPLAINT: Patient comes to the ED with complains of shortness of breath and swelling in her lower extremities. HISTORY OF PRESENT ILLNESS:       Patient is a 79y.o. year old female with a significant history of CHF, cardiac stents placement on May 31st, hypertension, hyperlipidemia, and psychiatric illness, coming to the ED complaining for shortness of breath over the last two days that has gotten progressively worse. Patient reports bilateral lower extremity edema. Left Ventricle: Severely reduced left ventricular systolic function with a visually estimated EF of 30 - 35%. Left ventricle is mildly dilated. Normal wall thickness    8/3    Patient was awake, alert, and resting in bed. She states that she is still feeling short on breath and that her stomach is cramping because she has to urinate but is worried about her catheter. Patient is having trouble swallowing and asked to be on a softer diet. She has no eaten much because of this.     Patients BNP- 16,068         Past Medical History:   Diagnosis Date    CHF (congestive heart failure) (HCC)     EF 30-35%    Dysphagia     Heart failure (HCC)     Hyperlipidemia     Hypertension     Ill-defined condition     patient unaware of any diagnosis due to recieving minimal medical care for the past 57 years    Psychiatric disorder     anxiety        Past Surgical History:   Procedure Laterality Date    HX CORONARY STENT PLACEMENT  2022    HX HEART CATHETERIZATION  2022    STENT PLACED       Social History     Tobacco Use    Smoking status: Former     Types: Cigarettes     Quit date: 1964     Years since quittin.6    Smokeless tobacco: Never   Substance Use Topics    Alcohol use: Never        Family History   Problem Relation Age of Onset    Diabetes Other     Heart Disease Other     Heart Disease Mother     Diabetes Mother     Diabetes Father     Heart Disease Father        Allergies   Allergen Reactions    [place] Itching    Peanut Butter Flavor Not Reported This Time     possible allergy        Prior to Admission medications    Medication Sig Start Date End Date Taking? Authorizing Provider   lisinopriL (PRINIVIL, ZESTRIL) 5 mg tablet Take 5 mg by mouth daily. 6/6/22  Yes Provider, Historical   pantoprazole (PROTONIX) 40 mg tablet Take 40 mg by mouth daily. 6/6/22  Yes Provider, Historical   atorvastatin (LIPITOR) 40 mg tablet Take 1 Tablet by mouth nightly. 6/1/22  Yes Mitzy Stewart MD   clopidogreL (PLAVIX) 75 mg tab Take 1 Tablet by mouth daily. Indications: blood clot prevention following percutaneous coronary intervention 6/2/22  Yes Mitzy Stewart MD   aspirin delayed-release 81 mg tablet Take 81 mg by mouth daily. Yes Provider, Historical   amoxicillin (AMOXIL) 500 mg capsule Take 2 Capsules by mouth two (2) times a day. Indications: inflammation of the stomach lining caused by H. pylori  Patient not taking: Reported on 8/2/2022 7/14/22   Bud Coleman MD   metroNIDAZOLE (FLAGYL) 500 mg tablet Take 1 Tablet by mouth three (3) times daily. Indications: inflammation of the stomach lining caused by H. pylori  Patient not taking: Reported on 8/2/2022 7/14/22   Bud Coleman MD   potassium chloride (KLOR-CON) 20 mEq pack Take 1 Packet by mouth two (2) times daily (with meals). Indications: low amount of potassium in the blood  Patient not taking: Reported on 7/6/2022 6/1/22   Mitzy Stewart MD   carvediloL (COREG) 3.125 mg tablet Take 1 Tablet by mouth two (2) times daily (with meals).  Indications: heart failure with reduced ejection fraction due to dilated cardiomyopathy  Patient not taking: Reported on 8/2/2022 6/1/22   Mitzy Stewart MD         Current Facility-Administered Medications:     hydrOXYzine (VISTARIL) 25 mg/mL injection 25 mg, 25 mg, IntraMUSCular, Q6H PRN, Janelle Carrillo MD, 25 mg at 08/03/22 0256    [START ON 8/4/2022] metoprolol succinate (TOPROL-XL) XL tablet 25 mg, 25 mg, Oral, DAILY, Janelle Carrillo MD    atorvastatin (LIPITOR) tablet 40 mg, 40 mg, Oral, QHS, Janelle Carrillo MD, 40 mg at 08/03/22 0126    clopidogreL (PLAVIX) tablet 75 mg, 75 mg, Oral, DAILY, Janelle Carrillo MD, 75 mg at 08/03/22 0831    potassium chloride (KLOR-CON) packet for solution 20 mEq, 20 mEq, Oral, BID WITH MEALS, Janelle Carrillo MD, 20 mEq at 08/03/22 0656    aspirin delayed-release tablet 81 mg, 81 mg, Oral, DAILY, Janelle Carrillo MD, 81 mg at 08/03/22 6248    lisinopriL (PRINIVIL, ZESTRIL) tablet 5 mg, 5 mg, Oral, DAILY, Janelle Carrillo MD, 5 mg at 08/03/22 0831    pantoprazole (PROTONIX) tablet 40 mg, 40 mg, Oral, DAILY, Janelle Carrillo MD, 40 mg at 08/03/22 7389    furosemide (LASIX) injection 40 mg, 40 mg, IntraVENous, BID, Janelle Carrillo MD, 40 mg at 08/03/22 8855    Current Outpatient Medications:     lisinopriL (PRINIVIL, ZESTRIL) 5 mg tablet, Take 5 mg by mouth daily. , Disp: , Rfl:     pantoprazole (PROTONIX) 40 mg tablet, Take 40 mg by mouth daily. , Disp: , Rfl:     atorvastatin (LIPITOR) 40 mg tablet, Take 1 Tablet by mouth nightly., Disp: 30 Tablet, Rfl: 0    clopidogreL (PLAVIX) 75 mg tab, Take 1 Tablet by mouth daily. Indications: blood clot prevention following percutaneous coronary intervention, Disp: 90 Tablet, Rfl: 0    aspirin delayed-release 81 mg tablet, Take 81 mg by mouth daily. , Disp: , Rfl:     amoxicillin (AMOXIL) 500 mg capsule, Take 2 Capsules by mouth two (2) times a day. Indications: inflammation of the stomach lining caused by H. pylori (Patient not taking: Reported on 8/2/2022), Disp: 56 Capsule, Rfl: 0    metroNIDAZOLE (FLAGYL) 500 mg tablet, Take 1 Tablet by mouth three (3) times daily.  Indications: inflammation of the stomach lining caused by H. pylori (Patient not taking: Reported on 8/2/2022), Disp: 28 Tablet, Rfl: 0    potassium chloride (KLOR-CON) 20 mEq pack, Take 1 Packet by mouth two (2) times daily (with meals). Indications: low amount of potassium in the blood (Patient not taking: Reported on 7/6/2022), Disp: 20 Packet, Rfl: 0    carvediloL (COREG) 3.125 mg tablet, Take 1 Tablet by mouth two (2) times daily (with meals). Indications: heart failure with reduced ejection fraction due to dilated cardiomyopathy (Patient not taking: Reported on 8/2/2022), Disp: 60 Tablet, Rfl: 0    LAB DATA REVIEWED:    Recent Results (from the past 24 hour(s))   CBC WITH AUTOMATED DIFF    Collection Time: 08/02/22  8:02 PM   Result Value Ref Range    WBC 8.0 3.6 - 11.0 K/uL    RBC 4.05 3.80 - 5.20 M/uL    HGB 11.6 11.5 - 16.0 g/dL    HCT 35.6 35.0 - 47.0 %    MCV 87.9 80.0 - 99.0 FL    MCH 28.6 26.0 - 34.0 PG    MCHC 32.6 30.0 - 36.5 g/dL    RDW 16.9 (H) 11.5 - 14.5 %    PLATELET 967 869 - 377 K/uL    MPV 9.2 8.9 - 12.9 FL    NRBC 0.0 0.0  WBC    ABSOLUTE NRBC 0.00 0.00 - 0.01 K/uL    NEUTROPHILS 74 32 - 75 %    LYMPHOCYTES 11 (L) 12 - 49 %    MONOCYTES 13 5 - 13 %    EOSINOPHILS 1 0 - 7 %    BASOPHILS 1 0 - 1 %    IMMATURE GRANULOCYTES 0 0 - 0.5 %    ABS. NEUTROPHILS 6.0 1.8 - 8.0 K/UL    ABS. LYMPHOCYTES 0.9 0.8 - 3.5 K/UL    ABS. MONOCYTES 1.0 0.0 - 1.0 K/UL    ABS. EOSINOPHILS 0.1 0.0 - 0.4 K/UL    ABS. BASOPHILS 0.1 0.0 - 0.1 K/UL    ABS. IMM.  GRANS. 0.0 0.00 - 0.04 K/UL    DF AUTOMATED     METABOLIC PANEL, COMPREHENSIVE    Collection Time: 08/02/22  8:02 PM   Result Value Ref Range    Sodium 138 136 - 145 mmol/L    Potassium 3.5 3.5 - 5.1 mmol/L    Chloride 104 97 - 108 mmol/L    CO2 25 21 - 32 mmol/L    Anion gap 9 5 - 15 mmol/L    Glucose 135 (H) 65 - 100 mg/dL    BUN 10 6 - 20 mg/dL    Creatinine 0.43 (L) 0.55 - 1.02 mg/dL    BUN/Creatinine ratio 23 (H) 12 - 20      GFR est AA >60 >60 ml/min/1.73m2    GFR est non-AA >60 >60 ml/min/1.73m2    Calcium 8.0 (L) 8.5 - 10.1 mg/dL    Bilirubin, total 1.1 (H) 0.2 - 1.0 mg/dL    AST (SGOT) 25 15 - 37 U/L    ALT (SGPT) 27 12 - 78 U/L    Alk. phosphatase 45 45 - 117 U/L    Protein, total 5.8 (L) 6.4 - 8.2 g/dL    Albumin 3.2 (L) 3.5 - 5.0 g/dL    Globulin 2.6 2.0 - 4.0 g/dL    A-G Ratio 1.2 1.1 - 2.2     NT-PRO BNP    Collection Time: 08/02/22  8:02 PM   Result Value Ref Range    NT pro-BNP 16,068 (H) <450 pg/mL   TROPONIN-HIGH SENSITIVITY    Collection Time: 08/02/22  8:02 PM   Result Value Ref Range    Troponin-High Sensitivity 39 0 - 51 ng/L   MAGNESIUM    Collection Time: 08/02/22  8:02 PM   Result Value Ref Range    Magnesium 1.8 1.6 - 2.4 mg/dL   EKG, 12 LEAD, INITIAL    Collection Time: 08/02/22  8:04 PM   Result Value Ref Range    Ventricular Rate 97 BPM    Atrial Rate 97 BPM    P-R Interval 168 ms    QRS Duration 76 ms    Q-T Interval 346 ms    QTC Calculation (Bezet) 439 ms    Calculated P Axis 8 degrees    Calculated R Axis 8 degrees    Calculated T Axis -35 degrees    Diagnosis       Sinus rhythm with occasional Premature ventricular complexes and Premature   atrial complexes  Nonspecific T wave abnormality  Abnormal ECG  When compared with ECG of 21-JUN-2022 07:38,  Premature ventricular complexes are now Present  Premature atrial complexes are now Present  T wave inversion no longer evident in Lateral leads  Confirmed by [facility] [place] [id number] ([id number]) on 8/3/2022 10:51:30 AM         XR Results (most recent):  Results from Hospital Encounter encounter on 08/02/22    XR CHEST PORT    Narrative  EXAM: XR CHEST PORT    HISTORY: sob, h/o CHF. COMPARISON: 6/21/2022    FINDINGS: Portable AP. The hilar is mildly enlarged. There are moderate left and  mild right pleural effusions which have increased in the interval. There is  increased hazy opacification of the right lower lobe. There is increased  atelectasis left lower lobe. There is unchanged mild edema. The bones are  osteopenic. Multilevel spondylosis spine. Impression  1. Increased mild right and moderate left pleural effusions with increased  bibasilar atelectasis. 2. Unchanged mild edema. XR CHEST PORT   Final Result      1. Increased mild right and moderate left pleural effusions with increased   bibasilar atelectasis. 2. Unchanged mild edema. Review of Systems:  Constitutional: Negative for chills and fever. HENT: Negative. Eyes: Negative. Respiratory: Shortness of breath   Cardiovascular: Negative. Gastrointestinal: Negative for abdominal pain and nausea. Skin: Negative. Neurological: Negative. Objective:   VITALS:    Visit Vitals  BP (!) 141/80   Pulse 89   Temp 97.5 °F (36.4 °C)   Resp 25   Ht 5' 2\" (1.575 m)   Wt 54.9 kg (121 lb)   SpO2 98%   BMI 22.13 kg/m²       Physical Exam:   Constitutional: pt is oriented to person, place, and time. HENT:   Head: Normocephalic and atraumatic. Eyes: Pupils are equal, round, and reactive to light. EOM are normal.   Cardiovascular: Tachycardic  Pulmonary/Chest: Breathing labored, tachypnea. Exhibits no tenderness. Abdominal: Soft. Bowel sounds are normal. There is no abdominal tenderness. There is no rebound and no guarding. Musculoskeletal: Lower extremity edema bilaterally   Neurological: pt is alert and oriented to person, place, and time. Alert. Normal strength. No cranial nerve deficit or sensory deficit. Displays a negative Romberg sign.         ASSESSMENT:    CHF (EF 30-35%)   Dilated cardiomypathy  Moderate mitral regurgitation  Pleural effusion, bilateral  Hx of cardiac stents  Diabetes  Dysphagia  Hyperlipidemia   Hypertension  Anxiety  Gastritis        PLAN:  Continue medications-    Aspirin, 81mg daily  Atorvastatin 40mg daily   Clopidogrel 75mg daily  Furosemide injection 40mg BID   Lisinopril 5mg daily  Metoprolol succinate 25mg qd  Pantoprazole 40mg daily  Potassium Chloride 20mEq BID    Consult cardiology  Augment diet to softer diet  initiate supplemental O2 therapy  Follow renal function, blood counts, electrolytes, glucose  PT/OT        Current Facility-Administered Medications:     hydrOXYzine (VISTARIL) 25 mg/mL injection 25 mg, 25 mg, IntraMUSCular, Q6H PRN, Janelle Carrillo MD, 25 mg at 08/03/22 0256    [START ON 8/4/2022] metoprolol succinate (TOPROL-XL) XL tablet 25 mg, 25 mg, Oral, DAILY, Janelle Carrillo MD    atorvastatin (LIPITOR) tablet 40 mg, 40 mg, Oral, QHS, Janelle Carrillo MD, 40 mg at 08/03/22 0126    clopidogreL (PLAVIX) tablet 75 mg, 75 mg, Oral, DAILY, Janelle Carrillo MD, 75 mg at 08/03/22 0831    potassium chloride (KLOR-CON) packet for solution 20 mEq, 20 mEq, Oral, BID WITH MEALS, Janelle Carrillo MD, 20 mEq at 08/03/22 4452    aspirin delayed-release tablet 81 mg, 81 mg, Oral, DAILY, Janelle Carrillo MD, 81 mg at 08/03/22 2852    lisinopriL (PRINIVIL, ZESTRIL) tablet 5 mg, 5 mg, Oral, DAILY, Janelle Carrillo MD, 5 mg at 08/03/22 0831    pantoprazole (PROTONIX) tablet 40 mg, 40 mg, Oral, DAILY, Janelle Carrillo MD, 40 mg at 08/03/22 1098    furosemide (LASIX) injection 40 mg, 40 mg, IntraVENous, BID, Janelle Carrillo MD, 40 mg at 08/03/22 4554    Current Outpatient Medications:     lisinopriL (PRINIVIL, ZESTRIL) 5 mg tablet, Take 5 mg by mouth daily. , Disp: , Rfl:     pantoprazole (PROTONIX) 40 mg tablet, Take 40 mg by mouth daily. , Disp: , Rfl:     atorvastatin (LIPITOR) 40 mg tablet, Take 1 Tablet by mouth nightly., Disp: 30 Tablet, Rfl: 0    clopidogreL (PLAVIX) 75 mg tab, Take 1 Tablet by mouth daily. Indications: blood clot prevention following percutaneous coronary intervention, Disp: 90 Tablet, Rfl: 0    aspirin delayed-release 81 mg tablet, Take 81 mg by mouth daily. , Disp: , Rfl:     amoxicillin (AMOXIL) 500 mg capsule, Take 2 Capsules by mouth two (2) times a day.  Indications: inflammation of the stomach lining caused by H. pylori (Patient not taking: Reported on 8/2/2022), Disp: 64 Capsule, Rfl: 0    metroNIDAZOLE (FLAGYL) 500 mg tablet, Take 1 Tablet by mouth three (3) times daily. Indications: inflammation of the stomach lining caused by H. pylori (Patient not taking: Reported on 8/2/2022), Disp: 28 Tablet, Rfl: 0    potassium chloride (KLOR-CON) 20 mEq pack, Take 1 Packet by mouth two (2) times daily (with meals). Indications: low amount of potassium in the blood (Patient not taking: Reported on 7/6/2022), Disp: 20 Packet, Rfl: 0    carvediloL (COREG) 3.125 mg tablet, Take 1 Tablet by mouth two (2) times daily (with meals).  Indications: heart failure with reduced ejection fraction due to dilated cardiomyopathy (Patient not taking: Reported on 8/2/2022), Disp: 60 Tablet, Rfl: 0     ________________________________________________________________________    Signed: Randell Campoverde MD

## 2022-08-04 LAB
ALBUMIN SERPL-MCNC: 3.4 G/DL (ref 3.5–5)
ALBUMIN/GLOB SERPL: 1.1 {RATIO} (ref 1.1–2.2)
ALP SERPL-CCNC: 53 U/L (ref 45–117)
ALT SERPL-CCNC: 31 U/L (ref 12–78)
ANION GAP SERPL CALC-SCNC: 8 MMOL/L (ref 5–15)
AST SERPL W P-5'-P-CCNC: 28 U/L (ref 15–37)
BILIRUB SERPL-MCNC: 1.6 MG/DL (ref 0.2–1)
BNP SERPL-MCNC: 5967 PG/ML
BNP SERPL-MCNC: 7606 PG/ML
BUN SERPL-MCNC: 12 MG/DL (ref 6–20)
BUN/CREAT SERPL: 23 (ref 12–20)
CA-I BLD-MCNC: 9 MG/DL (ref 8.5–10.1)
CHLORIDE SERPL-SCNC: 96 MMOL/L (ref 97–108)
CO2 SERPL-SCNC: 29 MMOL/L (ref 21–32)
CREAT SERPL-MCNC: 0.53 MG/DL (ref 0.55–1.02)
ERYTHROCYTE [DISTWIDTH] IN BLOOD BY AUTOMATED COUNT: 16.7 % (ref 11.5–14.5)
GLOBULIN SER CALC-MCNC: 3.2 G/DL (ref 2–4)
GLUCOSE SERPL-MCNC: 124 MG/DL (ref 65–100)
HCT VFR BLD AUTO: 42 % (ref 35–47)
HGB BLD-MCNC: 13.8 G/DL (ref 11.5–16)
MCH RBC QN AUTO: 28.3 PG (ref 26–34)
MCHC RBC AUTO-ENTMCNC: 32.9 G/DL (ref 30–36.5)
MCV RBC AUTO: 86.1 FL (ref 80–99)
NRBC # BLD: 0 K/UL (ref 0–0.01)
NRBC BLD-RTO: 0 PER 100 WBC
PLATELET # BLD AUTO: 307 K/UL (ref 150–400)
PMV BLD AUTO: 8.8 FL (ref 8.9–12.9)
POTASSIUM SERPL-SCNC: 3.1 MMOL/L (ref 3.5–5.1)
PROT SERPL-MCNC: 6.6 G/DL (ref 6.4–8.2)
RBC # BLD AUTO: 4.88 M/UL (ref 3.8–5.2)
SODIUM SERPL-SCNC: 133 MMOL/L (ref 136–145)
WBC # BLD AUTO: 6.4 K/UL (ref 3.6–11)

## 2022-08-04 PROCEDURE — 97165 OT EVAL LOW COMPLEX 30 MIN: CPT

## 2022-08-04 PROCEDURE — 85027 COMPLETE CBC AUTOMATED: CPT

## 2022-08-04 PROCEDURE — 83880 ASSAY OF NATRIURETIC PEPTIDE: CPT

## 2022-08-04 PROCEDURE — 65270000029 HC RM PRIVATE

## 2022-08-04 PROCEDURE — 97161 PT EVAL LOW COMPLEX 20 MIN: CPT

## 2022-08-04 PROCEDURE — 97530 THERAPEUTIC ACTIVITIES: CPT

## 2022-08-04 PROCEDURE — 80053 COMPREHEN METABOLIC PANEL: CPT

## 2022-08-04 PROCEDURE — 74011250637 HC RX REV CODE- 250/637: Performed by: FAMILY MEDICINE

## 2022-08-04 PROCEDURE — 36415 COLL VENOUS BLD VENIPUNCTURE: CPT

## 2022-08-04 PROCEDURE — 74011250636 HC RX REV CODE- 250/636: Performed by: FAMILY MEDICINE

## 2022-08-04 RX ORDER — POTASSIUM CHLORIDE 750 MG/1
40 TABLET, FILM COATED, EXTENDED RELEASE ORAL
Status: COMPLETED | OUTPATIENT
Start: 2022-08-04 | End: 2022-08-04

## 2022-08-04 RX ADMIN — POTASSIUM CHLORIDE 40 MEQ: 750 TABLET, FILM COATED, EXTENDED RELEASE ORAL at 14:16

## 2022-08-04 RX ADMIN — PANTOPRAZOLE SODIUM 40 MG: 40 TABLET, DELAYED RELEASE ORAL at 10:31

## 2022-08-04 RX ADMIN — ATORVASTATIN CALCIUM 40 MG: 40 TABLET, FILM COATED ORAL at 21:01

## 2022-08-04 RX ADMIN — FUROSEMIDE 40 MG: 10 INJECTION, SOLUTION INTRAMUSCULAR; INTRAVENOUS at 10:31

## 2022-08-04 RX ADMIN — FUROSEMIDE 40 MG: 10 INJECTION, SOLUTION INTRAMUSCULAR; INTRAVENOUS at 20:57

## 2022-08-04 RX ADMIN — POTASSIUM CHLORIDE 20 MEQ: 1.5 POWDER, FOR SOLUTION ORAL at 16:22

## 2022-08-04 RX ADMIN — POTASSIUM CHLORIDE 20 MEQ: 1.5 POWDER, FOR SOLUTION ORAL at 10:34

## 2022-08-04 RX ADMIN — LISINOPRIL 5 MG: 10 TABLET ORAL at 10:31

## 2022-08-04 RX ADMIN — METOPROLOL SUCCINATE 25 MG: 25 TABLET, EXTENDED RELEASE ORAL at 10:31

## 2022-08-04 RX ADMIN — CLOPIDOGREL BISULFATE 75 MG: 75 TABLET ORAL at 10:31

## 2022-08-04 RX ADMIN — ASPIRIN 81 MG: 81 TABLET, COATED ORAL at 10:31

## 2022-08-04 NOTE — PROGRESS NOTES
Problem: Patient Education: Go to Patient Education Activity  Goal: Patient/Family Education  Outcome: Progressing Towards Goal     Problem: Pressure Injury - Risk of  Goal: *Prevention of pressure injury  Description: Document Manfred Scale and appropriate interventions in the flowsheet. Outcome: Progressing Towards Goal  Note: Pressure Injury Interventions:  Sensory Interventions: Assess changes in LOC, Float heels, Keep linens dry and wrinkle-free, Minimize linen layers, Monitor skin under medical devices, Pressure redistribution bed/mattress (bed type), Turn and reposition approx. every two hours (pillows and wedges if needed)    Moisture Interventions: Absorbent underpads, Internal/External fecal devices, Apply protective barrier, creams and emollients    Activity Interventions: Assess need for specialty bed, Pressure redistribution bed/mattress(bed type), Increase time out of bed, PT/OT evaluation    Mobility Interventions: Assess need for specialty bed, Float heels, HOB 30 degrees or less, Pressure redistribution bed/mattress (bed type)    Nutrition Interventions: Document food/fluid/supplement intake                     Problem: Patient Education: Go to Patient Education Activity  Goal: Patient/Family Education  Outcome: Progressing Towards Goal     Problem: Falls - Risk of  Goal: *Absence of Falls  Description: Document Laith Nevarez Fall Risk and appropriate interventions in the flowsheet.   Outcome: Progressing Towards Goal  Note: Fall Risk Interventions:  Mobility Interventions: Utilize walker, cane, or other assistive device         Medication Interventions: Assess postural VS orthostatic hypotension, Patient to call before getting OOB, Teach patient to arise slowly    Elimination Interventions: Bed/chair exit alarm, Call light in reach              Problem: Patient Education: Go to Patient Education Activity  Goal: Patient/Family Education  Outcome: Progressing Towards Goal

## 2022-08-04 NOTE — PROGRESS NOTES
CM met with patient to discuss DCP, patient is recc for IRF, patient is agreeable, states she is weak and needs some rehab, signed choice letter for [facility] IRF. CM spoke with Aileenell Big [place]ber at [facility], states that patient only have Medicare part A and this may be a barrier to patient going to IRF. CM contacted patient's , Yaneli Hunt [phone], to update, explained barriers. CM continues to following, awaiting acceptance/denial from [facility] IRF.

## 2022-08-04 NOTE — PROGRESS NOTES
History and Physical    NAME: Cheo Orourke   :  1940   MRN:  [id number]     Date/Time:  2022 9:10 AM    Patient PCP: Adela Weaver MD  ______________________________________________________________________             Subjective:     CHIEF COMPLAINT: Patient comes to the ED with complains of shortness of breath and swelling in her lower extremities. HISTORY OF PRESENT ILLNESS:       Patient is a 79y.o. year old female with a significant history of CHF, cardiac stents placement on May 31st, hypertension, hyperlipidemia, and psychiatric illness, coming to the ED complaining for shortness of breath over the last two days that has gotten progressively worse. Patient reports bilateral lower extremity edema. Left Ventricle: Severely reduced left ventricular systolic function with a visually estimated EF of 30 - 35%. Left ventricle is mildly dilated. Normal wall thickness    8/3    Patient was awake, alert, and resting in bed. She states that she is still feeling short on breath and that her stomach is cramping because she has to urinate but is worried about her catheter. Patient is having trouble swallowing and asked to be on a softer diet. She has no eaten much because of this. Patients BNP- 16,068          Patient was awake and alert and in no apparent distress. She denies shortness of breath except when expending energy such as eating or trying to ambulate. She states that her abdominal pain has subsided after the catheter was placed. Edema in LE significantly decreased. Son is with her at this time and would like to get an update from physicians about the condition his mother is in and what future steps are to be taken. Would like to know if his mother's heart condition has gotten worse since the heart stents were placed.      Patient was recommended a puree diet and mildly thick liquids via speech pathologist. She is currently tolerating new diet well and ate most of her meal.   Patient also stated this is the first time in a while that she has slept this well. Patient notes discomfort on her backside due to inability to readjust herself as well as discomfort in her hands because they're cold. Telemetry reviewed reveals occasional episodes of 12 consecutive premature ventricular complex (PVCs). Past Medical History:   Diagnosis Date    CHF (congestive heart failure) ([facility])     EF 30-35%    Dysphagia     Heart failure (HCC)     Hyperlipidemia     Hypertension     Ill-defined condition     patient unaware of any diagnosis due to recieving minimal medical care for the past 57 years    Psychiatric disorder     anxiety        Past Surgical History:   Procedure Laterality Date    HX CORONARY STENT PLACEMENT  2022    HX HEART CATHETERIZATION  2022    STENT PLACED       Social History     Tobacco Use    Smoking status: Former     Types: Cigarettes     Quit date: 1964     Years since quittin.6    Smokeless tobacco: Never   Substance Use Topics    Alcohol use: Never        Family History   Problem Relation Age of Onset    Diabetes Other     Heart Disease Other     Heart Disease Mother     Diabetes Mother     Diabetes Father     Heart Disease Father        Allergies   Allergen Reactions    [place] Itching    Peanut Butter Flavor Not Reported This Time     possible allergy        Prior to Admission medications    Medication Sig Start Date End Date Taking? Authorizing Provider   nitroglycerin (NITROSTAT) 0.4 mg SL tablet 0.4 mg by SubLINGual route every five (5) minutes as needed for Chest Pain. Up to 3 doses. Yes Provider, Historical   lisinopriL (PRINIVIL, ZESTRIL) 5 mg tablet Take 5 mg by mouth daily. 22  Yes Provider, Historical   pantoprazole (PROTONIX) 40 mg tablet Take 40 mg by mouth daily. 22  Yes Provider, Historical   atorvastatin (LIPITOR) 40 mg tablet Take 1 Tablet by mouth nightly.  22  Yes Matthew Hays MD   clopidogreL (PLAVIX) 75 mg tab Take 1 Tablet by mouth daily. Indications: blood clot prevention following percutaneous coronary intervention 6/2/22  Yes Shirley Jack MD   carvediloL (COREG) 3.125 mg tablet Take 1 Tablet by mouth two (2) times daily (with meals). Indications: heart failure with reduced ejection fraction due to dilated cardiomyopathy 6/1/22  Yes Shirley Jack MD   aspirin delayed-release 81 mg tablet Take 81 mg by mouth daily.    Yes Provider, Historical         Current Facility-Administered Medications:     hydrOXYzine (VISTARIL) 25 mg/mL injection 25 mg, 25 mg, IntraMUSCular, Q6H PRN, Janelle Carrillo MD, 25 mg at 08/03/22 0256    metoprolol succinate (TOPROL-XL) XL tablet 25 mg, 25 mg, Oral, DAILY, Soy Carrillo MD    atorvastatin (LIPITOR) tablet 40 mg, 40 mg, Oral, QHS, Janelle Carrillo MD, 40 mg at 08/03/22 2015    clopidogreL (PLAVIX) tablet 75 mg, 75 mg, Oral, DAILY, Janelle Carrillo MD, 75 mg at 08/03/22 0831    potassium chloride (KLOR-CON) packet for solution 20 mEq, 20 mEq, Oral, BID WITH MEALS, Janelle Carrillo MD, 20 mEq at 08/03/22 8967    aspirin delayed-release tablet 81 mg, 81 mg, Oral, DAILY, Janelle Carrillo MD, 81 mg at 08/03/22 9364    lisinopriL (PRINIVIL, ZESTRIL) tablet 5 mg, 5 mg, Oral, DAILY, Janelle Carrillo MD, 5 mg at 08/03/22 0831    pantoprazole (PROTONIX) tablet 40 mg, 40 mg, Oral, DAILY, Deb Serrano MD, 40 mg at 08/03/22 2326    furosemide (LASIX) injection 40 mg, 40 mg, IntraVENous, BID, Janelle Carrillo MD, 40 mg at 08/03/22 2017    LAB DATA REVIEWED:    Recent Results (from the past 24 hour(s))   CBC W/O DIFF    Collection Time: 08/04/22  7:36 AM   Result Value Ref Range    WBC 6.4 3.6 - 11.0 K/uL    RBC 4.88 3.80 - 5.20 M/uL    HGB 13.8 11.5 - 16.0 g/dL    HCT 42.0 35.0 - 47.0 %    MCV 86.1 80.0 - 99.0 FL    MCH 28.3 26.0 - 34.0 PG    MCHC 32.9 30.0 - 36.5 g/dL    RDW 16.7 (H) 11.5 - 14.5 %    PLATELET 375 399 - 843 K/uL    MPV 8.8 (L) 8.9 - 12.9 FL    NRBC 0.0 0. 0  WBC    ABSOLUTE NRBC 0.00 0.00 - 0.01 K/uL       XR Results (most recent):  Results from Hospital Encounter encounter on 08/02/22    XR CHEST PORT    Narrative  EXAM: XR CHEST PORT    HISTORY: sob, h/o CHF. COMPARISON: 6/21/2022    FINDINGS: Portable AP. The hilar is mildly enlarged. There are moderate left and  mild right pleural effusions which have increased in the interval. There is  increased hazy opacification of the right lower lobe. There is increased  atelectasis left lower lobe. There is unchanged mild edema. The bones are  osteopenic. Multilevel spondylosis spine. Impression  1. Increased mild right and moderate left pleural effusions with increased  bibasilar atelectasis. 2. Unchanged mild edema. XR CHEST PORT   Final Result      1. Increased mild right and moderate left pleural effusions with increased   bibasilar atelectasis. 2. Unchanged mild edema. Review of Systems:  Constitutional: Negative for chills and fever. HENT: Negative. Eyes: Negative. Respiratory: Shortness of breath upon exertion. Cardiovascular: Negative. Gastrointestinal: Negative for abdominal pain and nausea. Skin: Cold hands. Neurological: Negative. Objective:   VITALS:    Visit Vitals  /73 (BP 1 Location: Right upper arm, BP Patient Position: Lying; At rest)   Pulse 75   Temp 97.7 °F (36.5 °C)   Resp 18   Ht 5' 2\" (1.575 m)   Wt 54.9 kg (121 lb)   SpO2 95%   Breastfeeding Unknown   BMI 22.13 kg/m²       Physical Exam:   Constitutional: pt is oriented to person, place, and time. HENT:   Head: Normocephalic and atraumatic. Eyes: Pupils are equal, round, and reactive to light. EOM are normal.   Cardiovascular: Slightly increased rate. Pulmonary/Chest: Breath sounds normal in all fields. No longer has tachypnea   Exhibits no tenderness. Abdominal: Soft. Bowel sounds are normal. There is no abdominal tenderness. There is no rebound and no guarding.    Musculoskeletal: Patient has baseline full range of motion   Neurological: pt is alert and oriented to person, place, and time. Alert. Normal strength. No cranial nerve deficit or sensory deficit. Displays a negative Romberg sign.         ASSESSMENT:    CHF (EF 30-35%)   Dilated cardiomypathy  Moderate mitral regurgitation  Pleural effusion, bilateral  Hx of cardiac stents  Diabetes  Dysphagia  Hyperlipidemia   Hypertension  Anxiety  Antral Gastritis  Distal esophagitis         PLAN:  Continue medications-    Aspirin, 81mg daily  Atorvastatin 40mg daily   Clopidogrel 75mg daily  Furosemide injection 40mg BID   Lisinopril 5mg daily  Metoprolol succinate 25mg   Pantoprazole 40mg daily  Potassium Chloride 20mEq BID      GI consult for H. Pylori dx  Follow up with cardiology consult  Possible supplemental O2 therapy if stats <93%  Follow renal function, blood counts, electrolytes, glucose  Monitor fluid balance and cardiac enzymes  PT/OT        Current Facility-Administered Medications:     hydrOXYzine (VISTARIL) 25 mg/mL injection 25 mg, 25 mg, IntraMUSCular, Q6H PRN, Janelle Carrillo MD, 25 mg at 08/03/22 0256    metoprolol succinate (TOPROL-XL) XL tablet 25 mg, 25 mg, Oral, DAILY, Janelle Carrillo MD    atorvastatin (LIPITOR) tablet 40 mg, 40 mg, Oral, QHS, Janelle Carrillo MD, 40 mg at 08/03/22 2015    clopidogreL (PLAVIX) tablet 75 mg, 75 mg, Oral, DAILY, Janelle Carrillo MD, 75 mg at 08/03/22 0831    potassium chloride (KLOR-CON) packet for solution 20 mEq, 20 mEq, Oral, BID WITH MEALS, Janelle Carrillo MD, 20 mEq at 08/03/22 5430    aspirin delayed-release tablet 81 mg, 81 mg, Oral, DAILY, Janelle Carrillo MD, 81 mg at 08/03/22 7243    lisinopriL (PRINIVIL, ZESTRIL) tablet 5 mg, 5 mg, Oral, DAILY, Janelle Carrillo MD, 5 mg at 08/03/22 0831    pantoprazole (PROTONIX) tablet 40 mg, 40 mg, Oral, DAILY, Janelle Carrillo MD, 40 mg at 08/03/22 1455    furosemide (LASIX) injection 40 mg, 40 mg, IntraVENous, BID, Anni Carrillo MD, 40 mg at 08/03/22 2017     ________________________________________________________________________    Signed: Marion Mckee

## 2022-08-04 NOTE — ROUTINE PROCESS
Dual skin assessment done with Tracy Marina. Small bruising (caused by IV insertion) to the Rt. F/A. No other skin issues observed.

## 2022-08-04 NOTE — PROGRESS NOTES
PULMONARY CONSULT  [facility]    Name: Catalino Hammond MRN: [id number]   : 1940 Hospital: [facility]   Date: 2022  Admission date: 2022 Hospital Day: 3       HPI:     Hospital Problems  Date Reviewed: 2022            Codes Class Noted POA    CHF exacerbation [facility]) ICD-10-CM: I50.9  ICD-9-CM: 428.0  2022 Unknown              [x] High complexity decision making was performed  [x] See my orders for details      Subjective/Initial History:     I was asked by Miguel Nixon MD to see Catalino Hammond  a 79 y.o.  female in consultation     Excerpts from admission 2022 or consult notes as follows:   59-year-old lady came in because of shortness of breath and dyspnea past medical history of congestive heart failure, hypertension, hyperlipidemia and psychiatric illness she is noncompliant not happy in the emergency room she is complaining about dyspnea on exertion she is on room air also having swelling of the lower extremities chest x-ray shows bilateral pleural effusions the patient admitted and pulmonary consult was called.   She had a remote history of tobacco abuse last 2D echo showed ejection fraction of 35% she has coronary artery disease stent placed 531      Allergies   Allergen Reactions    [place] Itching    Peanut Butter Flavor Not Reported This Time     possible allergy        MAR reviewed and pertinent medications noted or modified as needed     Current Facility-Administered Medications   Medication    hydrOXYzine (VISTARIL) 25 mg/mL injection 25 mg    metoprolol succinate (TOPROL-XL) XL tablet 25 mg    atorvastatin (LIPITOR) tablet 40 mg    clopidogreL (PLAVIX) tablet 75 mg    potassium chloride (KLOR-CON) packet for solution 20 mEq    aspirin delayed-release tablet 81 mg    lisinopriL (PRINIVIL, ZESTRIL) tablet 5 mg    pantoprazole (PROTONIX) tablet 40 mg    furosemide (LASIX) injection 40 mg      Patient PCP: Cezar Pichardo MD  [facility]:  has a past medical history of CHF (congestive heart failure) ([facility] [address] Dysphagia, Heart failure ([facility] [address] Hyperlipidemia, Hypertension, Ill-defined condition, and Psychiatric disorder. PSH:   has a past surgical history that includes hx coronary stent placement (2022) and hx heart catheterization (2022). FHX: family history includes Diabetes in her father, mother, and another family member; Heart Disease in her father, mother, and another family member. SHX:  reports that she quit smoking about 58 years ago. She has never used smokeless tobacco. She reports that she does not drink alcohol and does not use drugs. ROS:    Review of Systems   Constitutional: Negative. HENT: Negative. Eyes: Negative. Respiratory:  Negative for shortness of breath. Cardiovascular:  Positive for leg swelling. Negative for orthopnea. Gastrointestinal: Negative. Genitourinary: Negative. Musculoskeletal: Negative. Skin: Negative. Neurological: Negative. Psychiatric/Behavioral: Negative. Objective:     Vital Signs: Telemetry:    normal sinus rhythm Intake/Output:   Visit Vitals  /73 (BP 1 Location: Right upper arm, BP Patient Position: Lying; At rest)   Pulse 75   Temp 97.7 °F (36.5 °C)   Resp 18   Ht 5' 2\" (1.575 m)   Wt 114 lb 10.2 oz (52 kg)   SpO2 95%   Breastfeeding Unknown   BMI 20.97 kg/m²       Temp (24hrs), Av.8 °F (36.6 °C), Min:97.5 °F (36.4 °C), Max:98.2 °F (36.8 °C)        O2 Device: None (Room air)         Wt Readings from Last 4 Encounters:   22 114 lb 10.2 oz (52 kg)   22 121 lb (54.9 kg)   22 123 lb 6.4 oz (56 kg)   22 123 lb (55.8 kg)          Intake/Output Summary (Last 24 hours) at 2022 0927  Last data filed at 2022 0344  Gross per 24 hour   Intake --   Output 1800 ml   Net -1800 ml         Last shift:      No intake/output data recorded.   Last 3 shifts:  1901 -  0700  In: -   Out: 2600 [Urine:2600]       Physical Exam: Physical Exam  Constitutional:       Appearance: Normal appearance. HENT:      Head: Normocephalic and atraumatic. Nose: Nose normal.      Mouth/Throat:      Mouth: Mucous membranes are moist.   Eyes:      Pupils: Pupils are equal, round, and reactive to light. Cardiovascular:      Rate and Rhythm: Normal rate. Pulses: Normal pulses. Pulmonary:      Breath sounds: Rales present. Abdominal:      General: Abdomen is flat. Bowel sounds are normal.      Palpations: Abdomen is soft. Musculoskeletal:         General: Swelling present. Cervical back: Normal range of motion. Right lower leg: Edema present. Left lower leg: Edema present. Skin:     General: Skin is warm. Neurological:      General: No focal deficit present. Mental Status: She is alert. Psychiatric:         Mood and Affect: Mood normal.        Labs:    Recent Labs     08/04/22  0736 08/02/22 2002   WBC 6.4 8.0   HGB 13.8 11.6    322       Recent Labs     08/04/22 0736 08/02/22 2002   * 138   K 3.1* 3.5   CL 96* 104   CO2 29 25   * 135*   BUN 12 10   CREA 0.53* 0.43*   CA 9.0 8.0*   MG  --  1.8   ALB 3.4* 3.2*   ALT 31 27       No results for input(s): PH, PCO2, PO2, HCO3, FIO2 in the last 72 hours. No results for input(s): CPK, CKNDX, TROIQ in the last 72 hours. No lab exists for component: CPKMB  No results found for: BNPP, BNP   Lab Results   Component Value Date/Time    Culture result: No growth 6 days 05/27/2022 12:59 PM     No results found for: TSH, TSHEXT, TSHEXT    Imaging:    CXR Results  (Last 48 hours)                 08/02/22 1745  XR CHEST PORT Final result    Impression:      1. Increased mild right and moderate left pleural effusions with increased   bibasilar atelectasis. 2. Unchanged mild edema. Narrative:  EXAM: XR CHEST PORT       HISTORY: sob, h/o CHF. COMPARISON: 6/21/2022       FINDINGS: Portable AP. The hilar is mildly enlarged.  There are moderate left and   mild right pleural effusions which have increased in the interval. There is   increased hazy opacification of the right lower lobe. There is increased   atelectasis left lower lobe. There is unchanged mild edema. The bones are   osteopenic. Multilevel spondylosis spine. Results from Hospital Encounter encounter on 08/02/22    XR CHEST PORT    Narrative  EXAM: XR CHEST PORT    HISTORY: sob, h/o CHF. COMPARISON: 6/21/2022    FINDINGS: Portable AP. The hilar is mildly enlarged. There are moderate left and  mild right pleural effusions which have increased in the interval. There is  increased hazy opacification of the right lower lobe. There is increased  atelectasis left lower lobe. There is unchanged mild edema. The bones are  osteopenic. Multilevel spondylosis spine. Impression  1. Increased mild right and moderate left pleural effusions with increased  bibasilar atelectasis. 2. Unchanged mild edema. Results from [place] encounter on 06/21/22    XR CHEST PORT    Narrative  Chest single view. Comparison single view chest May 31, 2022. Left greater than right moderate volume dependent pleural effusions persist.  Mild dependent pulmonary interstitial edema. Bibasilar atelectasis. Cardiac and  mediastinal structures unchanged. No pneumothorax. Results from Hospital Encounter encounter on 05/27/22    XR CHEST PORT    Narrative  XR CHEST PORT    Comparison:  5/27/2022. Single view:  Stable pleural effusions and bibasilar atelectasis/pneumonia (left  greater than right). No pneumothorax apparent. The heart size is stable. Stable  hemodynamic status. Impression  No significant change. No results found for this or any previous visit.         IMPRESSION:   Congestive heart failure  HFrEF  Dilated cardiomyopathy ejection fraction 30 to 35%  Coronary artery disease with history of PCI to mid LAD 5/31/2022 and RCA disease treated medically  Moderate mitral regurgitation  Pleural effusion  Additional workup outlined below  Pt is requiring Drug therapy requiring intensive monitoring for toxicity  Pt is unstable, unpredictable needing inpatient monitoring; is acutely ill and at high risk of sudden decline and decompensation with severe consequenses and continued end organ dysfunction and failure  Prognosis guarded       RECOMMENDATIONS/PLAN:     50-year-old lady came in because of shortness of breath and dyspnea generalized weakness swelling of the lower extremities given history of coronary artery disease congestive heart failure low ejection fraction chest x-ray shows bilateral pleural effusion shortness of breath most likely secondary to underlying congestive heart failure normal white count continue with the Lasix aggressive diuresis  Supplemental O2 to keep sats > 93%  Aspiration precautions  Labs to follow electrolytes, renal function and and blood counts  Glucose monitoring and SSI  Bronchial hygiene with respiratory therapy techniques, bronchodilators  DVT, SUP prophylaxis         8/4: Pt states that she is feeling better. Complaining of sticky, yellow phlegm that she is coughing up. No dyspnea at this time. O2 sat 95%.     This care involved high complexity medical decision making: I personally:  Reviewed the flowsheet and previous days notes  Reviewed and summarized records or history from previous days note or discussions with staff, family        Yadi Eduardo

## 2022-08-04 NOTE — PROGRESS NOTES
Problem: Patient Education: Go to Patient Education Activity  Goal: Patient/Family Education  Outcome: Progressing Towards Goal     Problem: Pressure Injury - Risk of  Goal: *Prevention of pressure injury  Description: Document Manfred Scale and appropriate interventions in the flowsheet. Outcome: Progressing Towards Goal  Note: Pressure Injury Interventions:  Sensory Interventions: Assess changes in LOC, Float heels, Keep linens dry and wrinkle-free, Minimize linen layers, Maintain/enhance activity level    Moisture Interventions: Absorbent underpads, Apply protective barrier, creams and emollients, Minimize layers    Activity Interventions: Pressure redistribution bed/mattress(bed type), PT/OT evaluation    Mobility Interventions: HOB 30 degrees or less, PT/OT evaluation    Nutrition Interventions: Document food/fluid/supplement intake, Offer support with meals,snacks and hydration                     Problem: Falls - Risk of  Goal: *Absence of Falls  Description: Document Chay Fall Risk and appropriate interventions in the flowsheet.   Outcome: Progressing Towards Goal  Note: Fall Risk Interventions:  Mobility Interventions: Utilize walker, cane, or other assistive device         Medication Interventions: Assess postural VS orthostatic hypotension, Patient to call before getting OOB, Teach patient to arise slowly    Elimination Interventions: Bed/chair exit alarm, Call light in reach

## 2022-08-04 NOTE — PROGRESS NOTES
PHYSICAL THERAPY EVALUATION  Patient: Uriel Orourke (79 y.o. female)  Date: 8/4/2022  Primary Diagnosis: CHF exacerbation ([facility] [address] [I50.9]       Precautions: falls       ASSESSMENT  Patient cleared for participation by nursing, and received semi-supine, son present, agreeable to participation in therapy. Pt is a 78 yo F with hx of CHF, cardiac stents placed 5/31, HTN, HLD, and psychiatric illness, presenting to ED with SOB. Pt admitted 8/2/22 with acute on chronic congestive HF, SOB, and pleural effusion, B    Prior level received from pt, pt lives in a 1 level home w/ ramped entrance, pt was indep prior however had a lot of difficulty with mobility, and steadily had more difficulty recently w/ rollator. States she lives with  who is unable to provide physical assistance, and has son local however only able to check in ~ 2x/day due to his job, therefore pt would have to be indep prior to d/c home. Based on the objective data described below, the patient presents with grossly decr LE strength, decr endurance, decr mobility/transfers, and decr standing balance. Mobility performed this session: Supine>sit: primarily completed with supervision level, increased time for completion. Education on activity pacing and energy conservation techniques provided at EOB, along with breathing techniques. Sit to stand completed with CGA, cueing on hand placement. Initial bout of gait ~12' performed with CGA, no specific knee buckling observed, however decreased lorena, unsteadiness, and fatigue noted with bout w/ RW. Rated fatigue level at 5-6/10. After rest was provided, we performed another bout of functional ambulation working on upright functional tolerance, with decr lorena again, noted fatigue, unsteadiness, and CGA w/ RW. HR after first bout of ambulation ~80's, HR after second bout ~ 106-107. Once in adequate position at EOB, pt was instructed on hand placement for safety for stand to sit.  OT then present for her assessment, Pt was left with OT present in room, however prior to PT departure, pt was educated on continuing therapy services during hospital stay, and benefits of post acute rehab to maximize mobility, indep, and safety. Pt did fair with session today with noted fatigue during mobility and reported rate of fatigue at 5-6/10. Although gait is performed with CGA and RW, pt demonstrated decr lorena / unsteadiness, and increased fall risk 2/2 unsteadiness and will benefit from continued skilled PT to address above deficits and return to PLOF. Current PT DC recommendation IRF to maximize functional mobility and indep, and decr fall risk . Other factors to consider for discharge: current mobility, baseline mobility, social support at home, DME      PLAN :  Recommendations and Planned Interventions: bed mobility training, transfer training, gait training, therapeutic exercises, neuromuscular re-education, patient and family training/education, and therapeutic activities      Frequency/Duration: Patient will be followed by physical therapy:  3-5x/week to address goals. Recommendation for discharge: (in order for the patient to meet his/her long term goals)  1 [facility]     This discharge recommendation:  Has been made in collaboration with the attending provider and/or case management         SUBJECTIVE:   Patient stated agreeable to participation in therapy.     OBJECTIVE DATA SUMMARY:   HISTORY:    Past Medical History:   Diagnosis Date    CHF (congestive heart failure) (HCC)     EF 30-35%    Dysphagia     Heart failure (HCC)     Hyperlipidemia     Hypertension     Ill-defined condition     patient unaware of any diagnosis due to recieving minimal medical care for the past 57 years    Psychiatric disorder     anxiety     Past Surgical History:   Procedure Laterality Date    HX CORONARY STENT PLACEMENT  05/31/2022    HX HEART CATHETERIZATION  05/27/2022    [address] Situation  Home Environment: Private residence  Wheelchair Ramp: Yes  One/Two Story Residence: One story  Living Alone: No  Support Systems: Spouse/Significant Other, Child(bryant) (Son checks in)  Patient Expects to be Discharged to[de-identified] Home with home health  Current DME Used/Available at Home: cal Corbett    EXAMINATION/PRESENTATION/DECISION MAKING:   Critical Behavior:  Neurologic State: Alert  Orientation Level: Oriented to person, Oriented to place, Oriented to time  Cognition: Decreased attention/concentration, Follows commands, Poor safety awareness    Range Of Motion:  AROM: Generally decreased, functional                       Strength:    Strength: Generally decreased, functional                    Tone & Sensation:   Tone: Normal              Sensation: Intact               Coordination:  Coordination: Within functional limits  Vision:      Functional Mobility:  Bed Mobility:    Supine to Sit: Supervision    Scooting: Stand-by assistance  Transfers:  Sit to Stand: Contact guard assistance  Stand to Sit: Contact guard assistance                       Balance:   Sitting: Intact; Without support  Standing: Impaired; With support  Standing - Static: Fair;Constant support  Standing - Dynamic : Fair;Constant support  Ambulation/Gait Training:  Distance (ft): 25 Feet (ft) (total distance)  Assistive Device: Walker, rolling  Ambulation - Level of Assistance: Contact guard assistance     Gait Description (WDL): Exceptions to WDL  Gait Abnormalities:  (step to, decr lorena, slight trunk flexion, at times step through)         Functional Measure:  Carvel Else AM-PAC 6 Clicks         Basic Mobility Inpatient Short Form  How much difficulty does the patient currently have. .. Unable A Lot A Little None   1. Turning over in bed (including adjusting bedclothes, sheets and blankets)? [] 1   [] 2   [] 3   [x] 4   2.   Sitting down on and standing up from a chair with arms ( e.g., wheelchair, bedside commode, etc.)   [] 1   [] 2   [x] 3   [] 4   3. Moving from lying on back to sitting on the side of the bed? [] 1   [] 2   [x] 3   [] 4          How much help from another person does the patient currently need. .. Total A Lot A Little None   4. Moving to and from a bed to a chair (including a wheelchair)? [] 1   [] 2   [x] 3   [] 4   5. Need to walk in hospital room? [] 1   [] 2   [x] 3   [] 4   6. Climbing 3-5 steps with a railing? [] 1   [x] 2   [] 3   [] 4   © 2007, Trustees of [facility], under license to [employer]. All rights reserved     Score:  Initial: 18/24 Most Recent: (Date: 8/4/22 )   Interpretation of Tool:  Represents activities that are increasingly more difficult (i.e. Bed mobility, Transfers, Gait). Score 24 23 22-20 19-15 14-10 9-7 6   Modifier CH CI CJ CK CL CM CN         Physical Therapy Evaluation Charge Determination   History Examination Presentation Decision-Making   MEDIUM  Complexity : 1-2 comorbidities / personal factors will impact the outcome/ POC  MEDIUM Complexity : 3 Standardized tests and measures addressing body structure, function, activity limitation and / or participation in recreation  LOW Complexity : Stable, uncomplicated  Other outcome measures [facility] 6  18/24      Based on the above components, the patient evaluation is determined to be of the following complexity level: LOW     Pain Rating:  No pain reported    Activity Tolerance:   Good and requires rest breaks    After treatment patient left in no apparent distress:   Sitting at EOB, OT present upon PT departure  and RN updated. GOALS:    Problem: Mobility Impaired (Adult and Pediatric)  Goal: *Acute Goals and Plan of Care (Insert Text)  Description: Pt stated goals: walk  Pt will be I with LE HEP in 7 days. Pt will perform bed mobility with mod I in 7 days. Pt will perform transfers with mod I in 7 days. Pt will amb >100 feet with LRAD safely with mod I in 7 days.      Outcome: Not Met COMMUNICATION/EDUCATION:   The patients plan of care was discussed with: Registered nurse and Case management. Patient/family have participated as able in goal setting and plan of care.      Thank you for this referral.  Shasha Winters, PT, PT, DPT   Time Calculation: 27 mins

## 2022-08-04 NOTE — PROGRESS NOTES
Spiritual Care Assessment/Progress Note  [facility]      NAME: Gleda Kanner. Elliot List      MRN: [id number]  AGE: 79 y.o. SEX: female  Christian Affiliation: Ovidioh witness   Language: English     8/4/2022     Total Time (in minutes): 10     Spiritual Assessment begun in [address] through conversation with:         [x]Patient        [x] Family    [] Friend(s)        Reason for Consult: Other (comment) (Leader Rounding)     Spiritual beliefs: (Please include comment if needed)     [x] Identifies with a evelin tradition:   Ovidioh Witness       [x] Supported by a evelin community:            [] Claims no spiritual orientation:           [] Seeking spiritual identity:                [] Adheres to an individual form of spirituality:           [] Not able to assess:                           Identified resources for coping:      [] Prayer                               [] Music                  [] Guided Imagery     [x] Family/friends                 [] Pet visits     [] Devotional reading                         [] Unknown     [x] Other:  Evelin                                          Interventions offered during this visit: (See comments for more details)    Patient Interventions: Affirmation of emotions/emotional suffering, Affirmation of evelin, Iconic (affirming the presence of God/Higher Power), Initial/Spiritual assessment, patient floor, Christian beliefs/image of God discussed     Family/Friend(s):  Affirmation of emotions/emotional suffering     Plan of Care:     [] Support spiritual and/or cultural needs    [] Support AMD and/or advance care planning process      [] Support grieving process   [] Coordinate Rites and/or Rituals    [] Coordination with community clergy   [] No spiritual needs identified at this time   [] Detailed Plan of Care below (See Comments)  [] Make referral to Music Therapy  [] Make referral to Pet Therapy     [] Make referral to Addiction services  [] Make referral to [facility]  [] Make referral to Spiritual Care Partner  [] No future visits requested        [x] Contact Spiritual Care for further referrals     Comments: Leader Rounding in [address] shared she is Jehovah Witness, her bothers in the [place] she attends provided her the spiritual support she needs. He son was in the room. She shared some of her braeden and hope for the future. She indicated she has no concerns with her stay. Provided listening presence as she shared.     Malik Rogers., MS., [facility]   can be reached by calling the  at [facility]  [phone]

## 2022-08-04 NOTE — PROGRESS NOTES
History and Physical    NAME: Lorrie Orourke   :  1940   MRN:  [id number]     Date/Time:  2022 9:10 AM    Patient PCP: Laney Silva MD  ______________________________________________________________________             Subjective:     CHIEF COMPLAINT: Patient comes to the ED with complains of shortness of breath and swelling in her lower extremities. HISTORY OF PRESENT ILLNESS:       Patient is a 79y.o. year old female with a significant history of CHF, cardiac stents placement on May 31st, hypertension, hyperlipidemia, and psychiatric illness, coming to the ED complaining for shortness of breath over the last two days that has gotten progressively worse. Patient reports bilateral lower extremity edema. Left Ventricle: Severely reduced left ventricular systolic function with a visually estimated EF of 30 - 35%. Left ventricle is mildly dilated. Normal wall thickness    8/3    Patient was awake, alert, and resting in bed. She states that she is still feeling short on breath and that her stomach is cramping because she has to urinate but is worried about her catheter. Patient is having trouble swallowing and asked to be on a softer diet. She has no eaten much because of this. Patients BNP- 16,068          Patient was awake and alert and in no apparent distress. She denies shortness of breath except when expending energy such as eating or trying to ambulate. She states that her abdominal pain has subsided after the catheter was placed. Edema in LE significantly decreased. Patient was recommended a puree diet and mildly thick liquids via speech pathologist. She is currently tolerating new diet well and ate most of her meal.   Patient also stated this is the first time in a while that she has slept this well. Patient notes discomfort on her backside due to inability to readjust herself as well as discomfort in her hands because they're cold.      Telemetry reviewed reveals occasional episodes of 12 consecutive premature ventricular complex (PVCs). Past Medical History:   Diagnosis Date    CHF (congestive heart failure) (HCC)     EF 30-35%    Dysphagia     Heart failure (HCC)     Hyperlipidemia     Hypertension     Ill-defined condition     patient unaware of any diagnosis due to recieving minimal medical care for the past 57 years    Psychiatric disorder     anxiety        Past Surgical History:   Procedure Laterality Date    HX CORONARY STENT PLACEMENT  2022    HX HEART CATHETERIZATION  2022    STENT PLACED       Social History     Tobacco Use    Smoking status: Former     Types: Cigarettes     Quit date: 1964     Years since quittin.6    Smokeless tobacco: Never   Substance Use Topics    Alcohol use: Never        Family History   Problem Relation Age of Onset    Diabetes Other     Heart Disease Other     Heart Disease Mother     Diabetes Mother     Diabetes Father     Heart Disease Father        Allergies   Allergen Reactions    [place] Itching    Peanut Butter Flavor Not Reported This Time     possible allergy        Prior to Admission medications    Medication Sig Start Date End Date Taking? Authorizing Provider   nitroglycerin (NITROSTAT) 0.4 mg SL tablet 0.4 mg by SubLINGual route every five (5) minutes as needed for Chest Pain. Up to 3 doses. Yes Provider, Historical   lisinopriL (PRINIVIL, ZESTRIL) 5 mg tablet Take 5 mg by mouth daily. 22  Yes Provider, Historical   pantoprazole (PROTONIX) 40 mg tablet Take 40 mg by mouth daily. 22  Yes Provider, Historical   atorvastatin (LIPITOR) 40 mg tablet Take 1 Tablet by mouth nightly. 22  Yes Rakan Stratton MD   clopidogreL (PLAVIX) 75 mg tab Take 1 Tablet by mouth daily. Indications: blood clot prevention following percutaneous coronary intervention 22  Yes Rakan Stratton MD   carvediloL (COREG) 3.125 mg tablet Take 1 Tablet by mouth two (2) times daily (with meals).  Indications: heart failure with reduced ejection fraction due to dilated cardiomyopathy 6/1/22  Yes Tarik Walls MD   aspirin delayed-release 81 mg tablet Take 81 mg by mouth daily.    Yes Provider, Historical         Current Facility-Administered Medications:     potassium chloride SR (KLOR-CON 10) tablet 40 mEq, 40 mEq, Oral, NOW, Janelle Carrillo MD    hydrOXYzine (VISTARIL) 25 mg/mL injection 25 mg, 25 mg, IntraMUSCular, Q6H PRN, Janelle Carrillo MD, 25 mg at 08/03/22 0256    metoprolol succinate (TOPROL-XL) XL tablet 25 mg, 25 mg, Oral, DAILY, Janelle Carrillo MD, 25 mg at 08/04/22 1031    atorvastatin (LIPITOR) tablet 40 mg, 40 mg, Oral, QHS, Janelle Carrillo MD, 40 mg at 08/03/22 2015    clopidogreL (PLAVIX) tablet 75 mg, 75 mg, Oral, DAILY, Janelle Carrillo MD, 75 mg at 08/04/22 1031    potassium chloride (KLOR-CON) packet for solution 20 mEq, 20 mEq, Oral, BID WITH MEALS, Janelle Carrillo MD, 20 mEq at 08/04/22 1034    aspirin delayed-release tablet 81 mg, 81 mg, Oral, DAILY, Janelle Carrillo MD, 81 mg at 08/04/22 1031    lisinopriL (PRINIVIL, ZESTRIL) tablet 5 mg, 5 mg, Oral, DAILY, Janelle Carrillo MD, 5 mg at 08/04/22 1031    pantoprazole (PROTONIX) tablet 40 mg, 40 mg, Oral, DAILY, Janelle Carrillo MD, 40 mg at 08/04/22 1031    furosemide (LASIX) injection 40 mg, 40 mg, IntraVENous, BID, Janelle Carrillo MD, 40 mg at 08/04/22 1031    LAB DATA REVIEWED:    Recent Results (from the past 24 hour(s))   CBC W/O DIFF    Collection Time: 08/04/22  7:36 AM   Result Value Ref Range    WBC 6.4 3.6 - 11.0 K/uL    RBC 4.88 3.80 - 5.20 M/uL    HGB 13.8 11.5 - 16.0 g/dL    HCT 42.0 35.0 - 47.0 %    MCV 86.1 80.0 - 99.0 FL    MCH 28.3 26.0 - 34.0 PG    MCHC 32.9 30.0 - 36.5 g/dL    RDW 16.7 (H) 11.5 - 14.5 %    PLATELET 062 943 - 070 K/uL    MPV 8.8 (L) 8.9 - 12.9 FL    NRBC 0.0 0.0  WBC    ABSOLUTE NRBC 0.00 0.00 - 9.52 K/uL   METABOLIC PANEL, COMPREHENSIVE    Collection Time: 08/04/22  7:36 AM   Result Value Ref Range    Sodium 133 (L) 136 - 145 mmol/L    Potassium 3.1 (L) 3.5 - 5.1 mmol/L    Chloride 96 (L) 97 - 108 mmol/L    CO2 29 21 - 32 mmol/L    Anion gap 8 5 - 15 mmol/L    Glucose 124 (H) 65 - 100 mg/dL    BUN 12 6 - 20 mg/dL    Creatinine 0.53 (L) 0.55 - 1.02 mg/dL    BUN/Creatinine ratio 23 (H) 12 - 20      GFR est AA >60 >60 ml/min/1.73m2    GFR est non-AA >60 >60 ml/min/1.73m2    Calcium 9.0 8.5 - 10.1 mg/dL    Bilirubin, total 1.6 (H) 0.2 - 1.0 mg/dL    AST (SGOT) 28 15 - 37 U/L    ALT (SGPT) 31 12 - 78 U/L    Alk. phosphatase 53 45 - 117 U/L    Protein, total 6.6 6.4 - 8.2 g/dL    Albumin 3.4 (L) 3.5 - 5.0 g/dL    Globulin 3.2 2.0 - 4.0 g/dL    A-G Ratio 1.1 1.1 - 2.2     NT-PRO BNP    Collection Time: 08/04/22  7:36 AM   Result Value Ref Range    NT pro-BNP 7,606 (H) <450 pg/mL       XR Results (most recent):  Results from Hospital Encounter encounter on 08/02/22    XR CHEST PORT    Narrative  EXAM: XR CHEST PORT    HISTORY: sob, h/o CHF. COMPARISON: 6/21/2022    FINDINGS: Portable AP. The hilar is mildly enlarged. There are moderate left and  mild right pleural effusions which have increased in the interval. There is  increased hazy opacification of the right lower lobe. There is increased  atelectasis left lower lobe. There is unchanged mild edema. The bones are  osteopenic. Multilevel spondylosis spine. Impression  1. Increased mild right and moderate left pleural effusions with increased  bibasilar atelectasis. 2. Unchanged mild edema. XR CHEST PORT   Final Result      1. Increased mild right and moderate left pleural effusions with increased   bibasilar atelectasis. 2. Unchanged mild edema. Review of Systems:  Constitutional: Negative for chills and fever. HENT: Negative. Eyes: Negative. Respiratory: Shortness of breath upon exertion. Cardiovascular: Negative. Gastrointestinal: Negative for abdominal pain and nausea. Skin: Cold hands. Neurological: Negative. Objective:   VITALS:    Visit Vitals  /62 (BP 1 Location: Right upper arm, BP Patient Position: Semi fowlers)   Pulse (!) 57   Temp 97.7 °F (36.5 °C)   Resp 13   Ht 5' 2.01\" (1.575 m)   Wt 52 kg (114 lb 10.2 oz)   SpO2 99%   Breastfeeding Unknown   BMI 20.96 kg/m²       Physical Exam:   Constitutional: pt is oriented to person, place, and time. HENT:   Head: Normocephalic and atraumatic. Eyes: Pupils are equal, round, and reactive to light. EOM are normal.   Cardiovascular: Slightly increased rate. Pulmonary/Chest: Breath sounds normal in all fields. No longer has tachypnea   Exhibits no tenderness. Abdominal: Soft. Bowel sounds are normal. There is no abdominal tenderness. There is no rebound and no guarding. Musculoskeletal: Patient has baseline full range of motion   Neurological: pt is alert and oriented to person, place, and time. Alert. Normal strength. No cranial nerve deficit or sensory deficit. Displays a negative Romberg sign.         ASSESSMENT:    CHF (EF 30-35%)   Dilated cardiomypathy  Moderate mitral regurgitation  Pleural effusion, bilateral  Hx of cardiac stents  Diabetes  Dysphagia  Hyperlipidemia   Hypertension  Anxiety  Antral Gastritis  Distal esophagitis   Low k         PLAN:  Continue medications-    Aspirin, 81mg daily  Atorvastatin 40mg daily   Clopidogrel 75mg daily  Furosemide injection 40mg BID   Lisinopril 5mg daily  Metoprolol succinate 25mg   Pantoprazole 40mg daily  Potassium Chloride 20mEq BID        Follow up with cardiology consult  Possible supplemental O2 therapy if stats <93%  Follow renal function, blood counts, electrolytes, glucose  Monitor fluid balance and cardiac enzymes  PT/OT        Current Facility-Administered Medications:     potassium chloride SR (KLOR-CON 10) tablet 40 mEq, 40 mEq, Oral, NOW, Janelle Carrillo MD    hydrOXYzine (VISTARIL) 25 mg/mL injection 25 mg, 25 mg, IntraMUSCular, Q6H PRN, Janelle Carrillo MD, 25 mg at 08/03/22 1562 metoprolol succinate (TOPROL-XL) XL tablet 25 mg, 25 mg, Oral, DAILY, Janelle Carrillo MD, 25 mg at 08/04/22 1031    atorvastatin (LIPITOR) tablet 40 mg, 40 mg, Oral, QHS, Janelle Carrillo MD, 40 mg at 08/03/22 2015    clopidogreL (PLAVIX) tablet 75 mg, 75 mg, Oral, DAILY, Janelle Carrillo MD, 75 mg at 08/04/22 1031    potassium chloride (KLOR-CON) packet for solution 20 mEq, 20 mEq, Oral, BID WITH MEALS, Janelle Carrillo MD, 20 mEq at 08/04/22 1034    aspirin delayed-release tablet 81 mg, 81 mg, Oral, DAILY, Janelle Carrillo MD, 81 mg at 08/04/22 1031    lisinopriL (PRINIVIL, ZESTRIL) tablet 5 mg, 5 mg, Oral, DAILY, Janelle Carrillo MD, 5 mg at 08/04/22 1031    pantoprazole (PROTONIX) tablet 40 mg, 40 mg, Oral, DAILY, Janelle Carrillo MD, 40 mg at 08/04/22 1031    furosemide (LASIX) injection 40 mg, 40 mg, IntraVENous, BID, Janelle Carrillo MD, 40 mg at 08/04/22 1031     ________________________________________________________________________    Signed: Horace Spatz, MD

## 2022-08-04 NOTE — PROGRESS NOTES
Comprehensive Nutrition Assessment    Type and Reason for Visit: (P) Positive nutrition screen (mst5)    Nutrition Recommendations/Plan:   Continue current diet per SLP  Add ensure pudding 2x/day, magic cup 2x/day  Obtain updated measured weight as able  Monitor and record PO intakes, supplement acceptance, and Bms in I/Os     Malnutrition Assessment:  Malnutrition Status:  Severe malnutrition (08/04/22 1106)    Context:  Acute illness     Findings of the 6 clinical characteristics of malnutrition:   Energy Intake:  50% or less of est energy requirements for 5 or more days  Weight Loss:  Greater than 7.5% over 3 months     Body Fat Loss:  Unable to assess,     Muscle Mass Loss:  Unable to assess,    Fluid Accumulation:  No significant fluid accumulation,     Strength:  Not performed     Nutrition Assessment:    (P) Admitted for CHF exacerbation. MST5 for weight loss, decreased appetite/intake. Noted nutritionally significant weight loss x3 months. Decreased appetite during admission r/t chewing/swallowing- initially pt with dificulty, now SLP downgraded diet to puree/mildly thick, tolerating well. Plan to add ONS. Labs: Na 133, K 3.1, BUN 12, Creat 0.53, Gluc 124, Alb 3.4. Meds: atorvastatin, furosemide, pantoprazole, KCl. Nutrition Related Findings:    (P) NFPE deferred. No n/v, d/c. +hx of dysphagia, followed by SLP, rec'd pureed/mildly thick. No edema. BM PTA. Wound Type: (P) None    Current Nutrition Intake & Therapies:  Average Meal Intake: (P) 1-25%  Average Supplement Intake: (P) None ordered  ADULT DIET Dysphagia - Pureed; Mildly Thick (Nectar); Allow oatmeal at breakfast- RD & SLP approved    Anthropometric Measures:  Height: (P) 5' 2.01\" (157.5 cm)  Ideal Body Weight (IBW): (P) 110 lbs ((P) 50 kg)  Current Body Wt:  (P) 52 kg (114 lb 10.2 oz), (P) 104.2 % IBW.  (P) Not specified  Current BMI (kg/m2): (P) 21  BMI Category: (P) Underweight (BMI less than 22) age over 72    Estimated Daily Nutrient Needs:  Energy Requirements Based On: (P) Kcal/kg  Weight Used for Energy Requirements: (P) Current  Energy (kcal/day): (P) 1560kcal (30kcal/kg)  Weight Used for Protein Requirements: (P) Current  Protein (g/day): (P) 52g (1g/kg)  Method Used for Fluid Requirements: (P) 1 ml/kcal  Fluid (ml/day): (P) 1560mL (1mL/kg)    Nutrition Diagnosis:   (P) Severe malnutrition, In context of acute illness or injury related to (P) catabolic illness, biting/chewing (masticatory) difficulty, swallowing difficulty as evidenced by (P) poor intake prior to admission, weight loss 7.5% in 3 months, intake 0-25%    Nutrition Interventions:   Food and/or Nutrient Delivery: (P) Continue current diet, Start oral nutrition supplement  Nutrition Education/Counseling: (P) No recommendations at this time  Coordination of Nutrition Care: (P) Continue to monitor while inpatient, Feeding assistance/environmental change, Speech therapy    Goals:  Goals: (P) Meet at least 75% of estimated needs, within 7 days    Nutrition Monitoring and Evaluation:   Behavioral-Environmental Outcomes: (P) None identified  Food/Nutrient Intake Outcomes: (P) Diet advancement/tolerance, Food and nutrient intake, Supplement intake  Physical Signs/Symptoms Outcomes: (P) Meal time behavior, Weight    Discharge Planning:    (P) Too soon to determine    Tiffany Cedeno RD  Contact: [phone]

## 2022-08-04 NOTE — PROGRESS NOTES
PULMONARY NOTE  [facility]    Name: Scott Payan MRN: [id number]   : 1940 Hospital: [facility]   Date: 2022  Admission date: 2022 Hospital Day: 3       HPI:     Hospital Problems  Date Reviewed: 2022            Codes Class Noted POA    CHF exacerbation [facility]) ICD-10-CM: I50.9  ICD-9-CM: 428.0  2022 Unknown            [x] High complexity decision making was performed  [x] See my orders for details      Subjective/Initial History:     I was asked by Shelby Grace MD to see Scott Payan  a 79 y.o.  female in consultation     Excerpts from admission 2022 or consult notes as follows:   48-year-old lady came in because of shortness of breath and dyspnea past medical history of congestive heart failure, hypertension, hyperlipidemia and psychiatric illness she is noncompliant not happy in the emergency room she is complaining about dyspnea on exertion she is on room air also having swelling of the lower extremities chest x-ray shows bilateral pleural effusions the patient admitted and pulmonary consult was called.   She had a remote history of tobacco abuse last 2D echo showed ejection fraction of 35% she has coronary artery disease stent placed 531      Allergies   Allergen Reactions    [place] Itching    Peanut Butter Flavor Not Reported This Time     possible allergy        MAR reviewed and pertinent medications noted or modified as needed     Current Facility-Administered Medications   Medication    hydrOXYzine (VISTARIL) 25 mg/mL injection 25 mg    metoprolol succinate (TOPROL-XL) XL tablet 25 mg    atorvastatin (LIPITOR) tablet 40 mg    clopidogreL (PLAVIX) tablet 75 mg    potassium chloride (KLOR-CON) packet for solution 20 mEq    aspirin delayed-release tablet 81 mg    lisinopriL (PRINIVIL, ZESTRIL) tablet 5 mg    pantoprazole (PROTONIX) tablet 40 mg    furosemide (LASIX) injection 40 mg      Patient PCP: Clay Jin MD  PMH:  has a past medical history of CHF (congestive heart failure) ([facility] [address] Dysphagia, Heart failure ([facility] Hyperlipidemia, Hypertension, Ill-defined condition, and Psychiatric disorder. PSH:   has a past surgical history that includes hx coronary stent placement (2022) and hx heart catheterization (2022). FHX: family history includes Diabetes in her father, mother, and another family member; Heart Disease in her father, mother, and another family member. SHX:  reports that she quit smoking about 58 years ago. She has never used smokeless tobacco. She reports that she does not drink alcohol and does not use drugs. ROS:    Review of Systems   Constitutional: Negative. HENT: Negative. Eyes: Negative. Respiratory:  Negative for shortness of breath. Cardiovascular:  Positive for leg swelling. Negative for orthopnea. Gastrointestinal: Negative. Genitourinary: Negative. Musculoskeletal: Negative. Skin: Negative. Neurological: Negative. Psychiatric/Behavioral: Negative. Objective:     Vital Signs: Telemetry:    normal sinus rhythm Intake/Output:   Visit Vitals  /73 (BP 1 Location: Right upper arm, BP Patient Position: Lying; At rest)   Pulse 75   Temp 97.7 °F (36.5 °C)   Resp 18   Ht 5' 2\" (1.575 m)   Wt 52 kg (114 lb 10.2 oz)   SpO2 95%   Breastfeeding Unknown   BMI 20.97 kg/m²       Temp (24hrs), Av.8 °F (36.6 °C), Min:97.5 °F (36.4 °C), Max:98.2 °F (36.8 °C)        O2 Device: None (Room air)         Wt Readings from Last 4 Encounters:   22 52 kg (114 lb 10.2 oz)   22 54.9 kg (121 lb)   22 56 kg (123 lb 6.4 oz)   22 55.8 kg (123 lb)          Intake/Output Summary (Last 24 hours) at 2022 0933  Last data filed at 2022 0344  Gross per 24 hour   Intake --   Output 1800 ml   Net -1800 ml         Last shift:      No intake/output data recorded.   Last 3 shifts:  1901 -  0700  In: -   Out: 2600 [Urine:2600]       Physical Exam:     Physical Exam  Constitutional:       Appearance: Normal appearance. HENT:      Head: Normocephalic and atraumatic. Nose: Nose normal.      Mouth/Throat:      Mouth: Mucous membranes are moist.   Eyes:      Pupils: Pupils are equal, round, and reactive to light. Cardiovascular:      Rate and Rhythm: Normal rate. Pulses: Normal pulses. Pulmonary:      Breath sounds: Rales present. Abdominal:      General: Abdomen is flat. Bowel sounds are normal.      Palpations: Abdomen is soft. Musculoskeletal:         General: Swelling present. Cervical back: Normal range of motion. Right lower leg: Edema present. Left lower leg: Edema present. Skin:     General: Skin is warm. Neurological:      General: No focal deficit present. Mental Status: She is alert. Psychiatric:         Mood and Affect: Mood normal.        Labs:    Recent Labs     08/04/22  0736 08/02/22 2002   WBC 6.4 8.0   HGB 13.8 11.6    322       Recent Labs     08/04/22  0736 08/02/22 2002   * 138   K 3.1* 3.5   CL 96* 104   CO2 29 25   * 135*   BUN 12 10   CREA 0.53* 0.43*   CA 9.0 8.0*   MG  --  1.8   ALB 3.4* 3.2*   ALT 31 27       No results for input(s): PH, PCO2, PO2, HCO3, FIO2 in the last 72 hours. No results for input(s): CPK, CKNDX, TROIQ in the last 72 hours. No lab exists for component: CPKMB  No results found for: BNPP, BNP   Lab Results   Component Value Date/Time    Culture result: No growth 6 days 05/27/2022 12:59 PM     No results found for: TSH, TSHEXT, TSHEXT    Imaging:    CXR Results  (Last 48 hours)                 08/02/22 1745  XR CHEST PORT Final result    Impression:      1. Increased mild right and moderate left pleural effusions with increased   bibasilar atelectasis. 2. Unchanged mild edema. Narrative:  EXAM: XR CHEST PORT       HISTORY: sob, h/o CHF. COMPARISON: 6/21/2022       FINDINGS: Portable AP. The hilar is mildly enlarged.  There are moderate left and mild right pleural effusions which have increased in the interval. There is   increased hazy opacification of the right lower lobe. There is increased   atelectasis left lower lobe. There is unchanged mild edema. The bones are   osteopenic. Multilevel spondylosis spine. Results from Hospital Encounter encounter on 08/02/22    XR CHEST PORT    Narrative  EXAM: XR CHEST PORT    HISTORY: sob, h/o CHF. COMPARISON: 6/21/2022    FINDINGS: Portable AP. The hilar is mildly enlarged. There are moderate left and  mild right pleural effusions which have increased in the interval. There is  increased hazy opacification of the right lower lobe. There is increased  atelectasis left lower lobe. There is unchanged mild edema. The bones are  osteopenic. Multilevel spondylosis spine. Impression  1. Increased mild right and moderate left pleural effusions with increased  bibasilar atelectasis. 2. Unchanged mild edema. Results from [place] encounter on 06/21/22    XR CHEST PORT    Narrative  Chest single view. Comparison single view chest May 31, 2022. Left greater than right moderate volume dependent pleural effusions persist.  Mild dependent pulmonary interstitial edema. Bibasilar atelectasis. Cardiac and  mediastinal structures unchanged. No pneumothorax. Results from Hospital Encounter encounter on 05/27/22    XR CHEST PORT    Narrative  XR CHEST PORT    Comparison:  5/27/2022. Single view:  Stable pleural effusions and bibasilar atelectasis/pneumonia (left  greater than right). No pneumothorax apparent. The heart size is stable. Stable  hemodynamic status. Impression  No significant change. No results found for this or any previous visit.         IMPRESSION:   Congestive heart failure  HFrEF  Dilated cardiomyopathy ejection fraction 30 to 35%  Coronary artery disease with history of PCI to mid LAD 5/31/2022 and RCA disease treated medically  Moderate mitral regurgitation  Pleural effusion  Prognosis guarded       RECOMMENDATIONS/PLAN:     58-year-old lady came in because of shortness of breath and dyspnea generalized weakness swelling of the lower extremities given history of coronary artery disease congestive heart failure low ejection fraction chest x-ray shows bilateral pleural effusion shortness of breath most likely secondary to underlying congestive heart failure normal white count continue with the Lasix aggressive diuresis  Supplemental O2 to keep sats > 93%  Aspiration precautions  Labs to follow electrolytes, renal function and and blood counts  Glucose monitoring and SSI  Bronchial hygiene with respiratory therapy techniques, bronchodilators  DVT, SUP prophylaxis         8/4: Pt states that she is feeling better. Complaining of sticky, yellow phlegm that she is coughing up. No dyspnea at this time. O2 sat 95%.           Nicolas Guerra MD

## 2022-08-04 NOTE — PROGRESS NOTES
Progress Note      8/4/2022 7:81 AM  NAME: Devonte Woods   MRN:  [id number]   Admit Diagnosis: [facility] [I50.9]      Problem List:   1. Noncompliance  2. Patient presents for evaluation of shortness of breath  3. Acute/chronic heart failure  4. Chronic heartfailure with reduced ejection fraction (HFr EF)  5. Dilated cardiomyopathy (ejection fraction = 30-35%)  6. Coronary artery disease       6a. PCI of the mid LAD (5/31/2022)       6b. RCA disease to be treated medically  7. Moderate mitral regurgitation  8. Possible family history of premature coronary artery disease  9. Former smoker  8. Elevated pro-BNP (16,068 pg/mL)    11. Pleural effusions  12. Dysphagia  13. Hypocalcemia       Subjective: The patient is seen and examined in room 487. There were no acute cardiovascular events reported overnight. Currently, she denies any cardiovascular complaints. Specifically, she denies chest pain or shortness of breath. She describes frequent urination, and increased exercise tolerance. Telemetry reviewed reveals occasional episodes of 12 consecutive premature ventricular complex (PVCs). Medications Personally Reviewed:    Current Facility-Administered Medications   Medication Dose Route Frequency    hydrOXYzine (VISTARIL) 25 mg/mL injection 25 mg  25 mg IntraMUSCular Q6H PRN    metoprolol succinate (TOPROL-XL) XL tablet 25 mg  25 mg Oral DAILY    atorvastatin (LIPITOR) tablet 40 mg  40 mg Oral QHS    clopidogreL (PLAVIX) tablet 75 mg  75 mg Oral DAILY    potassium chloride (KLOR-CON) packet for solution 20 mEq  20 mEq Oral BID WITH MEALS    aspirin delayed-release tablet 81 mg  81 mg Oral DAILY    lisinopriL (PRINIVIL, ZESTRIL) tablet 5 mg  5 mg Oral DAILY    pantoprazole (PROTONIX) tablet 40 mg  40 mg Oral DAILY    furosemide (LASIX) injection 40 mg  40 mg IntraVENous BID           Objective:      Physical Exam:  Last 24hrs VS reviewed since prior progress note.  Most recent are:    Visit Vitals  BP (!) 140/80   Pulse 77   Temp 97.6 °F (36.4 °C)   Resp 18   Ht 5' 2\" (1.575 m)   Wt 54.9 kg (121 lb)   SpO2 96%   Breastfeeding Unknown   BMI 22.13 kg/m²       Intake/Output Summary (Last 24 hours) at 8/4/2022 0758  Last data filed at 8/4/2022 0344  Gross per 24 hour   Intake --   Output 1800 ml   Net -1800 ml        General Appearance: Well developed, in no acute respiratory distress. Chest: Lungs clear to auscultation bilaterally (improved from baseline)  Cardiovascular: JVP is not elevated, PMI is not attempted, normotensive S1-S2, without S3. There are no ectopic beats. Abdomen: Soft, non-tender, bowel sounds are active. Extremities: No edema bilaterally. Data Review    Telemetry: Sinus rhythm episode of nonsustained ventricular tachycardia (NSVT). EKG:   []  No new EKG for review    Lab Data Personally Reviewed:    Recent Labs     08/02/22 2002   WBC 8.0   HGB 11.6   HCT 35.6        No results for input(s): INR, PTP, APTT, INREXT in the last 72 hours. Recent Labs     08/02/22 2002      K 3.5      CO2 25   BUN 10   CREA 0.43*   *   CA 8.0*   MG 1.8     No results for input(s): CPK, CKNDX, TROIQ in the last 72 hours. No lab exists for component: CPKMB  Lab Results   Component Value Date/Time    Cholesterol, total 139 05/30/2022 07:33 AM    HDL Cholesterol 47 05/30/2022 07:33 AM    LDL, calculated 71.2 05/30/2022 07:33 AM    Triglyceride 104 05/30/2022 07:33 AM    CHOL/HDL Ratio 3.0 05/30/2022 07:33 AM       Recent Labs     08/02/22 2002   AP 45   TP 5.8*   ALB 3.2*   GLOB 2.6     No results for input(s): PH, PCO2, PO2 in the last 72 hours. Assessment/Plan:   1. Continue telemetry monitor  2. Continue to monitor serum electrolytes, renal function  3. Continue to monitor fluid balance, daily weights  4.   Continue current cardiovascular medications including aspirin, atorvastatin, clopidogrel, intravenous furosemide, lisinopril, metoprolol, and potassium chloride  5.   Further recommendations depending on above results, and clinical course     Jose L Munroe MD

## 2022-08-04 NOTE — PROGRESS NOTES
OCCUPATIONAL THERAPY EVALUATION  Patient: Katerina Flores (79 y.o. female)  Date: 8/4/2022  Primary Diagnosis: CHF exacerbation ([address] [I50.9]       Precautions: fall risk       ASSESSMENT  Pt is a 78 y/o F with PMH of CHF with EF of 30-35%, dysphagia, anxiety, HTN, and HLD presenting to [facility] with c/o SOB, admitted 08/02/22 and being treated for CHF, dilated cardiomyopathy, moderate mitral regurgitation, bilateral pleural effusion, hx of cardiac stents, DM, dysphagia, HLD, HTN, anxiety, and gastritis. Pt received seated EOB finishing PT evaluation with her son at bedside upon OT arrival, AXO x4, and agreeable to OT evaluation at this time. Per pt report, pt lives with her  in a one-story home with a ramp entrance, was IND with ADLs up until May 2022 where she increasingly required more A and was Mod I using a rollator for mobility at [facility]. Pt reports she was typically sponge bathing in a chair PTA and her  would complete most of the cooking and cleaning as she has been unable to complete IADLs recently. Pt's son reports pt and her  have had increasing difficulty caring for themselves so if pt returns home she will not have physical A from her  and he is only able to visit ~2x/day due to his job. Other DME owned includes: shower chair, BSC     Based on current observations, pt presents with deficits in generalized strength/AROM, balance, functional activity tolerance/endurance, vision, and decreased safety awareness impacting overall performance of ADLs and functional transfers/mobility. Pt reports blurry vision, however, pt does not wear glasses during ADLs. PT notified OT of pt's HR with activity so pt's HR was monitored throughout today's session. Pt's HR noted at ~90 BPM seated EOB. Pt completes sit EOB>stand with RW with CGA. Pt ambulates to toilet using RW with additional time and CGA.  Pt completes toilet transfer with min A, requiring VC to reach back for toilet seat and use grab bar when completing transfer. Pt demonstrated understanding, however, anticipate pt will require additional reenforcement of this safety technique. Pt completes toileting routine with CGA to wipe josé area while seated on toilet. Pt's HR noted at ~110 BPM while seated on toilet so pt advised to take a rest break before ambulating back to bedside. Pt able to use hand  while seated on toilet (simulated hand washing) with set up. Pt's HR recovered to ~100-105 BPM prior to further activity. Pt then ambulates back to bedside with CGA using RW. Pt's HR noted at ~115-120 during ambulation. Pt returned seated EOB (stand with RW>sit EOB) with CGA. Pt then completed sit EOB>sup, scooting, and rolling with supervision. Pt's HR returned to ~85 BPM while resting in bed. Pt and pt's son educated on the importance of working with therapy, inpatient rehab, and getting stronger and more IND before returning home. Pt and pt's son verbalized understanding. Overall, pt tolerates session fair. Pt would benefit from continued skilled OT services to address current impairments and improve IND and safety with self cares and functional transfers/mobility. Recommend discharge to IRF once medically appropriate due to pt's significantly decreased activity tolerance/endurance, decrease in IND with ADLs/IADLs since May, and pt not having adequate assist at home if needed as her  also requires assist to complete tasks. Other factors to consider for discharge: home support, PLOF, severity of deficits     Patient will benefit from skilled therapy intervention to address the above noted impairments.        PLAN :  Recommendations and Planned Interventions: self care training, functional mobility training, therapeutic exercise, balance training, therapeutic activities, endurance activities, patient education, and home safety training    Frequency/Duration: Patient will be followed by occupational therapy:  3-5x/week to address goals. Recommendation for discharge: (in order for the patient to meet his/her long term goals)  [facility]     This discharge recommendation:  Has been made in collaboration with the attending provider and/or case management    IF patient discharges home will need the following DME: TBD       SUBJECTIVE:   Patient stated I have trouble getting to the toilet now.     OBJECTIVE DATA SUMMARY:   HISTORY:   Past Medical History:   Diagnosis Date    CHF (congestive heart failure) (HCC)     EF 30-35%    Dysphagia     Heart failure (HCC)     Hyperlipidemia     Hypertension     Ill-defined condition     patient unaware of any diagnosis due to recieving minimal medical care for the past 57 years    Psychiatric disorder     anxiety     Past Surgical History:   Procedure Laterality Date    HX CORONARY STENT PLACEMENT  05/31/2022    HX HEART CATHETERIZATION  05/27/2022    STENT PLACED       Expanded or extensive additional review of patient history:     Home Situation  Home Environment: Private residence  Wheelchair Ramp: Yes  One/Two Story Residence: One story  Living Alone: No  Support Systems: Spouse/Significant Other, Child(bryant) (Lives with , son checks in 2x/day when able)  Patient Expects to be Discharged to[de-identified] Home with home health  Current DME Used/Available at Home: Donnamae Reese, rollator, Shower chair, Commode, bedside    Hand dominance: Right    EXAMINATION OF PERFORMANCE DEFICITS:  Cognitive/Behavioral Status:  Neurologic State: Alert  Orientation Level: Oriented X4  Cognition: Decreased attention/concentration; Follows commands;Poor safety awareness    Hearing: Auditory  Auditory Impairment: Hard of hearing, bilateral    Range of Motion:  AROM: Generally decreased, functional    Strength:  Strength: Generally decreased, functional    Coordination:  Coordination: Within functional limits  Fine Motor Skills-Upper: Left Intact; Right Intact (Slow but intact)    Gross Motor Skills-Upper: Left Intact; Right Intact    Tone & Sensation:  Tone: Normal  Sensation: Intact    Balance:  Sitting: Intact; Without support  Standing: Impaired; With support  Standing - Static: Fair;Constant support  Standing - Dynamic : Fair;Constant support    Functional Mobility and Transfers for ADLs:  Bed Mobility:  Rolling: Supervision  Sit to Supine: Supervision  Scooting: Supervision    Transfers:  Sit to Stand: Contact guard assistance  Stand to Sit: Contact guard assistance  Bathroom Mobility: Contact guard assistance  Toilet Transfer : Minimum assistance (VC to use grab bar)    ADL Intervention and task modifications:       Grooming  Position Performed: Other (comment) (Semi-supine face washing, seated on toilet using hand )  Washing Face: Set-up  Washing Hands: Set-up (Using hand )    Toileting  Bladder Hygiene: Contact guard assistance    Therapeutic Exercise:  Pt would benefit from UE HEP initiated at next session. Functional Measure:    [employer] AM-PACTM \"6 Clicks\"                                                       Daily Activity Inpatient Short Form  How much help from another person does the patient currently need. .. Total; A Lot A Little None   1. Putting on and taking off regular lower body clothing? []  1 [x]  2 []  3 []  4   2. Bathing (including washing, rinsing, drying)? []  1 []  2 [x]  3 []  4   3. Toileting, which includes using toilet, bedpan or urinal? [] 1 []  2 [x]  3 []  4   4. Putting on and taking off regular upper body clothing? []  1 []  2 [x]  3 []  4   5. Taking care of personal grooming such as brushing teeth? []  1 []  2 [x]  3 []  4   6. Eating meals? []  1 []  2 [x]  3 []  4   © 2007, Trustees of [facility] [employer], under license to [employer]. All rights reserved     Score: 17/24     Interpretation of Tool:  Represents clinically-significant functional categories (i.e. Activities of daily living).   Percentage of Impairment CH    0%   CI    1-19% CJ    20-39% CK    40-59% CL    60-79% CM    80-99% CN     100%   [facility]  Score 6-24 24 23 20-22 15-19 10-14 7-9 6         Occupational Therapy Evaluation Charge Determination   History Examination Decision-Making   LOW Complexity : Brief history review  LOW Complexity : 1-3 performance deficits relating to physical, cognitive , or psychosocial skils that result in activity limitations and / or participation restrictions  LOW Complexity : No comorbidities that affect functional and no verbal or physical assistance needed to complete eval tasks       Based on the above components, the patient evaluation is determined to be of the following complexity level: LOW   Pain Ratin/10    Activity Tolerance:   Fair, requires frequent rest breaks, and observed SOB with activity  Please refer to the flowsheet for vital signs taken during this treatment. After treatment patient left in no apparent distress:    Supine in bed, Call bell within reach, Bed / chair alarm activated, Caregiver / family present, and Side rails x 3    COMMUNICATION/EDUCATION:   The patients plan of care was discussed with: Physical therapist.     Patient/family agree to work toward stated goals and plan of care. This patients plan of care is appropriate for delegation to [facility]. Thank you for this referral.  Delfino Giraldo OT  Time Calculation: 39 mins    Problem: Self Care Deficits Care Plan (Adult)  Goal: *Acute Goals and Plan of Care (Insert Text)  Description: Patient stated goal: \"Care for myself and be IND again\"    1. Pt will be mod I sup <> sit in prep for EOB ADLs  2. Pt will be mod I grooming sitting/standing at sink  3. Pt will be mod I LE dressing sitting EOB/long sit  4. Pt will be mod I sit <>  prep for toileting LRAD  5. Pt will be mod I toileting/toilet transfer/cloth mgmt LRAD  6.  Pt will be mod I following UE HEP in prep for self care tasks      Outcome: Not Met Minoxidil Counseling: Minoxidil is a topical medication which can increase blood flow where it is applied. It is uncertain how this medication increases hair growth. Side effects are uncommon and include stinging and allergic reactions. Bactrim Pregnancy And Lactation Text: This medication is Pregnancy Category D and is known to cause fetal risk.  It is also excreted in breast milk. Tetracycline Counseling: Patient counseled regarding possible photosensitivity and increased risk for sunburn.  Patient instructed to avoid sunlight, if possible.  When exposed to sunlight, patients should wear protective clothing, sunglasses, and sunscreen.  The patient was instructed to call the office immediately if the following severe adverse effects occur:  hearing changes, easy bruising/bleeding, severe headache, or vision changes.  The patient verbalized understanding of the proper use and possible adverse effects of tetracycline.  All of the patient's questions and concerns were addressed. Patient understands to avoid pregnancy while on therapy due to potential birth defects. Cosentyx Counseling:  I discussed with the patient the risks of Cosentyx including but not limited to worsening of Crohn's disease, immunosuppression, allergic reactions and infections.  The patient understands that monitoring is required including a PPD at baseline and must alert us or the primary physician if symptoms of infection or other concerning signs are noted. Simponi Pregnancy And Lactation Text: The risk during pregnancy and breastfeeding is uncertain with this medication. Nsaids Pregnancy And Lactation Text: These medications are considered safe up to 30 weeks gestation. It is excreted in breast milk. Cellcept Pregnancy And Lactation Text: This medication is Pregnancy Category D and isn't considered safe during pregnancy. It is unknown if this medication is excreted in breast milk. Otezla Counseling: The side effects of Otezla were discussed with the patient, including but not limited to worsening or new depression, weight loss, diarrhea, nausea, upper respiratory tract infection, and headache. Patient instructed to call the office should any adverse effect occur.  The patient verbalized understanding of the proper use and possible adverse effects of Otezla.  All the patient's questions and concerns were addressed. Bactrim Counseling:  I discussed with the patient the risks of sulfa antibiotics including but not limited to GI upset, allergic reaction, drug rash, diarrhea, dizziness, photosensitivity, and yeast infections.  Rarely, more serious reactions can occur including but not limited to aplastic anemia, agranulocytosis, methemoglobinemia, blood dyscrasias, liver or kidney failure, lung infiltrates or desquamative/blistering drug rashes. Minoxidil Pregnancy And Lactation Text: This medication has not been assigned a Pregnancy Risk Category but animal studies failed to show danger with the topical medication. It is unknown if the medication is excreted in breast milk. Oxybutynin Counseling:  I discussed with the patient the risks of oxybutynin including but not limited to skin rash, drowsiness, dry mouth, difficulty urinating, and blurred vision. Cephalexin Counseling: I counseled the patient regarding use of cephalexin as an antibiotic for prophylactic and/or therapeutic purposes. Cephalexin (commonly prescribed under brand name Keflex) is a cephalosporin antibiotic which is active against numerous classes of bacteria, including most skin bacteria. Side effects may include nausea, diarrhea, gastrointestinal upset, rash, hives, yeast infections, and in rare cases, hepatitis, kidney disease, seizures, fever, confusion, neurologic symptoms, and others. Patients with severe allergies to penicillin medications are cautioned that there is about a 10% incidence of cross-reactivity with cephalosporins. When possible, patients with penicillin allergies should use alternatives to cephalosporins for antibiotic therapy. Imiquimod Pregnancy And Lactation Text: This medication is Pregnancy Category C. It is unknown if this medication is excreted in breast milk. High Dose Vitamin A Pregnancy And Lactation Text: High dose vitamin A therapy is contraindicated during pregnancy and breast feeding. Sarecycline Pregnancy And Lactation Text: This medication is Pregnancy Category D and not consider safe during pregnancy. It is also excreted in breast milk. Cellcept Counseling:  I discussed with the patient the risks of mycophenolate mofetil including but not limited to infection/immunosuppression, GI upset, hypokalemia, hypercholesterolemia, bone marrow suppression, lymphoproliferative disorders, malignancy, GI ulceration/bleed/perforation, colitis, interstitial lung disease, kidney failure, progressive multifocal leukoencephalopathy, and birth defects.  The patient understands that monitoring is required including a baseline creatinine and regular CBC testing. In addition, patient must alert us immediately if symptoms of infection or other concerning signs are noted. Cimzia Pregnancy And Lactation Text: This medication crosses the placenta but can be considered safe in certain situations. Cimzia may be excreted in breast milk. Nsaids Counseling: NSAID Counseling: I discussed with the patient that NSAIDs should be taken with food. Prolonged use of NSAIDs can result in the development of stomach ulcers.  Patient advised to stop taking NSAIDs if abdominal pain occurs.  The patient verbalized understanding of the proper use and possible adverse effects of NSAIDs.  All of the patient's questions and concerns were addressed. Simponi Counseling:  I discussed with the patient the risks of golimumab including but not limited to myelosuppression, immunosuppression, autoimmune hepatitis, demyelinating diseases, lymphoma, and serious infections.  The patient understands that monitoring is required including a PPD at baseline and must alert us or the primary physician if symptoms of infection or other concerning signs are noted. Otezla Pregnancy And Lactation Text: This medication is Pregnancy Category C and it isn't known if it is safe during pregnancy. It is unknown if it is excreted in breast milk. Benzoyl Peroxide Pregnancy And Lactation Text: This medication is Pregnancy Category C. It is unknown if benzoyl peroxide is excreted in breast milk. Detail Level: Generalized Odomzo Pregnancy And Lactation Text: This medication is Pregnancy Category X and is absolutely contraindicated during pregnancy. It is unknown if it is excreted in breast milk. Oxybutynin Pregnancy And Lactation Text: This medication is Pregnancy Category B and is considered safe during pregnancy. It is unknown if it is excreted in breast milk. Cyclophosphamide Pregnancy And Lactation Text: This medication is Pregnancy Category D and it isn't considered safe during pregnancy. This medication is excreted in breast milk. Cephalexin Pregnancy And Lactation Text: This medication is Pregnancy Category B and considered safe during pregnancy.  It is also excreted in breast milk but can be used safely for shorter doses. Cimetidine Pregnancy And Lactation Text: This medication is Pregnancy Category B and is considered safe during pregnancy. It is also excreted in breast milk and breast feeding isn't recommended. Dupixent Counseling: I discussed with the patient the risks of dupilumab including but not limited to eye infection and irritation, cold sores, injection site reactions, worsening of asthma, allergic reactions and increased risk of parasitic infection.  Live vaccines should be avoided while taking dupilumab. Dupilumab will also interact with certain medications such as warfarin and cyclosporine. The patient understands that monitoring is required and they must alert us or the primary physician if symptoms of infection or other concerning signs are noted. Clofazimine Counseling:  I discussed with the patient the risks of clofazimine including but not limited to skin and eye pigmentation, liver damage, nausea/vomiting, gastrointestinal bleeding and allergy. Fluconazole Counseling:  Patient counseled regarding adverse effects of fluconazole including but not limited to headache, diarrhea, nausea, upset stomach, liver function test abnormalities, taste disturbance, and stomach pain.  There is a rare possibility of liver failure that can occur when taking fluconazole.  The patient understands that monitoring of LFTs and kidney function test may be required, especially at baseline. The patient verbalized understanding of the proper use and possible adverse effects of fluconazole.  All of the patient's questions and concerns were addressed. Carac Counseling:  I discussed with the patient the risks of Carac including but not limited to erythema, scaling, itching, weeping, crusting, and pain. Arava Counseling:  Patient counseled regarding adverse effects of Arava including but not limited to nausea, vomiting, abnormalities in liver function tests. Patients may develop mouth sores, rash, diarrhea, and abnormalities in blood counts. The patient understands that monitoring is required including LFTs and blood counts.  There is a rare possibility of scarring of the liver and lung problems that can occur when taking methotrexate. Persistent nausea, loss of appetite, pale stools, dark urine, cough, and shortness of breath should be reported immediately. Patient advised to discontinue Arava treatment and consult with a physician prior to attempting conception. The patient will have to undergo a treatment to eliminate Arava from the body prior to conception. Benzoyl Peroxide Counseling: Patient counseled that medicine may cause skin irritation and bleach clothing.  In the event of skin irritation, the patient was advised to reduce the amount of the drug applied or use it less frequently.   The patient verbalized understanding of the proper use and possible adverse effects of benzoyl peroxide.  All of the patient's questions and concerns were addressed. Cosentyx Pregnancy And Lactation Text: This medication is Pregnancy Category B and is considered safe during pregnancy. It is unknown if this medication is excreted in breast milk. Skyrizi Counseling: I discussed with the patient the risks of risankizumab-rzaa including but not limited to immunosuppression, and serious infections.  The patient understands that monitoring is required including a PPD at baseline and must alert us or the primary physician if symptoms of infection or other concerning signs are noted. Cyclophosphamide Counseling:  I discussed with the patient the risks of cyclophosphamide including but not limited to hair loss, hormonal abnormalities, decreased fertility, abdominal pain, diarrhea, nausea and vomiting, bone marrow suppression and infection. The patient understands that monitoring is required while taking this medication. Birth Control Pills Counseling: Birth Control Pill Counseling: I discussed with the patient the potential side effects of OCPs including but not limited to increased risk of stroke, heart attack, thrombophlebitis, deep venous thrombosis, hepatic adenomas, breast changes, GI upset, headaches, and depression.  The patient verbalized understanding of the proper use and possible adverse effects of OCPs. All of the patient's questions and concerns were addressed. Odomzo Counseling- I discussed with the patient the risks of Odomzo including but not limited to nausea, vomiting, diarrhea, constipation, weight loss, changes in the sense of taste, decreased appetite, muscle spasms, and hair loss.  The patient verbalized understanding of the proper use and possible adverse effects of Odomzo.  All of the patient's questions and concerns were addressed. Clindamycin Counseling: I counseled the patient regarding use of clindamycin as an antibiotic for prophylactic and/or therapeutic purposes. Clindamycin is active against numerous classes of bacteria, including skin bacteria. Side effects may include nausea, diarrhea, gastrointestinal upset, rash, hives, yeast infections, and in rare cases, colitis. Picato Counseling:  I discussed with the patient the risks of Picato including but not limited to erythema, scaling, itching, weeping, crusting, and pain. Doxycycline Counseling:  Patient counseled regarding possible photosensitivity and increased risk for sunburn.  Patient instructed to avoid sunlight, if possible.  When exposed to sunlight, patients should wear protective clothing, sunglasses, and sunscreen.  The patient was instructed to call the office immediately if the following severe adverse effects occur:  hearing changes, easy bruising/bleeding, severe headache, or vision changes.  The patient verbalized understanding of the proper use and possible adverse effects of doxycycline.  All of the patient's questions and concerns were addressed. Fluconazole Pregnancy And Lactation Text: This medication is Pregnancy Category C and it isn't know if it is safe during pregnancy. It is also excreted in breast milk. Cyclosporine Pregnancy And Lactation Text: This medication is Pregnancy Category C and it isn't know if it is safe during pregnancy. This medication is excreted in breast milk. Doxepin Pregnancy And Lactation Text: This medication is Pregnancy Category C and it isn't known if it is safe during pregnancy. It is also excreted in breast milk and breast feeding isn't recommended. Colchicine Counseling:  Patient counseled regarding adverse effects including but not limited to stomach upset (nausea, vomiting, stomach pain, or diarrhea).  Patient instructed to limit alcohol consumption while taking this medication.  Colchicine may reduce blood counts especially with prolonged use.  The patient understands that monitoring of kidney function and blood counts may be required, especially at baseline. The patient verbalized understanding of the proper use and possible adverse effects of colchicine.  All of the patient's questions and concerns were addressed. Enbrel Counseling:  I discussed with the patient the risks of etanercept including but not limited to myelosuppression, immunosuppression, autoimmune hepatitis, demyelinating diseases, lymphoma, and infections.  The patient understands that monitoring is required including a PPD at baseline and must alert us or the primary physician if symptoms of infection or other concerning signs are noted. Birth Control Pills Pregnancy And Lactation Text: This medication should be avoided if pregnant and for the first 30 days post-partum. Protopic Pregnancy And Lactation Text: This medication is Pregnancy Category C. It is unknown if this medication is excreted in breast milk when applied topically. Taltz Counseling: I discussed with the patient the risks of ixekizumab including but not limited to immunosuppression, serious infections, worsening of inflammatory bowel disease and drug reactions.  The patient understands that monitoring is required including a PPD at baseline and must alert us or the primary physician if symptoms of infection or other concerning signs are noted. Methotrexate Counseling:  Patient counseled regarding adverse effects of methotrexate including but not limited to nausea, vomiting, abnormalities in liver function tests. Patients may develop mouth sores, rash, diarrhea, and abnormalities in blood counts. The patient understands that monitoring is required including LFT's and blood counts.  There is a rare possibility of scarring of the liver and lung problems that can occur when taking methotrexate. Persistent nausea, loss of appetite, pale stools, dark urine, cough, and shortness of breath should be reported immediately. Patient advised to discontinue methotrexate treatment at least three months before attempting to become pregnant.  I discussed the need for folate supplements while taking methotrexate.  These supplements can decrease side effects during methotrexate treatment. The patient verbalized understanding of the proper use and possible adverse effects of methotrexate.  All of the patient's questions and concerns were addressed. Clindamycin Pregnancy And Lactation Text: This medication can be used in pregnancy if certain situations. Clindamycin is also present in breast milk. 5-Fu Counseling: 5-Fluorouracil Counseling:  I discussed with the patient the risks of 5-fluorouracil including but not limited to erythema, scaling, itching, weeping, crusting, and pain. Griseofulvin Counseling:  I discussed with the patient the risks of griseofulvin including but not limited to photosensitivity, cytopenia, liver damage, nausea/vomiting and severe allergy.  The patient understands that this medication is best absorbed when taken with a fatty meal (e.g., ice cream or french fries). Cyclosporine Counseling:  I discussed with the patient the risks of cyclosporine including but not limited to hypertension, gingival hyperplasia,myelosuppression, immunosuppression, liver damage, kidney damage, neurotoxicity, lymphoma, and serious infections. The patient understands that monitoring is required including baseline blood pressure, CBC, CMP, lipid panel and uric acid, and then 1-2 times monthly CMP and blood pressure. Doxycycline Pregnancy And Lactation Text: This medication is Pregnancy Category D and not consider safe during pregnancy. It is also excreted in breast milk but is considered safe for shorter treatment courses. Stelara Counseling:  I discussed with the patient the risks of ustekinumab including but not limited to immunosuppression, malignancy, posterior leukoencephalopathy syndrome, and serious infections.  The patient understands that monitoring is required including a PPD at baseline and must alert us or the primary physician if symptoms of infection or other concerning signs are noted. Doxepin Counseling:  Patient advised that the medication is sedating and not to drive a car after taking this medication. Patient informed of potential adverse effects including but not limited to dry mouth, urinary retention, and blurry vision.  The patient verbalized understanding of the proper use and possible adverse effects of doxepin.  All of the patient's questions and concerns were addressed. Clofazimine Pregnancy And Lactation Text: This medication is Pregnancy Category C and isn't considered safe during pregnancy. It is excreted in breast milk. Spironolactone Counseling: Patient advised regarding risks of diarrhea, abdominal pain, hyperkalemia, birth defects (for female patients), liver toxicity and renal toxicity. The patient may need blood work to monitor liver and kidney function and potassium levels while on therapy. The patient verbalized understanding of the proper use and possible adverse effects of spironolactone.  All of the patient's questions and concerns were addressed. Dupixent Pregnancy And Lactation Text: This medication likely crosses the placenta but the risk for the fetus is uncertain. This medication is excreted in breast milk. Carac Pregnancy And Lactation Text: This medication is Pregnancy Category X and contraindicated in pregnancy and in women who may become pregnant. It is unknown if this medication is excreted in breast milk. Protopic Counseling: Patient may experience a mild burning sensation during topical application. Protopic is not approved in children less than 2 years of age. There have been case reports of hematologic and skin malignancies in patients using topical calcineurin inhibitors although causality is questionable. Hydroxyzine Pregnancy And Lactation Text: This medication is not safe during pregnancy and should not be taken. It is also excreted in breast milk and breast feeding isn't recommended. Solaraze Pregnancy And Lactation Text: This medication is Pregnancy Category B and is considered safe. There is some data to suggest avoiding during the third trimester. It is unknown if this medication is excreted in breast milk. Drysol Counseling:  I discussed with the patient the risks of drysol/aluminum chloride including but not limited to skin rash, itching, irritation, burning. Prednisone Counseling:  I discussed with the patient the risks of prolonged use of prednisone including but not limited to weight gain, insomnia, osteoporosis, mood changes, diabetes, susceptibility to infection, glaucoma and high blood pressure.  In cases where prednisone use is prolonged, patients should be monitored with blood pressure checks, serum glucose levels and an eye exam.  Additionally, the patient may need to be placed on GI prophylaxis, PCP prophylaxis, and calcium and vitamin D supplementation and/or a bisphosphonate.  The patient verbalized understanding of the proper use and the possible adverse effects of prednisone.  All of the patient's questions and concerns were addressed. Spironolactone Pregnancy And Lactation Text: This medication can cause feminization of the male fetus and should be avoided during pregnancy. The active metabolite is also found in breast milk. Humira Counseling:  I discussed with the patient the risks of adalimumab including but not limited to myelosuppression, immunosuppression, autoimmune hepatitis, demyelinating diseases, lymphoma, and serious infections.  The patient understands that monitoring is required including a PPD at baseline and must alert us or the primary physician if symptoms of infection or other concerning signs are noted. Include Pregnancy/Lactation Warning?: No Tremfya Counseling: I discussed with the patient the risks of guselkumab including but not limited to immunosuppression, serious infections, worsening of inflammatory bowel disease and drug reactions.  The patient understands that monitoring is required including a PPD at baseline and must alert us or the primary physician if symptoms of infection or other concerning signs are noted. Erythromycin Pregnancy And Lactation Text: This medication is Pregnancy Category B and is considered safe during pregnancy. It is also excreted in breast milk. Solaraze Counseling:  I discussed with the patient the risks of Solaraze including but not limited to erythema, scaling, itching, weeping, crusting, and pain. Hydroxyzine Counseling: Patient advised that the medication is sedating and not to drive a car after taking this medication.  Patient informed of potential adverse effects including but not limited to dry mouth, urinary retention, and blurry vision.  The patient verbalized understanding of the proper use and possible adverse effects of hydroxyzine.  All of the patient's questions and concerns were addressed. Methotrexate Pregnancy And Lactation Text: This medication is Pregnancy Category X and is known to cause fetal harm. This medication is excreted in breast milk. Griseofulvin Pregnancy And Lactation Text: This medication is Pregnancy Category X and is known to cause serious birth defects. It is unknown if this medication is excreted in breast milk but breast feeding should be avoided. SSKI Counseling:  I discussed with the patient the risks of SSKI including but not limited to thyroid abnormalities, metallic taste, GI upset, fever, headache, acne, arthralgias, paraesthesias, lymphadenopathy, easy bleeding, arrhythmias, and allergic reaction. Dapsone Counseling: I discussed with the patient the risks of dapsone including but not limited to hemolytic anemia, agranulocytosis, rashes, methemoglobinemia, kidney failure, peripheral neuropathy, headaches, GI upset, and liver toxicity.  Patients who start dapsone require monitoring including baseline LFTs and weekly CBCs for the first month, then every month thereafter.  The patient verbalized understanding of the proper use and possible adverse effects of dapsone.  All of the patient's questions and concerns were addressed. Erythromycin Counseling:  I discussed with the patient the risks of erythromycin including but not limited to GI upset, allergic reaction, drug rash, diarrhea, increase in liver enzymes, and yeast infections. Thalidomide Counseling: I discussed with the patient the risks of thalidomide including but not limited to birth defects, anxiety, weakness, chest pain, dizziness, cough and severe allergy. Metronidazole Counseling:  I discussed with the patient the risks of metronidazole including but not limited to seizures, nausea/vomiting, a metallic taste in the mouth, nausea/vomiting and severe allergy. Elidel Counseling: Patient may experience a mild burning sensation during topical application. Elidel is not approved in children less than 2 years of age. There have been case reports of hematologic and skin malignancies in patients using topical calcineurin inhibitors although causality is questionable. Ilumya Counseling: I discussed with the patient the risks of tildrakizumab including but not limited to immunosuppression, malignancy, posterior leukoencephalopathy syndrome, and serious infections.  The patient understands that monitoring is required including a PPD at baseline and must alert us or the primary physician if symptoms of infection or other concerning signs are noted. Xeljanz Counseling: I discussed with the patient the risks of Xeljanz therapy including increased risk of infection, liver issues, headache, diarrhea, or cold symptoms. Live vaccines should be avoided. They were instructed to call if they have any problems. Albendazole Pregnancy And Lactation Text: This medication is Pregnancy Category C and it isn't known if it is safe during pregnancy. It is also excreted in breast milk. Erivedge Counseling- I discussed with the patient the risks of Erivedge including but not limited to nausea, vomiting, diarrhea, constipation, weight loss, changes in the sense of taste, decreased appetite, muscle spasms, and hair loss.  The patient verbalized understanding of the proper use and possible adverse effects of Erivedge.  All of the patient's questions and concerns were addressed. Tazorac Counseling:  Patient advised that medication is irritating and drying.  Patient may need to apply sparingly and wash off after an hour before eventually leaving it on overnight.  The patient verbalized understanding of the proper use and possible adverse effects of tazorac.  All of the patient's questions and concerns were addressed. Sski Pregnancy And Lactation Text: This medication is Pregnancy Category D and isn't considered safe during pregnancy. It is excreted in breast milk. Topical Retinoid counseling:  Patient advised to apply a pea-sized amount only at bedtime and wait 30 minutes after washing their face before applying.  If too drying, patient may add a non-comedogenic moisturizer. The patient verbalized understanding of the proper use and possible adverse effects of retinoids.  All of the patient's questions and concerns were addressed. Itraconazole Counseling:  I discussed with the patient the risks of itraconazole including but not limited to liver damage, nausea/vomiting, neuropathy, and severe allergy.  The patient understands that this medication is best absorbed when taken with acidic beverages such as non-diet cola or ginger ale.  The patient understands that monitoring is required including baseline LFTs and repeat LFTs at intervals.  The patient understands that they are to contact us or the primary physician if concerning signs are noted. Metronidazole Pregnancy And Lactation Text: This medication is Pregnancy Category B and considered safe during pregnancy.  It is also excreted in breast milk. Drysol Pregnancy And Lactation Text: This medication is considered safe during pregnancy and breast feeding. Dapsone Pregnancy And Lactation Text: This medication is Pregnancy Category C and is not considered safe during pregnancy or breast feeding. Albendazole Counseling:  I discussed with the patient the risks of albendazole including but not limited to cytopenia, kidney damage, nausea/vomiting and severe allergy.  The patient understands that this medication is being used in an off-label manner. Xolair Counseling:  Patient informed of potential adverse effects including but not limited to fever, muscle aches, rash and allergic reactions.  The patient verbalized understanding of the proper use and possible adverse effects of Xolair.  All of the patient's questions and concerns were addressed. Acitretin Pregnancy And Lactation Text: This medication is Pregnancy Category X and should not be given to women who are pregnant or may become pregnant in the future. This medication is excreted in breast milk. Ketoconazole Pregnancy And Lactation Text: This medication is Pregnancy Category C and it isn't know if it is safe during pregnancy. It is also excreted in breast milk and breast feeding isn't recommended. Valtrex Counseling: I discussed with the patient the risks of valacyclovir including but not limited to kidney damage, nausea, vomiting and severe allergy.  The patient understands that if the infection seems to be worsening or is not improving, they are to call. Infliximab Counseling:  I discussed with the patient the risks of infliximab including but not limited to myelosuppression, immunosuppression, autoimmune hepatitis, demyelinating diseases, lymphoma, and serious infections.  The patient understands that monitoring is required including a PPD at baseline and must alert us or the primary physician if symptoms of infection or other concerning signs are noted. Gabapentin Counseling: I discussed with the patient the risks of gabapentin including but not limited to dizziness, somnolence, fatigue and ataxia. Eucrisa Counseling: Patient may experience a mild burning sensation during topical application. Eucrisa is not approved in children less than 2 years of age. Topical Clindamycin Counseling: Patient counseled that this medication may cause skin irritation or allergic reactions.  In the event of skin irritation, the patient was advised to reduce the amount of the drug applied or use it less frequently.   The patient verbalized understanding of the proper use and possible adverse effects of clindamycin.  All of the patient's questions and concerns were addressed. Ketoconazole Counseling:   Patient counseled regarding improving absorption with orange juice.  Adverse effects include but are not limited to breast enlargement, headache, diarrhea, nausea, upset stomach, liver function test abnormalities, taste disturbance, and stomach pain.  There is a rare possibility of liver failure that can occur when taking ketoconazole. The patient understands that monitoring of LFTs may be required, especially at baseline. The patient verbalized understanding of the proper use and possible adverse effects of ketoconazole.  All of the patient's questions and concerns were addressed. Valtrex Pregnancy And Lactation Text: this medication is Pregnancy Category B and is considered safe during pregnancy. This medication is not directly found in breast milk but it's metabolite acyclovir is present. Minocycline Counseling: Patient advised regarding possible photosensitivity and discoloration of the teeth, skin, lips, tongue and gums.  Patient instructed to avoid sunlight, if possible.  When exposed to sunlight, patients should wear protective clothing, sunglasses, and sunscreen.  The patient was instructed to call the office immediately if the following severe adverse effects occur:  hearing changes, easy bruising/bleeding, severe headache, or vision changes.  The patient verbalized understanding of the proper use and possible adverse effects of minocycline.  All of the patient's questions and concerns were addressed. Xelgianz Pregnancy And Lactation Text: This medication is Pregnancy Category D and is not considered safe during pregnancy.  The risk during breast feeding is also uncertain. Acitretin Counseling:  I discussed with the patient the risks of acitretin including but not limited to hair loss, dry lips/skin/eyes, liver damage, hyperlipidemia, depression/suicidal ideation, photosensitivity.  Serious rare side effects can include but are not limited to pancreatitis, pseudotumor cerebri, bony changes, clot formation/stroke/heart attack.  Patient understands that alcohol is contraindicated since it can result in liver toxicity and significantly prolong the elimination of the drug by many years. Ivermectin Counseling:  Patient instructed to take medication on an empty stomach with a full glass of water.  Patient informed of potential adverse effects including but not limited to nausea, diarrhea, dizziness, itching, and swelling of the extremities or lymph nodes.  The patient verbalized understanding of the proper use and possible adverse effects of ivermectin.  All of the patient's questions and concerns were addressed. Tazorac Pregnancy And Lactation Text: This medication is not safe during pregnancy. It is unknown if this medication is excreted in breast milk. Rituxan Counseling:  I discussed with the patient the risks of Rituxan infusions. Side effects can include infusion reactions, severe drug rashes including mucocutaneous reactions, reactivation of latent hepatitis and other infections and rarely progressive multifocal leukoencephalopathy.  All of the patient's questions and concerns were addressed. Glycopyrrolate Counseling:  I discussed with the patient the risks of glycopyrrolate including but not limited to skin rash, drowsiness, dry mouth, difficulty urinating, and blurred vision. Topical Sulfur Applications Counseling: Topical Sulfur Counseling: Patient counseled that this medication may cause skin irritation or allergic reactions.  In the event of skin irritation, the patient was advised to reduce the amount of the drug applied or use it less frequently.   The patient verbalized understanding of the proper use and possible adverse effects of topical sulfur application.  All of the patient's questions and concerns were addressed. Isotretinoin Counseling: Patient should get monthly blood tests, not donate blood, not drive at night if vision affected, not share medication, and not undergo elective surgery for 6 months after tx completed. Side effects reviewed, pt to contact office should one occur. Hydroquinone Counseling:  Patient advised that medication may result in skin irritation, lightening (hypopigmentation), dryness, and burning.  In the event of skin irritation, the patient was advised to reduce the amount of the drug applied or use it less frequently.  Rarely, spots that are treated with hydroquinone can become darker (pseudoochronosis).  Should this occur, patient instructed to stop medication and call the office. The patient verbalized understanding of the proper use and possible adverse effects of hydroquinone.  All of the patient's questions and concerns were addressed. Glycopyrrolate Pregnancy And Lactation Text: This medication is Pregnancy Category B and is considered safe during pregnancy. It is unknown if it is excreted breast milk. Rituxan Pregnancy And Lactation Text: This medication is Pregnancy Category C and it isn't know if it is safe during pregnancy. It is unknown if this medication is excreted in breast milk but similar antibodies are known to be excreted. Quinolones Counseling:  I discussed with the patient the risks of fluoroquinolones including but not limited to GI upset, allergic reaction, drug rash, diarrhea, dizziness, photosensitivity, yeast infections, liver function test abnormalities, tendonitis/tendon rupture. Azithromycin Counseling:  I discussed with the patient the risks of azithromycin including but not limited to GI upset, allergic reaction, drug rash, diarrhea, and yeast infections. Terbinafine Counseling: Patient counseling regarding adverse effects of terbinafine including but not limited to headache, diarrhea, rash, upset stomach, liver function test abnormalities, itching, taste/smell disturbance, nausea, abdominal pain, and flatulence.  There is a rare possibility of liver failure that can occur when taking terbinafine.  The patient understands that a baseline LFT and kidney function test may be required. The patient verbalized understanding of the proper use and possible adverse effects of terbinafine.  All of the patient's questions and concerns were addressed. Bexarotene Counseling:  I discussed with the patient the risks of bexarotene including but not limited to hair loss, dry lips/skin/eyes, liver abnormalities, hyperlipidemia, pancreatitis, depression/suicidal ideation, photosensitivity, drug rash/allergic reactions, hypothyroidism, anemia, leukopenia, infection, cataracts, and teratogenicity.  Patient understands that they will need regular blood tests to check lipid profile, liver function tests, white blood cell count, thyroid function tests and pregnancy test if applicable. Xolair Pregnancy And Lactation Text: This medication is Pregnancy Category B and is considered safe during pregnancy. This medication is excreted in breast milk. Quality 337: Tuberculosis Prevention For Psoriasis And Psoriatic Arthritis Patients On A Biological Immune Response Modifier: Patient has a documented negative annual TB screening. Bexarotene Pregnancy And Lactation Text: This medication is Pregnancy Category X and should not be given to women who are pregnant or may become pregnant. This medication should not be used if you are breast feeding. Zyclara Counseling:  I discussed with the patient the risks of imiquimod including but not limited to erythema, scaling, itching, weeping, crusting, and pain.  Patient understands that the inflammatory response to imiquimod is variable from person to person and was educated regarded proper titration schedule.  If flu-like symptoms develop, patient knows to discontinue the medication and contact us. Imiquimod Counseling:  I discussed with the patient the risks of imiquimod including but not limited to erythema, scaling, itching, weeping, crusting, and pain.  Patient understands that the inflammatory response to imiquimod is variable from person to person and was educated regarded proper titration schedule.  If flu-like symptoms develop, patient knows to discontinue the medication and contact us. Sarecycline Counseling: Patient advised regarding possible photosensitivity and discoloration of the teeth, skin, lips, tongue and gums.  Patient instructed to avoid sunlight, if possible.  When exposed to sunlight, patients should wear protective clothing, sunglasses, and sunscreen.  The patient was instructed to call the office immediately if the following severe adverse effects occur:  hearing changes, easy bruising/bleeding, severe headache, or vision changes.  The patient verbalized understanding of the proper use and possible adverse effects of sarecycline.  All of the patient's questions and concerns were addressed. Hydroxychloroquine Pregnancy And Lactation Text: This medication has been shown to cause fetal harm but it isn't assigned a Pregnancy Risk Category. There are small amounts excreted in breast milk. High Dose Vitamin A Counseling: Side effects reviewed, pt to contact office should one occur. Cimzia Counseling:  I discussed with the patient the risks of Cimzia including but not limited to immunosuppression, allergic reactions and infections.  The patient understands that monitoring is required including a PPD at baseline and must alert us or the primary physician if symptoms of infection or other concerning signs are noted. Azathioprine Counseling:  I discussed with the patient the risks of azathioprine including but not limited to myelosuppression, immunosuppression, hepatotoxicity, lymphoma, and infections.  The patient understands that monitoring is required including baseline LFTs, Creatinine, possible TPMP genotyping and weekly CBCs for the first month and then every 2 weeks thereafter.  The patient verbalized understanding of the proper use and possible adverse effects of azathioprine.  All of the patient's questions and concerns were addressed. Rifampin Counseling: I discussed with the patient the risks of rifampin including but not limited to liver damage, kidney damage, red-orange body fluids, nausea/vomiting and severe allergy. Topical Sulfur Applications Pregnancy And Lactation Text: This medication is Pregnancy Category C and has an unknown safety profile during pregnancy. It is unknown if this topical medication is excreted in breast milk. Cimetidine Counseling:  I discussed with the patient the risks of Cimetidine including but not limited to gynecomastia, headache, diarrhea, nausea, drowsiness, arrhythmias, pancreatitis, skin rashes, psychosis, bone marrow suppression and kidney toxicity. Isotretinoin Pregnancy And Lactation Text: This medication is Pregnancy Category X and is considered extremely dangerous during pregnancy. It is unknown if it is excreted in breast milk. Azithromycin Pregnancy And Lactation Text: This medication is considered safe during pregnancy and is also secreted in breast milk. Siliq Counseling:  I discussed with the patient the risks of Siliq including but not limited to new or worsening depression, suicidal thoughts and behavior, immunosuppression, malignancy, posterior leukoencephalopathy syndrome, and serious infections.  The patient understands that monitoring is required including a PPD at baseline and must alert us or the primary physician if symptoms of infection or other concerning signs are noted. There is also a special program designed to monitor depression which is required with Siliq. Hydroxychloroquine Counseling:  I discussed with the patient that a baseline ophthalmologic exam is needed at the start of therapy and every year thereafter while on therapy. A CBC may also be warranted for monitoring.  The side effects of this medication were discussed with the patient, including but not limited to agranulocytosis, aplastic anemia, seizures, rashes, retinopathy, and liver toxicity. Patient instructed to call the office should any adverse effect occur.  The patient verbalized understanding of the proper use and possible adverse effects of Plaquenil.  All the patient's questions and concerns were addressed. Rifampin Pregnancy And Lactation Text: This medication is Pregnancy Category C and it isn't know if it is safe during pregnancy. It is also excreted in breast milk and should not be used if you are breast feeding.

## 2022-08-05 LAB
ALBUMIN SERPL-MCNC: 3 G/DL (ref 3.5–5)
ALBUMIN/GLOB SERPL: 1 {RATIO} (ref 1.1–2.2)
ALP SERPL-CCNC: 47 U/L (ref 45–117)
ALT SERPL-CCNC: 26 U/L (ref 12–78)
ANION GAP SERPL CALC-SCNC: 8 MMOL/L (ref 5–15)
AST SERPL W P-5'-P-CCNC: 23 U/L (ref 15–37)
BILIRUB SERPL-MCNC: 1 MG/DL (ref 0.2–1)
BNP SERPL-MCNC: 3491 PG/ML
BUN SERPL-MCNC: 15 MG/DL (ref 6–20)
BUN/CREAT SERPL: 21 (ref 12–20)
CA-I BLD-MCNC: 8.4 MG/DL (ref 8.5–10.1)
CHLORIDE SERPL-SCNC: 97 MMOL/L (ref 97–108)
CO2 SERPL-SCNC: 30 MMOL/L (ref 21–32)
CREAT SERPL-MCNC: 0.73 MG/DL (ref 0.55–1.02)
ERYTHROCYTE [DISTWIDTH] IN BLOOD BY AUTOMATED COUNT: 16.9 % (ref 11.5–14.5)
GLOBULIN SER CALC-MCNC: 3 G/DL (ref 2–4)
GLUCOSE BLD STRIP.AUTO-MCNC: 115 MG/DL (ref 65–117)
GLUCOSE BLD STRIP.AUTO-MCNC: 151 MG/DL (ref 65–117)
GLUCOSE SERPL-MCNC: 252 MG/DL (ref 65–100)
HCT VFR BLD AUTO: 39.8 % (ref 35–47)
HGB BLD-MCNC: 13 G/DL (ref 11.5–16)
MCH RBC QN AUTO: 28.8 PG (ref 26–34)
MCHC RBC AUTO-ENTMCNC: 32.7 G/DL (ref 30–36.5)
MCV RBC AUTO: 88.1 FL (ref 80–99)
NRBC # BLD: 0 K/UL (ref 0–0.01)
NRBC BLD-RTO: 0 PER 100 WBC
PERFORMED BY, TECHID: ABNORMAL
PERFORMED BY, TECHID: NORMAL
PLATELET # BLD AUTO: 309 K/UL (ref 150–400)
PMV BLD AUTO: 8.9 FL (ref 8.9–12.9)
POTASSIUM SERPL-SCNC: 3.3 MMOL/L (ref 3.5–5.1)
PROT SERPL-MCNC: 6 G/DL (ref 6.4–8.2)
RBC # BLD AUTO: 4.52 M/UL (ref 3.8–5.2)
SODIUM SERPL-SCNC: 135 MMOL/L (ref 136–145)
TROPONIN-HIGH SENSITIVITY: 32 NG/L (ref 0–51)
WBC # BLD AUTO: 5.5 K/UL (ref 3.6–11)

## 2022-08-05 PROCEDURE — 74011250637 HC RX REV CODE- 250/637: Performed by: FAMILY MEDICINE

## 2022-08-05 PROCEDURE — 85027 COMPLETE CBC AUTOMATED: CPT

## 2022-08-05 PROCEDURE — 84484 ASSAY OF TROPONIN QUANT: CPT

## 2022-08-05 PROCEDURE — 65270000029 HC RM PRIVATE

## 2022-08-05 PROCEDURE — 83036 HEMOGLOBIN GLYCOSYLATED A1C: CPT

## 2022-08-05 PROCEDURE — 74011636637 HC RX REV CODE- 636/637: Performed by: FAMILY MEDICINE

## 2022-08-05 PROCEDURE — 97530 THERAPEUTIC ACTIVITIES: CPT

## 2022-08-05 PROCEDURE — 83880 ASSAY OF NATRIURETIC PEPTIDE: CPT

## 2022-08-05 PROCEDURE — 36415 COLL VENOUS BLD VENIPUNCTURE: CPT

## 2022-08-05 PROCEDURE — 74011250636 HC RX REV CODE- 250/636: Performed by: FAMILY MEDICINE

## 2022-08-05 PROCEDURE — 82962 GLUCOSE BLOOD TEST: CPT

## 2022-08-05 PROCEDURE — 80053 COMPREHEN METABOLIC PANEL: CPT

## 2022-08-05 PROCEDURE — 74011250637 HC RX REV CODE- 250/637: Performed by: INTERNAL MEDICINE

## 2022-08-05 RX ORDER — POTASSIUM CHLORIDE 750 MG/1
40 TABLET, FILM COATED, EXTENDED RELEASE ORAL
Status: COMPLETED | OUTPATIENT
Start: 2022-08-05 | End: 2022-08-05

## 2022-08-05 RX ORDER — NITROGLYCERIN 0.4 MG/1
TABLET SUBLINGUAL
Status: DISPENSED
Start: 2022-08-05 | End: 2022-08-05

## 2022-08-05 RX ORDER — SODIUM CHLORIDE 9 MG/ML
250 INJECTION, SOLUTION INTRAVENOUS ONCE
Status: COMPLETED | OUTPATIENT
Start: 2022-08-05 | End: 2022-08-05

## 2022-08-05 RX ORDER — NITROGLYCERIN 0.4 MG/1
0.4 TABLET SUBLINGUAL AS NEEDED
Status: DISCONTINUED | OUTPATIENT
Start: 2022-08-05 | End: 2022-08-09 | Stop reason: HOSPADM

## 2022-08-05 RX ORDER — INSULIN LISPRO 100 [IU]/ML
INJECTION, SOLUTION INTRAVENOUS; SUBCUTANEOUS
Status: DISCONTINUED | OUTPATIENT
Start: 2022-08-05 | End: 2022-08-09 | Stop reason: HOSPADM

## 2022-08-05 RX ORDER — MAGNESIUM SULFATE 100 %
4 CRYSTALS MISCELLANEOUS AS NEEDED
Status: DISCONTINUED | OUTPATIENT
Start: 2022-08-05 | End: 2022-08-09 | Stop reason: HOSPADM

## 2022-08-05 RX ORDER — ESCITALOPRAM OXALATE 10 MG/1
10 TABLET ORAL DAILY
Status: DISCONTINUED | OUTPATIENT
Start: 2022-08-06 | End: 2022-08-09 | Stop reason: HOSPADM

## 2022-08-05 RX ADMIN — POTASSIUM CHLORIDE 20 MEQ: 1.5 POWDER, FOR SOLUTION ORAL at 16:43

## 2022-08-05 RX ADMIN — METOPROLOL SUCCINATE 25 MG: 25 TABLET, EXTENDED RELEASE ORAL at 08:15

## 2022-08-05 RX ADMIN — CLOPIDOGREL BISULFATE 75 MG: 75 TABLET ORAL at 08:15

## 2022-08-05 RX ADMIN — POTASSIUM CHLORIDE 20 MEQ: 1.5 POWDER, FOR SOLUTION ORAL at 08:15

## 2022-08-05 RX ADMIN — ATORVASTATIN CALCIUM 40 MG: 40 TABLET, FILM COATED ORAL at 20:24

## 2022-08-05 RX ADMIN — ASPIRIN 81 MG: 81 TABLET, COATED ORAL at 08:15

## 2022-08-05 RX ADMIN — POTASSIUM CHLORIDE 40 MEQ: 750 TABLET, FILM COATED, EXTENDED RELEASE ORAL at 16:43

## 2022-08-05 RX ADMIN — NITROGLYCERIN 0.4 MG: 0.4 TABLET, ORALLY DISINTEGRATING SUBLINGUAL at 09:00

## 2022-08-05 RX ADMIN — NITROGLYCERIN 0.4 MG: 0.4 TABLET, ORALLY DISINTEGRATING SUBLINGUAL at 09:13

## 2022-08-05 RX ADMIN — LISINOPRIL 5 MG: 10 TABLET ORAL at 08:15

## 2022-08-05 RX ADMIN — FUROSEMIDE 40 MG: 10 INJECTION, SOLUTION INTRAMUSCULAR; INTRAVENOUS at 08:15

## 2022-08-05 RX ADMIN — INSULIN LISPRO 3 UNITS: 100 INJECTION, SOLUTION INTRAVENOUS; SUBCUTANEOUS at 17:01

## 2022-08-05 RX ADMIN — SODIUM CHLORIDE 250 ML: 9 INJECTION, SOLUTION INTRAVENOUS at 10:00

## 2022-08-05 RX ADMIN — PANTOPRAZOLE SODIUM 40 MG: 40 TABLET, DELAYED RELEASE ORAL at 08:15

## 2022-08-05 NOTE — PROGRESS NOTES
OCCUPATIONAL THERAPY TREATMENT  Patient: Roger Orourke (79 y.o. female)  Date: 8/5/2022  Diagnosis: CHF exacerbation ([facility] [address] [I50.9] <principal problem not specified>      Precautions:    Chart, occupational therapy assessment, plan of care, and goals were reviewed. ASSESSMENT  Patient continues with skilled OT services and is progressing towards goals. Upon [facility] arrival, pt semi supine in bed and agreeable to tx session. Per RN, pt noted with low BP today. BP in supine noted to be 93/56 and RN agreeable to pt transferring to [facility]. Pt completed bed mobility with SBA and sit>stand from EOB with CGA using HHA. Pt completed transfer to [facility] with CGA and completed seated bladder/bowel hygiene with SBA. Seated hair brushing completed with setup A. Pt completed transfer back to EOB with CGA. Pt returned to supine with SBA. Pt left semi supine in bed with call bell within reach and needs met. Will continue to follow pt throughout remainder of stay and progress towards OT goals. Recommending IRF at discharge when medically appropriate. Other factors to consider for discharge: family/social support, DME, time since onset, severity of deficits, decline from functional baseline         PLAN :  Patient continues to benefit from skilled intervention to address the above impairments. Continue treatment per established plan of care. to address goals. Recommendation for discharge: (in order for the patient to meet his/her long term goals)  [facility]     This discharge recommendation:  Has been made in collaboration with the attending provider and/or case management    IF patient discharges home will need the following DME: TBD       SUBJECTIVE:   Patient stated I need to use the toilet.     OBJECTIVE DATA SUMMARY:   Cognitive/Behavioral Status:  Neurologic State: Alert  Orientation Level: Oriented X4  Cognition: Follows commands; Impulsive    Functional Mobility and Transfers for ADLs:  Bed Mobility:  Rolling: Stand-by assistance  Supine to Sit: Stand-by assistance  Scooting: Stand-by assistance    Transfers:  Sit to Stand: Contact guard assistance  Functional Transfers  Toilet Transfer : Contact guard assistance (BSC)    Balance:  Sitting: Intact; Without support  Standing: Impaired; With support  Standing - Static: Constant support; Fair  Standing - Dynamic : Constant support; Fair    ADL Intervention:  Grooming  Position Performed: Other (comment) (seated on BSC)  Brushing/Combing Hair: Set-up    Toileting  Bladder Hygiene: Stand-by assistance  Bowel Hygiene: Stand-by assistance    Pain:  0/10    Activity Tolerance:   Fair and requires rest breaks  Please refer to the flowsheet for vital signs taken during this treatment. After treatment patient left in no apparent distress:   Supine in bed, Call bell within reach, Bed / chair alarm activated, and Side rails x 3    COMMUNICATION/COLLABORATION:   The patients plan of care was discussed with: Registered nurse and Certified nursing assistant/patient care technician. JEB Pisano  Time Calculation: 24 mins    Problem: Self Care Deficits Care Plan (Adult)  Goal: *Acute Goals and Plan of Care (Insert Text)  Description: Patient stated goal: \"Care for myself and be IND again\"    1. Pt will be mod I sup <> sit in prep for EOB ADLs  2. Pt will be mod I grooming sitting/standing at sink  3. Pt will be mod I LE dressing sitting EOB/long sit  4. Pt will be mod I sit <>  prep for toileting LRAD  5. Pt will be mod I toileting/toilet transfer/cloth mgmt LRAD  6.  Pt will be mod I following UE HEP in prep for self care tasks      Outcome: Progressing Towards Goal

## 2022-08-05 NOTE — PROGRESS NOTES
PULMONARY NOTE  [facility]    Name: Radha Rodgers MRN: [id number]   : 1940 Hospital: [facility]   Date: 2022  Admission date: 2022 Hospital Day: 4       HPI:     Hospital Problems  Date Reviewed: 2022            Codes Class Noted POA    CHF exacerbation [facility]) ICD-10-CM: I50.9  ICD-9-CM: 428.0  2022 Unknown            [x] High complexity decision making was performed  [x] See my orders for details      Subjective/Initial History:     I was asked by John Tineo MD to see Radha Rodgers  a 79 y.o.  female in consultation     Excerpts from admission 2022 or consult notes as follows:   19-year-old lady came in because of shortness of breath and dyspnea past medical history of congestive heart failure, hypertension, hyperlipidemia and psychiatric illness she is noncompliant not happy in the emergency room she is complaining about dyspnea on exertion she is on room air also having swelling of the lower extremities chest x-ray shows bilateral pleural effusions the patient admitted and pulmonary consult was called.   She had a remote history of tobacco abuse last 2D echo showed ejection fraction of 35% she has coronary artery disease stent placed 531      Allergies   Allergen Reactions    [place] Itching    Peanut Butter Flavor Not Reported This Time     possible allergy        MAR reviewed and pertinent medications noted or modified as needed     Current Facility-Administered Medications   Medication    nitroglycerin (NITROSTAT) tablet 0.4 mg    nitroglycerin (NITROSTAT) 0.4 mg tablet    hydrOXYzine (VISTARIL) 25 mg/mL injection 25 mg    metoprolol succinate (TOPROL-XL) XL tablet 25 mg    atorvastatin (LIPITOR) tablet 40 mg    clopidogreL (PLAVIX) tablet 75 mg    potassium chloride (KLOR-CON) packet for solution 20 mEq    aspirin delayed-release tablet 81 mg    lisinopriL (PRINIVIL, ZESTRIL) tablet 5 mg    pantoprazole (PROTONIX) tablet 40 mg    furosemide (LASIX) injection 40 mg      Patient PCP: Laney Silva MD  PMH:  has a past medical history of CHF (congestive heart failure) ([address] Dysphagia, Heart failure ([address] Hyperlipidemia, Hypertension, Ill-defined condition, and Psychiatric disorder. PSH:   has a past surgical history that includes hx coronary stent placement (2022) and hx heart catheterization (2022). FHX: family history includes Diabetes in her father, mother, and another family member; Heart Disease in her father, mother, and another family member. SHX:  reports that she quit smoking about 58 years ago. She has never used smokeless tobacco. She reports that she does not drink alcohol and does not use drugs. ROS:    Review of Systems   Constitutional:  Positive for malaise/fatigue. Generalized weakness   HENT: Negative. Eyes: Negative. Respiratory:  Positive for sputum production. Negative for shortness of breath. Cardiovascular:  Positive for leg swelling. Negative for orthopnea. Gastrointestinal: Negative. Genitourinary: Negative. Musculoskeletal: Negative. Skin: Negative. Neurological: Negative. Psychiatric/Behavioral: Negative. Objective:     Vital Signs: Telemetry:    normal sinus rhythm Intake/Output:   Visit Vitals  BP (!) 85/53 (BP 1 Location: Left upper arm, BP Patient Position: Semi fowlers)   Pulse 83   Temp 97.4 °F (36.3 °C)   Resp 19   Ht 5' 2.01\" (1.575 m)   Wt 114 lb 10.2 oz (52 kg)   SpO2 93%   Breastfeeding Unknown   BMI 20.96 kg/m²       Temp (24hrs), Av.7 °F (36.5 °C), Min:97.2 °F (36.2 °C), Max:98 °F (36.7 °C)        O2 Device: None (Room air)         Wt Readings from Last 4 Encounters:   22 114 lb 10.2 oz (52 kg)   22 121 lb (54.9 kg)   22 123 lb 6.4 oz (56 kg)   22 123 lb (55.8 kg)        No intake or output data in the 24 hours ending 22 7056      Last shift:      No intake/output data recorded.   Last 3 shifts: 1901 -  0700  In: -   Out: 1100 [Urine:1100]       Physical Exam:     Physical Exam  Constitutional:       Appearance: Normal appearance. HENT:      Head: Normocephalic and atraumatic. Nose: Nose normal.      Mouth/Throat:      Mouth: Mucous membranes are moist.   Eyes:      Pupils: Pupils are equal, round, and reactive to light. Cardiovascular:      Rate and Rhythm: Normal rate. Pulses: Normal pulses. Pulmonary:      Breath sounds: Rales present. Abdominal:      General: Abdomen is flat. Bowel sounds are normal.      Palpations: Abdomen is soft. Musculoskeletal:         General: Swelling present. Cervical back: Normal range of motion. Right lower leg: Edema present. Left lower leg: Edema present. Skin:     General: Skin is warm. Neurological:      General: No focal deficit present. Mental Status: She is alert. Psychiatric:         Mood and Affect: Mood normal.        Labs:    Recent Labs     08/04/22  0736 08/02/22 2002   WBC 6.4 8.0   HGB 13.8 11.6    322       Recent Labs     08/04/22  0736 08/02/22 2002   * 138   K 3.1* 3.5   CL 96* 104   CO2 29 25   * 135*   BUN 12 10   CREA 0.53* 0.43*   CA 9.0 8.0*   MG  --  1.8   ALB 3.4* 3.2*   ALT 31 27       No results for input(s): PH, PCO2, PO2, HCO3, FIO2 in the last 72 hours. No results for input(s): CPK, CKNDX, TROIQ in the last 72 hours. No lab exists for component: CPKMB  No results found for: BNPP, BNP   Lab Results   Component Value Date/Time    Culture result: No growth 6 days 05/27/2022 12:59 PM     No results found for: TSH, TSHEXT, TSHEXT    Imaging:    CXR Results  (Last 48 hours)      None          Results from Hospital Encounter encounter on 08/02/22    XR CHEST PORT    Narrative  EXAM: XR CHEST PORT    HISTORY: sob, h/o CHF. COMPARISON: 6/21/2022    FINDINGS: Portable AP. The hilar is mildly enlarged.  There are moderate left and  mild right pleural effusions which have increased in the interval. There is  increased hazy opacification of the right lower lobe. There is increased  atelectasis left lower lobe. There is unchanged mild edema. The bones are  osteopenic. Multilevel spondylosis spine. Impression  1. Increased mild right and moderate left pleural effusions with increased  bibasilar atelectasis. 2. Unchanged mild edema. Results from [place] encounter on 06/21/22    XR CHEST PORT    Narrative  Chest single view. Comparison single view chest May 31, 2022. Left greater than right moderate volume dependent pleural effusions persist.  Mild dependent pulmonary interstitial edema. Bibasilar atelectasis. Cardiac and  mediastinal structures unchanged. No pneumothorax. Results from Hospital Encounter encounter on 05/27/22    XR CHEST PORT    Narrative  XR CHEST PORT    Comparison:  5/27/2022. Single view:  Stable pleural effusions and bibasilar atelectasis/pneumonia (left  greater than right). No pneumothorax apparent. The heart size is stable. Stable  hemodynamic status. Impression  No significant change. No results found for this or any previous visit.         IMPRESSION:   Congestive heart failure  HFrEF  Dilated cardiomyopathy ejection fraction 30 to 35%  Coronary artery disease with history of PCI to mid LAD 5/31/2022 and RCA disease treated medically  Moderate mitral regurgitation  Pleural effusion  Prognosis guarded   Low blood pressure  Chest pain      RECOMMENDATIONS/PLAN:   18-year-old lady came in because of shortness of breath and dyspnea generalized weakness swelling of the lower extremities given history of coronary artery disease congestive heart failure low ejection fraction chest x-ray shows bilateral pleural effusion shortness of breath most likely secondary to underlying congestive heart failure normal white count continue with the Lasix aggressive diuresis  Supplemental O2 to keep sats > 93%  Aspiration precautions  Labs to follow electrolytes, renal function and and blood counts  Glucose monitoring and SSI  Bronchial hygiene with respiratory therapy techniques, bronchodilators  DVT, SUP prophylaxis  8. Cardiac workup. 8/4: Pt states that she is feeling better. Complaining of sticky, yellow phlegm that she is coughing up. No dyspnea at this time. O2 sat 95%. 8/5: Pt still complaining of sputum production and now increased generalized weakness and anxiety. Pt recently c/o chest pain radiating to her jaw. EKG ordered. BP 85/53, O2 sat 93%.         Yanci Sweeney

## 2022-08-05 NOTE — PROGRESS NOTES
History and Physical    NAME: Hitesh Orourke   :  1940   MRN:  [id number]     Date/Time:  2022 9:10 AM    Patient PCP: David Adams MD  ______________________________________________________________________             Subjective:     CHIEF COMPLAINT: Patient comes to the ED with complains of shortness of breath and swelling in her lower extremities. HISTORY OF PRESENT ILLNESS:       Patient is a 79y.o. year old female with a significant history of CHF, cardiac stents placement on May 31st, hypertension, hyperlipidemia, and psychiatric illness, coming to the ED complaining for shortness of breath over the last two days that has gotten progressively worse. Patient reports bilateral lower extremity edema. Left Ventricle: Severely reduced left ventricular systolic function with a visually estimated EF of 30 - 35%. Left ventricle is mildly dilated. Normal wall thickness    8/3    Patient was awake, alert, and resting in bed. She states that she is still feeling short on breath and that her stomach is cramping because she has to urinate but is worried about her catheter. Patient is having trouble swallowing and asked to be on a softer diet. She has no eaten much because of this. Patients BNP- 16,068          Patient was awake and alert and in no apparent distress. She denies shortness of breath except when expending energy such as eating or trying to ambulate. She states that her abdominal pain has subsided after the catheter was placed. Edema in LE significantly decreased. Patient was recommended a puree diet and mildly thick liquids via speech pathologist. She is currently tolerating new diet well and ate most of her meal.   Patient also stated this is the first time in a while that she has slept this well. Patient notes discomfort on her backside due to inability to readjust herself as well as discomfort in her hands because they're cold.      Telemetry reviewed reveals occasional episodes of 12 consecutive premature ventricular complex (PVCs).     Patient was trying to rest in bed. She was currently on nasal cannula oxygen and recently complained of chest tightness and pain. She also stated that she feels weak. PT recommended inpatient rehab facility at discharge. Past Medical History:   Diagnosis Date    CHF (congestive heart failure) ([facility])     EF 30-35%    Dysphagia     Heart failure (HCC)     Hyperlipidemia     Hypertension     Ill-defined condition     patient unaware of any diagnosis due to recieving minimal medical care for the past 57 years    Psychiatric disorder     anxiety        Past Surgical History:   Procedure Laterality Date    HX CORONARY STENT PLACEMENT  2022    HX HEART CATHETERIZATION  2022    STENT PLACED       Social History     Tobacco Use    Smoking status: Former     Types: Cigarettes     Quit date: 1964     Years since quittin.6    Smokeless tobacco: Never   Substance Use Topics    Alcohol use: Never        Family History   Problem Relation Age of Onset    Diabetes Other     Heart Disease Other     Heart Disease Mother     Diabetes Mother     Diabetes Father     Heart Disease Father        Allergies   Allergen Reactions    [place] Itching    Peanut Butter Flavor Not Reported This Time     possible allergy        Prior to Admission medications    Medication Sig Start Date End Date Taking? Authorizing Provider   nitroglycerin (NITROSTAT) 0.4 mg SL tablet 0.4 mg by SubLINGual route every five (5) minutes as needed for Chest Pain. Up to 3 doses. Yes Provider, Historical   lisinopriL (PRINIVIL, ZESTRIL) 5 mg tablet Take 5 mg by mouth daily. 22  Yes Provider, Historical   pantoprazole (PROTONIX) 40 mg tablet Take 40 mg by mouth daily. 22  Yes Provider, Historical   atorvastatin (LIPITOR) 40 mg tablet Take 1 Tablet by mouth nightly.  22  Yes Hans Trivedi MD   clopidogreL (PLAVIX) 75 mg tab Take 1 Tablet by mouth daily. Indications: blood clot prevention following percutaneous coronary intervention 6/2/22  Yes Amanda Curry MD   carvediloL (COREG) 3.125 mg tablet Take 1 Tablet by mouth two (2) times daily (with meals). Indications: heart failure with reduced ejection fraction due to dilated cardiomyopathy 6/1/22  Yes Amanda Curry MD   aspirin delayed-release 81 mg tablet Take 81 mg by mouth daily.    Yes Provider, Historical         Current Facility-Administered Medications:     nitroglycerin (NITROSTAT) 0.4 mg tablet, , , ,     nitroglycerin (NITROSTAT) tablet 0.4 mg, 0.4 mg, SubLINGual, PRN, Mahin Gonzales MD    hydrOXYzine (VISTARIL) 25 mg/mL injection 25 mg, 25 mg, IntraMUSCular, Q6H PRN, Janelle Carrillo MD, 25 mg at 08/03/22 0256    metoprolol succinate (TOPROL-XL) XL tablet 25 mg, 25 mg, Oral, DAILY, Janelle Carrillo MD, 25 mg at 08/05/22 0815    atorvastatin (LIPITOR) tablet 40 mg, 40 mg, Oral, QHS, Janelle Carrillo MD, 40 mg at 08/04/22 2101    clopidogreL (PLAVIX) tablet 75 mg, 75 mg, Oral, DAILY, Janelle Carrillo MD, 75 mg at 08/05/22 0815    potassium chloride (KLOR-CON) packet for solution 20 mEq, 20 mEq, Oral, BID WITH MEALS, Janelle Carrillo MD, 20 mEq at 08/05/22 3826    aspirin delayed-release tablet 81 mg, 81 mg, Oral, DAILY, Janelle Carrillo MD, 81 mg at 08/05/22 0815    lisinopriL (PRINIVIL, ZESTRIL) tablet 5 mg, 5 mg, Oral, DAILY, Janelle Carrillo MD, 5 mg at 08/05/22 0815    pantoprazole (PROTONIX) tablet 40 mg, 40 mg, Oral, DAILY, Janelle Carrillo MD, 40 mg at 08/05/22 0815    furosemide (LASIX) injection 40 mg, 40 mg, IntraVENous, BID, Janelle Carrillo MD, 40 mg at 08/05/22 0815    LAB DATA REVIEWED:    Recent Results (from the past 24 hour(s))   NT-PRO BNP    Collection Time: 08/04/22  2:32 PM   Result Value Ref Range    NT pro-BNP 5,967 (H) <450 pg/mL       XR Results (most recent):  Results from [place] encounter on 08/02/22    XR CHEST PORT    Narrative  EXAM: XR CHEST PORT    HISTORY: sob, h/o CHF. COMPARISON: 6/21/2022    FINDINGS: Portable AP. The hilar is mildly enlarged. There are moderate left and  mild right pleural effusions which have increased in the interval. There is  increased hazy opacification of the right lower lobe. There is increased  atelectasis left lower lobe. There is unchanged mild edema. The bones are  osteopenic. Multilevel spondylosis spine. Impression  1. Increased mild right and moderate left pleural effusions with increased  bibasilar atelectasis. 2. Unchanged mild edema. XR CHEST PORT   Final Result      1. Increased mild right and moderate left pleural effusions with increased   bibasilar atelectasis. 2. Unchanged mild edema. Review of Systems:  Constitutional: Generalized weakness  HENT: Negative. Eyes: Negative. Respiratory: Shortness of breath upon exertion. Cardiovascular: Chest pain and tightness  Gastrointestinal: Negative for abdominal pain and nausea. Skin: Cold hands. Neurological: Negative. Objective:   VITALS:    Visit Vitals  /69 (BP 1 Location: Right upper arm, BP Patient Position: Lying)   Pulse 77   Temp 97.4 °F (36.3 °C)   Resp 19   Ht 5' 2.01\" (1.575 m)   Wt 52 kg (114 lb 10.2 oz)   SpO2 93%   Breastfeeding Unknown   BMI 20.96 kg/m²       Physical Exam:   Constitutional: pt is oriented to person, place, and time. HENT:   Head: Normocephalic and atraumatic. Eyes: Pupils are equal, round, and reactive to light. EOM are normal.   Cardiovascular: Slightly increased rate. Pulmonary/Chest: Breath sounds normal in all fields. No longer has tachypnea   Exhibits no tenderness. Abdominal: Soft. Bowel sounds are normal. There is no abdominal tenderness. There is no rebound and no guarding. Musculoskeletal: Patient has baseline full range of motion   Neurological: pt is alert and oriented to person, place, and time. Alert. Normal strength.  No cranial nerve deficit or sensory deficit. Displays a negative Romberg sign. ASSESSMENT:    CHF (EF 30-35%)   Dilated cardiomypathy  Moderate mitral regurgitation  Pleural effusion, bilateral  Hx of cardiac stents  Diabetes  Dysphagia  Hyperlipidemia   Hypertension  Anxiety  Antral Gastritis  Distal esophagitis   Low k         PLAN:  Continue medications-    Aspirin, 81mg daily  Atorvastatin 40mg daily   Clopidogrel 75mg daily  Furosemide injection 40mg BID   Lisinopril 5mg daily  Metoprolol succinate 25mg   Pantoprazole 40mg daily  Potassium Chloride 20mEq BID    Hydroxyzine 25mg/mL injection Q6H PRN  Nitroglycerin . 4mg PRN    Follow up on EKG   Possible supplemental O2 therapy if stats <93%  Follow renal function, blood counts, electrolytes, glucose  Monitor fluid balance and cardiac enzymes  PT/OT        Current Facility-Administered Medications:     nitroglycerin (NITROSTAT) 0.4 mg tablet, , , ,     nitroglycerin (NITROSTAT) tablet 0.4 mg, 0.4 mg, SubLINGual, PRN, Mahin Gonzalse MD    hydrOXYzine (VISTARIL) 25 mg/mL injection 25 mg, 25 mg, IntraMUSCular, Q6H PRN, Janelle Carrillo MD, 25 mg at 08/03/22 0256    metoprolol succinate (TOPROL-XL) XL tablet 25 mg, 25 mg, Oral, DAILY, Janelle Carrillo MD, 25 mg at 08/05/22 0815    atorvastatin (LIPITOR) tablet 40 mg, 40 mg, Oral, QHS, Janelle Carrillo MD, 40 mg at 08/04/22 2101    clopidogreL (PLAVIX) tablet 75 mg, 75 mg, Oral, DAILY, Janelle Carrillo MD, 75 mg at 08/05/22 0815    potassium chloride (KLOR-CON) packet for solution 20 mEq, 20 mEq, Oral, BID WITH MEALS, Janelle Carrillo MD, 20 mEq at 08/05/22 0074    aspirin delayed-release tablet 81 mg, 81 mg, Oral, DAILY, Janelle Carrillo MD, 81 mg at 08/05/22 0815    lisinopriL (PRINIVIL, ZESTRIL) tablet 5 mg, 5 mg, Oral, DAILY, Janelle Carrillo MD, 5 mg at 08/05/22 0815    pantoprazole (PROTONIX) tablet 40 mg, 40 mg, Oral, DAILY, Janelle Carrillo MD, 40 mg at 08/05/22 0815    furosemide (LASIX) injection 40 mg, 40 mg, IntraVENous, Elyssa ELIZABETH MD, 40 mg at 08/05/22 5167     ________________________________________________________________________    Signed: Maurisio Bella

## 2022-08-05 NOTE — PROGRESS NOTES
PULMONARY NOTE  [facility]    Name: Kristan Meyers MRN: [id number]   : 1940 Hospital: [facility]   Date: 2022  Admission date: 2022 Hospital Day: 4       HPI:     Hospital Problems  Date Reviewed: 2022            Codes Class Noted POA    CHF exacerbation [facility]) ICD-10-CM: I50.9  ICD-9-CM: 428.0  2022 Unknown          [x] High complexity decision making was performed  [x] See my orders for details      Subjective/Initial History:     I was asked by Rob Nagy MD to see Kristan Meyers  a 79 y.o.  female in consultation     Excerpts from admission 2022 or consult notes as follows:   50-year-old lady came in because of shortness of breath and dyspnea past medical history of congestive heart failure, hypertension, hyperlipidemia and psychiatric illness she is noncompliant not happy in the emergency room she is complaining about dyspnea on exertion she is on room air also having swelling of the lower extremities chest x-ray shows bilateral pleural effusions the patient admitted and pulmonary consult was called.   She had a remote history of tobacco abuse last 2D echo showed ejection fraction of 35% she has coronary artery disease stent placed 531      Allergies   Allergen Reactions    [place] Itching    Peanut Butter Flavor Not Reported This Time     possible allergy        MAR reviewed and pertinent medications noted or modified as needed     Current Facility-Administered Medications   Medication    nitroglycerin (NITROSTAT) tablet 0.4 mg    nitroglycerin (NITROSTAT) 0.4 mg tablet    0.9% sodium chloride infusion 250 mL    hydrOXYzine (VISTARIL) 25 mg/mL injection 25 mg    metoprolol succinate (TOPROL-XL) XL tablet 25 mg    atorvastatin (LIPITOR) tablet 40 mg    clopidogreL (PLAVIX) tablet 75 mg    potassium chloride (KLOR-CON) packet for solution 20 mEq    aspirin delayed-release tablet 81 mg    lisinopriL (PRINIVIL, ZESTRIL) tablet 5 mg    pantoprazole (PROTONIX) tablet 40 mg    furosemide (LASIX) injection 40 mg      Patient PCP: Mg Ledbetter MD  PMH:  has a past medical history of CHF (congestive heart failure) ([address] Dysphagia, Heart failure ([address] Hyperlipidemia, Hypertension, Ill-defined condition, and Psychiatric disorder. PSH:   has a past surgical history that includes hx coronary stent placement (2022) and hx heart catheterization (2022). FHX: family history includes Diabetes in her father, mother, and another family member; Heart Disease in her father, mother, and another family member. SHX:  reports that she quit smoking about 58 years ago. She has never used smokeless tobacco. She reports that she does not drink alcohol and does not use drugs. ROS:    Review of Systems   Constitutional:  Positive for malaise/fatigue. Generalized weakness   HENT: Negative. Eyes: Negative. Respiratory:  Positive for sputum production. Negative for shortness of breath. Cardiovascular:  Positive for leg swelling. Negative for orthopnea. Gastrointestinal: Negative. Genitourinary: Negative. Musculoskeletal: Negative. Skin: Negative. Neurological: Negative. Psychiatric/Behavioral: Negative.         Objective:     Vital Signs: Telemetry:    normal sinus rhythm Intake/Output:   Visit Vitals  BP (!) 83/52 (BP 1 Location: Left upper arm, BP Patient Position: Semi fowlers)   Pulse 74   Temp 97.4 °F (36.3 °C)   Resp 22   Ht 5' 2.01\" (1.575 m)   Wt 52 kg (114 lb 10.2 oz)   SpO2 100%   Breastfeeding Unknown   BMI 20.96 kg/m²       Temp (24hrs), Av.7 °F (36.5 °C), Min:97.2 °F (36.2 °C), Max:98 °F (36.7 °C)        O2 Device: None (Room air)         Wt Readings from Last 4 Encounters:   22 52 kg (114 lb 10.2 oz)   22 54.9 kg (121 lb)   22 56 kg (123 lb 6.4 oz)   22 55.8 kg (123 lb)        No intake or output data in the 24 hours ending 22 0948      Last shift:      No intake/output data recorded. Last 3 shifts: 08/03 1901 - 08/05 0700  In: -   Out: 1100 [Urine:1100]       Physical Exam:     Physical Exam  Constitutional:       Appearance: Normal appearance. HENT:      Head: Normocephalic and atraumatic. Nose: Nose normal.      Mouth/Throat:      Mouth: Mucous membranes are moist.   Eyes:      Pupils: Pupils are equal, round, and reactive to light. Cardiovascular:      Rate and Rhythm: Normal rate. Pulses: Normal pulses. Pulmonary:      Breath sounds: Rales present. Abdominal:      General: Abdomen is flat. Bowel sounds are normal.      Palpations: Abdomen is soft. Musculoskeletal:         General: Swelling present. Cervical back: Normal range of motion. Right lower leg: Edema present. Left lower leg: Edema present. Skin:     General: Skin is warm. Neurological:      General: No focal deficit present. Mental Status: She is alert. Psychiatric:         Mood and Affect: Mood normal.        Labs:    Recent Labs     08/04/22  0736 08/02/22 2002   WBC 6.4 8.0   HGB 13.8 11.6    322       Recent Labs     08/04/22  0736 08/02/22 2002   * 138   K 3.1* 3.5   CL 96* 104   CO2 29 25   * 135*   BUN 12 10   CREA 0.53* 0.43*   CA 9.0 8.0*   MG  --  1.8   ALB 3.4* 3.2*   ALT 31 27       No results for input(s): PH, PCO2, PO2, HCO3, FIO2 in the last 72 hours. No results for input(s): CPK, CKNDX, TROIQ in the last 72 hours. No lab exists for component: CPKMB  No results found for: BNPP, BNP   Lab Results   Component Value Date/Time    Culture result: No growth 6 days 05/27/2022 12:59 PM     No results found for: TSH, TSHEXT, TSHEXT    Imaging:    CXR Results  (Last 48 hours)      None          Results from Hospital Encounter encounter on 08/02/22    XR CHEST PORT    Narrative  EXAM: XR CHEST PORT    HISTORY: sob, h/o CHF. COMPARISON: 6/21/2022    FINDINGS: Portable AP. The hilar is mildly enlarged.  There are moderate left and  mild right pleural effusions which have increased in the interval. There is  increased hazy opacification of the right lower lobe. There is increased  atelectasis left lower lobe. There is unchanged mild edema. The bones are  osteopenic. Multilevel spondylosis spine. Impression  1. Increased mild right and moderate left pleural effusions with increased  bibasilar atelectasis. 2. Unchanged mild edema. Results from [place] encounter on 06/21/22    XR CHEST PORT    Narrative  Chest single view. Comparison single view chest May 31, 2022. Left greater than right moderate volume dependent pleural effusions persist.  Mild dependent pulmonary interstitial edema. Bibasilar atelectasis. Cardiac and  mediastinal structures unchanged. No pneumothorax. Results from Hospital Encounter encounter on 05/27/22    XR CHEST PORT    Narrative  XR CHEST PORT    Comparison:  5/27/2022. Single view:  Stable pleural effusions and bibasilar atelectasis/pneumonia (left  greater than right). No pneumothorax apparent. The heart size is stable. Stable  hemodynamic status. Impression  No significant change. No results found for this or any previous visit.         IMPRESSION:   Congestive heart failure  HFrEF  Dilated cardiomyopathy ejection fraction 30 to 35%  Coronary artery disease with history of PCI to mid LAD 5/31/2022 and RCA disease treated medically  Moderate mitral regurgitation  Pleural effusion  Prognosis guarded   Low blood pressure  Chest pain      RECOMMENDATIONS/PLAN:   48-year-old lady came in because of shortness of breath and dyspnea generalized weakness swelling of the lower extremities given history of coronary artery disease congestive heart failure low ejection fraction chest x-ray shows bilateral pleural effusion shortness of breath most likely secondary to underlying congestive heart failure normal white count continue with the Lasix aggressive diuresis  Patient was having left upper chest discomfort radiating to the neck EKG ordered discussed with the son at bedside sublingual nitroglycerin  Supplemental O2 to keep sats > 93%  Aspiration precautions  Labs to follow electrolytes, renal function and and blood counts  Glucose monitoring and SSI  Bronchial hygiene with respiratory therapy techniques, bronchodilators  DVT, SUP prophylaxis       8/4: Pt states that she is feeling better. Complaining of sticky, yellow phlegm that she is coughing up. No dyspnea at this time. O2 sat 95%. 8/5: Pt still complaining of sputum production and now increased generalized weakness and anxiety. Pt recently c/o chest pain radiating to her jaw. EKG ordered. BP 85/53, O2 sat 93%.         Ankush Oseguera MD

## 2022-08-05 NOTE — PROGRESS NOTES
Pt with MD in room, pt c/o of chest area and neck pain. New orders for EKG, and nitro. Family at bedside. Pt given nitro x2 with moderate relief. Dr. Jamie Burden called to bedside, VS hypotensive after morning lasix and 2 nitro, 250cc bolus NS given. Pt anxiety decreased but still anxious. All questions answered to family.

## 2022-08-05 NOTE — PROGRESS NOTES
Physician Progress Note      [facility]  CSN #:                  [id number]  :                       1940  ADMIT DATE:       2022 5:06 PM  [address] [place] DATE:  RESPONDING  PROVIDER #:        Cassandra Felix MD [facility] MD          QUERY TEXT:    Pt admitted with AECHF and has malnutrition documented. Please further specify type of malnutrition with documentation in the medical record. The medical record reflects the following:  Risk Factors: CHF, cardiac stents, hypertension, hyperlipidemia, and psychiatric illness  Clinical Indicators: RD PN: \"Malnutrition Status:  Severe malnutrition, Weight Loss:  Greater than 7.5% over 3 months, 50% or less of est energy requirements for 5 or more days. \" BMI 20.96  Treatment: RD Consulted, Add ensure pudding 2x/day, magic cup 2x/day, Obtain updated measured weight as able    ASPEN Criteria:  https://aspenjournals. onlinelibrary. meneses. com/doi/full/10.1177/1716605803235897    Thank you,  SCOTT Eduardo, RN, [place], [facility] Specialist  [phone] or [email or web address]  Options provided:  -- Severe Malnutrition  -- Mild Malnutrition  -- Moderate Malnutrition  -- Other - I will add my own diagnosis  -- Disagree - Not applicable / Not valid  -- Disagree - Clinically unable to determine / Unknown  -- Refer to Clinical Documentation Reviewer    PROVIDER RESPONSE TEXT:    This patient has severe malnutrition.     Query created by: Fede Pruett on 2022 10:01 PM      Electronically signed by:  Cassandra Felix MD [facility] NOLVIA VINSON 2022 10:12 PM

## 2022-08-05 NOTE — PROGRESS NOTES
PT treatment attempted at 9:06 however, therapist told to hold by nursing as patient was not appropriate at this time. Nursing told therapist to possibly attempt patient later that day. Will continue to follow pt and will attempt treatment at a later time.  Thank you

## 2022-08-05 NOTE — PROGRESS NOTES
CM spoke with patient's son regarding issues with d/c to [facility] due to patient having Medicaid part A only. He stated that the patient was applied for Part B two weeks ago and they were waiting to hear back from Medicare regarding this and that his father, patient's , was contacting Medicare today to inquire about the process in order for patient to go to [facility]. CM explained that SNF could be an option as they are able to accept Part A only. Mr. Romeo Macdonald stated that they did not want to send referrals at this time and would like to check with Medicare first as [facility] was primary choice. CM to follow up at a later time regarding placement.

## 2022-08-05 NOTE — PROGRESS NOTES
History and Physical    NAME: Gleda Kanner. Caswell   :  1940   MRN:  [id number]     Date/Time:  2022 9:10 AM    Patient PCP: Joey Villarreal MD  ______________________________________________________________________             Subjective:     CHIEF COMPLAINT: Patient comes to the ED with complains of shortness of breath and swelling in her lower extremities. HISTORY OF PRESENT ILLNESS:       Patient is a 79y.o. year old female with a significant history of CHF, cardiac stents placement on May 31st, hypertension, hyperlipidemia, and psychiatric illness, coming to the ED complaining for shortness of breath over the last two days that has gotten progressively worse. Patient reports bilateral lower extremity edema. Left Ventricle: Severely reduced left ventricular systolic function with a visually estimated EF of 30 - 35%. Left ventricle is mildly dilated. Normal wall thickness    8/3    Patient was awake, alert, and resting in bed. She states that she is still feeling short on breath and that her stomach is cramping because she has to urinate but is worried about her catheter. Patient is having trouble swallowing and asked to be on a softer diet. She has no eaten much because of this. Patients BNP- 16,068          Patient was awake and alert and in no apparent distress. She denies shortness of breath except when expending energy such as eating or trying to ambulate. She states that her abdominal pain has subsided after the catheter was placed. Edema in LE significantly decreased. Patient was recommended a puree diet and mildly thick liquids via speech pathologist. She is currently tolerating new diet well and ate most of her meal.   Patient also stated this is the first time in a while that she has slept this well. Patient notes discomfort on her backside due to inability to readjust herself as well as discomfort in her hands because they're cold.      Telemetry reviewed reveals occasional episodes of 12 consecutive premature ventricular complex (PVCs).     Patient was trying to rest in bed. She was currently on nasal cannula oxygen and recently complained of chest tightness and pain. She also stated that she feels weak. On 7/15- results from Dr. [facility] of patient's EGD shows H. Pylori infection that was treated with antibiotics -Amoxicillin and metronidazole that should have been completed on 8/3. Discussed with the patient's son at the bedside concerned about severe anxiety      PT recommended inpatient rehab facility at discharge. Past Medical History:   Diagnosis Date    CHF (congestive heart failure) (HCC)     EF 30-35%    Dysphagia     Heart failure (HCC)     Hyperlipidemia     Hypertension     Ill-defined condition     patient unaware of any diagnosis due to recieving minimal medical care for the past 57 years    Psychiatric disorder     anxiety        Past Surgical History:   Procedure Laterality Date    HX CORONARY STENT PLACEMENT  2022    HX HEART CATHETERIZATION  2022    STENT PLACED       Social History     Tobacco Use    Smoking status: Former     Types: Cigarettes     Quit date: 1964     Years since quittin.6    Smokeless tobacco: Never   Substance Use Topics    Alcohol use: Never        Family History   Problem Relation Age of Onset    Diabetes Other     Heart Disease Other     Heart Disease Mother     Diabetes Mother     Diabetes Father     Heart Disease Father        Allergies   Allergen Reactions    [place] Itching    Peanut Butter Flavor Not Reported This Time     possible allergy        Prior to Admission medications    Medication Sig Start Date End Date Taking? Authorizing Provider   nitroglycerin (NITROSTAT) 0.4 mg SL tablet 0.4 mg by SubLINGual route every five (5) minutes as needed for Chest Pain. Up to 3 doses.    Yes Provider, Historical   lisinopriL (PRINIVIL, ZESTRIL) 5 mg tablet Take 5 mg by mouth daily. 6/6/22  Yes Provider, Historical   pantoprazole (PROTONIX) 40 mg tablet Take 40 mg by mouth daily. 6/6/22  Yes Provider, Historical   atorvastatin (LIPITOR) 40 mg tablet Take 1 Tablet by mouth nightly. 6/1/22  Yes Denver Dame, MD   clopidogreL (PLAVIX) 75 mg tab Take 1 Tablet by mouth daily. Indications: blood clot prevention following percutaneous coronary intervention 6/2/22  Yes Denver Dame, MD   carvediloL (COREG) 3.125 mg tablet Take 1 Tablet by mouth two (2) times daily (with meals). Indications: heart failure with reduced ejection fraction due to dilated cardiomyopathy 6/1/22  Yes Denver Dame, MD   aspirin delayed-release 81 mg tablet Take 81 mg by mouth daily.    Yes Provider, Historical         Current Facility-Administered Medications:     nitroglycerin (NITROSTAT) tablet 0.4 mg, 0.4 mg, SubLINGual, PRN, Mahin Gonzales MD, 0.4 mg at 08/05/22 0913    nitroglycerin (NITROSTAT) 0.4 mg tablet, , , ,     hydrOXYzine (VISTARIL) 25 mg/mL injection 25 mg, 25 mg, IntraMUSCular, Q6H PRN, Marco Benz MD, 25 mg at 08/03/22 0256    metoprolol succinate (TOPROL-XL) XL tablet 25 mg, 25 mg, Oral, DAILY, Marco Benz MD, 25 mg at 08/05/22 0815    atorvastatin (LIPITOR) tablet 40 mg, 40 mg, Oral, QHS, Janelle Carrillo MD, 40 mg at 08/04/22 2101    clopidogreL (PLAVIX) tablet 75 mg, 75 mg, Oral, DAILY, Janelle Carrillo MD, 75 mg at 08/05/22 0815    potassium chloride (KLOR-CON) packet for solution 20 mEq, 20 mEq, Oral, BID WITH MEALS, Janelle Carrillo MD, 20 mEq at 08/05/22 3856    aspirin delayed-release tablet 81 mg, 81 mg, Oral, DAILY, Janelle Carrillo MD, 81 mg at 08/05/22 0815    lisinopriL (PRINIVIL, ZESTRIL) tablet 5 mg, 5 mg, Oral, DAILY, Janelle Carirllo MD, 5 mg at 08/05/22 0815    pantoprazole (PROTONIX) tablet 40 mg, 40 mg, Oral, DAILY, Janelle Carrillo MD, 40 mg at 08/05/22 0815    furosemide (LASIX) injection 40 mg, 40 mg, IntraVENous, BID, Janelle Carrillo MD, 40 mg at 08/05/22 0815    LAB DATA REVIEWED:    Recent Results (from the past 24 hour(s))   CBC W/O DIFF    Collection Time: 08/05/22 10:29 AM   Result Value Ref Range    WBC 5.5 3.6 - 11.0 K/uL    RBC 4.52 3.80 - 5.20 M/uL    HGB 13.0 11.5 - 16.0 g/dL    HCT 39.8 35.0 - 47.0 %    MCV 88.1 80.0 - 99.0 FL    MCH 28.8 26.0 - 34.0 PG    MCHC 32.7 30.0 - 36.5 g/dL    RDW 16.9 (H) 11.5 - 14.5 %    PLATELET 324 643 - 183 K/uL    MPV 8.9 8.9 - 12.9 FL    NRBC 0.0 0.0  WBC    ABSOLUTE NRBC 0.00 0.00 - 7.41 K/uL   METABOLIC PANEL, COMPREHENSIVE    Collection Time: 08/05/22 10:29 AM   Result Value Ref Range    Sodium 135 (L) 136 - 145 mmol/L    Potassium 3.3 (L) 3.5 - 5.1 mmol/L    Chloride 97 97 - 108 mmol/L    CO2 30 21 - 32 mmol/L    Anion gap 8 5 - 15 mmol/L    Glucose 252 (H) 65 - 100 mg/dL    BUN 15 6 - 20 mg/dL    Creatinine 0.73 0.55 - 1.02 mg/dL    BUN/Creatinine ratio 21 (H) 12 - 20      GFR est AA >60 >60 ml/min/1.73m2    GFR est non-AA >60 >60 ml/min/1.73m2    Calcium 8.4 (L) 8.5 - 10.1 mg/dL    Bilirubin, total 1.0 0.2 - 1.0 mg/dL    AST (SGOT) 23 15 - 37 U/L    ALT (SGPT) 26 12 - 78 U/L    Alk. phosphatase 47 45 - 117 U/L    Protein, total 6.0 (L) 6.4 - 8.2 g/dL    Albumin 3.0 (L) 3.5 - 5.0 g/dL    Globulin 3.0 2.0 - 4.0 g/dL    A-G Ratio 1.0 (L) 1.1 - 2.2     TROPONIN-HIGH SENSITIVITY    Collection Time: 08/05/22 10:29 AM   Result Value Ref Range    Troponin-High Sensitivity 32 0 - 51 ng/L       XR Results (most recent):  Results from Hospital Encounter encounter on 08/02/22    XR CHEST PORT    Narrative  EXAM: XR CHEST PORT    HISTORY: sob, h/o CHF. COMPARISON: 6/21/2022    FINDINGS: Portable AP. The hilar is mildly enlarged. There are moderate left and  mild right pleural effusions which have increased in the interval. There is  increased hazy opacification of the right lower lobe. There is increased  atelectasis left lower lobe. There is unchanged mild edema. The bones are  osteopenic.  Multilevel spondylosis spine. Impression  1. Increased mild right and moderate left pleural effusions with increased  bibasilar atelectasis. 2. Unchanged mild edema. XR CHEST PORT   Final Result      1. Increased mild right and moderate left pleural effusions with increased   bibasilar atelectasis. 2. Unchanged mild edema. Review of Systems:  Constitutional: Generalized weakness  HENT: Negative. Eyes: Negative. Respiratory: Shortness of breath upon exertion. Cardiovascular: Chest pain and tightness  Gastrointestinal: Negative for abdominal pain and nausea. Skin: Cold hands. Neurological: Negative. Objective:   VITALS:    Visit Vitals  BP (!) 98/55 (BP 1 Location: Left upper arm, BP Patient Position: Lying)   Pulse 82   Temp 97.3 °F (36.3 °C)   Resp 18   Ht 5' 2.01\" (1.575 m)   Wt 52 kg (114 lb 10.2 oz)   SpO2 94%   Breastfeeding Unknown   BMI 20.96 kg/m²       Physical Exam:   Constitutional: pt is oriented to person, place, and time. HENT:   Head: Normocephalic and atraumatic. Eyes: Pupils are equal, round, and reactive to light. EOM are normal.   Cardiovascular: Slightly increased rate. Pulmonary/Chest: Breath sounds normal in all fields. No longer has tachypnea   Exhibits no tenderness. Abdominal: Soft. Bowel sounds are normal. There is no abdominal tenderness. There is no rebound and no guarding. Musculoskeletal: Patient has baseline full range of motion   Neurological: pt is alert and oriented to person, place, and time. Alert. Normal strength. No cranial nerve deficit or sensory deficit. Displays a negative Romberg sign.         ASSESSMENT:    CHF (EF 30-35%)   Dilated cardiomypathy  Moderate mitral regurgitation  Pleural effusion, bilateral  Hx of cardiac stents  Diabetes  Dysphagia  Hyperlipidemia   Hypertension  Anxiety  Antral Gastritis  Distal esophagitis   Low k         PLAN:  Continue medications-    Aspirin, 81mg daily  Atorvastatin 40mg daily   Clopidogrel 75mg daily  Furosemide injection 40mg BID   Lisinopril 5mg daily  Metoprolol succinate 25mg   Pantoprazole 40mg daily  Potassium Chloride 20mEq BID    Lexapro 10 mg daily    Hydroxyzine 25mg/mL injection Q6H PRN  Nitroglycerin . 4mg PRN    Follow up on EKG   Possible supplemental O2 therapy if stats <93%  Follow renal function, blood counts, electrolytes, glucose  Monitor fluid balance and cardiac enzymes  PT/OT        Current Facility-Administered Medications:     nitroglycerin (NITROSTAT) tablet 0.4 mg, 0.4 mg, SubLINGual, PRN, Mahin Gonzales MD, 0.4 mg at 08/05/22 0913    nitroglycerin (NITROSTAT) 0.4 mg tablet, , , ,     hydrOXYzine (VISTARIL) 25 mg/mL injection 25 mg, 25 mg, IntraMUSCular, Q6H PRN, Janelle Carrillo MD, 25 mg at 08/03/22 0256    metoprolol succinate (TOPROL-XL) XL tablet 25 mg, 25 mg, Oral, DAILY, Janelle Carrillo MD, 25 mg at 08/05/22 0815    atorvastatin (LIPITOR) tablet 40 mg, 40 mg, Oral, QHS, Janelle Carrillo MD, 40 mg at 08/04/22 2101    clopidogreL (PLAVIX) tablet 75 mg, 75 mg, Oral, DAILY, Janelle Carrillo MD, 75 mg at 08/05/22 0815    potassium chloride (KLOR-CON) packet for solution 20 mEq, 20 mEq, Oral, BID WITH MEALS, Janelle Carrillo MD, 20 mEq at 08/05/22 5152    aspirin delayed-release tablet 81 mg, 81 mg, Oral, DAILY, Janelle Carrillo MD, 81 mg at 08/05/22 0815    lisinopriL (PRINIVIL, ZESTRIL) tablet 5 mg, 5 mg, Oral, DAILY, Janelle Carrillo MD, 5 mg at 08/05/22 0815    pantoprazole (PROTONIX) tablet 40 mg, 40 mg, Oral, DAILY, Janelle Carrillo MD, 40 mg at 08/05/22 0815    furosemide (LASIX) injection 40 mg, 40 mg, IntraVENous, BID, Janelle Carrillo MD, 40 mg at 08/05/22 1469     ________________________________________________________________________    Signed: John Tineo MD

## 2022-08-05 NOTE — PROGRESS NOTES
Problem: Patient Education: Go to Patient Education Activity  Goal: Patient/Family Education  Outcome: Progressing Towards Goal     Problem: Pressure Injury - Risk of  Goal: *Prevention of pressure injury  Description: Document Manfred Scale and appropriate interventions in the flowsheet. Outcome: Progressing Towards Goal  Note: Pressure Injury Interventions:  Sensory Interventions: Assess changes in LOC, Float heels, Keep linens dry and wrinkle-free, Minimize linen layers, Monitor skin under medical devices, Pressure redistribution bed/mattress (bed type), Turn and reposition approx. every two hours (pillows and wedges if needed)    Moisture Interventions: Absorbent underpads, Apply protective barrier, creams and emollients    Activity Interventions: Assess need for specialty bed    Mobility Interventions: Assess need for specialty bed    Nutrition Interventions: Document food/fluid/supplement intake                     Problem: Patient Education: Go to Patient Education Activity  Goal: Patient/Family Education  Outcome: Progressing Towards Goal     Problem: Falls - Risk of  Goal: *Absence of Falls  Description: Document Theresa Starch Fall Risk and appropriate interventions in the flowsheet.   Outcome: Progressing Towards Goal  Note: Fall Risk Interventions:  Mobility Interventions: OT consult for ADLs, Patient to call before getting OOB, PT Consult for mobility concerns, PT Consult for assist device competence, Bed/chair exit alarm         Medication Interventions: Bed/chair exit alarm, Patient to call before getting OOB    Elimination Interventions: Bed/chair exit alarm, Call light in reach, Patient to call for help with toileting needs              Problem: Patient Education: Go to Patient Education Activity  Goal: Patient/Family Education  Outcome: Progressing Towards Goal     Problem: Patient Education: Go to Patient Education Activity  Goal: Patient/Family Education  Outcome: Progressing Towards Goal     Problem: Patient Education: Go to Patient Education Activity  Goal: Patient/Family Education  Outcome: Progressing Towards Goal

## 2022-08-05 NOTE — PROGRESS NOTES
Problem: Patient Education: Go to Patient Education Activity  Goal: Patient/Family Education  Outcome: Progressing Towards Goal     Problem: Pressure Injury - Risk of  Goal: *Prevention of pressure injury  Description: Document Manfred Scale and appropriate interventions in the flowsheet. Outcome: Progressing Towards Goal  Note: Pressure Injury Interventions:  Sensory Interventions: Assess changes in LOC, Float heels, Keep linens dry and wrinkle-free, Minimize linen layers, Monitor skin under medical devices, Pressure redistribution bed/mattress (bed type), Turn and reposition approx.  every two hours (pillows and wedges if needed)    Moisture Interventions: Absorbent underpads, Apply protective barrier, creams and emollients    Activity Interventions: Assess need for specialty bed    Mobility Interventions: Assess need for specialty bed    Nutrition Interventions: Document food/fluid/supplement intake

## 2022-08-05 NOTE — PROGRESS NOTES
History and Physical    NAME: Terence Orourke   :  1940   MRN:  [id number]     Date/Time:  2022 9:10 AM    Patient PCP: Shakir Conti MD  ______________________________________________________________________             Subjective:     CHIEF COMPLAINT: Patient comes to the ED with complains of shortness of breath and swelling in her lower extremities. HISTORY OF PRESENT ILLNESS:       Patient is a 79y.o. year old female with a significant history of CHF, cardiac stents placement on May 31st, hypertension, hyperlipidemia, and psychiatric illness, coming to the ED complaining for shortness of breath over the last two days that has gotten progressively worse. Patient reports bilateral lower extremity edema. Left Ventricle: Severely reduced left ventricular systolic function with a visually estimated EF of 30 - 35%. Left ventricle is mildly dilated. Normal wall thickness    8/3    Patient was awake, alert, and resting in bed. She states that she is still feeling short on breath and that her stomach is cramping because she has to urinate but is worried about her catheter. Patient is having trouble swallowing and asked to be on a softer diet. She has no eaten much because of this. Patients BNP- 16,068          Patient was awake and alert and in no apparent distress. She denies shortness of breath except when expending energy such as eating or trying to ambulate. She states that her abdominal pain has subsided after the catheter was placed. Edema in LE significantly decreased. Patient was recommended a puree diet and mildly thick liquids via speech pathologist. She is currently tolerating new diet well and ate most of her meal.   Patient also stated this is the first time in a while that she has slept this well. Patient notes discomfort on her backside due to inability to readjust herself as well as discomfort in her hands because they're cold.      Telemetry reviewed reveals occasional episodes of 12 consecutive premature ventricular complex (PVCs).     Patient was trying to rest in bed. She was currently on nasal cannula oxygen and recently complained of chest tightness and pain. She also stated that she feels weak. On 7/15- results from Dr. [facility] of patient's EGD shows H. Pylori infection that was treated with antibiotics -Amoxicillin and metronidazole that should have been completed on 8/3. PT recommended inpatient rehab facility at discharge. Past Medical History:   Diagnosis Date    CHF (congestive heart failure) ([facility])     EF 30-35%    Dysphagia     Heart failure ([facility])     Hyperlipidemia     Hypertension     Ill-defined condition     patient unaware of any diagnosis due to recieving minimal medical care for the past 57 years    Psychiatric disorder     anxiety        Past Surgical History:   Procedure Laterality Date    HX CORONARY STENT PLACEMENT  2022    HX HEART CATHETERIZATION  2022    STENT PLACED       Social History     Tobacco Use    Smoking status: Former     Types: Cigarettes     Quit date: 1964     Years since quittin.6    Smokeless tobacco: Never   Substance Use Topics    Alcohol use: Never        Family History   Problem Relation Age of Onset    Diabetes Other     Heart Disease Other     Heart Disease Mother     Diabetes Mother     Diabetes Father     Heart Disease Father        Allergies   Allergen Reactions    [place] Itching    Peanut Butter Flavor Not Reported This Time     possible allergy        Prior to Admission medications    Medication Sig Start Date End Date Taking? Authorizing Provider   nitroglycerin (NITROSTAT) 0.4 mg SL tablet 0.4 mg by SubLINGual route every five (5) minutes as needed for Chest Pain. Up to 3 doses. Yes Provider, Historical   lisinopriL (PRINIVIL, ZESTRIL) 5 mg tablet Take 5 mg by mouth daily.  22  Yes Provider, Historical   pantoprazole (PROTONIX) 40 mg tablet Take 40 mg by mouth daily. 6/6/22  Yes Provider, Historical   atorvastatin (LIPITOR) 40 mg tablet Take 1 Tablet by mouth nightly. 6/1/22  Yes Robert Payne MD   clopidogreL (PLAVIX) 75 mg tab Take 1 Tablet by mouth daily. Indications: blood clot prevention following percutaneous coronary intervention 6/2/22  Yes Robert Payne MD   carvediloL (COREG) 3.125 mg tablet Take 1 Tablet by mouth two (2) times daily (with meals). Indications: heart failure with reduced ejection fraction due to dilated cardiomyopathy 6/1/22  Yes Robert Payne MD   aspirin delayed-release 81 mg tablet Take 81 mg by mouth daily.    Yes Provider, Historical         Current Facility-Administered Medications:     nitroglycerin (NITROSTAT) tablet 0.4 mg, 0.4 mg, SubLINGual, PRN, Mahin Gonzales MD, 0.4 mg at 08/05/22 0913    nitroglycerin (NITROSTAT) 0.4 mg tablet, , , ,     hydrOXYzine (VISTARIL) 25 mg/mL injection 25 mg, 25 mg, IntraMUSCular, Q6H PRN, Dereje Garrido MD, 25 mg at 08/03/22 0256    metoprolol succinate (TOPROL-XL) XL tablet 25 mg, 25 mg, Oral, DAILY, Dereje Garrido MD, 25 mg at 08/05/22 0815    atorvastatin (LIPITOR) tablet 40 mg, 40 mg, Oral, QHS, Janelle Carrillo MD, 40 mg at 08/04/22 2101    clopidogreL (PLAVIX) tablet 75 mg, 75 mg, Oral, DAILY, Janelle Carrillo MD, 75 mg at 08/05/22 0815    potassium chloride (KLOR-CON) packet for solution 20 mEq, 20 mEq, Oral, BID WITH MEALS, Janelle Carrillo MD, 20 mEq at 08/05/22 9202    aspirin delayed-release tablet 81 mg, 81 mg, Oral, DAILY, Janelle Carrillo MD, 81 mg at 08/05/22 0815    lisinopriL (PRINIVIL, ZESTRIL) tablet 5 mg, 5 mg, Oral, DAILY, Janelle Carrillo MD, 5 mg at 08/05/22 0815    pantoprazole (PROTONIX) tablet 40 mg, 40 mg, Oral, DAILY, Janelle Carrillo MD, 40 mg at 08/05/22 0815    furosemide (LASIX) injection 40 mg, 40 mg, IntraVENous, BID, Janelle Carrillo MD, 40 mg at 08/05/22 0815    LAB DATA REVIEWED:    Recent Results (from the past 24 hour(s)) NT-PRO BNP    Collection Time: 08/04/22  2:32 PM   Result Value Ref Range    NT pro-BNP 5,967 (H) <450 pg/mL   CBC W/O DIFF    Collection Time: 08/05/22 10:29 AM   Result Value Ref Range    WBC 5.5 3.6 - 11.0 K/uL    RBC 4.52 3.80 - 5.20 M/uL    HGB 13.0 11.5 - 16.0 g/dL    HCT 39.8 35.0 - 47.0 %    MCV 88.1 80.0 - 99.0 FL    MCH 28.8 26.0 - 34.0 PG    MCHC 32.7 30.0 - 36.5 g/dL    RDW 16.9 (H) 11.5 - 14.5 %    PLATELET 618 284 - 753 K/uL    MPV 8.9 8.9 - 12.9 FL    NRBC 0.0 0.0  WBC    ABSOLUTE NRBC 0.00 0.00 - 4.08 K/uL   METABOLIC PANEL, COMPREHENSIVE    Collection Time: 08/05/22 10:29 AM   Result Value Ref Range    Sodium 135 (L) 136 - 145 mmol/L    Potassium 3.3 (L) 3.5 - 5.1 mmol/L    Chloride 97 97 - 108 mmol/L    CO2 30 21 - 32 mmol/L    Anion gap 8 5 - 15 mmol/L    Glucose 252 (H) 65 - 100 mg/dL    BUN 15 6 - 20 mg/dL    Creatinine 0.73 0.55 - 1.02 mg/dL    BUN/Creatinine ratio 21 (H) 12 - 20      GFR est AA >60 >60 ml/min/1.73m2    GFR est non-AA >60 >60 ml/min/1.73m2    Calcium 8.4 (L) 8.5 - 10.1 mg/dL    Bilirubin, total 1.0 0.2 - 1.0 mg/dL    AST (SGOT) 23 15 - 37 U/L    ALT (SGPT) 26 12 - 78 U/L    Alk. phosphatase 47 45 - 117 U/L    Protein, total 6.0 (L) 6.4 - 8.2 g/dL    Albumin 3.0 (L) 3.5 - 5.0 g/dL    Globulin 3.0 2.0 - 4.0 g/dL    A-G Ratio 1.0 (L) 1.1 - 2.2     TROPONIN-HIGH SENSITIVITY    Collection Time: 08/05/22 10:29 AM   Result Value Ref Range    Troponin-High Sensitivity 32 0 - 51 ng/L       XR Results (most recent):  Results from Hospital Encounter encounter on 08/02/22    XR CHEST PORT    Narrative  EXAM: XR CHEST PORT    HISTORY: sob, h/o CHF. COMPARISON: 6/21/2022    FINDINGS: Portable AP. The hilar is mildly enlarged. There are moderate left and  mild right pleural effusions which have increased in the interval. There is  increased hazy opacification of the right lower lobe. There is increased  atelectasis left lower lobe. There is unchanged mild edema.  The bones are  osteopenic. Multilevel spondylosis spine. Impression  1. Increased mild right and moderate left pleural effusions with increased  bibasilar atelectasis. 2. Unchanged mild edema. XR CHEST PORT   Final Result      1. Increased mild right and moderate left pleural effusions with increased   bibasilar atelectasis. 2. Unchanged mild edema. Review of Systems:  Constitutional: Generalized weakness  HENT: Negative. Eyes: Negative. Respiratory: Shortness of breath upon exertion. Cardiovascular: Chest pain and tightness  Gastrointestinal: Negative for abdominal pain and nausea. Skin: Cold hands. Neurological: Negative. Objective:   VITALS:    Visit Vitals  BP (!) 91/54 (BP 1 Location: Left upper arm, BP Patient Position: Semi fowlers)   Pulse 76   Temp 97.4 °F (36.3 °C)   Resp 20   Ht 5' 2.01\" (1.575 m)   Wt 52 kg (114 lb 10.2 oz)   SpO2 100%   Breastfeeding Unknown   BMI 20.96 kg/m²       Physical Exam:   Constitutional: pt is oriented to person, place, and time. HENT:   Head: Normocephalic and atraumatic. Eyes: Pupils are equal, round, and reactive to light. EOM are normal.   Cardiovascular: Slightly increased rate. Pulmonary/Chest: Breath sounds normal in all fields. No longer has tachypnea   Exhibits no tenderness. Abdominal: Soft. Bowel sounds are normal. There is no abdominal tenderness. There is no rebound and no guarding. Musculoskeletal: Patient has baseline full range of motion   Neurological: pt is alert and oriented to person, place, and time. Alert. Normal strength. No cranial nerve deficit or sensory deficit. Displays a negative Romberg sign.         ASSESSMENT:    CHF (EF 30-35%)   Dilated cardiomypathy  Moderate mitral regurgitation  Pleural effusion, bilateral  Hx of cardiac stents  Diabetes  Dysphagia  Hyperlipidemia   Hypertension  Anxiety  Antral Gastritis  Distal esophagitis   Low k         PLAN:  Continue medications-    Aspirin, 81mg daily  Atorvastatin 40mg daily   Clopidogrel 75mg daily  Furosemide injection 40mg BID   Lisinopril 5mg daily  Metoprolol succinate 25mg   Pantoprazole 40mg daily  Potassium Chloride 20mEq BID    Hydroxyzine 25mg/mL injection Q6H PRN  Nitroglycerin . 4mg PRN    Follow up on EKG   Possible supplemental O2 therapy if stats <93%  Follow renal function, blood counts, electrolytes, glucose  Monitor fluid balance and cardiac enzymes  PT/OT        Current Facility-Administered Medications:     nitroglycerin (NITROSTAT) tablet 0.4 mg, 0.4 mg, SubLINGual, PRN, Mahin Gonzales MD, 0.4 mg at 08/05/22 0913    nitroglycerin (NITROSTAT) 0.4 mg tablet, , , ,     hydrOXYzine (VISTARIL) 25 mg/mL injection 25 mg, 25 mg, IntraMUSCular, Q6H PRN, Janelle Carrillo MD, 25 mg at 08/03/22 0256    metoprolol succinate (TOPROL-XL) XL tablet 25 mg, 25 mg, Oral, DAILY, Janelle Carrillo MD, 25 mg at 08/05/22 0815    atorvastatin (LIPITOR) tablet 40 mg, 40 mg, Oral, QHS, Janelle Carrillo MD, 40 mg at 08/04/22 2101    clopidogreL (PLAVIX) tablet 75 mg, 75 mg, Oral, DAILY, Janelle Carrillo MD, 75 mg at 08/05/22 0815    potassium chloride (KLOR-CON) packet for solution 20 mEq, 20 mEq, Oral, BID WITH MEALS, Janelle Carrillo MD, 20 mEq at 08/05/22 3896    aspirin delayed-release tablet 81 mg, 81 mg, Oral, DAILY, Janelle Carrillo MD, 81 mg at 08/05/22 0815    lisinopriL (PRINIVIL, ZESTRIL) tablet 5 mg, 5 mg, Oral, DAILY, Janelle Carrillo MD, 5 mg at 08/05/22 0815    pantoprazole (PROTONIX) tablet 40 mg, 40 mg, Oral, DAILY, Janelle Carrillo MD, 40 mg at 08/05/22 0815    furosemide (LASIX) injection 40 mg, 40 mg, IntraVENous, BID, Negrita Carrilloul, MD, 40 mg at 08/05/22 9570     ________________________________________________________________________    Signed: Reza Upton

## 2022-08-05 NOTE — PROGRESS NOTES
Pt with BP trending back down, and MEWS 3.  MD paged, and charge nurse made aware. Pt comfortable in bed, resting on and off, easily awakened. Less anxious, pt states \"it just suddenly comes on like that at home too. \"

## 2022-08-05 NOTE — PROGRESS NOTES
Progress Note      8/5/2022 2:92 AM  NAME: Emily Wolf   MRN:  [id number]   Admit Diagnosis: [facility] [I50.9]      Problem List:   1. Noncompliance  2. Patient presents for evaluation of shortness of breath  3. Acute/chronic heart failure  4. Chronic heartfailure with reduced ejection fraction (HFr EF)  5. Dilated cardiomyopathy (ejection fraction = 30-35%)  6. Coronary artery disease       6a. PCI of the mid LAD (5/31/2022)       6b. RCA disease to be treated medically  7. Moderate mitral regurgitation  8. Possible family history of premature coronary artery disease  9. Former smoker  8. Elevated pro-BNP (16,068 pg/mL)    11. Pleural effusions  12. Dysphagia  13. Hypocalcemia       Subjective: The patient is seen and examined in room 487. There were no acute cardiovascular events reported overnight. Currently, the patient describes some chest discomfort. She describes retrosternal pressure or tightness. She indicates the symptoms have persisted for approximately half an hour. She did not inform the nursing staff. .     Medications Personally Reviewed:    Current Facility-Administered Medications   Medication Dose Route Frequency    hydrOXYzine (VISTARIL) 25 mg/mL injection 25 mg  25 mg IntraMUSCular Q6H PRN    metoprolol succinate (TOPROL-XL) XL tablet 25 mg  25 mg Oral DAILY    atorvastatin (LIPITOR) tablet 40 mg  40 mg Oral QHS    clopidogreL (PLAVIX) tablet 75 mg  75 mg Oral DAILY    potassium chloride (KLOR-CON) packet for solution 20 mEq  20 mEq Oral BID WITH MEALS    aspirin delayed-release tablet 81 mg  81 mg Oral DAILY    lisinopriL (PRINIVIL, ZESTRIL) tablet 5 mg  5 mg Oral DAILY    pantoprazole (PROTONIX) tablet 40 mg  40 mg Oral DAILY    furosemide (LASIX) injection 40 mg  40 mg IntraVENous BID           Objective:      Physical Exam:  Essentially unchanged  Last 24hrs VS reviewed since prior progress note.  Most recent are:    Visit Vitals  /69 (BP 1 Location: Right upper arm, BP Patient Position: Lying)   Pulse 77   Temp 97.4 °F (36.3 °C)   Resp 19   Ht 5' 2.01\" (1.575 m)   Wt 52 kg (114 lb 10.2 oz)   SpO2 93%   Breastfeeding Unknown   BMI 20.96 kg/m²     No intake or output data in the 24 hours ending 08/05/22 0819       Data Review    Telemetry: Sinus rhythm without ventricular ectopy    EKG:   []  No new EKG for review    Lab Data Personally Reviewed:    Recent Labs     08/04/22  0736 08/02/22 2002   WBC 6.4 8.0   HGB 13.8 11.6   HCT 42.0 35.6    322     No results for input(s): INR, PTP, APTT, INREXT in the last 72 hours. Recent Labs     08/04/22  0736 08/02/22 2002   * 138   K 3.1* 3.5   CL 96* 104   CO2 29 25   BUN 12 10   CREA 0.53* 0.43*   * 135*   CA 9.0 8.0*   MG  --  1.8     No results for input(s): CPK, CKNDX, TROIQ in the last 72 hours. No lab exists for component: CPKMB  Lab Results   Component Value Date/Time    Cholesterol, total 139 05/30/2022 07:33 AM    HDL Cholesterol 47 05/30/2022 07:33 AM    LDL, calculated 71.2 05/30/2022 07:33 AM    Triglyceride 104 05/30/2022 07:33 AM    CHOL/HDL Ratio 3.0 05/30/2022 07:33 AM       Recent Labs     08/04/22  0736 08/02/22 2002   AP 53 45   TP 6.6 5.8*   ALB 3.4* 3.2*   GLOB 3.2 2.6     No results for input(s): PH, PCO2, PO2 in the last 72 hours. Assessment/Plan:   1. Continue telemetry monitor  2. Obtain cardiac enzymes  3. Continue to monitor serum electrolytes, and renal function  4. Continue to monitor fluid balance, daily weights  5. Continue current cardiovascular medications including aspirin, atorvastatin, clopidogrel, furosemide, lisinopril, and metoprolol    6.   Further recommendations depending on above results, and clinical course     Keny Richardson MD

## 2022-08-05 NOTE — PROGRESS NOTES
Skilled ST attempted. However, the patient was asleep and unable to be aroused. Nurse stated she had been having panic attacks. SLP did not want to wake. Treatment will be attempted again tomorrow.

## 2022-08-06 LAB
ALBUMIN SERPL-MCNC: 3.4 G/DL (ref 3.5–5)
ALBUMIN/GLOB SERPL: 1 {RATIO} (ref 1.1–2.2)
ALP SERPL-CCNC: 51 U/L (ref 45–117)
ALT SERPL-CCNC: 30 U/L (ref 12–78)
ANION GAP SERPL CALC-SCNC: 5 MMOL/L (ref 5–15)
AST SERPL W P-5'-P-CCNC: 48 U/L (ref 15–37)
BILIRUB SERPL-MCNC: 1.2 MG/DL (ref 0.2–1)
BUN SERPL-MCNC: 13 MG/DL (ref 6–20)
BUN/CREAT SERPL: 22 (ref 12–20)
CA-I BLD-MCNC: 9 MG/DL (ref 8.5–10.1)
CHLORIDE SERPL-SCNC: 99 MMOL/L (ref 97–108)
CO2 SERPL-SCNC: 30 MMOL/L (ref 21–32)
CREAT SERPL-MCNC: 0.58 MG/DL (ref 0.55–1.02)
ERYTHROCYTE [DISTWIDTH] IN BLOOD BY AUTOMATED COUNT: 16.8 % (ref 11.5–14.5)
EST. AVERAGE GLUCOSE BLD GHB EST-MCNC: 143 MG/DL
GLOBULIN SER CALC-MCNC: 3.4 G/DL (ref 2–4)
GLUCOSE BLD STRIP.AUTO-MCNC: 109 MG/DL (ref 65–117)
GLUCOSE BLD STRIP.AUTO-MCNC: 135 MG/DL (ref 65–117)
GLUCOSE BLD STRIP.AUTO-MCNC: 142 MG/DL (ref 65–117)
GLUCOSE BLD STRIP.AUTO-MCNC: 211 MG/DL (ref 65–117)
GLUCOSE SERPL-MCNC: 147 MG/DL (ref 65–100)
HBA1C MFR BLD: 6.6 % (ref 4–5.6)
HCT VFR BLD AUTO: 43.6 % (ref 35–47)
HGB BLD-MCNC: 14 G/DL (ref 11.5–16)
MCH RBC QN AUTO: 28.6 PG (ref 26–34)
MCHC RBC AUTO-ENTMCNC: 32.1 G/DL (ref 30–36.5)
MCV RBC AUTO: 89.2 FL (ref 80–99)
NRBC # BLD: 0 K/UL (ref 0–0.01)
NRBC BLD-RTO: 0 PER 100 WBC
PERFORMED BY, TECHID: ABNORMAL
PERFORMED BY, TECHID: NORMAL
PLATELET # BLD AUTO: 310 K/UL (ref 150–400)
PMV BLD AUTO: 8.8 FL (ref 8.9–12.9)
POTASSIUM SERPL-SCNC: 5.1 MMOL/L (ref 3.5–5.1)
PROT SERPL-MCNC: 6.8 G/DL (ref 6.4–8.2)
RBC # BLD AUTO: 4.89 M/UL (ref 3.8–5.2)
SODIUM SERPL-SCNC: 134 MMOL/L (ref 136–145)
WBC # BLD AUTO: 5.2 K/UL (ref 3.6–11)

## 2022-08-06 PROCEDURE — 80053 COMPREHEN METABOLIC PANEL: CPT

## 2022-08-06 PROCEDURE — 97530 THERAPEUTIC ACTIVITIES: CPT

## 2022-08-06 PROCEDURE — 74011250637 HC RX REV CODE- 250/637: Performed by: INTERNAL MEDICINE

## 2022-08-06 PROCEDURE — 74011250636 HC RX REV CODE- 250/636: Performed by: FAMILY MEDICINE

## 2022-08-06 PROCEDURE — 65270000029 HC RM PRIVATE

## 2022-08-06 PROCEDURE — 74011250637 HC RX REV CODE- 250/637: Performed by: FAMILY MEDICINE

## 2022-08-06 PROCEDURE — 92526 ORAL FUNCTION THERAPY: CPT

## 2022-08-06 PROCEDURE — 36415 COLL VENOUS BLD VENIPUNCTURE: CPT

## 2022-08-06 PROCEDURE — 85027 COMPLETE CBC AUTOMATED: CPT

## 2022-08-06 PROCEDURE — 82962 GLUCOSE BLOOD TEST: CPT

## 2022-08-06 PROCEDURE — 74011636637 HC RX REV CODE- 636/637: Performed by: FAMILY MEDICINE

## 2022-08-06 RX ORDER — LEVOFLOXACIN 500 MG/1
500 TABLET, FILM COATED ORAL EVERY 24 HOURS
Status: DISCONTINUED | OUTPATIENT
Start: 2022-08-06 | End: 2022-08-09 | Stop reason: HOSPADM

## 2022-08-06 RX ORDER — GUAIFENESIN 100 MG/5ML
100 SOLUTION ORAL
Status: DISCONTINUED | OUTPATIENT
Start: 2022-08-06 | End: 2022-08-09 | Stop reason: HOSPADM

## 2022-08-06 RX ORDER — ISOSORBIDE MONONITRATE 30 MG/1
30 TABLET, EXTENDED RELEASE ORAL DAILY
Status: DISCONTINUED | OUTPATIENT
Start: 2022-08-06 | End: 2022-08-09 | Stop reason: HOSPADM

## 2022-08-06 RX ORDER — INSULIN GLARGINE 100 [IU]/ML
10 INJECTION, SOLUTION SUBCUTANEOUS DAILY
Status: DISCONTINUED | OUTPATIENT
Start: 2022-08-06 | End: 2022-08-09 | Stop reason: HOSPADM

## 2022-08-06 RX ORDER — BUDESONIDE AND FORMOTEROL FUMARATE DIHYDRATE 160; 4.5 UG/1; UG/1
2 AEROSOL RESPIRATORY (INHALATION)
Status: DISCONTINUED | OUTPATIENT
Start: 2022-08-06 | End: 2022-08-09 | Stop reason: HOSPADM

## 2022-08-06 RX ORDER — DEXTROSE MONOHYDRATE 100 MG/ML
0-250 INJECTION, SOLUTION INTRAVENOUS AS NEEDED
Status: DISCONTINUED | OUTPATIENT
Start: 2022-08-06 | End: 2022-08-09 | Stop reason: HOSPADM

## 2022-08-06 RX ORDER — ALBUTEROL SULFATE 90 UG/1
2 AEROSOL, METERED RESPIRATORY (INHALATION)
Status: DISCONTINUED | OUTPATIENT
Start: 2022-08-06 | End: 2022-08-08

## 2022-08-06 RX ADMIN — ISOSORBIDE MONONITRATE 30 MG: 30 TABLET, EXTENDED RELEASE ORAL at 09:49

## 2022-08-06 RX ADMIN — ATORVASTATIN CALCIUM 40 MG: 40 TABLET, FILM COATED ORAL at 23:01

## 2022-08-06 RX ADMIN — FUROSEMIDE 40 MG: 10 INJECTION, SOLUTION INTRAMUSCULAR; INTRAVENOUS at 09:36

## 2022-08-06 RX ADMIN — POTASSIUM CHLORIDE 20 MEQ: 1.5 POWDER, FOR SOLUTION ORAL at 17:11

## 2022-08-06 RX ADMIN — CLOPIDOGREL BISULFATE 75 MG: 75 TABLET ORAL at 09:37

## 2022-08-06 RX ADMIN — ASPIRIN 81 MG: 81 TABLET, COATED ORAL at 09:37

## 2022-08-06 RX ADMIN — LEVOFLOXACIN 500 MG: 500 TABLET, FILM COATED ORAL at 12:08

## 2022-08-06 RX ADMIN — METOPROLOL SUCCINATE 25 MG: 25 TABLET, EXTENDED RELEASE ORAL at 09:37

## 2022-08-06 RX ADMIN — INSULIN LISPRO 3 UNITS: 100 INJECTION, SOLUTION INTRAVENOUS; SUBCUTANEOUS at 09:35

## 2022-08-06 RX ADMIN — INSULIN LISPRO 4 UNITS: 100 INJECTION, SOLUTION INTRAVENOUS; SUBCUTANEOUS at 17:11

## 2022-08-06 RX ADMIN — LISINOPRIL 5 MG: 10 TABLET ORAL at 09:37

## 2022-08-06 RX ADMIN — PANTOPRAZOLE SODIUM 40 MG: 40 TABLET, DELAYED RELEASE ORAL at 09:37

## 2022-08-06 RX ADMIN — POTASSIUM CHLORIDE 20 MEQ: 1.5 POWDER, FOR SOLUTION ORAL at 09:36

## 2022-08-06 RX ADMIN — ESCITALOPRAM OXALATE 10 MG: 10 TABLET ORAL at 09:00

## 2022-08-06 NOTE — PROGRESS NOTES
OCCUPATIONAL THERAPY TREATMENT  Patient: Clint Orourke (79 y.o. female)  Date: 8/6/2022  Diagnosis: CHF exacerbation ([facility] [I50.9] <principal problem not specified>      Precautions: fall risk    Chart, occupational therapy assessment, plan of care, and goals were reviewed. ASSESSMENT  Pt continues with skilled OT services and is progressing towards goals. Pt received semi-supine in bed upon arrival, AXO x4 and agreeable to OT tx at this time. Functional Mobility and Transfers for ADLs:  Bed Mobility:  Rolling: Stand-by assistance  Supine to Sit: Stand-by assistance  Sit to Supine: Stand-by assistance  Scooting: Stand-by assistance    Transfers:  Sit to Stand: Contact guard assistance;Stand-by assistance     Balance:  Intact, Seated EOB    ADL Intervention:  Feeding  Container Management: Total assistance (dependent)  Food to Mouth: Independent    Grooming  Grooming Assistance: Set-up; Stand-by assistance  Position Performed: Seated edge of bed  Washing Face: Set-up; Stand-by assistance  Brushing Teeth: Set-up; Stand-by assistance  Brushing/Combing Hair: Set-up; Stand-by assistance    Upper Body [address]: Minimum  assistance    Overall, pt continues to present with deficits in generalized strength/AROM, dynamic sitting and standing balance and functional activity tolerance during performance of ADLs/mobility; however is making steady progress towards noted OT goals. Will continue to progress. Recommend d/c to IRF once medically appropriate. Other factors to consider for discharge: family support, DME, time since onset, severity of deficits          PLAN :  Patient continues to benefit from skilled intervention to address the above impairments. Continue treatment per established plan of care. to address goals.     Recommend with staff: Encourage bed level ADLs as tolerated     Recommend next OT session: toileting/OOB ADL    Recommendation for discharge: (in order for the patient to meet his/her long term goals)  Inpatient Rehabilitation Facility     This discharge recommendation:  Has been made in collaboration with the attending provider and/or case management       SUBJECTIVE:   Patient stated I could really use some oomph.     OBJECTIVE DATA SUMMARY:   Cognitive/Behavioral Status:  Neurologic State: Alert  Orientation Level: Oriented X4  Cognition: Follows commands             Functional Mobility and Transfers for ADLs:  Bed Mobility:  Rolling: Stand-by assistance  Supine to Sit: Stand-by assistance  Sit to Supine: Stand-by assistance  Scooting: Stand-by assistance    Transfers:  Sit to Stand: Contact guard assistance;Stand-by assistance        ADL Intervention:  Feeding  Container Management: Total assistance (dependent)  Food to Mouth: Independent    Grooming  Grooming Assistance: Set-up; Stand-by assistance  Position Performed: Seated edge of bed  Washing Face: Set-up; Stand-by assistance  Brushing Teeth: Set-up; Stand-by assistance  Brushing/Combing Hair: Set-up; Stand-by assistance              Upper Body [address]: Minimum  assistance      Therapeutic Exercises:   Pt would benefit from UE HEP to improve overall UE AROM/strength and can be further educated in next treatment session. Pain:  0/10    Activity Tolerance:   Fair    After treatment patient left in no apparent distress:   Supine in bed, Patient positioned with pillow wedge to L side for pressure relief, Call bell within reach, Bed / chair alarm activated, and Side rails x 3    COMMUNICATION/COLLABORATION:   The patients plan of care was discussed with: Registered nurseJosé Fu  Time Calculation: 18 mins    Problem: Self Care Deficits Care Plan (Adult)  Goal: *Acute Goals and Plan of Care (Insert Text)  Description: Patient stated goal: \"Care for myself and be IND again\"    1. Pt will be mod I sup <> sit in prep for EOB ADLs  2. Pt will be mod I grooming sitting/standing at sink  3.  Pt will be mod I LE dressing sitting EOB/long sit  4. Pt will be mod I sit <>  prep for toileting LRAD  5. Pt will be mod I toileting/toilet transfer/cloth mgmt LRAD  6.  Pt will be mod I following UE HEP in prep for self care tasks      Outcome: Progressing Towards Goal

## 2022-08-06 NOTE — PROGRESS NOTES
Progress Note      8/6/2022 8:98 AM  NAME: Jitendra Paulson   MRN:  [id number]   Admit Diagnosis: CHF exacerbation ([facility] [I50.9]      Problem List:   1. Noncompliance prior to admission  2. Patient presents for evaluation of shortness of breath  3. Acute/chronic heart failure  4. Chronic heartfailure with reduced ejection fraction (HFr EF)  5. Dilated cardiomyopathy (ejection fraction = 30-35%)  6. Coronary artery disease       6a. PCI of the mid LAD (5/31/2022)       6b. RCA disease to be treated medically  7. Moderate mitral regurgitation  8. Possible family history of premature coronary artery disease  9. Former smoker  8. Elevated pro-BNP (16,068 pg/mL)    11. Pleural effusions  12. Dysphagia  13. Hypocalcemia       Subjective: The patient is seen and examined in room 487. There were no acute cardiovascular events reported overnight. Currently, she denies any cardiovascular complaints. Specifically, she denies chest pain or shortness of breath. She states overall she feels improved. The results of her cardiac enzymes including unremarkable high-sensitivity troponin I which shared with the patient.      Medications Personally Reviewed:    Current Facility-Administered Medications   Medication Dose Route Frequency    nitroglycerin (NITROSTAT) tablet 0.4 mg  0.4 mg SubLINGual PRN    escitalopram oxalate (LEXAPRO) tablet 10 mg  10 mg Oral DAILY    insulin lispro (HUMALOG) injection   SubCUTAneous AC&HS    glucose chewable tablet 16 g  4 Tablet Oral PRN    glucagon (GLUCAGEN) injection 1 mg  1 mg IntraMUSCular PRN    hydrOXYzine (VISTARIL) 25 mg/mL injection 25 mg  25 mg IntraMUSCular Q6H PRN    metoprolol succinate (TOPROL-XL) XL tablet 25 mg  25 mg Oral DAILY    atorvastatin (LIPITOR) tablet 40 mg  40 mg Oral QHS    clopidogreL (PLAVIX) tablet 75 mg  75 mg Oral DAILY    potassium chloride (KLOR-CON) packet for solution 20 mEq  20 mEq Oral BID WITH MEALS    aspirin delayed-release tablet 81 mg  81 mg Oral DAILY    lisinopriL (PRINIVIL, ZESTRIL) tablet 5 mg  5 mg Oral DAILY    pantoprazole (PROTONIX) tablet 40 mg  40 mg Oral DAILY    furosemide (LASIX) injection 40 mg  40 mg IntraVENous BID           Objective:      Physical Exam:  Essentially unchanged. Last 24hrs VS reviewed since prior progress note. Most recent are:    Visit Vitals  /68 (BP Patient Position: At rest;Lying)   Pulse 81   Temp 97.6 °F (36.4 °C)   Resp 20   Ht 5' 2.01\" (1.575 m)   Wt 52 kg (114 lb 10.2 oz)   SpO2 96%   Breastfeeding Unknown   BMI 20.96 kg/m²       Intake/Output Summary (Last 24 hours) at 8/6/2022 0931  Last data filed at 8/6/2022 0740  Gross per 24 hour   Intake --   Output 200 ml   Net -200 ml        Data Review    Telemetry: Sinus rhythm without ventricular ectopy. EKG:   []  No new EKG for review    Lab Data Personally Reviewed:    Recent Labs     08/05/22  1029 08/04/22  0736   WBC 5.5 6.4   HGB 13.0 13.8   HCT 39.8 42.0    307     No results for input(s): INR, PTP, APTT, INREXT in the last 72 hours. Recent Labs     08/05/22  1029 08/04/22  0736   * 133*   K 3.3* 3.1*   CL 97 96*   CO2 30 29   BUN 15 12   CREA 0.73 0.53*   * 124*   CA 8.4* 9.0     No results for input(s): CPK, CKNDX, TROIQ in the last 72 hours. No lab exists for component: CPKMB  Lab Results   Component Value Date/Time    Cholesterol, total 139 05/30/2022 07:33 AM    HDL Cholesterol 47 05/30/2022 07:33 AM    LDL, calculated 71.2 05/30/2022 07:33 AM    Triglyceride 104 05/30/2022 07:33 AM    CHOL/HDL Ratio 3.0 05/30/2022 07:33 AM       Recent Labs     08/05/22  1029 08/04/22  0736   AP 47 53   TP 6.0* 6.6   ALB 3.0* 3.4*   GLOB 3.0 3.2     No results for input(s): PH, PCO2, PO2 in the last 72 hours. Assessment/Plan:   1. Continue telemetry monitoring  2. Continue to monitor serum electrolytes, renal function  3.   Continue current cardiovascular medications including aspirin, atorvastatin, clopidogrel, furosemide, insulin, lisinopril, metoprolol, and potassium chloride  4. Add isosorbide mononitrate  5.   The patient is to follow-up in our office within 1 to 2 weeks after discharge     Saurabh Arreguin MD

## 2022-08-06 NOTE — PROGRESS NOTES
Problem: Falls - Risk of  Goal: *Absence of Falls  Description: Document Mila Gonzalez Fall Risk and appropriate interventions in the flowsheet.   Outcome: Progressing Towards Goal  Note: Fall Risk Interventions:  Mobility Interventions: OT consult for ADLs, Patient to call before getting OOB, PT Consult for mobility concerns, PT Consult for assist device competence, Bed/chair exit alarm         Medication Interventions: Bed/chair exit alarm, Patient to call before getting OOB    Elimination Interventions: Bed/chair exit alarm, Call light in reach, Patient to call for help with toileting needs

## 2022-08-06 NOTE — PROGRESS NOTES
Progress Note    Patient: Marc Arambula. Hossein Snow MRN: [id number]  SSN: [id number]    YOB: 1940  Age: 79 y.o. Sex: female      Admit Date: 8/2/2022    LOS: 4 days     Subjective:     79years old patient admitted for congestive heart failure complains of cough productive of sputum. Denies any chest pain    Objective:     Vitals:    08/06/22 0015 08/06/22 0400 08/06/22 0437 08/06/22 0817   BP: 127/65  (!) 99/50 109/68   Pulse: 80  76 81   Resp: 19  19 20   Temp: 97.9 °F (36.6 °C)  98 °F (36.7 °C) 97.6 °F (36.4 °C)   SpO2: 98%  98% 96%   Weight:  52 kg (114 lb 10.2 oz)     Height:            Intake and Output:  Current Shift: 08/06 0701 - 08/06 1900  In: -   Out: 200 [Urine:200]  Last three shifts: No intake/output data recorded. Physical Exam:   General:  Alert, cooperative, no distress, appears stated age. Eyes:  Conjunctivae/corneas clear. PERRL, EOMs intact. Fundi benign   Ears:  Normal TMs and external ear canals both ears. Nose: Nares normal. Septum midline. Mucosa normal. No drainage or sinus tenderness. Mouth/Throat: Lips, mucosa, and tongue normal. Teeth and gums normal.   Neck: Supple, symmetrical, trachea midline, no adenopathy, thyroid: no enlargment/tenderness/nodules, no carotid bruit and no JVD. Back:   Symmetric, no curvature. ROM normal. No CVA tenderness. Lungs:   Rales, ronchito auscultation bilaterally. Heart:  Regular rate and rhythm, S1, S2 normal, no murmur, click, rub or gallop. Abdomen:   Soft, non-tender. Bowel sounds normal. No masses,  No organomegaly. Extremities: Extremities normal, atraumatic, no cyanosis or edema. Pulses: 2+ and symmetric all extremities. Skin: Skin color, texture, turgor normal. No rashes or lesions   Lymph nodes: Cervical, supraclavicular, and axillary nodes normal.   Neurologic: CNII-XII intact. Normal strength, sensation and reflexes throughout. Lab/Data Review: All lab results for the last 24 hours reviewed. Recent Results (from the past 24 hour(s))   CBC W/O DIFF    Collection Time: 08/05/22 10:29 AM   Result Value Ref Range    WBC 5.5 3.6 - 11.0 K/uL    RBC 4.52 3.80 - 5.20 M/uL    HGB 13.0 11.5 - 16.0 g/dL    HCT 39.8 35.0 - 47.0 %    MCV 88.1 80.0 - 99.0 FL    MCH 28.8 26.0 - 34.0 PG    MCHC 32.7 30.0 - 36.5 g/dL    RDW 16.9 (H) 11.5 - 14.5 %    PLATELET 536 365 - 735 K/uL    MPV 8.9 8.9 - 12.9 FL    NRBC 0.0 0.0  WBC    ABSOLUTE NRBC 0.00 0.00 - 4.34 K/uL   METABOLIC PANEL, COMPREHENSIVE    Collection Time: 08/05/22 10:29 AM   Result Value Ref Range    Sodium 135 (L) 136 - 145 mmol/L    Potassium 3.3 (L) 3.5 - 5.1 mmol/L    Chloride 97 97 - 108 mmol/L    CO2 30 21 - 32 mmol/L    Anion gap 8 5 - 15 mmol/L    Glucose 252 (H) 65 - 100 mg/dL    BUN 15 6 - 20 mg/dL    Creatinine 0.73 0.55 - 1.02 mg/dL    BUN/Creatinine ratio 21 (H) 12 - 20      GFR est AA >60 >60 ml/min/1.73m2    GFR est non-AA >60 >60 ml/min/1.73m2    Calcium 8.4 (L) 8.5 - 10.1 mg/dL    Bilirubin, total 1.0 0.2 - 1.0 mg/dL    AST (SGOT) 23 15 - 37 U/L    ALT (SGPT) 26 12 - 78 U/L    Alk.  phosphatase 47 45 - 117 U/L    Protein, total 6.0 (L) 6.4 - 8.2 g/dL    Albumin 3.0 (L) 3.5 - 5.0 g/dL    Globulin 3.0 2.0 - 4.0 g/dL    A-G Ratio 1.0 (L) 1.1 - 2.2     TROPONIN-HIGH SENSITIVITY    Collection Time: 08/05/22 10:29 AM   Result Value Ref Range    Troponin-High Sensitivity 32 0 - 51 ng/L   NT-PRO BNP    Collection Time: 08/05/22  3:53 PM   Result Value Ref Range    NT pro-BNP 3,491 (H) <450 pg/mL   GLUCOSE, POC    Collection Time: 08/05/22  4:57 PM   Result Value Ref Range    Glucose (POC) 151 (H) 65 - 117 mg/dL    Performed by Prosper Iglesias, POC    Collection Time: 08/05/22  8:30 PM   Result Value Ref Range    Glucose (POC) 115 65 - 117 mg/dL    Performed by [address] [place] [address], POC    Collection Time: 08/06/22  8:20 AM   Result Value Ref Range    Glucose (POC) 142 (H) 65 - 117 mg/dL    Performed by Lucia Hurst Assessment:     Active Problems:    CHF exacerbation ([facility] [address] (8/2/2022)      Pneumonia   Copd EX/spasms    Place patient on levaquin  Albuterol and Symbicort    Signed By: Ru Ramos MD     August 6, 2022

## 2022-08-06 NOTE — PROGRESS NOTES
History and Physical    NAME: Sindy Orourke   :  1940   MRN:  [id number]     Date/Time:  2022 9:10 AM    Patient PCP: Tai Mcdermott MD  ______________________________________________________________________             Subjective:     CHIEF COMPLAINT: Patient comes to the ED with complains of shortness of breath and swelling in her lower extremities. HISTORY OF PRESENT ILLNESS:       Patient is a 79y.o. year old female with a significant history of CHF, cardiac stents placement on May 31st, hypertension, hyperlipidemia, and psychiatric illness, coming to the ED complaining for shortness of breath over the last two days that has gotten progressively worse. Patient reports bilateral lower extremity edema. Left Ventricle: Severely reduced left ventricular systolic function with a visually estimated EF of 30 - 35%. Left ventricle is mildly dilated. Normal wall thickness    8/3    Patient was awake, alert, and resting in bed. She states that she is still feeling short on breath and that her stomach is cramping because she has to urinate but is worried about her catheter. Patient is having trouble swallowing and asked to be on a softer diet. She has no eaten much because of this. Patients BNP- 16,068          Patient was awake and alert and in no apparent distress. She denies shortness of breath except when expending energy such as eating or trying to ambulate. She states that her abdominal pain has subsided after the catheter was placed. Edema in LE significantly decreased. Patient was recommended a puree diet and mildly thick liquids via speech pathologist. She is currently tolerating new diet well and ate most of her meal.   Patient also stated this is the first time in a while that she has slept this well. Patient notes discomfort on her backside due to inability to readjust herself as well as discomfort in her hands because they're cold.      Telemetry reviewed reveals occasional episodes of 12 consecutive premature ventricular complex (PVCs).     Patient was trying to rest in bed. She was currently on nasal cannula oxygen and recently complained of chest tightness and pain. She also stated that she feels weak. On 7/15- results from Dr. Prosper Arellano [facility] of patient's EGD shows H. Pylori infection that was treated with antibiotics -Amoxicillin and metronidazole that should have been completed on 8/3. Discussed with the patient's son at the bedside concerned about severe anxiety      PT recommended inpatient rehab facility at discharge.   Patient was alert and in bed trying to eat. She continues to have shortness or breath and often spits up white phlegm. Patient denies any blood with phlegm. Patient has no signs of lower extremity swelling. She reports her chest pain a 5 on a scale of 0-10. Talking for more than 30 seconds leads to more coughing. She reports having pain in her tailbone and was given a cushion to decrease the pressure in her back. Lungs are clear to auscultation. Heart sounds are normal S1/S2. Fasting glucose is still elevated (142 mg/dL), there are no new lab results or changes in her medication.           Past Medical History:   Diagnosis Date    CHF (congestive heart failure) (HCC)     EF 30-35%    Dysphagia     Heart failure (HCC)     Hyperlipidemia     Hypertension     Ill-defined condition     patient unaware of any diagnosis due to recieving minimal medical care for the past 57 years    Psychiatric disorder     anxiety        Past Surgical History:   Procedure Laterality Date    HX CORONARY STENT PLACEMENT  2022    HX HEART CATHETERIZATION  2022    STENT PLACED       Social History     Tobacco Use    Smoking status: Former     Types: Cigarettes     Quit date: 1964     Years since quittin.6    Smokeless tobacco: Never   Substance Use Topics    Alcohol use: Never        Family History   Problem Relation Age of Onset    Diabetes Other     Heart Disease Other     Heart Disease Mother     Diabetes Mother     Diabetes Father     Heart Disease Father        Allergies   Allergen Reactions    [place] Itching    Peanut Butter Flavor Not Reported This Time     possible allergy        Prior to Admission medications    Medication Sig Start Date End Date Taking? Authorizing Provider   nitroglycerin (NITROSTAT) 0.4 mg SL tablet 0.4 mg by SubLINGual route every five (5) minutes as needed for Chest Pain. Up to 3 doses. Yes Provider, Historical   lisinopriL (PRINIVIL, ZESTRIL) 5 mg tablet Take 5 mg by mouth daily. 6/6/22  Yes Provider, Historical   pantoprazole (PROTONIX) 40 mg tablet Take 40 mg by mouth daily. 6/6/22  Yes Provider, Historical   atorvastatin (LIPITOR) 40 mg tablet Take 1 Tablet by mouth nightly. 6/1/22  Yes Tad Given, MD   clopidogreL (PLAVIX) 75 mg tab Take 1 Tablet by mouth daily. Indications: blood clot prevention following percutaneous coronary intervention 6/2/22  Yes Tad Given, MD   carvediloL (COREG) 3.125 mg tablet Take 1 Tablet by mouth two (2) times daily (with meals). Indications: heart failure with reduced ejection fraction due to dilated cardiomyopathy 6/1/22  Yes Tad Given, MD   aspirin delayed-release 81 mg tablet Take 81 mg by mouth daily.    Yes Provider, Historical         Current Facility-Administered Medications:     nitroglycerin (NITROSTAT) tablet 0.4 mg, 0.4 mg, SubLINGual, PRN, Mahin Gonzales MD, 0.4 mg at 08/05/22 0913    escitalopram oxalate (LEXAPRO) tablet 10 mg, 10 mg, Oral, DAILY, Janelle Carrillo MD    insulin lispro (HUMALOG) injection, , SubCUTAneous, AC&HS, Janelle Carrillo MD, 3 Units at 08/05/22 1701    glucose chewable tablet 16 g, 4 Tablet, Oral, PRN, Janelle Carrillo MD    glucagon (GLUCAGEN) injection 1 mg, 1 mg, IntraMUSCular, PRN, Janelle Carrillo MD    hydrOXYzine (VISTARIL) 25 mg/mL injection 25 mg, 25 mg, IntraMUSCular, Q6H PRN, Lorena Alis Reza MD, 25 mg at 08/03/22 5506    metoprolol succinate (TOPROL-XL) XL tablet 25 mg, 25 mg, Oral, DAILY, Janelle Carrillo MD, 25 mg at 08/05/22 0815    atorvastatin (LIPITOR) tablet 40 mg, 40 mg, Oral, QHS, Janelle Carrillo MD, 40 mg at 08/05/22 2024    clopidogreL (PLAVIX) tablet 75 mg, 75 mg, Oral, DAILY, Janelle Carrillo MD, 75 mg at 08/05/22 0815    potassium chloride (KLOR-CON) packet for solution 20 mEq, 20 mEq, Oral, BID WITH MEALS, Janelle Carrillo MD, 20 mEq at 08/05/22 1643    aspirin delayed-release tablet 81 mg, 81 mg, Oral, DAILY, Janelle Carrillo MD, 81 mg at 08/05/22 0815    lisinopriL (PRINIVIL, ZESTRIL) tablet 5 mg, 5 mg, Oral, DAILY, Fabiola Parks MD, 5 mg at 08/05/22 0815    pantoprazole (PROTONIX) tablet 40 mg, 40 mg, Oral, DAILY, Fabiola Parks MD, 40 mg at 08/05/22 0815    furosemide (LASIX) injection 40 mg, 40 mg, IntraVENous, BID, Janelle Carrillo MD, 40 mg at 08/05/22 0815    LAB DATA REVIEWED:    Recent Results (from the past 24 hour(s))   CBC W/O DIFF    Collection Time: 08/05/22 10:29 AM   Result Value Ref Range    WBC 5.5 3.6 - 11.0 K/uL    RBC 4.52 3.80 - 5.20 M/uL    HGB 13.0 11.5 - 16.0 g/dL    HCT 39.8 35.0 - 47.0 %    MCV 88.1 80.0 - 99.0 FL    MCH 28.8 26.0 - 34.0 PG    MCHC 32.7 30.0 - 36.5 g/dL    RDW 16.9 (H) 11.5 - 14.5 %    PLATELET 813 047 - 026 K/uL    MPV 8.9 8.9 - 12.9 FL    NRBC 0.0 0.0  WBC    ABSOLUTE NRBC 0.00 0.00 - 1.86 K/uL   METABOLIC PANEL, COMPREHENSIVE    Collection Time: 08/05/22 10:29 AM   Result Value Ref Range    Sodium 135 (L) 136 - 145 mmol/L    Potassium 3.3 (L) 3.5 - 5.1 mmol/L    Chloride 97 97 - 108 mmol/L    CO2 30 21 - 32 mmol/L    Anion gap 8 5 - 15 mmol/L    Glucose 252 (H) 65 - 100 mg/dL    BUN 15 6 - 20 mg/dL    Creatinine 0.73 0.55 - 1.02 mg/dL    BUN/Creatinine ratio 21 (H) 12 - 20      GFR est AA >60 >60 ml/min/1.73m2    GFR est non-AA >60 >60 ml/min/1.73m2    Calcium 8.4 (L) 8.5 - 10.1 mg/dL    Bilirubin, total 1.0 0.2 - 1.0 mg/dL    AST (SGOT) 23 15 - 37 U/L    ALT (SGPT) 26 12 - 78 U/L    Alk. phosphatase 47 45 - 117 U/L    Protein, total 6.0 (L) 6.4 - 8.2 g/dL    Albumin 3.0 (L) 3.5 - 5.0 g/dL    Globulin 3.0 2.0 - 4.0 g/dL    A-G Ratio 1.0 (L) 1.1 - 2.2     TROPONIN-HIGH SENSITIVITY    Collection Time: 08/05/22 10:29 AM   Result Value Ref Range    Troponin-High Sensitivity 32 0 - 51 ng/L   NT-PRO BNP    Collection Time: 08/05/22  3:53 PM   Result Value Ref Range    NT pro-BNP 3,491 (H) <450 pg/mL   GLUCOSE, POC    Collection Time: 08/05/22  4:57 PM   Result Value Ref Range    Glucose (POC) 151 (H) 65 - 117 mg/dL    Performed by [address] [place] [address], POC    Collection Time: 08/05/22  8:30 PM   Result Value Ref Range    Glucose (POC) 115 65 - 117 mg/dL    Performed by [address] [place] [address], POC    Collection Time: 08/06/22  8:20 AM   Result Value Ref Range    Glucose (POC) 142 (H) 65 - 117 mg/dL    Performed by COLIN PENN        XR Results (most recent):  Results from Hospital Encounter encounter on 08/02/22    XR CHEST PORT    Narrative  EXAM: XR CHEST PORT    HISTORY: sob, h/o CHF. COMPARISON: 6/21/2022    FINDINGS: Portable AP. The hilar is mildly enlarged. There are moderate left and  mild right pleural effusions which have increased in the interval. There is  increased hazy opacification of the right lower lobe. There is increased  atelectasis left lower lobe. There is unchanged mild edema. The bones are  osteopenic. Multilevel spondylosis spine. Impression  1. Increased mild right and moderate left pleural effusions with increased  bibasilar atelectasis. 2. Unchanged mild edema. XR CHEST PORT   Final Result      1. Increased mild right and moderate left pleural effusions with increased   bibasilar atelectasis. 2. Unchanged mild edema. Review of Systems:  Constitutional: Generalized weakness  HENT: Negative. Eyes: Negative. Respiratory: Shortness of breath upon exertion. Cardiovascular: Chest pain and tightness  Gastrointestinal: Negative for abdominal pain and nausea. Skin: Cold hands. Neurological: Negative. Objective:   VITALS:    Visit Vitals  /68 (BP Patient Position: At rest;Lying)   Pulse 81   Temp 97.6 °F (36.4 °C)   Resp 20   Ht 5' 2.01\" (1.575 m)   Wt 114 lb 10.2 oz (52 kg)   SpO2 96%   Breastfeeding Unknown   BMI 20.96 kg/m²       Physical Exam:   Constitutional: pt is oriented to person, place, and time. HENT:   Head: Normocephalic and atraumatic. Eyes: Pupils are equal, round, and reactive to light. EOM are normal.   Cardiovascular: Slightly increased rate. Pulmonary/Chest: Breath sounds normal in all fields. No longer has tachypnea   Exhibits no tenderness. Abdominal: Soft. Bowel sounds are normal. There is no abdominal tenderness. There is no rebound and no guarding. Musculoskeletal: Patient has baseline full range of motion   Neurological: pt is alert and oriented to person, place, and time. Alert. Normal strength. No cranial nerve deficit or sensory deficit. Displays a negative Romberg sign. ASSESSMENT:    CHF (EF 30-35%)   Dilated cardiomypathy  Moderate mitral regurgitation  Pleural effusion, bilateral  Hx of cardiac stents  Diabetes  Dysphagia  Hyperlipidemia   Hypertension  Anxiety  Antral Gastritis  Distal esophagitis   Low k         PLAN:  Continue medications-    Aspirin, 81mg daily  Atorvastatin 40mg daily   Clopidogrel 75mg daily  Furosemide injection 40mg BID   Lisinopril 5mg daily  Metoprolol succinate 25mg   Pantoprazole 40mg daily  Potassium Chloride 20mEq BID    Lexapro 10 mg daily    Hydroxyzine 25mg/mL injection Q6H PRN  Nitroglycerin . 4mg PRN    Follow up on EKG   Possible supplemental O2 therapy if stats <93%  Follow renal function, blood counts, electrolytes, glucose  Monitor fluid balance and cardiac enzymes  PT/OT        Current Facility-Administered Medications:     nitroglycerin (NITROSTAT) tablet 0.4 mg, 0.4 mg, SubLINGual, PRN, Mahin Gonzales MD, 0.4 mg at 08/05/22 0913    escitalopram oxalate (LEXAPRO) tablet 10 mg, 10 mg, Oral, DAILY, Janelle Carrillo MD    insulin lispro (HUMALOG) injection, , SubCUTAneous, AC&HS, Daquan Acuña MD, 3 Units at 08/05/22 1701    glucose chewable tablet 16 g, 4 Tablet, Oral, PRN, Janelle Carrillo MD    glucagon (GLUCAGEN) injection 1 mg, 1 mg, IntraMUSCular, PRN, Janelle Carrillo MD    hydrOXYzine (VISTARIL) 25 mg/mL injection 25 mg, 25 mg, IntraMUSCular, Q6H PRN, Daquan Acuña MD, 25 mg at 08/03/22 0256    metoprolol succinate (TOPROL-XL) XL tablet 25 mg, 25 mg, Oral, DAILY, Daquan Acuña MD, 25 mg at 08/05/22 0815    atorvastatin (LIPITOR) tablet 40 mg, 40 mg, Oral, QHS, Janelle Carrillo MD, 40 mg at 08/05/22 2024    clopidogreL (PLAVIX) tablet 75 mg, 75 mg, Oral, DAILY, Janelle Carrillo MD, 75 mg at 08/05/22 0815    potassium chloride (KLOR-CON) packet for solution 20 mEq, 20 mEq, Oral, BID WITH MEALS, Janelle Carrillo MD, 20 mEq at 08/05/22 1643    aspirin delayed-release tablet 81 mg, 81 mg, Oral, DAILY, Janelle Carrillo MD, 81 mg at 08/05/22 0815    lisinopriL (PRINIVIL, ZESTRIL) tablet 5 mg, 5 mg, Oral, DAILY, Janelle Carrillo MD, 5 mg at 08/05/22 0815    pantoprazole (PROTONIX) tablet 40 mg, 40 mg, Oral, DAILY, Janelle Carrillo MD, 40 mg at 08/05/22 0815    furosemide (LASIX) injection 40 mg, 40 mg, IntraVENous, BID, Janelle Carrillo MD, 40 mg at 08/05/22 4641     ________________________________________________________________________    Signed: Stefani Marten

## 2022-08-06 NOTE — PROGRESS NOTES
PULMONARY NOTE  [facility]    Name: Devonte Woods MRN: [id number]   : 1940 Hospital: [facility]   Date: 2022  Admission date: 2022 Hospital Day: 5       HPI:     Hospital Problems  Date Reviewed: 2022            Codes Class Noted POA    CHF exacerbation [facility]) ICD-10-CM: I50.9  ICD-9-CM: 428.0  2022 Unknown          [x] High complexity decision making was performed  [x] See my orders for details      Subjective/Initial History:     I was asked by Suzi Velasquez MD to see Devonte Woods  a 79 y.o.  female in consultation     Excerpts from admission 2022 or consult notes as follows:   69-year-old lady came in because of shortness of breath and dyspnea past medical history of congestive heart failure, hypertension, hyperlipidemia and psychiatric illness she is noncompliant not happy in the emergency room she is complaining about dyspnea on exertion she is on room air also having swelling of the lower extremities chest x-ray shows bilateral pleural effusions the patient admitted and pulmonary consult was called.   She had a remote history of tobacco abuse last 2D echo showed ejection fraction of 35% she has coronary artery disease stent placed 531      Allergies   Allergen Reactions    [place] Itching    Peanut Butter Flavor Not Reported This Time     possible allergy        MAR reviewed and pertinent medications noted or modified as needed     Current Facility-Administered Medications   Medication    nitroglycerin (NITROSTAT) tablet 0.4 mg    escitalopram oxalate (LEXAPRO) tablet 10 mg    insulin lispro (HUMALOG) injection    glucose chewable tablet 16 g    glucagon (GLUCAGEN) injection 1 mg    hydrOXYzine (VISTARIL) 25 mg/mL injection 25 mg    metoprolol succinate (TOPROL-XL) XL tablet 25 mg    atorvastatin (LIPITOR) tablet 40 mg    clopidogreL (PLAVIX) tablet 75 mg    potassium chloride (KLOR-CON) packet for solution 20 mEq    aspirin delayed-release tablet 81 mg    lisinopriL (PRINIVIL, ZESTRIL) tablet 5 mg    pantoprazole (PROTONIX) tablet 40 mg    furosemide (LASIX) injection 40 mg      Patient PCP: Fredrick Mcfarlane MD  PMH:  has a past medical history of CHF (congestive heart failure) ([facility] [address] Dysphagia, Heart failure ([facility] [address] Hyperlipidemia, Hypertension, Ill-defined condition, and Psychiatric disorder. PSH:   has a past surgical history that includes hx coronary stent placement (2022) and hx heart catheterization (2022). FHX: family history includes Diabetes in her father, mother, and another family member; Heart Disease in her father, mother, and another family member. SHX:  reports that she quit smoking about 58 years ago. She has never used smokeless tobacco. She reports that she does not drink alcohol and does not use drugs. ROS:    Review of Systems   Constitutional:  Positive for malaise/fatigue. Generalized weakness   HENT: Negative. Eyes: Negative. Respiratory:  Positive for sputum production. Negative for shortness of breath. Cardiovascular:  Positive for leg swelling. Negative for chest pain and orthopnea. Gastrointestinal: Negative. Genitourinary: Negative. Musculoskeletal: Negative. Skin: Negative. Neurological: Negative. Psychiatric/Behavioral: Negative.         Objective:     Vital Signs: Telemetry:    normal sinus rhythm Intake/Output:   Visit Vitals  /68 (BP Patient Position: At rest;Lying)   Pulse 81   Temp 97.6 °F (36.4 °C)   Resp 20   Ht 5' 2.01\" (1.575 m)   Wt 114 lb 10.2 oz (52 kg)   SpO2 96%   Breastfeeding Unknown   BMI 20.96 kg/m²       Temp (24hrs), Av.6 °F (36.4 °C), Min:97.3 °F (36.3 °C), Max:98 °F (36.7 °C)        O2 Device: None (Room air) (returned to RA now pt more relaxed) O2 Flow Rate (L/min): 5 l/min       Wt Readings from Last 4 Encounters:   22 114 lb 10.2 oz (52 kg)   22 121 lb (54.9 kg)   22 123 lb 6.4 oz (56 kg)   22 123 lb (55.8 kg)          Intake/Output Summary (Last 24 hours) at 8/6/2022 0859  Last data filed at 8/6/2022 0740  Gross per 24 hour   Intake --   Output 200 ml   Net -200 ml         Last shift:      08/06 0701 - 08/06 1900  In: -   Out: 200 [Urine:200]  Last 3 shifts: No intake/output data recorded. Physical Exam:     Physical Exam  Constitutional:       Appearance: Normal appearance. HENT:      Head: Normocephalic and atraumatic. Nose: Nose normal.      Mouth/Throat:      Mouth: Mucous membranes are moist.   Eyes:      Pupils: Pupils are equal, round, and reactive to light. Cardiovascular:      Rate and Rhythm: Normal rate. Pulses: Normal pulses. Pulmonary:      Breath sounds: Rales present. Abdominal:      General: Abdomen is flat. Bowel sounds are normal.      Palpations: Abdomen is soft. Musculoskeletal:         General: Swelling present. Cervical back: Normal range of motion. Right lower leg: Edema present. Left lower leg: Edema present. Skin:     General: Skin is warm. Neurological:      General: No focal deficit present. Mental Status: She is alert. Psychiatric:         Mood and Affect: Mood normal.        Labs:    Recent Labs     08/05/22  1029 08/04/22  0736   WBC 5.5 6.4   HGB 13.0 13.8    307       Recent Labs     08/05/22  1029 08/04/22  0736   * 133*   K 3.3* 3.1*   CL 97 96*   CO2 30 29   * 124*   BUN 15 12   CREA 0.73 0.53*   CA 8.4* 9.0   ALB 3.0* 3.4*   ALT 26 31       No results for input(s): PH, PCO2, PO2, HCO3, FIO2 in the last 72 hours. No results for input(s): CPK, CKNDX, TROIQ in the last 72 hours.     No lab exists for component: CPKMB  No results found for: BNPP, BNP   Lab Results   Component Value Date/Time    Culture result: No growth 6 days 05/27/2022 12:59 PM     No results found for: TSH, TSHEXT, TSHEXT    Imaging:    CXR Results  (Last 48 hours)      None          Results from Hospital Encounter encounter on 08/02/22    XR CHEST PORT    Narrative  EXAM: XR CHEST PORT    HISTORY: sob, h/o CHF. COMPARISON: 6/21/2022    FINDINGS: Portable AP. The hilar is mildly enlarged. There are moderate left and  mild right pleural effusions which have increased in the interval. There is  increased hazy opacification of the right lower lobe. There is increased  atelectasis left lower lobe. There is unchanged mild edema. The bones are  osteopenic. Multilevel spondylosis spine. Impression  1. Increased mild right and moderate left pleural effusions with increased  bibasilar atelectasis. 2. Unchanged mild edema. Results from [place] encounter on 06/21/22    XR CHEST PORT    Narrative  Chest single view. Comparison single view chest May 31, 2022. Left greater than right moderate volume dependent pleural effusions persist.  Mild dependent pulmonary interstitial edema. Bibasilar atelectasis. Cardiac and  mediastinal structures unchanged. No pneumothorax. Results from Hospital Encounter encounter on 05/27/22    XR CHEST PORT    Narrative  XR CHEST PORT    Comparison:  5/27/2022. Single view:  Stable pleural effusions and bibasilar atelectasis/pneumonia (left  greater than right). No pneumothorax apparent. The heart size is stable. Stable  hemodynamic status. Impression  No significant change. No results found for this or any previous visit.         IMPRESSION:   Congestive heart failure  HFrEF  Dilated cardiomyopathy ejection fraction 30 to 35%  Coronary artery disease with history of PCI to mid LAD 5/31/2022 and RCA disease treated medically  Moderate mitral regurgitation  Pleural effusion  Prognosis guarded   Low blood pressure  Chest pain      RECOMMENDATIONS/PLAN:   59-year-old lady came in because of shortness of breath and dyspnea generalized weakness swelling of the lower extremities given history of coronary artery disease congestive heart failure low ejection fraction chest x-ray shows bilateral pleural effusion shortness of breath most likely secondary to underlying congestive heart failure normal white count continue with the Lasix aggressive diuresis  Patient was having left upper chest discomfort radiating to the neck EKG ordered discussed with the son at bedside sublingual nitroglycerin  Supplemental O2 to keep sats > 93%  Aspiration precautions  Labs to follow electrolytes, renal function and and blood counts  Glucose monitoring and SSI  Bronchial hygiene with respiratory therapy techniques, bronchodilators  DVT, SUP prophylaxis       8/4: Pt states that she is feeling better. Complaining of sticky, yellow phlegm that she is coughing up. No dyspnea at this time. O2 sat 95%. 8/5: Pt still complaining of sputum production and now increased generalized weakness and anxiety. Pt recently c/o chest pain radiating to her jaw. EKG ordered. BP 85/53, O2 sat 93%. 8/6: Pt states that she is feeling better since yesterday but still feels very weak. She no longer has the chest pain or jaw pain, but still is coughing up sputum. Glucose 142 but vitals are otherwise stable.        Ben Mancilla

## 2022-08-06 NOTE — PROGRESS NOTES
PULMONARY NOTE  [facility]    Name: Catalino Hammond MRN: [id number]   : 1940 Hospital: [facility]   Date: 2022  Admission date: 2022 Hospital Day: 5       HPI:     Hospital Problems  Date Reviewed: 2022            Codes Class Noted POA    CHF exacerbation [facility]) ICD-10-CM: I50.9  ICD-9-CM: 428.0  2022 Unknown          [x] High complexity decision making was performed  [x] See my orders for details      Subjective/Initial History:     I was asked by Miguel Nixon MD to see Catalino Hammond  a 79 y.o.  female in consultation     Excerpts from admission 2022 or consult notes as follows:   59-year-old lady came in because of shortness of breath and dyspnea past medical history of congestive heart failure, hypertension, hyperlipidemia and psychiatric illness she is noncompliant not happy in the emergency room she is complaining about dyspnea on exertion she is on room air also having swelling of the lower extremities chest x-ray shows bilateral pleural effusions the patient admitted and pulmonary consult was called.   She had a remote history of tobacco abuse last 2D echo showed ejection fraction of 35% she has coronary artery disease stent placed 531      Allergies   Allergen Reactions    [place] Itching    Peanut Butter Flavor Not Reported This Time     possible allergy        MAR reviewed and pertinent medications noted or modified as needed     Current Facility-Administered Medications   Medication    nitroglycerin (NITROSTAT) tablet 0.4 mg    escitalopram oxalate (LEXAPRO) tablet 10 mg    insulin lispro (HUMALOG) injection    glucose chewable tablet 16 g    glucagon (GLUCAGEN) injection 1 mg    hydrOXYzine (VISTARIL) 25 mg/mL injection 25 mg    metoprolol succinate (TOPROL-XL) XL tablet 25 mg    atorvastatin (LIPITOR) tablet 40 mg    clopidogreL (PLAVIX) tablet 75 mg    potassium chloride (KLOR-CON) packet for solution 20 mEq    aspirin delayed-release tablet 81 mg    lisinopriL (PRINIVIL, ZESTRIL) tablet 5 mg    pantoprazole (PROTONIX) tablet 40 mg    furosemide (LASIX) injection 40 mg      Patient PCP: Cesar Alaniz MD  PMH:  has a past medical history of CHF (congestive heart failure) ([facility] [address] Dysphagia, Heart failure ([place] [address] Hyperlipidemia, Hypertension, Ill-defined condition, and Psychiatric disorder. PSH:   has a past surgical history that includes hx coronary stent placement (2022) and hx heart catheterization (2022). FHX: family history includes Diabetes in her father, mother, and another family member; Heart Disease in her father, mother, and another family member. SHX:  reports that she quit smoking about 58 years ago. She has never used smokeless tobacco. She reports that she does not drink alcohol and does not use drugs. ROS:    Review of Systems   Constitutional:  Positive for malaise/fatigue. Generalized weakness   HENT: Negative. Eyes: Negative. Respiratory:  Positive for sputum production. Negative for shortness of breath. Cardiovascular:  Positive for leg swelling. Negative for chest pain and orthopnea. Gastrointestinal: Negative. Genitourinary: Negative. Musculoskeletal: Negative. Skin: Negative. Neurological: Negative. Psychiatric/Behavioral: Negative.         Objective:     Vital Signs: Telemetry:    normal sinus rhythm Intake/Output:   Visit Vitals  /68 (BP Patient Position: At rest;Lying)   Pulse 81   Temp 97.6 °F (36.4 °C)   Resp 20   Ht 5' 2.01\" (1.575 m)   Wt 52 kg (114 lb 10.2 oz)   SpO2 96%   Breastfeeding Unknown   BMI 20.96 kg/m²       Temp (24hrs), Av.6 °F (36.4 °C), Min:97.3 °F (36.3 °C), Max:98 °F (36.7 °C)        O2 Device: None (Room air) (returned to RA now pt more relaxed) O2 Flow Rate (L/min): 5 l/min       Wt Readings from Last 4 Encounters:   22 52 kg (114 lb 10.2 oz)   22 54.9 kg (121 lb)   22 56 kg (123 lb 6.4 oz)   22 55.8 kg (123 lb)          Intake/Output Summary (Last 24 hours) at 8/6/2022 0923  Last data filed at 8/6/2022 0740  Gross per 24 hour   Intake --   Output 200 ml   Net -200 ml         Last shift:      08/06 0701 - 08/06 1900  In: -   Out: 200 [Urine:200]  Last 3 shifts: No intake/output data recorded. Physical Exam:     Physical Exam  Constitutional:       Appearance: Normal appearance. HENT:      Head: Normocephalic and atraumatic. Nose: Nose normal.      Mouth/Throat:      Mouth: Mucous membranes are moist.   Eyes:      Pupils: Pupils are equal, round, and reactive to light. Cardiovascular:      Rate and Rhythm: Normal rate. Pulses: Normal pulses. Pulmonary:      Breath sounds: Rales present. Abdominal:      General: Abdomen is flat. Bowel sounds are normal.      Palpations: Abdomen is soft. Musculoskeletal:         General: Swelling present. Cervical back: Normal range of motion. Right lower leg: Edema present. Left lower leg: Edema present. Skin:     General: Skin is warm. Neurological:      General: No focal deficit present. Mental Status: She is alert. Psychiatric:         Mood and Affect: Mood normal.        Labs:    Recent Labs     08/05/22  1029 08/04/22  0736   WBC 5.5 6.4   HGB 13.0 13.8    307       Recent Labs     08/05/22  1029 08/04/22  0736   * 133*   K 3.3* 3.1*   CL 97 96*   CO2 30 29   * 124*   BUN 15 12   CREA 0.73 0.53*   CA 8.4* 9.0   ALB 3.0* 3.4*   ALT 26 31       No results for input(s): PH, PCO2, PO2, HCO3, FIO2 in the last 72 hours. No results for input(s): CPK, CKNDX, TROIQ in the last 72 hours.     No lab exists for component: CPKMB  No results found for: BNPP, BNP   Lab Results   Component Value Date/Time    Culture result: No growth 6 days 05/27/2022 12:59 PM     No results found for: TSH, TSHEXT, TSHEXT    Imaging:    CXR Results  (Last 48 hours)      None          Results from Hospital Encounter encounter on 08/02/22    XR CHEST PORT    Narrative  EXAM: XR CHEST PORT    HISTORY: sob, h/o CHF. COMPARISON: 6/21/2022    FINDINGS: Portable AP. The hilar is mildly enlarged. There are moderate left and  mild right pleural effusions which have increased in the interval. There is  increased hazy opacification of the right lower lobe. There is increased  atelectasis left lower lobe. There is unchanged mild edema. The bones are  osteopenic. Multilevel spondylosis spine. Impression  1. Increased mild right and moderate left pleural effusions with increased  bibasilar atelectasis. 2. Unchanged mild edema. Results from [place] encounter on 06/21/22    XR CHEST PORT    Narrative  Chest single view. Comparison single view chest May 31, 2022. Left greater than right moderate volume dependent pleural effusions persist.  Mild dependent pulmonary interstitial edema. Bibasilar atelectasis. Cardiac and  mediastinal structures unchanged. No pneumothorax. Results from Hospital Encounter encounter on 05/27/22    XR CHEST PORT    Narrative  XR CHEST PORT    Comparison:  5/27/2022. Single view:  Stable pleural effusions and bibasilar atelectasis/pneumonia (left  greater than right). No pneumothorax apparent. The heart size is stable. Stable  hemodynamic status. Impression  No significant change. No results found for this or any previous visit.         IMPRESSION:   Congestive heart failure  HFrEF  Dilated cardiomyopathy ejection fraction 30 to 35%  Coronary artery disease with history of PCI to mid LAD 5/31/2022 and RCA disease treated medically  Moderate mitral regurgitation  Pleural effusion  Prognosis guarded   Low blood pressure  Chest pain      RECOMMENDATIONS/PLAN:   79-year-old lady came in because of shortness of breath and dyspnea generalized weakness swelling of the lower extremities given history of coronary artery disease congestive heart failure low ejection fraction chest x-ray shows bilateral pleural effusion shortness of breath most likely secondary to underlying congestive heart failure normal white count continue with the Lasix aggressive diuresis  Patient better no chest pain       8/4: Pt states that she is feeling better. Complaining of sticky, yellow phlegm that she is coughing up. No dyspnea at this time. O2 sat 95%. 8/5: Pt still complaining of sputum production and now increased generalized weakness and anxiety. Pt recently c/o chest pain radiating to her jaw. EKG ordered. BP 85/53, O2 sat 93%. 8/6: Pt states that she is feeling better since yesterday but still feels very weak. She no longer has the chest pain or jaw pain, but still is coughing up sputum. Glucose 142 but vitals are otherwise stable.        Son Woo MD

## 2022-08-06 NOTE — PROGRESS NOTES
SPEECH LANGUAGE PATHOLOGY DYSPHAGIA TREATMENT  Patient: Reg Wolf (79 y.o. female)  Date: 8/6/2022  Diagnosis: CHF exacerbation ([facility] [address] [I50.9] <principal problem not specified>      Precautions: aspiration/GERD      ASSESSMENT:  Per nsg, patient c/o discomfort d/t phlegm. Patient alert, in bed, requesting assistance with repositioning upright. Observed frequent coughing, patient reports unable to \"cough it up. \" Agreeable to minimal po trials, stated \"maybe it'll help with the phlegm. \"   Reviewed results of MBS and EGD. Concerns noted in MBS for esophageal retention. Self-administered min trials mildly thick liquids. Oral phase WFL. Timely initiation; HLE reduced to digital palpation. Double-multiple swallows with each trial. Cough response noted before, during, and after po; possibly due to patient continuing to clear mucous. Patient appears increasingly anxious throughout tx and becomes SOB on RA with consistent coughing. Nsg notified and entered room, reports will obtain orders for Mucinex. PLAN:  Recommendations and Planned Interventions:  Recommend continue puree diet, mildly thick liquids. STRICT aspiration and GERD precautions. Patient educated to remain upright for at least 90 minutes after meals. MD [facility] GI consult. No GI orders following chart review, nsg notified. Repeat MBS if/as indicated. Patient continues to benefit from skilled intervention to address the above impairments. Continue treatment per established plan of care. Frequency/Duration: Patient will be followed by speech-language pathology 5 times a week to address goals. Discharge Recommendations: To Be Determined     SUBJECTIVE:   Patient reports feeling phlegm, frequent coughing and SOB.      OBJECTIVE:     CXR Results  (Last 48 hours)      None          CT Results  (Last 48 hours)      None           Cognitive and Communication Status:  Neurologic State: Alert, Eyes open spontaneously, Other (Comment) (anxious)  Orientation Level: Oriented X4  Cognition: Decreased attention/concentration, Follows commands                           After treatment:   Patient left in no apparent distress in bed, Call bell within reach, Nursing notified, and nsg at bedside. COMMUNICATION/EDUCATION:   Patient was educated regarding her deficit(s) of dysphagia, swallow safety precautions, diet recs and POC. She demonstrated Fair understanding as evidenced by decreased attention/concentration. The patient's plan of care including recommendations, planned interventions, and recommended diet changes were discussed with: Registered nurse. Ky Salinas M.S. CCC-SLP               Problem: Dysphagia (Adult)  Goal: *Acute Goals and Plan of Care (Insert Text)  Description: Speech Therapy Swallow Goals  Initiated 8/3/2022  -Patient will tolerate full diet with nectar thick liquids without clinical indicators of aspiration given minimal cues within 5-7day(s). -Patient will tolerate PO trials without clinical indicators of aspiration given minimal cues within 5-7day(s). -Patient will participate in modified barium swallow study within 5-7 day(s). -Patient will demonstrate understanding of swallow safety precautions and aspiration precautions, diet recs with minimal cues within 5-7 day(s).     -Patient/caregiver goal: \"to swallow better\"          Outcome: Progressing Towards Goal

## 2022-08-07 LAB
GLUCOSE BLD STRIP.AUTO-MCNC: 105 MG/DL (ref 65–117)
GLUCOSE BLD STRIP.AUTO-MCNC: 109 MG/DL (ref 65–117)
GLUCOSE BLD STRIP.AUTO-MCNC: 117 MG/DL (ref 65–117)
GLUCOSE BLD STRIP.AUTO-MCNC: 263 MG/DL (ref 65–117)
PERFORMED BY, TECHID: ABNORMAL
PERFORMED BY, TECHID: NORMAL

## 2022-08-07 PROCEDURE — 65270000029 HC RM PRIVATE

## 2022-08-07 PROCEDURE — 74011250637 HC RX REV CODE- 250/637: Performed by: INTERNAL MEDICINE

## 2022-08-07 PROCEDURE — 74011636637 HC RX REV CODE- 636/637: Performed by: FAMILY MEDICINE

## 2022-08-07 PROCEDURE — 74011250636 HC RX REV CODE- 250/636: Performed by: FAMILY MEDICINE

## 2022-08-07 PROCEDURE — 74011250637 HC RX REV CODE- 250/637: Performed by: FAMILY MEDICINE

## 2022-08-07 PROCEDURE — 97530 THERAPEUTIC ACTIVITIES: CPT

## 2022-08-07 PROCEDURE — 74011636637 HC RX REV CODE- 636/637: Performed by: INTERNAL MEDICINE

## 2022-08-07 PROCEDURE — 94640 AIRWAY INHALATION TREATMENT: CPT

## 2022-08-07 PROCEDURE — 82962 GLUCOSE BLOOD TEST: CPT

## 2022-08-07 RX ORDER — GUAIFENESIN 100 MG/5ML
100 SOLUTION ORAL
Status: DISCONTINUED | OUTPATIENT
Start: 2022-08-07 | End: 2022-08-09 | Stop reason: HOSPADM

## 2022-08-07 RX ADMIN — ALBUTEROL SULFATE 2 PUFF: 108 INHALANT RESPIRATORY (INHALATION) at 19:31

## 2022-08-07 RX ADMIN — ISOSORBIDE MONONITRATE 30 MG: 30 TABLET, EXTENDED RELEASE ORAL at 09:52

## 2022-08-07 RX ADMIN — ESCITALOPRAM OXALATE 10 MG: 10 TABLET ORAL at 10:01

## 2022-08-07 RX ADMIN — POTASSIUM CHLORIDE 20 MEQ: 1.5 POWDER, FOR SOLUTION ORAL at 16:42

## 2022-08-07 RX ADMIN — INSULIN GLARGINE 10 UNITS: 100 INJECTION, SOLUTION SUBCUTANEOUS at 10:00

## 2022-08-07 RX ADMIN — CLOPIDOGREL BISULFATE 75 MG: 75 TABLET ORAL at 09:53

## 2022-08-07 RX ADMIN — ATORVASTATIN CALCIUM 40 MG: 40 TABLET, FILM COATED ORAL at 21:57

## 2022-08-07 RX ADMIN — POTASSIUM CHLORIDE 20 MEQ: 1.5 POWDER, FOR SOLUTION ORAL at 09:52

## 2022-08-07 RX ADMIN — ASPIRIN 81 MG: 81 TABLET, COATED ORAL at 09:00

## 2022-08-07 RX ADMIN — LISINOPRIL 5 MG: 10 TABLET ORAL at 09:53

## 2022-08-07 RX ADMIN — METOPROLOL SUCCINATE 25 MG: 25 TABLET, EXTENDED RELEASE ORAL at 09:52

## 2022-08-07 RX ADMIN — PANTOPRAZOLE SODIUM 40 MG: 40 TABLET, DELAYED RELEASE ORAL at 09:53

## 2022-08-07 RX ADMIN — INSULIN LISPRO 7 UNITS: 100 INJECTION, SOLUTION INTRAVENOUS; SUBCUTANEOUS at 12:04

## 2022-08-07 RX ADMIN — FUROSEMIDE 40 MG: 10 INJECTION, SOLUTION INTRAMUSCULAR; INTRAVENOUS at 09:52

## 2022-08-07 RX ADMIN — BUDESONIDE AND FORMOTEROL FUMARATE DIHYDRATE 2 PUFF: 160; 4.5 AEROSOL RESPIRATORY (INHALATION) at 19:31

## 2022-08-07 RX ADMIN — LEVOFLOXACIN 500 MG: 500 TABLET, FILM COATED ORAL at 12:28

## 2022-08-07 RX ADMIN — ALBUTEROL SULFATE 2 PUFF: 108 INHALANT RESPIRATORY (INHALATION) at 14:06

## 2022-08-07 NOTE — PROGRESS NOTES
Problem: Mobility Impaired (Adult and Pediatric)  Goal: *Acute Goals and Plan of Care (Insert Text)  Description: Pt stated goals: walk  Pt will be I with LE HEP in 7 days. Pt will perform bed mobility with mod I in 7 days. Pt will perform transfers with mod I in 7 days. Pt will amb >100 feet with LRAD safely with mod I in 7 days. Outcome: Progressing Towards Goal   PHYSICAL THERAPY TREATMENT  Patient: Genna Orourke (79 y.o. female)  Date: 8/7/2022  Diagnosis: CHF exacerbation ([facility] [address] [I50.9] <principal problem not specified>      Precautions:    Chart, physical therapy assessment, plan of care and goals were reviewed. ASSESSMENT  Patient continues with skilled PT services and is progressing towards goals. Upon entry into room, patient semi-supine in bed and agreeable to work with PT. Bed mobility performed with SBA to come to sitting. Patient sat EOB for a few minutes to gather herself and let dizziness subside. Assisted patient in bathing with CHG wipes in sitting; good dynamic sitting balance noted. Sit<>stand with CGA and RW. Patient ambulated 20 feet around bedside to sit up in chair. She sat in chair for approx 10 minutes and worked on sitting balance tasks and LE kicks. RT present during session and SaO2 was 98% on RA. Patient SOB with activity but improved with rest. Patient then ambulated back to bed. SBA to return to semi-supine and position to comfort in bed. Patient will benefit from continued skilled PT services to improve her strength for bed mobility and transfers and decrease assistance from caregivers. Current Level of Function Impacting Discharge (mobility/balance): decr strength, decr balance, decr endurance     Other factors to consider for discharge: SOB, medical hx, CHF, AD for gait          PLAN :  Patient continues to benefit from skilled intervention to address the above impairments. Continue treatment per established plan of care. to address goals.     Recommendation for discharge: (in order for the patient to meet his/her long term goals)  [address]     This discharge recommendation:  Has been made in collaboration with the attending provider and/or case management    IF patient discharges home will need the following DME: rolling walker and to be determined (TBD)       SUBJECTIVE:   Patient stated my bottom hurts some.     OBJECTIVE DATA SUMMARY:   Critical Behavior:  Neurologic State: Alert  Orientation Level: Oriented X4  Cognition: Decreased attention/concentration, Follows commands     Functional Mobility Training:  Bed Mobility:  Rolling: Stand-by assistance  Supine to Sit: Stand-by assistance  Sit to Supine: Stand-by assistance  Scooting: Stand-by assistance        Transfers:  Sit to Stand: Contact guard assistance  Stand to Sit: Contact guard assistance       Balance:  Sitting: Intact  Standing: Impaired; With support  Standing - Static: Fair;Constant support  Standing - Dynamic : Fair;Constant support  Ambulation/Gait Training:  Distance (ft): 20 Feet (ft)  Assistive Device: Gait belt;Walker, rolling  Ambulation - Level of Assistance: Contact guard assistance    Therapeutic Exercises:   Seated LAQ  Seated dynamic balance tasks to work on trunk control     Pain Rating:  No pain     Activity Tolerance:   Fair, SpO2 stable on RA, requires frequent rest breaks, and observed SOB with activity  Please refer to the flowsheet for vital signs taken during this treatment.     After treatment patient left in no apparent distress:   Bed returned to lowest position, Supine in bed, Call bell within reach, Bed / chair alarm activated, and Side rails x 3    COMMUNICATION/COLLABORATION:   The patients plan of care was discussed with: Physical therapist, Registered nurse, and Respiratory therapist.     Tiago Mauricio   Time Calculation: 39 mins

## 2022-08-07 NOTE — PROGRESS NOTES
Progress Note    Patient: Genna Yeung MRN: [id number]  SSN: [id number]    YOB: 1940  Age: 79 y.o. Sex: female      Admit Date: 8/2/2022    LOS: 5 days     Subjective:     Patient still has a cough but looks better. Shows bibasilar atelectasis with pleural effusion    Objective:     Vitals:    08/06/22 2348 08/07/22 0251 08/07/22 0400 08/07/22 0739   BP: 118/74 107/62  (!) 114/55   Pulse: 86 61  80   Resp: 18 18  18   Temp: (!) 96.5 °F (35.8 °C) 97.4 °F (36.3 °C)  97.7 °F (36.5 °C)   SpO2: 96% 96%  98%   Weight:   52 kg (114 lb 10.2 oz)    Height:            Intake and Output:  Current Shift: No intake/output data recorded. Last three shifts: 08/05 1901 - 08/07 0700  In: -   Out: 600 [Urine:600]    Physical Exam:   General:  Alert, cooperative, no distress, appears stated age. Eyes:  Conjunctivae/corneas clear. PERRL, EOMs intact. Fundi benign   Ears:  Normal TMs and external ear canals both ears. Nose: Nares normal. Septum midline. Mucosa normal. No drainage or sinus tenderness. Mouth/Throat: Lips, mucosa, and tongue normal. Teeth and gums normal.   Neck: Supple, symmetrical, trachea midline, no adenopathy, thyroid: no enlargment/tenderness/nodules, no carotid bruit and no JVD. Back:   Symmetric, no curvature. ROM normal. No CVA tenderness. Lungs:   ronchi to auscultation bilaterally. Heart:  Regular rate and rhythm, S1, S2 normal, no murmur, click, rub or gallop. Abdomen:   Soft, non-tender. Bowel sounds normal. No masses,  No organomegaly. Extremities: Extremities normal, atraumatic, no cyanosis or edema. Pulses: 2+ and symmetric all extremities. Skin: Skin color, texture, turgor normal. No rashes or lesions   Lymph nodes: Cervical, supraclavicular, and axillary nodes normal.   Neurologic: CNII-XII intact. Normal strength, sensation and reflexes throughout. Lab/Data Review: All lab results for the last 24 hours reviewed.      Recent Results (from the past 24 hour(s))   GLUCOSE, POC    Collection Time: 08/06/22 12:28 PM   Result Value Ref Range    Glucose (POC) 135 (H) 65 - 117 mg/dL    Performed by Deangelo Parr    GLUCOSE, POC    Collection Time: 08/06/22  4:05 PM   Result Value Ref Range    Glucose (POC) 211 (H) 65 - 117 mg/dL    Performed by Deangelo Parr    GLUCOSE, POC    Collection Time: 08/06/22  9:20 PM   Result Value Ref Range    Glucose (POC) 109 65 - 117 mg/dL    Performed by Mike Leung    GLUCOSE, POC    Collection Time: 08/07/22  7:42 AM   Result Value Ref Range    Glucose (POC) 117 65 - 117 mg/dL    Performed by Deangelo Parr          Assessment:     Active Problems:    CHF exacerbation ([address] (8/2/2022)      Possible, CAP  pneumonia  Cough      Atelectasis    Plan:     Is on Levaquin   Add robitussin    Signed By: Salome Maria MD     August 7, 2022

## 2022-08-07 NOTE — PROGRESS NOTES
Problem: Pressure Injury - Risk of  Goal: *Prevention of pressure injury  Description: Document Manfred Scale and appropriate interventions in the flowsheet. Outcome: Progressing Towards Goal  Note: Pressure Injury Interventions:  Sensory Interventions: Keep linens dry and wrinkle-free, Monitor skin under medical devices, Float heels    Moisture Interventions: Absorbent underpads    Activity Interventions: Increase time out of bed, PT/OT evaluation    Mobility Interventions: HOB 30 degrees or less, PT/OT evaluation    Nutrition Interventions: Document food/fluid/supplement intake    Friction and Shear Interventions: HOB 30 degrees or less                Problem: Falls - Risk of  Goal: *Absence of Falls  Description: Document Chay Fall Risk and appropriate interventions in the flowsheet.   Outcome: Progressing Towards Goal  Note: Fall Risk Interventions:  Mobility Interventions: Bed/chair exit alarm, Patient to call before getting OOB         Medication Interventions: Bed/chair exit alarm    Elimination Interventions: Bed/chair exit alarm, Call light in reach

## 2022-08-07 NOTE — PROGRESS NOTES
History and Physical    NAME: Emily Orourke   :  1940   MRN:  [id number]     Date/Time:  2022 9:10 AM    Patient PCP: Laure Monreal MD  ______________________________________________________________________             Subjective:     CHIEF COMPLAINT: Patient comes to the ED with complains of shortness of breath and swelling in her lower extremities. HISTORY OF PRESENT ILLNESS:       Patient is a 79y.o. year old female with a significant history of CHF, cardiac stents placement on May 31st, hypertension, hyperlipidemia, and psychiatric illness, coming to the ED complaining for shortness of breath over the last two days that has gotten progressively worse. Patient reports bilateral lower extremity edema. Left Ventricle: Severely reduced left ventricular systolic function with a visually estimated EF of 30 - 35%. Left ventricle is mildly dilated. Normal wall thickness    8/3    Patient was awake, alert, and resting in bed. She states that she is still feeling short on breath and that her stomach is cramping because she has to urinate but is worried about her catheter. Patient is having trouble swallowing and asked to be on a softer diet. She has no eaten much because of this. Patients BNP- 16,068          Patient was awake and alert and in no apparent distress. She denies shortness of breath except when expending energy such as eating or trying to ambulate. She states that her abdominal pain has subsided after the catheter was placed. Edema in LE significantly decreased. Patient was recommended a puree diet and mildly thick liquids via speech pathologist. She is currently tolerating new diet well and ate most of her meal.   Patient also stated this is the first time in a while that she has slept this well. Patient notes discomfort on her backside due to inability to readjust herself as well as discomfort in her hands because they're cold.      Telemetry reviewed reveals occasional episodes of 12 consecutive premature ventricular complex (PVCs). 8/5    Patient was trying to rest in bed. She was currently on nasal cannula oxygen and recently complained of chest tightness and pain. She also stated that she feels weak. On 7/15- results from Dr. Mary Gaitan [facility] of patient's EGD shows H. Pylori infection that was treated with antibiotics -Amoxicillin and metronidazole that should have been completed on 8/3. Discussed with the patient's son at the bedside concerned about severe anxiety      PT recommended inpatient rehab facility at discharge. 8/6  Patient was alert and in bed trying to eat. She continues to have shortness or breath and often spits up white phlegm. Patient denies any blood with phlegm. Patient has no signs of lower extremity swelling. She reports her chest pain a 5 on a scale of 0-10. Talking for more than 30 seconds leads to more coughing. She reports having pain in her tailbone and was given a cushion to decrease the pressure in her back. Lungs are clear to auscultation. Heart sounds are normal S1/S2. Fasting glucose is still elevated (142 mg/dL), there are no new lab results or changes in her medication. 8/7  Patient was alert and upright in chair, in no apparent distress. Reports walking around and getting short of breath. Compared to yesterday, patient is coughing and spitting up phlegm less frequently. Is compliant with medications. There is no LE edema, lungs are clear to auscultation. Was visited by the speech language pathologist, is recommended to continue pureed foods and remain upright for 90 minutes. Patient is compliant with regimen. Patient continues on strict asp/GERD protocol. No new labs for 8/7.            Past Medical History:   Diagnosis Date    CHF (congestive heart failure) (HCC)     EF 30-35%    Dysphagia     Heart failure (HCC)     Hyperlipidemia     Hypertension     Ill-defined condition     patient unaware of any diagnosis due to recieving minimal medical care for the past 57 years    Psychiatric disorder     anxiety        Past Surgical History:   Procedure Laterality Date    HX CORONARY STENT PLACEMENT  2022    HX HEART CATHETERIZATION  2022    STENT PLACED       Social History     Tobacco Use    Smoking status: Former     Types: Cigarettes     Quit date: 1964     Years since quittin.6    Smokeless tobacco: Never   Substance Use Topics    Alcohol use: Never        Family History   Problem Relation Age of Onset    Diabetes Other     Heart Disease Other     Heart Disease Mother     Diabetes Mother     Diabetes Father     Heart Disease Father        Allergies   Allergen Reactions    [place] Itching    Peanut Butter Flavor Not Reported This Time     possible allergy        Prior to Admission medications    Medication Sig Start Date End Date Taking? Authorizing Provider   nitroglycerin (NITROSTAT) 0.4 mg SL tablet 0.4 mg by SubLINGual route every five (5) minutes as needed for Chest Pain. Up to 3 doses. Yes Provider, Historical   lisinopriL (PRINIVIL, ZESTRIL) 5 mg tablet Take 5 mg by mouth daily. 22  Yes Provider, Historical   pantoprazole (PROTONIX) 40 mg tablet Take 40 mg by mouth daily. 22  Yes Provider, Historical   atorvastatin (LIPITOR) 40 mg tablet Take 1 Tablet by mouth nightly. 22  Yes Kayleen Marcus MD   clopidogreL (PLAVIX) 75 mg tab Take 1 Tablet by mouth daily. Indications: blood clot prevention following percutaneous coronary intervention 22  Yes Kayleen Marcus MD   carvediloL (COREG) 3.125 mg tablet Take 1 Tablet by mouth two (2) times daily (with meals). Indications: heart failure with reduced ejection fraction due to dilated cardiomyopathy 22  Yes Kayleen Marcus MD   aspirin delayed-release 81 mg tablet Take 81 mg by mouth daily.    Yes Provider, Historical         Current Facility-Administered Medications:     isosorbide mononitrate ER (IMDUR) tablet 30 mg, 30 mg, Oral, DAILY, Jacqueline CORBIN MD, 30 mg at 08/06/22 0949    levoFLOXacin (LEVAQUIN) tablet 500 mg, 500 mg, Oral, Q24H, Austin VAZQUEZ MD, 500 mg at 08/06/22 1208    dextrose 10% infusion 0-250 mL, 0-250 mL, IntraVENous, PRN, Kip aBnsal MD    insulin glargine (LANTUS) injection 10 Units, 10 Units, SubCUTAneous, DAILY, Kip Bansal MD    albuterol (PROVENTIL HFA, VENTOLIN HFA, PROAIR HFA) inhaler 2 Puff, 2 Puff, Inhalation, Q6H RT, Kip Basnal MD    budesonide-formoteroL (SYMBICORT) 160-4.5 mcg/actuation HFA inhaler 2 Puff, 2 Puff, Inhalation, BID RT, Kip Bansal MD    guaiFENesin (ROBITUSSIN) 100 mg/5 mL oral liquid 100 mg, 100 mg, Oral, Q4H PRN, Austin VAZQUEZ MD    nitroglycerin (NITROSTAT) tablet 0.4 mg, 0.4 mg, SubLINGual, PRN, Mahin Gonzales MD, 0.4 mg at 08/05/22 0913    escitalopram oxalate (LEXAPRO) tablet 10 mg, 10 mg, Oral, DAILY, Janelle Carrillo MD, 10 mg at 08/06/22 0900    insulin lispro (HUMALOG) injection, , SubCUTAneous, AC&HS, Janelle Carrillo MD, 4 Units at 08/06/22 1711    glucose chewable tablet 16 g, 4 Tablet, Oral, PRN, Janelle Carrillo MD    glucagon (GLUCAGEN) injection 1 mg, 1 mg, IntraMUSCular, PRN, Janelle Carrillo MD    metoprolol succinate (TOPROL-XL) XL tablet 25 mg, 25 mg, Oral, DAILY, Janelle Carrillo MD, 25 mg at 08/06/22 1490    atorvastatin (LIPITOR) tablet 40 mg, 40 mg, Oral, QHS, Janelle Carrillo MD, 40 mg at 08/06/22 2301    clopidogreL (PLAVIX) tablet 75 mg, 75 mg, Oral, DAILY, Janelle Carrillo MD, 75 mg at 08/06/22 9664    potassium chloride (KLOR-CON) packet for solution 20 mEq, 20 mEq, Oral, BID WITH MEALS, Janelle Carrillo MD, 20 mEq at 08/06/22 1711    aspirin delayed-release tablet 81 mg, 81 mg, Oral, DAILY, Janelle Carrillo MD, 81 mg at 08/07/22 0900    lisinopriL (PRINIVIL, ZESTRIL) tablet 5 mg, 5 mg, Oral, DAILY, Janelle Carrillo MD, 5 mg at 08/06/22 0937    pantoprazole (PROTONIX) tablet 40 mg, 40 mg, Oral, DAILY, Janelle Carrillo MD, 40 mg at 08/06/22 6858    furosemide (LASIX) injection 40 mg, 40 mg, IntraVENous, BID, Janelle Carrillo MD, 40 mg at 08/06/22 0936    LAB DATA REVIEWED:    Recent Results (from the past 24 hour(s))   CBC W/O DIFF    Collection Time: 08/06/22  9:16 AM   Result Value Ref Range    WBC 5.2 3.6 - 11.0 K/uL    RBC 4.89 3.80 - 5.20 M/uL    HGB 14.0 11.5 - 16.0 g/dL    HCT 43.6 35.0 - 47.0 %    MCV 89.2 80.0 - 99.0 FL    MCH 28.6 26.0 - 34.0 PG    MCHC 32.1 30.0 - 36.5 g/dL    RDW 16.8 (H) 11.5 - 14.5 %    PLATELET 018 436 - 354 K/uL    MPV 8.8 (L) 8.9 - 12.9 FL    NRBC 0.0 0.0  WBC    ABSOLUTE NRBC 0.00 0.00 - 3.79 K/uL   METABOLIC PANEL, COMPREHENSIVE    Collection Time: 08/06/22  9:16 AM   Result Value Ref Range    Sodium 134 (L) 136 - 145 mmol/L    Potassium 5.1 3.5 - 5.1 mmol/L    Chloride 99 97 - 108 mmol/L    CO2 30 21 - 32 mmol/L    Anion gap 5 5 - 15 mmol/L    Glucose 147 (H) 65 - 100 mg/dL    BUN 13 6 - 20 mg/dL    Creatinine 0.58 0.55 - 1.02 mg/dL    BUN/Creatinine ratio 22 (H) 12 - 20      GFR est AA >60 >60 ml/min/1.73m2    GFR est non-AA >60 >60 ml/min/1.73m2    Calcium 9.0 8.5 - 10.1 mg/dL    Bilirubin, total 1.2 (H) 0.2 - 1.0 mg/dL    AST (SGOT) 48 (H) 15 - 37 U/L    ALT (SGPT) 30 12 - 78 U/L    Alk.  phosphatase 51 45 - 117 U/L    Protein, total 6.8 6.4 - 8.2 g/dL    Albumin 3.4 (L) 3.5 - 5.0 g/dL    Globulin 3.4 2.0 - 4.0 g/dL    A-G Ratio 1.0 (L) 1.1 - 2.2     GLUCOSE, POC    Collection Time: 08/06/22 12:28 PM   Result Value Ref Range    Glucose (POC) 135 (H) 65 - 117 mg/dL    Performed by Emily Syed    GLUCOSE, POC    Collection Time: 08/06/22  4:05 PM   Result Value Ref Range    Glucose (POC) 211 (H) 65 - 117 mg/dL    Performed by Emily Syed    GLUCOSE, POC    Collection Time: 08/06/22  9:20 PM   Result Value Ref Range    Glucose (POC) 109 65 - 117 mg/dL    Performed by Mike Leung    GLUCOSE, POC    Collection Time: 08/07/22  7:42 AM   Result Value Ref Range    Glucose (POC) 117 65 - 117 mg/dL    Performed by Landen Camejo        XR Results (most recent):  Results from Hospital Encounter encounter on 08/02/22    XR CHEST PORT    Narrative  EXAM: XR CHEST PORT    HISTORY: sob, h/o CHF. COMPARISON: 6/21/2022    FINDINGS: Portable AP. The hilar is mildly enlarged. There are moderate left and  mild right pleural effusions which have increased in the interval. There is  increased hazy opacification of the right lower lobe. There is increased  atelectasis left lower lobe. There is unchanged mild edema. The bones are  osteopenic. Multilevel spondylosis spine. Impression  1. Increased mild right and moderate left pleural effusions with increased  bibasilar atelectasis. 2. Unchanged mild edema. XR CHEST PORT   Final Result      1. Increased mild right and moderate left pleural effusions with increased   bibasilar atelectasis. 2. Unchanged mild edema. Review of Systems:  Constitutional: Generalized weakness  HENT: Negative. Eyes: Negative. Respiratory: Shortness of breath upon exertion. Cardiovascular: Chest pain and tightness  Gastrointestinal: Negative for abdominal pain and nausea. Skin: Cold hands. Neurological: Negative. Objective:   VITALS:    Visit Vitals  BP (!) 114/55 (BP Patient Position: At rest;Lying right side)   Pulse 80   Temp 97.7 °F (36.5 °C)   Resp 18   Ht 5' 2.01\" (1.575 m)   Wt 114 lb 10.2 oz (52 kg)   SpO2 98%   Breastfeeding Unknown   BMI 20.96 kg/m²       Physical Exam:   Constitutional: pt is oriented to person, place, and time. HENT:   Head: Normocephalic and atraumatic. Eyes: Pupils are equal, round, and reactive to light. EOM are normal.   Cardiovascular: Slightly increased rate. Pulmonary/Chest: Breath sounds normal in all fields. Lungs are clear to auscultation. No longer has tachypnea   Exhibits no tenderness. Still has shortness of breath, more so with movement or talking.    Abdominal: Soft. Bowel sounds are normal. There is no abdominal tenderness. There is no rebound and no guarding. Musculoskeletal: Patient has baseline full range of motion   Neurological: pt is alert and oriented to person, place, and time. Alert. Normal strength. No cranial nerve deficit or sensory deficit. Displays a negative Romberg sign. ASSESSMENT:    CHF (EF 30-35%)   Dilated cardiomypathy  Moderate mitral regurgitation  Pleural effusion, bilateral  Hx of cardiac stents  Diabetes  Dysphagia  Hyperlipidemia   Hypertension  Anxiety  Antral Gastritis  Distal esophagitis   Low k         PLAN:  Continue medications-    Aspirin, 81mg daily  Atorvastatin 40mg daily   Clopidogrel 75mg daily  Furosemide injection 40mg BID   Lisinopril 5mg daily  Metoprolol succinate 25mg   Pantoprazole 40mg daily  Potassium Chloride 20mEq BID    Lexapro 10 mg daily    Hydroxyzine 25mg/mL injection Q6H PRN  Nitroglycerin . 4mg PRN    Follow up on EKG   Possible supplemental O2 therapy if stats <93%  Follow renal function, blood counts, electrolytes, glucose  Monitor fluid balance and cardiac enzymes  PT/OT        Current Facility-Administered Medications:     isosorbide mononitrate ER (IMDUR) tablet 30 mg, 30 mg, Oral, DAILY, Lambert Alvares MD, 30 mg at 08/06/22 0949    levoFLOXacin (LEVAQUIN) tablet 500 mg, 500 mg, Oral, Q24H, Kip Bansal MD, 500 mg at 08/06/22 1208    dextrose 10% infusion 0-250 mL, 0-250 mL, IntraVENous, PRN, Kip Bansal MD    insulin glargine (LANTUS) injection 10 Units, 10 Units, SubCUTAneous, DAILY, Kip Bansal MD    albuterol (PROVENTIL HFA, VENTOLIN HFA, PROAIR HFA) inhaler 2 Puff, 2 Puff, Inhalation, Q6H RT, Kip Bansal MD    budesonide-formoteroL (SYMBICORT) 160-4.5 mcg/actuation HFA inhaler 2 Puff, 2 Puff, Inhalation, BID RT, Kip Bansal MD    guaiFENesin (ROBITUSSIN) 100 mg/5 mL oral liquid 100 mg, 100 mg, Oral, Q4H PRN, Adina VAZQUEZ MD    nitroglycerin (NITROSTAT) tablet 0.4 mg, 0.4 mg, SubLINGual, PRN, Mahin Gonzales MD, 0.4 mg at 08/05/22 0913    escitalopram oxalate (LEXAPRO) tablet 10 mg, 10 mg, Oral, DAILY, Erma Fierro MD, 10 mg at 08/06/22 0900    insulin lispro (HUMALOG) injection, , SubCUTAneous, AC&HS, Erma Fierro MD, 4 Units at 08/06/22 1711    glucose chewable tablet 16 g, 4 Tablet, Oral, PRN, Janelle Carrillo MD    glucagon (GLUCAGEN) injection 1 mg, 1 mg, IntraMUSCular, PRN, Janelle Carrillo MD    metoprolol succinate (TOPROL-XL) XL tablet 25 mg, 25 mg, Oral, DAILY, Erma Fierro MD, 25 mg at 08/06/22 6577    atorvastatin (LIPITOR) tablet 40 mg, 40 mg, Oral, QHS, Janelle Carrillo MD, 40 mg at 08/06/22 2301    clopidogreL (PLAVIX) tablet 75 mg, 75 mg, Oral, DAILY, Janelle Carrillo MD, 75 mg at 08/06/22 1048    potassium chloride (KLOR-CON) packet for solution 20 mEq, 20 mEq, Oral, BID WITH MEALS, Janelle Carrillo MD, 20 mEq at 08/06/22 1711    aspirin delayed-release tablet 81 mg, 81 mg, Oral, DAILY, Janelle Carrillo MD, 81 mg at 08/07/22 0900    lisinopriL (PRINIVIL, ZESTRIL) tablet 5 mg, 5 mg, Oral, DAILY, Janelle Carrillo MD, 5 mg at 08/06/22 9848    pantoprazole (PROTONIX) tablet 40 mg, 40 mg, Oral, DAILY, Janelle Carrillo MD, 40 mg at 08/06/22 8266    furosemide (LASIX) injection 40 mg, 40 mg, IntraVENous, BID, Janelle Carrillo MD, 40 mg at 08/06/22 4160     ________________________________________________________________________    Signed: Lucinda Comes

## 2022-08-08 LAB
BNP SERPL-MCNC: 3899 PG/ML
GLUCOSE BLD STRIP.AUTO-MCNC: 112 MG/DL (ref 65–117)
GLUCOSE BLD STRIP.AUTO-MCNC: 123 MG/DL (ref 65–117)
GLUCOSE BLD STRIP.AUTO-MCNC: 167 MG/DL (ref 65–117)
GLUCOSE BLD STRIP.AUTO-MCNC: 169 MG/DL (ref 65–117)
PERFORMED BY, TECHID: ABNORMAL
PERFORMED BY, TECHID: NORMAL

## 2022-08-08 PROCEDURE — 74011250637 HC RX REV CODE- 250/637: Performed by: INTERNAL MEDICINE

## 2022-08-08 PROCEDURE — 74011636637 HC RX REV CODE- 636/637: Performed by: FAMILY MEDICINE

## 2022-08-08 PROCEDURE — 82962 GLUCOSE BLOOD TEST: CPT

## 2022-08-08 PROCEDURE — 97110 THERAPEUTIC EXERCISES: CPT

## 2022-08-08 PROCEDURE — 65270000029 HC RM PRIVATE

## 2022-08-08 PROCEDURE — 74011250636 HC RX REV CODE- 250/636: Performed by: FAMILY MEDICINE

## 2022-08-08 PROCEDURE — 94640 AIRWAY INHALATION TREATMENT: CPT

## 2022-08-08 PROCEDURE — 74011636637 HC RX REV CODE- 636/637: Performed by: INTERNAL MEDICINE

## 2022-08-08 PROCEDURE — 74011250637 HC RX REV CODE- 250/637: Performed by: FAMILY MEDICINE

## 2022-08-08 PROCEDURE — 83880 ASSAY OF NATRIURETIC PEPTIDE: CPT

## 2022-08-08 PROCEDURE — 36415 COLL VENOUS BLD VENIPUNCTURE: CPT

## 2022-08-08 PROCEDURE — 97116 GAIT TRAINING THERAPY: CPT

## 2022-08-08 RX ORDER — ALBUTEROL SULFATE 90 UG/1
2 AEROSOL, METERED RESPIRATORY (INHALATION)
Status: DISCONTINUED | OUTPATIENT
Start: 2022-08-09 | End: 2022-08-09 | Stop reason: HOSPADM

## 2022-08-08 RX ORDER — ESCITALOPRAM OXALATE 10 MG/1
10 TABLET ORAL DAILY
Qty: 30 TABLET | Refills: 0 | Status: ON HOLD | OUTPATIENT
Start: 2022-08-09

## 2022-08-08 RX ORDER — POTASSIUM CHLORIDE 1.5 G/1.77G
20 POWDER, FOR SOLUTION ORAL 2 TIMES DAILY WITH MEALS
Qty: 20 PACKET | Refills: 0 | Status: SHIPPED | OUTPATIENT
Start: 2022-08-08 | End: 2022-11-02

## 2022-08-08 RX ORDER — GUAIFENESIN 100 MG/5ML
100 SOLUTION ORAL
Qty: 500 ML | Refills: 0 | Status: SHIPPED | OUTPATIENT
Start: 2022-08-08 | End: 2022-11-02

## 2022-08-08 RX ORDER — BUDESONIDE AND FORMOTEROL FUMARATE DIHYDRATE 160; 4.5 UG/1; UG/1
2 AEROSOL RESPIRATORY (INHALATION) 2 TIMES DAILY
Qty: 10.2 G | Refills: 1 | Status: SHIPPED | OUTPATIENT
Start: 2022-08-08 | End: 2022-11-02

## 2022-08-08 RX ORDER — METOPROLOL SUCCINATE 25 MG/1
25 TABLET, EXTENDED RELEASE ORAL DAILY
Qty: 30 TABLET | Refills: 0 | Status: SHIPPED | OUTPATIENT
Start: 2022-08-09 | End: 2022-11-02

## 2022-08-08 RX ORDER — INSULIN GLARGINE 100 [IU]/ML
INJECTION, SOLUTION SUBCUTANEOUS
Qty: 1 ML | Refills: 1 | Status: SHIPPED | OUTPATIENT
Start: 2022-08-09 | End: 2022-11-02

## 2022-08-08 RX ORDER — LEVOFLOXACIN 500 MG/1
500 TABLET, FILM COATED ORAL EVERY 24 HOURS
Qty: 4 TABLET | Refills: 0 | Status: SHIPPED | OUTPATIENT
Start: 2022-08-09 | End: 2022-08-13

## 2022-08-08 RX ORDER — ISOSORBIDE MONONITRATE 30 MG/1
30 TABLET, EXTENDED RELEASE ORAL DAILY
Qty: 30 TABLET | Refills: 0 | Status: SHIPPED | OUTPATIENT
Start: 2022-08-09 | End: 2022-11-02

## 2022-08-08 RX ORDER — FUROSEMIDE 40 MG/1
TABLET ORAL
Qty: 60 TABLET | Refills: 1 | Status: SHIPPED | OUTPATIENT
Start: 2022-08-08 | End: 2022-11-02

## 2022-08-08 RX ADMIN — LEVOFLOXACIN 500 MG: 500 TABLET, FILM COATED ORAL at 10:22

## 2022-08-08 RX ADMIN — METOPROLOL SUCCINATE 25 MG: 25 TABLET, EXTENDED RELEASE ORAL at 10:21

## 2022-08-08 RX ADMIN — PANTOPRAZOLE SODIUM 40 MG: 40 TABLET, DELAYED RELEASE ORAL at 10:24

## 2022-08-08 RX ADMIN — FUROSEMIDE 40 MG: 10 INJECTION, SOLUTION INTRAMUSCULAR; INTRAVENOUS at 10:24

## 2022-08-08 RX ADMIN — ATORVASTATIN CALCIUM 40 MG: 40 TABLET, FILM COATED ORAL at 21:40

## 2022-08-08 RX ADMIN — ALBUTEROL SULFATE 2 PUFF: 108 INHALANT RESPIRATORY (INHALATION) at 01:43

## 2022-08-08 RX ADMIN — ALBUTEROL SULFATE 2 PUFF: 108 INHALANT RESPIRATORY (INHALATION) at 07:43

## 2022-08-08 RX ADMIN — CLOPIDOGREL BISULFATE 75 MG: 75 TABLET ORAL at 10:22

## 2022-08-08 RX ADMIN — ISOSORBIDE MONONITRATE 30 MG: 30 TABLET, EXTENDED RELEASE ORAL at 10:23

## 2022-08-08 RX ADMIN — INSULIN GLARGINE 10 UNITS: 100 INJECTION, SOLUTION SUBCUTANEOUS at 10:21

## 2022-08-08 RX ADMIN — FUROSEMIDE 40 MG: 10 INJECTION, SOLUTION INTRAMUSCULAR; INTRAVENOUS at 21:40

## 2022-08-08 RX ADMIN — ESCITALOPRAM OXALATE 10 MG: 10 TABLET ORAL at 10:29

## 2022-08-08 RX ADMIN — BUDESONIDE AND FORMOTEROL FUMARATE DIHYDRATE 2 PUFF: 160; 4.5 AEROSOL RESPIRATORY (INHALATION) at 07:43

## 2022-08-08 RX ADMIN — ALBUTEROL SULFATE 2 PUFF: 108 INHALANT RESPIRATORY (INHALATION) at 13:41

## 2022-08-08 RX ADMIN — BUDESONIDE AND FORMOTEROL FUMARATE DIHYDRATE 2 PUFF: 160; 4.5 AEROSOL RESPIRATORY (INHALATION) at 18:16

## 2022-08-08 RX ADMIN — LISINOPRIL 5 MG: 10 TABLET ORAL at 10:23

## 2022-08-08 RX ADMIN — POTASSIUM CHLORIDE 20 MEQ: 1.5 POWDER, FOR SOLUTION ORAL at 10:29

## 2022-08-08 RX ADMIN — POTASSIUM CHLORIDE 20 MEQ: 1.5 POWDER, FOR SOLUTION ORAL at 18:42

## 2022-08-08 RX ADMIN — ASPIRIN 81 MG: 81 TABLET, COATED ORAL at 10:22

## 2022-08-08 RX ADMIN — INSULIN LISPRO 3 UNITS: 100 INJECTION, SOLUTION INTRAVENOUS; SUBCUTANEOUS at 12:35

## 2022-08-08 RX ADMIN — ALBUTEROL SULFATE 2 PUFF: 108 INHALANT RESPIRATORY (INHALATION) at 18:16

## 2022-08-08 NOTE — PROGRESS NOTES
Problem: Pressure Injury - Risk of  Goal: *Prevention of pressure injury  Description: Document Manfred Scale and appropriate interventions in the flowsheet. Outcome: Progressing Towards Goal  Note: Pressure Injury Interventions:  Sensory Interventions: Float heels, Minimize linen layers    Moisture Interventions: Absorbent underpads    Activity Interventions: Increase time out of bed, PT/OT evaluation    Mobility Interventions: HOB 30 degrees or less, PT/OT evaluation    Nutrition Interventions: Document food/fluid/supplement intake    Friction and Shear Interventions: HOB 30 degrees or less                Problem: Falls - Risk of  Goal: *Absence of Falls  Description: Document Chay Fall Risk and appropriate interventions in the flowsheet.   Outcome: Progressing Towards Goal  Note: Fall Risk Interventions:  Mobility Interventions: Bed/chair exit alarm, PT Consult for mobility concerns, Patient to call before getting OOB         Medication Interventions: Teach patient to arise slowly, Patient to call before getting OOB    Elimination Interventions: Call light in reach, Toilet paper/wipes in reach, Patient to call for help with toileting needs, Bed/chair exit alarm

## 2022-08-08 NOTE — PROGRESS NOTES
PHYSICAL THERAPY TREATMENT  Patient: Terence Orourke (79 y.o. female)  Date: 8/8/2022  Diagnosis: CHF exacerbation ([facility] [address] [I50.9] <principal problem not specified>      Precautions:    Chart, physical therapy assessment, plan of care and goals were reviewed. ASSESSMENT  Patient continues with skilled PT services and is progressing towards goals. Patient supine in bed upon approach and agreed to therapy session today. Patient was SBA for bed mobility, supine<>sit and scooting EOB. Patient demonstrated good unsupported sitting balance while EOB. Patient then performed SPT to bedside commode at [facility]. Patient then able to use commode and perform josé care without assistance. Patient then performed STS to RW and ambulated 20 feet around bed to bedside chair. Patient then was CGA for STS> into chair with patient demonstrating good sitting technique. Patient then performed seated TE ( see details below). Patient then performed STS> to RW at [facility] and ambulated 10 feet around bed to perform STS to EOB at [facility]. Patient was then SBA for sit>supine transfer. Patient then left supine in bed with call bell within reach and all needs meet. Current Level of Function Impacting Discharge (mobility/balance): impaired balance, general weakness, poor activity tolerance    Other factors to consider for discharge: PLOF, assistance at home, level of deficits, acute medical state         PLAN :  Patient continues to benefit from skilled intervention to address the above impairments. Continue treatment per established plan of care. to address goals. Recommendation for discharge: (in order for the patient to meet his/her long term goals)  1 [facility] [place],[address]     This discharge recommendation:  Has been made in collaboration with the attending provider and/or case management    IF patient discharges home will need the following DME: rolling walker       SUBJECTIVE:   Patient stated im a bit cold.     OBJECTIVE DATA SUMMARY:   Critical Behavior:  Neurologic State: Alert  Orientation Level: Oriented X4  Cognition: Follows commands     Functional Mobility Training:  Bed Mobility:  Rolling: Stand-by assistance  Supine to Sit: Stand-by assistance  Sit to Supine: Stand-by assistance  Scooting: Stand-by assistance  Transfers:  Sit to Stand: Contact guard assistance  Stand to Sit: Contact guard assistance  Balance:  Sitting: Intact  Standing: Impaired; With support  Standing - Static: Fair;Constant support  Standing - Dynamic : Fair;Constant support  Ambulation/Gait Training:  Distance (ft): 25 Feet (ft)  Assistive Device: Gait belt;Walker, rolling  Ambulation - Level of Assistance: Contact guard assistance    Therapeutic Exercises:   1x20 ankle circles  1x20 seated marches  1x15 LAQ  1x12 hip ADD/ABD  Pain Rating:  Un rated pain in stomach and hips     Activity Tolerance:   Fair and requires rest breaks  Please refer to the flowsheet for vital signs taken during this treatment. After treatment patient left in no apparent distress:   Bed returned to lowest position, Supine in bed, Call bell within reach, Bed / chair alarm activated, and Side rails x 3    COMMUNICATION/COLLABORATION:   The patients plan of care was discussed with: Registered nurse. Problem: Mobility Impaired (Adult and Pediatric)  Goal: *Acute Goals and Plan of Care (Insert Text)  Description: Pt stated goals: walk  Pt will be I with LE HEP in 7 days. Pt will perform bed mobility with mod I in 7 days. Pt will perform transfers with mod I in 7 days. Pt will amb >100 feet with LRAD safely with mod I in 7 days.      Outcome: Progressing Towards Goal       [place] PTA   Time Calculation: 28 mins

## 2022-08-08 NOTE — PROGRESS NOTES
PULMONARY NOTE  [facility]    Name: Willistine Speaks. Caryle Powers MRN: [id number]   : 1940 Hospital: [facility]   Date: 2022  Admission date: 2022 Hospital Day: 7       HPI:     Hospital Problems  Date Reviewed: 2022            Codes Class Noted POA    CHF exacerbation [facility]) ICD-10-CM: I50.9  ICD-9-CM: 428.0  2022 Unknown          [x] High complexity decision making was performed  [x] See my orders for details      Subjective/Initial History:     I was asked by Irineo Terrazas MD to see Willistine Speaks. Caryle Powers  a 79 y.o.  female in consultation     Excerpts from admission 2022 or consult notes as follows:   50-year-old lady came in because of shortness of breath and dyspnea past medical history of congestive heart failure, hypertension, hyperlipidemia and psychiatric illness she is noncompliant not happy in the emergency room she is complaining about dyspnea on exertion she is on room air also having swelling of the lower extremities chest x-ray shows bilateral pleural effusions the patient admitted and pulmonary consult was called.   She had a remote history of tobacco abuse last 2D echo showed ejection fraction of 35% she has coronary artery disease stent placed 531      Allergies   Allergen Reactions    Mackey Itching    Peanut Butter Flavor Not Reported This Time     possible allergy        MAR reviewed and pertinent medications noted or modified as needed     Current Facility-Administered Medications   Medication    guaiFENesin (ROBITUSSIN) 100 mg/5 mL oral liquid 100 mg    isosorbide mononitrate ER (IMDUR) tablet 30 mg    levoFLOXacin (LEVAQUIN) tablet 500 mg    dextrose 10% infusion 0-250 mL    insulin glargine (LANTUS) injection 10 Units    albuterol (PROVENTIL HFA, VENTOLIN HFA, PROAIR HFA) inhaler 2 Puff    budesonide-formoteroL (SYMBICORT) 160-4.5 mcg/actuation HFA inhaler 2 Puff    guaiFENesin (ROBITUSSIN) 100 mg/5 mL oral liquid 100 mg    nitroglycerin (NITROSTAT) tablet 0.4 mg    escitalopram oxalate (LEXAPRO) tablet 10 mg    insulin lispro (HUMALOG) injection    glucose chewable tablet 16 g    glucagon (GLUCAGEN) injection 1 mg    metoprolol succinate (TOPROL-XL) XL tablet 25 mg    atorvastatin (LIPITOR) tablet 40 mg    clopidogreL (PLAVIX) tablet 75 mg    potassium chloride (KLOR-CON) packet for solution 20 mEq    aspirin delayed-release tablet 81 mg    lisinopriL (PRINIVIL, ZESTRIL) tablet 5 mg    pantoprazole (PROTONIX) tablet 40 mg    furosemide (LASIX) injection 40 mg      Patient PCP: David Adams MD  PMH:  has a past medical history of CHF (congestive heart failure) ([facility] [address] Dysphagia, Heart failure ([place] [address] Hyperlipidemia, Hypertension, Ill-defined condition, and Psychiatric disorder. PSH:   has a past surgical history that includes hx coronary stent placement (05/31/2022) and hx heart catheterization (05/27/2022). FHX: family history includes Diabetes in her father, mother, and another family member; Heart Disease in her father, mother, and another family member. SHX:  reports that she quit smoking about 58 years ago. She has never used smokeless tobacco. She reports that she does not drink alcohol and does not use drugs. ROS:    Review of Systems   Constitutional:  Positive for malaise/fatigue. Generalized weakness   HENT: Negative. Eyes: Negative. Respiratory:  Positive for sputum production. Negative for shortness of breath. Cardiovascular:  Positive for leg swelling. Negative for chest pain and orthopnea. Gastrointestinal: Negative. Genitourinary: Negative. Musculoskeletal: Negative. Skin: Negative. Neurological: Negative. Psychiatric/Behavioral: Negative.         Objective:     Vital Signs: Telemetry:    normal sinus rhythm Intake/Output:   Visit Vitals  /68   Pulse 79   Temp 97.3 °F (36.3 °C)   Resp 17   Ht 5' 2.01\" (1.575 m)   Wt 114 lb 10.2 oz (52 kg)   SpO2 96%   Breastfeeding Unknown   BMI 20.96 kg/m²       Temp (24hrs), Av.4 °F (36.3 °C), Min:97.2 °F (36.2 °C), Max:97.6 °F (36.4 °C)        O2 Device: None (Room air) O2 Flow Rate (L/min): 5 l/min       Wt Readings from Last 4 Encounters:   22 114 lb 10.2 oz (52 kg)   22 121 lb (54.9 kg)   22 123 lb 6.4 oz (56 kg)   22 123 lb (55.8 kg)        No intake or output data in the 24 hours ending 22 0920      Last shift:      No intake/output data recorded. Last 3 shifts: No intake/output data recorded. Physical Exam:     Physical Exam  Constitutional:       Appearance: Normal appearance. HENT:      Head: Normocephalic and atraumatic. Nose: Nose normal.      Mouth/Throat:      Mouth: Mucous membranes are moist.   Eyes:      Pupils: Pupils are equal, round, and reactive to light. Cardiovascular:      Rate and Rhythm: Normal rate. Pulses: Normal pulses. Pulmonary:      Breath sounds: Rales present. Abdominal:      General: Abdomen is flat. Bowel sounds are normal.      Palpations: Abdomen is soft. Musculoskeletal:         General: Swelling present. Cervical back: Normal range of motion. Right lower leg: Edema present. Left lower leg: Edema present. Skin:     General: Skin is warm. Neurological:      General: No focal deficit present. Mental Status: She is alert. Psychiatric:         Mood and Affect: Mood normal.        Labs:    Recent Labs     22  0916 22  1029   WBC 5.2 5.5   HGB 14.0 13.0    309       Recent Labs     22  0916 22  1029   * 135*   K 5.1 3.3*   CL 99 97   CO2 30 30   * 252*   BUN 13 15   CREA 0.58 0.73   CA 9.0 8.4*   ALB 3.4* 3.0*   ALT 30 26       No results for input(s): PH, PCO2, PO2, HCO3, FIO2 in the last 72 hours. No results for input(s): CPK, CKNDX, TROIQ in the last 72 hours.     No lab exists for component: CPKMB  No results found for: BNPP, BNP   Lab Results   Component Value Date/Time    Culture result: No growth 6 days 05/27/2022 12:59 PM     No results found for: TSH, TSHEXT, TSHEXT    Imaging:    CXR Results  (Last 48 hours)      None          Results from Hospital Encounter encounter on 08/02/22    XR CHEST PORT    Narrative  EXAM: XR CHEST PORT    HISTORY: sob, h/o CHF. COMPARISON: 6/21/2022    FINDINGS: Portable AP. The hilar is mildly enlarged. There are moderate left and  mild right pleural effusions which have increased in the interval. There is  increased hazy opacification of the right lower lobe. There is increased  atelectasis left lower lobe. There is unchanged mild edema. The bones are  osteopenic. Multilevel spondylosis spine. Impression  1. Increased mild right and moderate left pleural effusions with increased  bibasilar atelectasis. 2. Unchanged mild edema. Results from [place] encounter on 06/21/22    XR CHEST PORT    Narrative  Chest single view. Comparison single view chest May 31, 2022. Left greater than right moderate volume dependent pleural effusions persist.  Mild dependent pulmonary interstitial edema. Bibasilar atelectasis. Cardiac and  mediastinal structures unchanged. No pneumothorax. Results from Hospital Encounter encounter on 05/27/22    XR CHEST PORT    Narrative  XR CHEST PORT    Comparison:  5/27/2022. Single view:  Stable pleural effusions and bibasilar atelectasis/pneumonia (left  greater than right). No pneumothorax apparent. The heart size is stable. Stable  hemodynamic status. Impression  No significant change. No results found for this or any previous visit.         IMPRESSION:   Congestive heart failure  HFrEF  Dilated cardiomyopathy ejection fraction 30 to 35%  Coronary artery disease with history of PCI to mid LAD 5/31/2022 and RCA disease treated medically  Moderate mitral regurgitation  Pleural effusion  Prognosis guarded   Low blood pressure  Chest pain  Hyperglycemia       RECOMMENDATIONS/PLAN:   69-year-old lady came in because of shortness of breath and dyspnea generalized weakness swelling of the lower extremities given history of coronary artery disease congestive heart failure low ejection fraction chest x-ray shows bilateral pleural effusion shortness of breath most likely secondary to underlying congestive heart failure normal white count continue with the Lasix aggressive diuresis  Patient better no chest pain  Examine lower back for sacral ulcer concern. Monitor blood glucose. 8/4: Pt states that she is feeling better. Complaining of sticky, yellow phlegm that she is coughing up. No dyspnea at this time. O2 sat 95%. 8/5: Pt still complaining of sputum production and now increased generalized weakness and anxiety. Pt recently c/o chest pain radiating to her jaw. EKG ordered. BP 85/53, O2 sat 93%. 8/6: Pt states that she is feeling better since yesterday but still feels very weak. She no longer has the chest pain or jaw pain, but still is coughing up sputum. Glucose 142 but vitals are otherwise stable. 8/8: Pt states that she is feeling better but still feels weak. She mentions that she may have had an episode of restless leg yesterday, which she was concerned about. She also mentions that she has pain in her lower back. Examine for potential sacral ulcer. Glucose 167. Ensure pt is receiving her insuline injections.       Fredy Gray

## 2022-08-08 NOTE — DISCHARGE SUMMARY
Discharge Summary       PATIENT ID: Ashu Desai  MRN: [id number]   YOB: 1940    DATE OF ADMISSION: 8/2/2022  5:06 PM    DATE OF DISCHARGE:   PRIMARY CARE PROVIDER: Dandre Lu MD     ATTENDING PHYSICIAN: Abdiaziz Carrillo  DISCHARGING PROVIDER: Abdiaziz Carrillo      CONSULTATIONS: IP CONSULT TO PULMONOLOGY  IP CONSULT TO CARDIOLOGY    PROCEDURES/SURGERIES: * No surgery found *    ADMITTING DIAGNOSES:    Patient Active Problem List    Diagnosis Date Noted    CHF exacerbation ([facility] [address] 08/02/2022    Acute respiratory failure with hypoxia ([facility] [address] 05/29/2022    Acute on chronic systolic congestive heart failure ([facility] [address] 05/29/2022    Dysphagia 05/29/2022    CHF (congestive heart failure) ([facility] [address] 05/27/2022       DISCHARGE DIAGNOSES / PLAN:      CHF (EF 30-35%)  Dilated cardiomypathy  Moderate mitral regurgitation  Pleural effusion, bilateral  Hx of cardiac stents  Diabetes  Dysphagia  Hyperlipidemia  Hypertension  Anxiety  Antral Gastritis  Distal esophagitis  Low k        DISCHARGE MEDICATIONS:  Current Discharge Medication List        START taking these medications    Details   budesonide-formoteroL (SYMBICORT) 160-4.5 mcg/actuation HFAA Take 2 Puffs by inhalation two (2) times a day. Qty: 10.2 g, Refills: 1  Start date: 8/8/2022      escitalopram oxalate (LEXAPRO) 10 mg tablet Take 1 Tablet by mouth in the morning. Qty: 30 Tablet, Refills: 0  Start date: 8/9/2022      guaiFENesin (ROBITUSSIN) 100 mg/5 mL liquid Take 5 mL by mouth every four (4) hours as needed for Cough. Qty: 500 mL, Refills: 0  Start date: 8/8/2022      insulin glargine (LANTUS) 100 unit/mL injection 10 units subcu daily  Qty: 1 mL, Refills: 1  Start date: 8/9/2022      isosorbide mononitrate ER (IMDUR) 30 mg tablet Take 1 Tablet by mouth in the morning.  Indications: prevention of anginal chest pain associated with coronary artery disease  Qty: 30 Tablet, Refills: 0  Start date: 8/9/2022      levoFLOXacin (LEVAQUIN) 500 mg tablet Take 1 Tablet by mouth every twenty-four (24) hours for 4 doses. Qty: 4 Tablet, Refills: 0  Start date: 8/9/2022, End date: 8/13/2022      metoprolol succinate (TOPROL-XL) 25 mg XL tablet Take 1 Tablet by mouth in the morning. Qty: 30 Tablet, Refills: 0  Start date: 8/9/2022      furosemide (Lasix) 40 mg tablet 1 tablet twice a day  Qty: 60 Tablet, Refills: 1  Start date: 8/8/2022           CONTINUE these medications which have CHANGED    Details   potassium chloride (KLOR-CON) 20 mEq pack Take 1 Packet by mouth two (2) times daily (with meals). Indications: low amount of potassium in the blood  Qty: 20 Packet, Refills: 0  Start date: 8/8/2022           CONTINUE these medications which have NOT CHANGED    Details   nitroglycerin (NITROSTAT) 0.4 mg SL tablet 0.4 mg by SubLINGual route every five (5) minutes as needed for Chest Pain. Up to 3 doses. lisinopriL (PRINIVIL, ZESTRIL) 5 mg tablet Take 5 mg by mouth daily. pantoprazole (PROTONIX) 40 mg tablet Take 40 mg by mouth daily. atorvastatin (LIPITOR) 40 mg tablet Take 1 Tablet by mouth nightly. Qty: 30 Tablet, Refills: 0      clopidogreL (PLAVIX) 75 mg tab Take 1 Tablet by mouth daily. Indications: blood clot prevention following percutaneous coronary intervention  Qty: 90 Tablet, Refills: 0      aspirin delayed-release 81 mg tablet Take 81 mg by mouth daily. STOP taking these medications       carvediloL (COREG) 3.125 mg tablet Comments:   Reason for Stopping:                 NOTIFY YOUR PHYSICIAN FOR ANY OF THE FOLLOWING:   Fever over 101 degrees for 24 hours. Chest pain, shortness of breath, fever, chills, nausea, vomiting, diarrhea, change in mentation, falling, weakness, bleeding. Severe pain or pain not relieved by medications. Or, any other signs or symptoms that you may have questions about.     DISPOSITION:  x  Home With:   OT  PT  HH  RN       Long term SNF/Inpatient Rehab    Independent/assisted living    Hospice    Other: PATIENT CONDITION AT DISCHARGE: Stable      PHYSICAL EXAMINATION AT DISCHARGE:  General:          Alert, cooperative, no distress, appears stated age. HEENT:           Atraumatic, anicteric sclerae, pink conjunctivae                          No oral ulcers, mucosa moist, throat clear, dentition fair  Neck:               Supple, symmetrical  Lungs:             Clear to auscultation bilaterally. No Wheezing or Rhonchi. No rales. Chest wall:      No tenderness  No Accessory muscle use. Heart:              Regular  rhythm,  No  murmur   No edema  Abdomen:        Soft, non-tender. Not distended. Bowel sounds normal  Extremities:     No cyanosis. No clubbing,                            Skin turgor normal, Capillary refill normal  Skin:                Not pale. Not Jaundiced  No rashes   Psych:             Not anxious or agitated. Neurologic:      Alert, moves all extremities, answers questions appropriately and responds to commands     XR CHEST PORT   Final Result      1. Increased mild right and moderate left pleural effusions with increased   bibasilar atelectasis. 2. Unchanged mild edema.             Recent Results (from the past 24 hour(s))   GLUCOSE, POC    Collection Time: 08/07/22  4:48 PM   Result Value Ref Range    Glucose (POC) 109 65 - 117 mg/dL    Performed by Doug Sofia    GLUCOSE, POC    Collection Time: 08/07/22  9:11 PM   Result Value Ref Range    Glucose (POC) 105 65 - 117 mg/dL    Performed by [address] [place] [address] Street, POC    Collection Time: 08/08/22  8:57 AM   Result Value Ref Range    Glucose (POC) 167 (H) 65 - 117 mg/dL    Performed by José Manuel Olivares, POC    Collection Time: 08/08/22 12:17 PM   Result Value Ref Range    Glucose (POC) 169 (H) 65 - 117 mg/dL    Performed by [address]:    Patient is a 79y.o. year old female with a significant history of CHF, cardiac stents placement on May 31st, hypertension, hyperlipidemia, and psychiatric illness, coming to the ED complaining for shortness of breath over the last two days that has gotten progressively worse. Patient reports bilateral lower extremity edema. Left Ventricle: Severely reduced left ventricular systolic function with a visually estimated EF of 30 - 35%. Left ventricle is mildly dilated. Normal wall thickness     8/3     Patient was awake, alert, and resting in bed. She states that she is still feeling short on breath and that her stomach is cramping because she has to urinate but is worried about her catheter. Patient is having trouble swallowing and asked to be on a softer diet. She has no eaten much because of this. Patients BNP- 16,068        8/4     Patient was awake and alert and in no apparent distress. She denies shortness of breath except when expending energy such as eating or trying to ambulate. She states that her abdominal pain has subsided after the catheter was placed. Edema in LE significantly decreased. Patient was recommended a puree diet and mildly thick liquids via speech pathologist. She is currently tolerating new diet well and ate most of her meal.  Patient also stated this is the first time in a while that she has slept this well. Patient notes discomfort on her backside due to inability to readjust herself as well as discomfort in her hands because they're cold. Telemetry reviewed reveals occasional episodes of 12 consecutive premature ventricular complex (PVCs). 8/5     Patient was trying to rest in bed. She was currently on nasal cannula oxygen and recently complained of chest tightness and pain. She also stated that she feels weak. On 7/15- results from Dr. [facility] of patient's EGD shows H. Pylori infection that was treated with antibiotics -Amoxicillin and metronidazole that should have been completed on 8/3.      Discussed with the patient's son at the bedside concerned about severe anxiety        PT recommended inpatient rehab facility at discharge. 8/6  Patient was alert and in bed trying to eat. She continues to have shortness or breath and often spits up white phlegm. Patient denies any blood with phlegm. Patient has no signs of lower extremity swelling. She reports her chest pain a 5 on a scale of 0-10. Talking for more than 30 seconds leads to more coughing. She reports having pain in her tailbone and was given a cushion to decrease the pressure in her back. Lungs are clear to auscultation. Heart sounds are normal S1/S2. Fasting glucose is still elevated (142 mg/dL), there are no new lab results or changes in her medication. 8/7  Patient was alert and upright in chair, in no apparent distress. Reports walking around and getting short of breath. Compared to yesterday, patient is coughing and spitting up phlegm less frequently. Is compliant with medications. There is no LE edema, lungs are clear to auscultation. Was visited by the speech language pathologist, is recommended to continue pureed foods and remain upright for 90 minutes. Patient is compliant with regimen. Patient continues on strict asp/GERD protocol. No new labs for 8/7.     8/8-  Patient was awake, aware, and in fetal position resting in bed. She states that her cough is better since starting the new medications ; Symbicort and albuterol. She still complains of general weakness and some chest pain. She asked if a nurse could look for bed sores on her bottom. She in pain d/t lack of movement and ambulation. Patient mentioned an episode of restless leg syndrome that occurred over the weekend that she was concerned about.      A1C-6.6     Patient doing well very anxious  No fever no chills   No shortness of breath no chest pain no fever no chills    Patient was seen by the PT OT recommend inpatient rehab    Medication reconciliation done    Done discharge plan 35 minutes 50% time spent counseling and coordination of care          Signed:   Chris Morejon MD  8/8/2022  12:30 PM

## 2022-08-08 NOTE — PROGRESS NOTES
PULMONARY NOTE  [facility]    Name: Sindy Mendez MRN: [id number]   : 1940 Hospital: [facility]   Date: 2022  Admission date: 2022 Hospital Day: 7       HPI:     Hospital Problems  Date Reviewed: 2022            Codes Class Noted POA    CHF exacerbation [facility]) ICD-10-CM: I50.9  ICD-9-CM: 428.0  2022 Unknown          [x] High complexity decision making was performed  [x] See my orders for details      Subjective/Initial History:     I was asked by Randell Campoverde MD to see Sindy Mendez  a 79 y.o.  female in consultation     Excerpts from admission 2022 or consult notes as follows:   76-year-old lady came in because of shortness of breath and dyspnea past medical history of congestive heart failure, hypertension, hyperlipidemia and psychiatric illness she is noncompliant not happy in the emergency room she is complaining about dyspnea on exertion she is on room air also having swelling of the lower extremities chest x-ray shows bilateral pleural effusions the patient admitted and pulmonary consult was called.   She had a remote history of tobacco abuse last 2D echo showed ejection fraction of 35% she has coronary artery disease stent placed 531      Allergies   Allergen Reactions    [place] Itching    Peanut Butter Flavor Not Reported This Time     possible allergy        MAR reviewed and pertinent medications noted or modified as needed     Current Facility-Administered Medications   Medication    guaiFENesin (ROBITUSSIN) 100 mg/5 mL oral liquid 100 mg    isosorbide mononitrate ER (IMDUR) tablet 30 mg    levoFLOXacin (LEVAQUIN) tablet 500 mg    dextrose 10% infusion 0-250 mL    insulin glargine (LANTUS) injection 10 Units    albuterol (PROVENTIL HFA, VENTOLIN HFA, PROAIR HFA) inhaler 2 Puff    budesonide-formoteroL (SYMBICORT) 160-4.5 mcg/actuation HFA inhaler 2 Puff    guaiFENesin (ROBITUSSIN) 100 mg/5 mL oral liquid 100 mg    nitroglycerin (NITROSTAT) tablet 0.4 mg    escitalopram oxalate (LEXAPRO) tablet 10 mg    insulin lispro (HUMALOG) injection    glucose chewable tablet 16 g    glucagon (GLUCAGEN) injection 1 mg    metoprolol succinate (TOPROL-XL) XL tablet 25 mg    atorvastatin (LIPITOR) tablet 40 mg    clopidogreL (PLAVIX) tablet 75 mg    potassium chloride (KLOR-CON) packet for solution 20 mEq    aspirin delayed-release tablet 81 mg    lisinopriL (PRINIVIL, ZESTRIL) tablet 5 mg    pantoprazole (PROTONIX) tablet 40 mg    furosemide (LASIX) injection 40 mg      Patient PCP: Adela Weaver MD  PMH:  has a past medical history of CHF (congestive heart failure) ([facility] [address] Dysphagia, Heart failure ([facility] [address] Hyperlipidemia, Hypertension, Ill-defined condition, and Psychiatric disorder. PSH:   has a past surgical history that includes hx coronary stent placement (05/31/2022) and hx heart catheterization (05/27/2022). FHX: family history includes Diabetes in her father, mother, and another family member; Heart Disease in her father, mother, and another family member. SHX:  reports that she quit smoking about 58 years ago. She has never used smokeless tobacco. She reports that she does not drink alcohol and does not use drugs. ROS:    Review of Systems   Constitutional:  Positive for malaise/fatigue. Generalized weakness   HENT: Negative. Eyes: Negative. Respiratory:  Positive for sputum production. Negative for shortness of breath. Cardiovascular:  Positive for leg swelling. Negative for chest pain and orthopnea. Gastrointestinal: Negative. Genitourinary: Negative. Musculoskeletal: Negative. Skin: Negative. Neurological: Negative. Psychiatric/Behavioral: Negative.         Objective:     Vital Signs: Telemetry:    normal sinus rhythm Intake/Output:   Visit Vitals  /68   Pulse 79   Temp 97.3 °F (36.3 °C)   Resp 17   Ht 5' 2.01\" (1.575 m)   Wt 52 kg (114 lb 10.2 oz)   SpO2 96%   Breastfeeding Unknown   BMI 20.96 kg/m²       Temp (24hrs), Av.4 °F (36.3 °C), Min:97.2 °F (36.2 °C), Max:97.6 °F (36.4 °C)        O2 Device: None (Room air) O2 Flow Rate (L/min): 5 l/min       Wt Readings from Last 4 Encounters:   22 52 kg (114 lb 10.2 oz)   22 54.9 kg (121 lb)   22 56 kg (123 lb 6.4 oz)   22 55.8 kg (123 lb)        No intake or output data in the 24 hours ending 22 1012      Last shift:      No intake/output data recorded. Last 3 shifts: No intake/output data recorded. Physical Exam:     Physical Exam  Constitutional:       Appearance: Normal appearance. HENT:      Head: Normocephalic and atraumatic. Nose: Nose normal.      Mouth/Throat:      Mouth: Mucous membranes are moist.   Eyes:      Pupils: Pupils are equal, round, and reactive to light. Cardiovascular:      Rate and Rhythm: Normal rate. Pulses: Normal pulses. Pulmonary:      Breath sounds: Rales present. Abdominal:      General: Abdomen is flat. Bowel sounds are normal.      Palpations: Abdomen is soft. Musculoskeletal:         General: Swelling present. Cervical back: Normal range of motion. Right lower leg: Edema present. Left lower leg: Edema present. Skin:     General: Skin is warm. Neurological:      General: No focal deficit present. Mental Status: She is alert. Psychiatric:         Mood and Affect: Mood normal.        Labs:    Recent Labs     22  0916 22  1029   WBC 5.2 5.5   HGB 14.0 13.0    309       Recent Labs     22  0916 22  1029   * 135*   K 5.1 3.3*   CL 99 97   CO2 30 30   * 252*   BUN 13 15   CREA 0.58 0.73   CA 9.0 8.4*   ALB 3.4* 3.0*   ALT 30 26       No results for input(s): PH, PCO2, PO2, HCO3, FIO2 in the last 72 hours. No results for input(s): CPK, CKNDX, TROIQ in the last 72 hours.     No lab exists for component: CPKMB  No results found for: BNPP, BNP   Lab Results   Component Value Date/Time    Culture result: No growth 6 days 05/27/2022 12:59 PM     No results found for: TSH, TSHEXT, TSHEXT    Imaging:    CXR Results  (Last 48 hours)      None          Results from Hospital Encounter encounter on 08/02/22    XR CHEST PORT    Narrative  EXAM: XR CHEST PORT    HISTORY: sob, h/o CHF. COMPARISON: 6/21/2022    FINDINGS: Portable AP. The hilar is mildly enlarged. There are moderate left and  mild right pleural effusions which have increased in the interval. There is  increased hazy opacification of the right lower lobe. There is increased  atelectasis left lower lobe. There is unchanged mild edema. The bones are  osteopenic. Multilevel spondylosis spine. Impression  1. Increased mild right and moderate left pleural effusions with increased  bibasilar atelectasis. 2. Unchanged mild edema. Results from [place] encounter on 06/21/22    XR CHEST PORT    Narrative  Chest single view. Comparison single view chest May 31, 2022. Left greater than right moderate volume dependent pleural effusions persist.  Mild dependent pulmonary interstitial edema. Bibasilar atelectasis. Cardiac and  mediastinal structures unchanged. No pneumothorax. Results from Hospital Encounter encounter on 05/27/22    XR CHEST PORT    Narrative  XR CHEST PORT    Comparison:  5/27/2022. Single view:  Stable pleural effusions and bibasilar atelectasis/pneumonia (left  greater than right). No pneumothorax apparent. The heart size is stable. Stable  hemodynamic status. Impression  No significant change. No results found for this or any previous visit.         IMPRESSION:   Congestive heart failure  HFrEF  Dilated cardiomyopathy ejection fraction 30 to 35%  Coronary artery disease with history of PCI to mid LAD 5/31/2022 and RCA disease treated medically  Moderate mitral regurgitation  Pleural effusion  Prognosis guarded   Low blood pressure  Chest pain  Hyperglycemia       RECOMMENDATIONS/PLAN:   79-year-old lady came in because of shortness of breath and dyspnea generalized weakness swelling of the lower extremities given history of coronary artery disease congestive heart failure low ejection fraction chest x-ray shows bilateral pleural effusion shortness of breath most likely secondary to underlying congestive heart failure normal white count continue with the Lasix aggressive diuresis  Patient better no chest pain  Examine lower back for sacral ulcer concern. Monitor blood glucose. 8/4: Pt states that she is feeling better. Complaining of sticky, yellow phlegm that she is coughing up. No dyspnea at this time. O2 sat 95%. 8/5: Pt still complaining of sputum production and now increased generalized weakness and anxiety. Pt recently c/o chest pain radiating to her jaw. EKG ordered. BP 85/53, O2 sat 93%. 8/6: Pt states that she is feeling better since yesterday but still feels very weak. She no longer has the chest pain or jaw pain, but still is coughing up sputum. Glucose 142 but vitals are otherwise stable. 8/8: Pt states that she is feeling better but still feels weak. She mentions that she may have had an episode of restless leg yesterday, which she was concerned about. She also mentions that she has pain in her lower back. Examine for potential sacral ulcer. Glucose 167. Ensure pt is receiving her insuline injections.   Wound care evaluation    January Hannon MD

## 2022-08-08 NOTE — PROGRESS NOTES
Leader rounding on [address] appeared to be a bit discouraged, she is going to another facility for a couple of weeks. She reaffirmed her Spiritual needs being met by her [facility]. Provided spiritual presence and assurance of prayers. She has no needs at this time.   Jeannine Hooker, MS., [facility]   can be reached by calling the  at [facility]  [phone]

## 2022-08-08 NOTE — PROGRESS NOTES
History and Physical    NAME: Devonte Orourke   :  1940   MRN:  [id number]     Date/Time:  2022 9:10 AM    Patient PCP: Que Pradhan MD  ______________________________________________________________________             Subjective:     CHIEF COMPLAINT: Patient comes to the ED with complains of shortness of breath and swelling in her lower extremities. HISTORY OF PRESENT ILLNESS:       Patient is a 79y.o. year old female with a significant history of CHF, cardiac stents placement on May 31st, hypertension, hyperlipidemia, and psychiatric illness, coming to the ED complaining for shortness of breath over the last two days that has gotten progressively worse. Patient reports bilateral lower extremity edema. Left Ventricle: Severely reduced left ventricular systolic function with a visually estimated EF of 30 - 35%. Left ventricle is mildly dilated. Normal wall thickness    8/3    Patient was awake, alert, and resting in bed. She states that she is still feeling short on breath and that her stomach is cramping because she has to urinate but is worried about her catheter. Patient is having trouble swallowing and asked to be on a softer diet. She has no eaten much because of this. Patients BNP- 16,068          Patient was awake and alert and in no apparent distress. She denies shortness of breath except when expending energy such as eating or trying to ambulate. She states that her abdominal pain has subsided after the catheter was placed. Edema in LE significantly decreased. Patient was recommended a puree diet and mildly thick liquids via speech pathologist. She is currently tolerating new diet well and ate most of her meal.   Patient also stated this is the first time in a while that she has slept this well. Patient notes discomfort on her backside due to inability to readjust herself as well as discomfort in her hands because they're cold.      Telemetry reviewed reveals occasional episodes of 12 consecutive premature ventricular complex (PVCs). 8/5    Patient was trying to rest in bed. She was currently on nasal cannula oxygen and recently complained of chest tightness and pain. She also stated that she feels weak. On 7/15- results from Dr. [facility] of patient's EGD shows H. Pylori infection that was treated with antibiotics -Amoxicillin and metronidazole that should have been completed on 8/3. Discussed with the patient's son at the bedside concerned about severe anxiety      PT recommended inpatient rehab facility at discharge. 8/6  Patient was alert and in bed trying to eat. She continues to have shortness or breath and often spits up white phlegm. Patient denies any blood with phlegm. Patient has no signs of lower extremity swelling. She reports her chest pain a 5 on a scale of 0-10. Talking for more than 30 seconds leads to more coughing. She reports having pain in her tailbone and was given a cushion to decrease the pressure in her back. Lungs are clear to auscultation. Heart sounds are normal S1/S2. Fasting glucose is still elevated (142 mg/dL), there are no new lab results or changes in her medication. 8/7  Patient was alert and upright in chair, in no apparent distress. Reports walking around and getting short of breath. Compared to yesterday, patient is coughing and spitting up phlegm less frequently. Is compliant with medications. There is no LE edema, lungs are clear to auscultation. Was visited by the speech language pathologist, is recommended to continue pureed foods and remain upright for 90 minutes. Patient is compliant with regimen. Patient continues on strict asp/GERD protocol. No new labs for 8/7.    8/8-   Patient was awake, aware, and in fetal position resting in bed. She states that her cough is better since starting the new medications ; Symbicort and albuterol.  She still complains of general weakness and some chest pain. She asked if a nurse could look for bed sores on her bottom. She in pain d/t lack of movement and ambulation. Patient mentioned an episode of restless leg syndrome that occurred over the weekend that she was concerned about. A1C-6.6   Shows bibasilar atelectasis with pleural effusion()           Past Medical History:   Diagnosis Date    CHF (congestive heart failure) ([facility])     EF 30-35%    Dysphagia     Heart failure ([facility])     Hyperlipidemia     Hypertension     Ill-defined condition     patient unaware of any diagnosis due to recieving minimal medical care for the past 57 years    Psychiatric disorder     anxiety        Past Surgical History:   Procedure Laterality Date    HX CORONARY STENT PLACEMENT  2022    HX HEART CATHETERIZATION  2022    STENT PLACED       Social History     Tobacco Use    Smoking status: Former     Types: Cigarettes     Quit date: 1964     Years since quittin.6    Smokeless tobacco: Never   Substance Use Topics    Alcohol use: Never        Family History   Problem Relation Age of Onset    Diabetes Other     Heart Disease Other     Heart Disease Mother     Diabetes Mother     Diabetes Father     Heart Disease Father        Allergies   Allergen Reactions    [place] Itching    Peanut Butter Flavor Not Reported This Time     possible allergy        Prior to Admission medications    Medication Sig Start Date End Date Taking? Authorizing Provider   nitroglycerin (NITROSTAT) 0.4 mg SL tablet 0.4 mg by SubLINGual route every five (5) minutes as needed for Chest Pain. Up to 3 doses. Yes Provider, Historical   lisinopriL (PRINIVIL, ZESTRIL) 5 mg tablet Take 5 mg by mouth daily. 22  Yes Provider, Historical   pantoprazole (PROTONIX) 40 mg tablet Take 40 mg by mouth daily. 22  Yes Provider, Historical   atorvastatin (LIPITOR) 40 mg tablet Take 1 Tablet by mouth nightly.  22  Yes Rhonda Bravo MD   clopidogreL (PLAVIX) 75 mg tab Take 1 Tablet by mouth daily. Indications: blood clot prevention following percutaneous coronary intervention 6/2/22  Yes Denver Dame, MD   carvediloL (COREG) 3.125 mg tablet Take 1 Tablet by mouth two (2) times daily (with meals). Indications: heart failure with reduced ejection fraction due to dilated cardiomyopathy 6/1/22  Yes Denver Dame, MD   aspirin delayed-release 81 mg tablet Take 81 mg by mouth daily.    Yes Provider, Historical         Current Facility-Administered Medications:     guaiFENesin (ROBITUSSIN) 100 mg/5 mL oral liquid 100 mg, 100 mg, Oral, Q4H PRN, Kip Givens MD    isosorbide mononitrate ER (IMDUR) tablet 30 mg, 30 mg, Oral, DAILY, Omega CORBIN MD, 30 mg at 08/07/22 0952    levoFLOXacin (LEVAQUIN) tablet 500 mg, 500 mg, Oral, Q24H, Arlene VAZQUEZ MD, 500 mg at 08/07/22 1228    dextrose 10% infusion 0-250 mL, 0-250 mL, IntraVENous, PRN, Kip Bansal MD    insulin glargine (LANTUS) injection 10 Units, 10 Units, SubCUTAneous, DAILY, Kip Bansal MD, 10 Units at 08/07/22 1000    albuterol (PROVENTIL HFA, VENTOLIN HFA, PROAIR HFA) inhaler 2 Puff, 2 Puff, Inhalation, Q6H RT, Asaf Zuluaga MD, 2 Puff at 08/08/22 0743    budesonide-formoteroL (SYMBICORT) 160-4.5 mcg/actuation HFA inhaler 2 Puff, 2 Puff, Inhalation, BID RT, Asaf Zuluaga MD, 2 Puff at 08/08/22 0743    guaiFENesin (ROBITUSSIN) 100 mg/5 mL oral liquid 100 mg, 100 mg, Oral, Q4H PRN, Arlene VAZQUEZ MD    nitroglycerin (NITROSTAT) tablet 0.4 mg, 0.4 mg, SubLINGual, PRN, Mahin Gonzales MD, 0.4 mg at 08/05/22 0913    escitalopram oxalate (LEXAPRO) tablet 10 mg, 10 mg, Oral, DAILY, Janelle Carrillo MD, 10 mg at 08/07/22 1001    insulin lispro (HUMALOG) injection, , SubCUTAneous, AC&HS, Janelle Carrillo MD, 7 Units at 08/07/22 1204    glucose chewable tablet 16 g, 4 Tablet, Oral, PRN, Janelle Carrillo MD    glucagon (GLUCAGEN) injection 1 mg, 1 mg, IntraMUSCular, PRN, Vinh Carrillo MD    metoprolol succinate (TOPROL-XL) XL tablet 25 mg, 25 mg, Oral, DAILY, Janelle Carrillo MD, 25 mg at 08/07/22 3576    atorvastatin (LIPITOR) tablet 40 mg, 40 mg, Oral, QHS, Janelle Carrillo MD, 40 mg at 08/07/22 2157    clopidogreL (PLAVIX) tablet 75 mg, 75 mg, Oral, DAILY, Janelle Carrillo MD, 75 mg at 08/07/22 6770    potassium chloride (KLOR-CON) packet for solution 20 mEq, 20 mEq, Oral, BID WITH MEALS, Janelle Carrillo MD, 20 mEq at 08/07/22 1642    aspirin delayed-release tablet 81 mg, 81 mg, Oral, DAILY, Janelle Carrillo MD, 81 mg at 08/07/22 0900    lisinopriL (PRINIVIL, ZESTRIL) tablet 5 mg, 5 mg, Oral, DAILY, Janelle Carrillo MD, 5 mg at 08/07/22 0953    pantoprazole (PROTONIX) tablet 40 mg, 40 mg, Oral, DAILY, Janelle Carrillo MD, 40 mg at 08/07/22 8100    furosemide (LASIX) injection 40 mg, 40 mg, IntraVENous, BID, Janelle Carrillo MD, 40 mg at 08/07/22 9243    LAB DATA REVIEWED:    Recent Results (from the past 24 hour(s))   GLUCOSE, POC    Collection Time: 08/07/22 11:24 AM   Result Value Ref Range    Glucose (POC) 263 (H) 65 - 117 mg/dL    Performed by Jorge Luis Gonzalez, POC    Collection Time: 08/07/22  4:48 PM   Result Value Ref Range    Glucose (POC) 109 65 - 117 mg/dL    Performed by Lu Garcia    GLUCOSE, POC    Collection Time: 08/07/22  9:11 PM   Result Value Ref Range    Glucose (POC) 105 65 - 117 mg/dL    Performed by KULWINDER ROSALES        XR Results (most recent):  Results from Hospital Encounter encounter on 08/02/22    XR CHEST PORT    Narrative  EXAM: XR CHEST PORT    HISTORY: sob, h/o CHF. COMPARISON: 6/21/2022    FINDINGS: Portable AP. The hilar is mildly enlarged. There are moderate left and  mild right pleural effusions which have increased in the interval. There is  increased hazy opacification of the right lower lobe. There is increased  atelectasis left lower lobe. There is unchanged mild edema. The bones are  osteopenic. Multilevel spondylosis spine. Impression  1. Increased mild right and moderate left pleural effusions with increased  bibasilar atelectasis. 2. Unchanged mild edema. XR CHEST PORT   Final Result      1. Increased mild right and moderate left pleural effusions with increased   bibasilar atelectasis. 2. Unchanged mild edema. Review of Systems:  Constitutional: Generalized weakness  HENT: Negative. Eyes: Negative. Respiratory: Shortness of breath upon exertion. Cardiovascular: Chest pain and tightness  Gastrointestinal: Negative for abdominal pain and nausea. Skin: Cold hands. Neurological: Negative. Objective:   VITALS:    Visit Vitals  /65 (BP 1 Location: Left upper arm, BP Patient Position: Semi fowlers)   Pulse 66   Temp 97.2 °F (36.2 °C)   Resp 18   Ht 5' 2.01\" (1.575 m)   Wt 52 kg (114 lb 10.2 oz)   SpO2 98%   Breastfeeding Unknown   BMI 20.96 kg/m²       Physical Exam:   Constitutional: pt is oriented to person, place, and time. HENT:   Head: Normocephalic and atraumatic. Eyes: Pupils are equal, round, and reactive to light. EOM are normal.   Cardiovascular: Slightly increased rate. Pulmonary/Chest: Breath sounds normal in all fields. Lungs are clear to auscultation. No longer has tachypnea   Exhibits no tenderness. Still has shortness of breath, more so with movement or talking. Abdominal: Soft. Bowel sounds are normal. There is no abdominal tenderness. There is no rebound and no guarding. Musculoskeletal: Patient has baseline full range of motion   Neurological: pt is alert and oriented to person, place, and time. Alert. Normal strength. No cranial nerve deficit or sensory deficit. Displays a negative Romberg sign.         ASSESSMENT:    CHF (EF 30-35%)   Dilated cardiomypathy  Moderate mitral regurgitation  Pleural effusion, bilateral  Hx of cardiac stents  Diabetes  Dysphagia  Hyperlipidemia   Hypertension  Anxiety  Antral Gastritis  Distal esophagitis   Low k         PLAN:  Continue medications-    Aspirin, 81mg daily  Atorvastatin 40mg daily   Clopidogrel 75mg daily  Furosemide injection 40mg BID   Lisinopril 5mg daily  Metoprolol succinate 25mg   Pantoprazole 40mg daily  Potassium Chloride 20mEq BID  Lexapro 10 mg daily  Nitroglycerin . 4mg PRN  Albuterol inhaler 2 puff Q6H   Symbicort 160-4.5mcg/actuation HFA inhaler 2 puff BID  Isosorbide mononitrate EER 30mg Daily   Levofloxacin 500mg QD    Continue lasix treatment. Possible ropinirole if restless leg syndrome increases.    Follow up on wound care for possible bed sore    Possible supplemental O2 therapy if stats <93%  Follow renal function, blood counts, electrolytes, glucose  Monitor fluid balance and cardiac enzymes  PT/OT        Current Facility-Administered Medications:     guaiFENesin (ROBITUSSIN) 100 mg/5 mL oral liquid 100 mg, 100 mg, Oral, Q4H PRN, Kip Haro MD    isosorbide mononitrate ER (IMDUR) tablet 30 mg, 30 mg, Oral, DAILY, Luis CORBIN MD, 30 mg at 08/07/22 0952    levoFLOXacin (LEVAQUIN) tablet 500 mg, 500 mg, Oral, Q24H, Kip Bansal MD, 500 mg at 08/07/22 1228    dextrose 10% infusion 0-250 mL, 0-250 mL, IntraVENous, PRN, Kip Bansal MD    insulin glargine (LANTUS) injection 10 Units, 10 Units, SubCUTAneous, DAILY, Kip Bansal MD, 10 Units at 08/07/22 1000    albuterol (PROVENTIL HFA, VENTOLIN HFA, PROAIR HFA) inhaler 2 Puff, 2 Puff, Inhalation, Q6H RT, Kip Bansal MD, 2 Puff at 08/08/22 0743    budesonide-formoteroL (SYMBICORT) 160-4.5 mcg/actuation HFA inhaler 2 Puff, 2 Puff, Inhalation, BID RT, Kip Bansal MD, 2 Puff at 08/08/22 0743    guaiFENesin (ROBITUSSIN) 100 mg/5 mL oral liquid 100 mg, 100 mg, Oral, Q4H PRN, Sulma VAZQUEZ MD    nitroglycerin (NITROSTAT) tablet 0.4 mg, 0.4 mg, SubLINGual, PRN, Mahin Gonzales MD, 0.4 mg at 08/05/22 0913    escitalopram oxalate (LEXAPRO) tablet 10 mg, 10 mg, Oral, DAILY, Janelle Carrillo MD, 10 mg at 08/07/22 1001    insulin lispro (HUMALOG) injection, , SubCUTAneous, AC&HS, Deb Serrano MD, 7 Units at 08/07/22 1204    glucose chewable tablet 16 g, 4 Tablet, Oral, PRN, Janelle Carrillo MD    glucagon (GLUCAGEN) injection 1 mg, 1 mg, IntraMUSCular, PRN, Janelle Carrillo MD    metoprolol succinate (TOPROL-XL) XL tablet 25 mg, 25 mg, Oral, DAILY, Janelle Carrillo MD, 25 mg at 08/07/22 2705    atorvastatin (LIPITOR) tablet 40 mg, 40 mg, Oral, QHS, Janelle Carrillo MD, 40 mg at 08/07/22 2157    clopidogreL (PLAVIX) tablet 75 mg, 75 mg, Oral, DAILY, Janelle Carrillo MD, 75 mg at 08/07/22 8429    potassium chloride (KLOR-CON) packet for solution 20 mEq, 20 mEq, Oral, BID WITH MEALS, Janelle Carrillo MD, 20 mEq at 08/07/22 1642    aspirin delayed-release tablet 81 mg, 81 mg, Oral, DAILY, Janelle Carrillo MD, 81 mg at 08/07/22 0900    lisinopriL (PRINIVIL, ZESTRIL) tablet 5 mg, 5 mg, Oral, DAILY, Janelle Carrillo MD, 5 mg at 08/07/22 0953    pantoprazole (PROTONIX) tablet 40 mg, 40 mg, Oral, DAILY, Janelle Carrillo MD, 40 mg at 08/07/22 8308    furosemide (LASIX) injection 40 mg, 40 mg, IntraVENous, BID, Janelle Carrillo MD, 40 mg at 08/07/22 9693     ________________________________________________________________________    Signed: Marion Mckee

## 2022-08-08 NOTE — PROGRESS NOTES
Patient continues to be recc for IRF however patient is Medicare Part A only, which would leave the family responsible for medication and physician costs, family not able to afford the cost at this time. PANKAJ spoke with patient's son, he states that his father contacted Medicare in attempt to find out progress of Medicare part B application which was submitted approx 2 weeks ago. Chris Hooker, patient's son, asked that CM contact Medicare in attempt to expedite the process. CM contacted Medicare at [phone], they reported that family needed to contact  where they applied for part B. CM relayed this information to Mr. Estrada and requested they contact [facility]. PANKAJ also discussed possible need for patient to return home with [facility]. PANKAJ continues to follow. 2:15 PM - CM received call from son, Chris Hooker, he asked that CM sent referral to [id number]  for SNF. He stated that he spoke with the facility and that Part A would cover the cost of SNF. Referral sent via shaheen, awaiting possible acceptance.

## 2022-08-09 VITALS
TEMPERATURE: 97.6 F | SYSTOLIC BLOOD PRESSURE: 119 MMHG | DIASTOLIC BLOOD PRESSURE: 65 MMHG | HEIGHT: 62 IN | RESPIRATION RATE: 17 BRPM | HEART RATE: 75 BPM | BODY MASS INDEX: 21.54 KG/M2 | OXYGEN SATURATION: 96 % | WEIGHT: 117.06 LBS

## 2022-08-09 LAB
ALBUMIN SERPL-MCNC: 3 G/DL (ref 3.5–5)
ALBUMIN/GLOB SERPL: 1 {RATIO} (ref 1.1–2.2)
ALP SERPL-CCNC: 45 U/L (ref 45–117)
ALT SERPL-CCNC: 24 U/L (ref 12–78)
ANION GAP SERPL CALC-SCNC: 7 MMOL/L (ref 5–15)
AST SERPL W P-5'-P-CCNC: 30 U/L (ref 15–37)
BILIRUB SERPL-MCNC: 0.8 MG/DL (ref 0.2–1)
BUN SERPL-MCNC: 16 MG/DL (ref 6–20)
BUN/CREAT SERPL: 31 (ref 12–20)
CA-I BLD-MCNC: 9 MG/DL (ref 8.5–10.1)
CHLORIDE SERPL-SCNC: 100 MMOL/L (ref 97–108)
CO2 SERPL-SCNC: 29 MMOL/L (ref 21–32)
COVID-19 RAPID TEST, COVR: NOT DETECTED
CREAT SERPL-MCNC: 0.51 MG/DL (ref 0.55–1.02)
ERYTHROCYTE [DISTWIDTH] IN BLOOD BY AUTOMATED COUNT: 16.6 % (ref 11.5–14.5)
GLOBULIN SER CALC-MCNC: 3.1 G/DL (ref 2–4)
GLUCOSE BLD STRIP.AUTO-MCNC: 139 MG/DL (ref 65–117)
GLUCOSE BLD STRIP.AUTO-MCNC: 97 MG/DL (ref 65–117)
GLUCOSE SERPL-MCNC: 107 MG/DL (ref 65–100)
HCT VFR BLD AUTO: 36.6 % (ref 35–47)
HGB BLD-MCNC: 11.9 G/DL (ref 11.5–16)
MCH RBC QN AUTO: 28.6 PG (ref 26–34)
MCHC RBC AUTO-ENTMCNC: 32.5 G/DL (ref 30–36.5)
MCV RBC AUTO: 88 FL (ref 80–99)
NRBC # BLD: 0 K/UL (ref 0–0.01)
NRBC BLD-RTO: 0 PER 100 WBC
PERFORMED BY, TECHID: ABNORMAL
PERFORMED BY, TECHID: NORMAL
PLATELET # BLD AUTO: 262 K/UL (ref 150–400)
PMV BLD AUTO: 9.1 FL (ref 8.9–12.9)
POTASSIUM SERPL-SCNC: 3.5 MMOL/L (ref 3.5–5.1)
PROT SERPL-MCNC: 6.1 G/DL (ref 6.4–8.2)
RBC # BLD AUTO: 4.16 M/UL (ref 3.8–5.2)
SODIUM SERPL-SCNC: 136 MMOL/L (ref 136–145)
WBC # BLD AUTO: 3.9 K/UL (ref 3.6–11)

## 2022-08-09 PROCEDURE — 82962 GLUCOSE BLOOD TEST: CPT

## 2022-08-09 PROCEDURE — 94761 N-INVAS EAR/PLS OXIMETRY MLT: CPT

## 2022-08-09 PROCEDURE — 85027 COMPLETE CBC AUTOMATED: CPT

## 2022-08-09 PROCEDURE — 74011250637 HC RX REV CODE- 250/637: Performed by: INTERNAL MEDICINE

## 2022-08-09 PROCEDURE — 36415 COLL VENOUS BLD VENIPUNCTURE: CPT

## 2022-08-09 PROCEDURE — 74011250637 HC RX REV CODE- 250/637: Performed by: FAMILY MEDICINE

## 2022-08-09 PROCEDURE — 80053 COMPREHEN METABOLIC PANEL: CPT

## 2022-08-09 PROCEDURE — 74011250636 HC RX REV CODE- 250/636: Performed by: FAMILY MEDICINE

## 2022-08-09 PROCEDURE — 74011636637 HC RX REV CODE- 636/637: Performed by: INTERNAL MEDICINE

## 2022-08-09 PROCEDURE — 94640 AIRWAY INHALATION TREATMENT: CPT

## 2022-08-09 PROCEDURE — 87635 SARS-COV-2 COVID-19 AMP PRB: CPT

## 2022-08-09 PROCEDURE — 97530 THERAPEUTIC ACTIVITIES: CPT

## 2022-08-09 RX ADMIN — LISINOPRIL 5 MG: 10 TABLET ORAL at 12:22

## 2022-08-09 RX ADMIN — INSULIN GLARGINE 10 UNITS: 100 INJECTION, SOLUTION SUBCUTANEOUS at 12:25

## 2022-08-09 RX ADMIN — ALBUTEROL SULFATE 2 PUFF: 108 INHALANT RESPIRATORY (INHALATION) at 07:30

## 2022-08-09 RX ADMIN — METOPROLOL SUCCINATE 25 MG: 25 TABLET, EXTENDED RELEASE ORAL at 09:00

## 2022-08-09 RX ADMIN — ALBUTEROL SULFATE 2 PUFF: 108 INHALANT RESPIRATORY (INHALATION) at 14:28

## 2022-08-09 RX ADMIN — ISOSORBIDE MONONITRATE 30 MG: 30 TABLET, EXTENDED RELEASE ORAL at 12:22

## 2022-08-09 RX ADMIN — PANTOPRAZOLE SODIUM 40 MG: 40 TABLET, DELAYED RELEASE ORAL at 12:26

## 2022-08-09 RX ADMIN — GUAIFENESIN 100 MG: 200 SOLUTION ORAL at 12:22

## 2022-08-09 RX ADMIN — FUROSEMIDE 40 MG: 10 INJECTION, SOLUTION INTRAMUSCULAR; INTRAVENOUS at 12:21

## 2022-08-09 RX ADMIN — ESCITALOPRAM OXALATE 10 MG: 10 TABLET ORAL at 12:29

## 2022-08-09 RX ADMIN — CLOPIDOGREL BISULFATE 75 MG: 75 TABLET ORAL at 12:21

## 2022-08-09 RX ADMIN — ASPIRIN 81 MG: 81 TABLET, COATED ORAL at 12:21

## 2022-08-09 RX ADMIN — BUDESONIDE AND FORMOTEROL FUMARATE DIHYDRATE 2 PUFF: 160; 4.5 AEROSOL RESPIRATORY (INHALATION) at 07:30

## 2022-08-09 RX ADMIN — LEVOFLOXACIN 500 MG: 500 TABLET, FILM COATED ORAL at 12:21

## 2022-08-09 RX ADMIN — POTASSIUM CHLORIDE 20 MEQ: 1.5 POWDER, FOR SOLUTION ORAL at 12:21

## 2022-08-09 NOTE — PROGRESS NOTES
Patient is clear to d/c from CM to [address] ([facility], [address]), pending negative COVID test, room 36B, nurse report can be called to [phone]. CM notified patient and family in room, family to provide transport. Medicare pt has received, reviewed, and signed 2nd IM letter informing them of their right to appeal the discharge. Signed copied has been placed on pt bedside chart.

## 2022-08-09 NOTE — DISCHARGE SUMMARY
Discharge Summary       PATIENT ID: Kumar Caraballo  MRN: [id number]   YOB: 1940    DATE OF ADMISSION: 8/2/2022  5:06 PM    DATE OF DISCHARGE:   PRIMARY CARE PROVIDER: Aniket David MD     ATTENDING PHYSICIAN: Clint Carrillo  DISCHARGING PROVIDER: Clint Carrillo      CONSULTATIONS: IP CONSULT TO PULMONOLOGY  IP CONSULT TO CARDIOLOGY    PROCEDURES/SURGERIES: * No surgery found *    ADMITTING DIAGNOSES:    Patient Active Problem List    Diagnosis Date Noted    CHF exacerbation ([facility] 08/02/2022    Acute respiratory failure with hypoxia ([facility] [facility] 05/29/2022    Acute on chronic systolic congestive heart failure ([facility] 05/29/2022    Dysphagia 05/29/2022    [facility]est[facility] 05/27/2022       DISCHARGE DIAGNOSES / PLAN:      CHF (EF 30-35%)  Dilated cardiomypathy  Moderate mitral regurgitation  Pleural effusion, bilateral  Hx of cardiac stents  Diabetes  Dysphagia  Hyperlipidemia  Hypertension  Anxiety  Antral Gastritis  Distal esophagitis  Low k        DISCHARGE MEDICATIONS:  Current Discharge Medication List        START taking these medications    Details   budesonide-formoteroL (SYMBICORT) 160-4.5 mcg/actuation HFAA Take 2 Puffs by inhalation two (2) times a day. Qty: 10.2 g, Refills: 1  Start date: 8/8/2022      escitalopram oxalate (LEXAPRO) 10 mg tablet Take 1 Tablet by mouth in the morning. Qty: 30 Tablet, Refills: 0  Start date: 8/9/2022      guaiFENesin (ROBITUSSIN) 100 mg/5 mL liquid Take 5 mL by mouth every four (4) hours as needed for Cough. Qty: 500 mL, Refills: 0  Start date: 8/8/2022      insulin glargine (LANTUS) 100 unit/mL injection 10 units subcu daily  Qty: 1 mL, Refills: 1  Start date: 8/9/2022      isosorbide mononitrate ER (IMDUR) 30 mg tablet Take 1 Tablet by mouth in the morning.  Indications: prevention of anginal chest pain associated with coronary artery disease  Qty: 30 Tablet, Refills: 0  Start date: 8/9/2022      levoFLOXacin (LEVAQUIN) 500 mg tablet Take 1 Tablet by mouth every twenty-four (24) hours for 4 doses. Qty: 4 Tablet, Refills: 0  Start date: 8/9/2022, End date: 8/13/2022      metoprolol succinate (TOPROL-XL) 25 mg XL tablet Take 1 Tablet by mouth in the morning. Qty: 30 Tablet, Refills: 0  Start date: 8/9/2022      furosemide (Lasix) 40 mg tablet 1 tablet twice a day  Qty: 60 Tablet, Refills: 1  Start date: 8/8/2022           CONTINUE these medications which have CHANGED    Details   potassium chloride (KLOR-CON) 20 mEq pack Take 1 Packet by mouth two (2) times daily (with meals). Indications: low amount of potassium in the blood  Qty: 20 Packet, Refills: 0  Start date: 8/8/2022           CONTINUE these medications which have NOT CHANGED    Details   nitroglycerin (NITROSTAT) 0.4 mg SL tablet 0.4 mg by SubLINGual route every five (5) minutes as needed for Chest Pain. Up to 3 doses. lisinopriL (PRINIVIL, ZESTRIL) 5 mg tablet Take 5 mg by mouth daily. pantoprazole (PROTONIX) 40 mg tablet Take 40 mg by mouth daily. atorvastatin (LIPITOR) 40 mg tablet Take 1 Tablet by mouth nightly. Qty: 30 Tablet, Refills: 0      clopidogreL (PLAVIX) 75 mg tab Take 1 Tablet by mouth daily. Indications: blood clot prevention following percutaneous coronary intervention  Qty: 90 Tablet, Refills: 0      aspirin delayed-release 81 mg tablet Take 81 mg by mouth daily. STOP taking these medications       carvediloL (COREG) 3.125 mg tablet Comments:   Reason for Stopping:                 NOTIFY YOUR PHYSICIAN FOR ANY OF THE FOLLOWING:   Fever over 101 degrees for 24 hours. Chest pain, shortness of breath, fever, chills, nausea, vomiting, diarrhea, change in mentation, falling, weakness, bleeding. Severe pain or pain not relieved by medications. Or, any other signs or symptoms that you may have questions about.     DISPOSITION:  x  Home With:   OT  PT  HH  RN       Long term SNF/Inpatient Rehab    Independent/assisted living    Hospice    Other: PATIENT CONDITION AT DISCHARGE: Stable      PHYSICAL EXAMINATION AT DISCHARGE:  General:          Alert, cooperative, no distress, appears stated age. HEENT:           Atraumatic, anicteric sclerae, pink conjunctivae                          No oral ulcers, mucosa moist, throat clear, dentition fair  Neck:               Supple, symmetrical  Lungs:             Clear to auscultation bilaterally. No Wheezing or Rhonchi. No rales. Chest wall:      No tenderness  No Accessory muscle use. Heart:              Regular  rhythm,  No  murmur   No edema  Abdomen:        Soft, non-tender. Not distended. Bowel sounds normal  Extremities:     No cyanosis. No clubbing,                            Skin turgor normal, Capillary refill normal  Skin:                Not pale. Not Jaundiced  No rashes   Psych:             Not anxious or agitated. Neurologic:      Alert, moves all extremities, answers questions appropriately and responds to commands     XR CHEST PORT   Final Result      1. Increased mild right and moderate left pleural effusions with increased   bibasilar atelectasis. 2. Unchanged mild edema.             Recent Results (from the past 24 hour(s))   NT-PRO BNP    Collection Time: 08/08/22  2:26 PM   Result Value Ref Range    NT pro-BNP 3,899 (H) <450 pg/mL   GLUCOSE, POC    Collection Time: 08/08/22  4:39 PM   Result Value Ref Range    Glucose (POC) 112 65 - 117 mg/dL    Performed by Prosper Iglesias, POC    Collection Time: 08/08/22  8:26 PM   Result Value Ref Range    Glucose (POC) 123 (H) 65 - 117 mg/dL    Performed by Chapin James    CBC W/O DIFF    Collection Time: 08/09/22  8:18 AM   Result Value Ref Range    WBC 3.9 3.6 - 11.0 K/uL    RBC 4.16 3.80 - 5.20 M/uL    HGB 11.9 11.5 - 16.0 g/dL    HCT 36.6 35.0 - 47.0 %    MCV 88.0 80.0 - 99.0 FL    MCH 28.6 26.0 - 34.0 PG    MCHC 32.5 30.0 - 36.5 g/dL    RDW 16.6 (H) 11.5 - 14.5 %    PLATELET 794 077 - 933 K/uL    MPV 9.1 8.9 - 12.9 FL NRBC 0.0 0.0  WBC    ABSOLUTE NRBC 0.00 0.00 - 5.20 K/uL   METABOLIC PANEL, COMPREHENSIVE    Collection Time: 08/09/22  8:18 AM   Result Value Ref Range    Sodium 136 136 - 145 mmol/L    Potassium 3.5 3.5 - 5.1 mmol/L    Chloride 100 97 - 108 mmol/L    CO2 29 21 - 32 mmol/L    Anion gap 7 5 - 15 mmol/L    Glucose 107 (H) 65 - 100 mg/dL    BUN 16 6 - 20 mg/dL    Creatinine 0.51 (L) 0.55 - 1.02 mg/dL    BUN/Creatinine ratio 31 (H) 12 - 20      GFR est AA >60 >60 ml/min/1.73m2    GFR est non-AA >60 >60 ml/min/1.73m2    Calcium 9.0 8.5 - 10.1 mg/dL    Bilirubin, total 0.8 0.2 - 1.0 mg/dL    AST (SGOT) 30 15 - 37 U/L    ALT (SGPT) 24 12 - 78 U/L    Alk. phosphatase 45 45 - 117 U/L    Protein, total 6.1 (L) 6.4 - 8.2 g/dL    Albumin 3.0 (L) 3.5 - 5.0 g/dL    Globulin 3.1 2.0 - 4.0 g/dL    A-G Ratio 1.0 (L) 1.1 - 2.2     GLUCOSE, POC    Collection Time: 08/09/22  8:28 AM   Result Value Ref Range    Glucose (POC) 97 65 - 117 mg/dL    Performed by Fay Babinski    COVID-19 RAPID TEST    Collection Time: 08/09/22 12:00 PM   Result Value Ref Range    COVID-19 rapid test Not Detected Not Detected     GLUCOSE, POC    Collection Time: 08/09/22 12:39 PM   Result Value Ref Range    Glucose (POC) 139 (H) 65 - 117 mg/dL    Performed by [facility]           HOSPITAL COURSE:    Patient is a 79y.o. year old female with a significant history of CHF, cardiac stents placement on May 31st, hypertension, hyperlipidemia, and psychiatric illness, coming to the ED complaining for shortness of breath over the last two days that has gotten progressively worse. Patient reports bilateral lower extremity edema. Left Ventricle: Severely reduced left ventricular systolic function with a visually estimated EF of 30 - 35%. Left ventricle is mildly dilated. Normal wall thickness     8/3     Patient was awake, alert, and resting in bed.  She states that she is still feeling short on breath and that her stomach is cramping because she has to urinate but is worried about her catheter. Patient is having trouble swallowing and asked to be on a softer diet. She has no eaten much because of this. Patients BNP- 16,068        8/4     Patient was awake and alert and in no apparent distress. She denies shortness of breath except when expending energy such as eating or trying to ambulate. She states that her abdominal pain has subsided after the catheter was placed. Edema in LE significantly decreased. Patient was recommended a puree diet and mildly thick liquids via speech pathologist. She is currently tolerating new diet well and ate most of her meal.  Patient also stated this is the first time in a while that she has slept this well. Patient notes discomfort on her backside due to inability to readjust herself as well as discomfort in her hands because they're cold. Telemetry reviewed reveals occasional episodes of 12 consecutive premature ventricular complex (PVCs). 8/5     Patient was trying to rest in bed. She was currently on nasal cannula oxygen and recently complained of chest tightness and pain. She also stated that she feels weak. On 7/15- results from Dr. [facility] of patient's EGD shows H. Pylori infection that was treated with antibiotics -Amoxicillin and metronidazole that should have been completed on 8/3. Discussed with the patient's son at the bedside concerned about severe anxiety        PT recommended inpatient rehab facility at discharge. 8/6  Patient was alert and in bed trying to eat. She continues to have shortness or breath and often spits up white phlegm. Patient denies any blood with phlegm. Patient has no signs of lower extremity swelling. She reports her chest pain a 5 on a scale of 0-10. Talking for more than 30 seconds leads to more coughing. She reports having pain in her tailbone and was given a cushion to decrease the pressure in her back.      Lungs are clear to auscultation. Heart sounds are normal S1/S2. Fasting glucose is still elevated (142 mg/dL), there are no new lab results or changes in her medication. 8/7  Patient was alert and upright in chair, in no apparent distress. Reports walking around and getting short of breath. Compared to yesterday, patient is coughing and spitting up phlegm less frequently. Is compliant with medications. There is no LE edema, lungs are clear to auscultation. Was visited by the speech language pathologist, is recommended to continue pureed foods and remain upright for 90 minutes. Patient is compliant with regimen. Patient continues on strict asp/GERD protocol. No new labs for 8/7.     8/8-  Patient was awake, aware, and in fetal position resting in bed. She states that her cough is better since starting the new medications ; Symbicort and albuterol. She still complains of general weakness and some chest pain. She asked if a nurse could look for bed sores on her bottom. She in pain d/t lack of movement and ambulation. Patient mentioned an episode of restless leg syndrome that occurred over the weekend that she was concerned about.      A1C-6.6     Patient doing well very anxious  No fever no chills   No shortness of breath no chest pain no fever no chills    Patient was seen by the PT OT recommend inpatient rehab    Medication reconciliation done    Done discharge plan 35 minutes 50% time spent counseling and coordination of care          Signed:   Suzi Velasquez MD  8/9/2022  12:30 PM

## 2022-08-09 NOTE — PROGRESS NOTES
OCCUPATIONAL THERAPY TREATMENT  Patient: Farhana Orourke (79 y.o. female)  Date: 8/9/2022  Diagnosis: CHF exacerbation ([facility] [I50.9] <principal problem not specified>      Precautions:    Chart, occupational therapy assessment, plan of care, and goals were reviewed. ASSESSMENT  Patient continues with skilled OT services and is progressing towards goals. Upon [facility] arrival, pt semi supine in bed with family in room and agreeable to tx session. Pt completed bed mobility with SBA and sit>stand from EOB with CGA using RW for balance upon standing. Pt ambulated in room with CGA. Pt noted with HR increasing to 150 during ambulation. Pt returned to EOB with CGA and reporting needing to lay down. Pt returned to supine with SBA. Pt completed semi supine face washing with setup A. Pt left semi supine in bed with call bell within reach, family in room, and needs met. Will continue to follow pt throughout remainder of stay and progress towards OT goals. Recommending IRF at discharge when medically appropriate. Other factors to consider for discharge: family/social support, DME, time since onset, severity of deficits, decline from functional baseline         PLAN :  Patient continues to benefit from skilled intervention to address the above impairments. Continue treatment per established plan of care. to address goals. Recommendation for discharge: (in order for the patient to meet his/her long term goals)  [address]     This discharge recommendation:  Has been made in collaboration with the attending provider and/or case management    IF patient discharges home will need the following DME: TBD       SUBJECTIVE:   Patient stated I ache everywhere.     OBJECTIVE DATA SUMMARY:   Cognitive/Behavioral Status:  Neurologic State: Alert     Cognition: Follows commands    Functional Mobility and Transfers for ADLs:  Bed Mobility:  Rolling: Stand-by assistance  Supine to Sit: Stand-by assistance  Sit to Supine: Stand-by assistance  Scooting: Stand-by assistance    Transfers:  Sit to Stand: Contact guard assistance    Balance:  Sitting: Intact; Without support  Standing: Impaired; With support  Standing - Static: Constant support; Fair  Standing - Dynamic : Constant support; Fair    ADL Intervention:  Grooming  Position Performed: Long sitting on bed  Washing Face: Set-up    Pain:  Generalized pain reported, no formal rating given    Activity Tolerance:   Poor  Please refer to the flowsheet for vital signs taken during this treatment. After treatment patient left in no apparent distress:   Bed returned to lowest position, Supine in bed, Call bell within reach, and Caregiver / family present    COMMUNICATION/COLLABORATION:   The patients plan of care was discussed with: Registered nurse. JEB Mendoza  Time Calculation: 33 mins    Problem: Self Care Deficits Care Plan (Adult)  Goal: *Acute Goals and Plan of Care (Insert Text)  Description: Patient stated goal: \"Care for myself and be IND again\"    1. Pt will be mod I sup <> sit in prep for EOB ADLs  2. Pt will be mod I grooming sitting/standing at sink  3. Pt will be mod I LE dressing sitting EOB/long sit  4. Pt will be mod I sit <>  prep for toileting LRAD  5. Pt will be mod I toileting/toilet transfer/cloth mgmt LRAD  6.  Pt will be mod I following UE HEP in prep for self care tasks      Outcome: Progressing Towards Goal

## 2022-08-09 NOTE — PROGRESS NOTES
Problem: Patient Education: Go to Patient Education Activity  Goal: Patient/Family Education  Outcome: Resolved/Not Met     Problem: Pressure Injury - Risk of  Goal: *Prevention of pressure injury  Description: Document Manfred Scale and appropriate interventions in the flowsheet. Outcome: Resolved/Not Met     Problem: Falls - Risk of  Goal: *Absence of Falls  Description: Document Bhaskar Scott Fall Risk and appropriate interventions in the flowsheet. Outcome: Resolved/Not Met     Problem: Diabetes Self-Management  Goal: *Disease process and treatment process  Description: Define diabetes and identify own type of diabetes; list 3 options for treating diabetes. Outcome: Resolved/Not Met  Goal: *Incorporating nutritional management into lifestyle  Description: Describe effect of type, amount and timing of food on blood glucose; list 3 methods for planning meals. Outcome: Resolved/Not Met  Goal: *Incorporating physical activity into lifestyle  Description: State effect of exercise on blood glucose levels. Outcome: Resolved/Not Met  Goal: *Developing strategies to promote health/change behavior  Description: Define the ABC's of diabetes; identify appropriate screenings, schedule and personal plan for screenings. Outcome: Resolved/Not Met  Goal: *Using medications safely  Description: State effect of diabetes medications on diabetes; name diabetes medication taking, action and side effects. Outcome: Resolved/Not Met  Goal: *Monitoring blood glucose, interpreting and using results  Description: Identify recommended blood glucose targets  and personal targets. Outcome: Resolved/Not Met  Goal: *Prevention, detection, treatment of acute complications  Description: List symptoms of hyper- and hypoglycemia; describe how to treat low blood sugar and actions for lowering  high blood glucose level.   Outcome: Resolved/Not Met  Goal: *Prevention, detection and treatment of chronic complications  Description: Define the natural course of diabetes and describe the relationship of blood glucose levels to long term complications of diabetes.   Outcome: Resolved/Not Met  Goal: *Developing strategies to address psychosocial issues  Description: Describe feelings about living with diabetes; identify support needed and support network  Outcome: Resolved/Not Met  Goal: *Insulin pump training  Outcome: Resolved/Not Met  Goal: *Sick day guidelines  Outcome: Resolved/Not Met  Goal: *Patient Specific Goal (EDIT GOAL, INSERT TEXT)  Outcome: Resolved/Not Met

## 2022-08-09 NOTE — WOUND CARE
Wound Care Note:    Wound Care into see patient because of sacral wound    Skin Care & Pressure Relief Recommendations:  Minimize layers of linen  Pads under patient to optimize support surface and microclimate  Turn/reposition approximately every 2 hours. Pillow Wedges  Manage incontinence  Promote continence; Skin Protective lotion to buttocks and sacrum daily and as needed with incontinence care    Offload heels with pillows or offloading boots. Support surface: Gel  Superficial fissure noted to gluteal cleft / coccyx. Epithelialization noted with small 0.2 x 0.2 open area near center. Area and periwound are blanchable. Apply Optifoam Border Sacral foam dressing every other day and prn for soiling to redistribute pressure from bony prominence and prevent pressure and friction injury to skin. Maintain the PureWick to manage  incontinence. Patient stays on sides well when turned. Ensure turning q2h at 30 degree angle to offload sacrum and buttocks. Heels pink and blanchable. Float heels with 2-3 pillows while in bed for offloading of the heels. Maintain HOB at 30 degrees or less, if not contraindicated, to reduce pressure to buttocks and sacrum. Raise foot of bed to help prevent friction and shear injury from sliding down in the bed. Patient to discharge today to [place]. Re-consult [facility] if skin condition changes.

## 2022-08-09 NOTE — PROGRESS NOTES
Problem: Diabetes Self-Management  Goal: *Monitoring blood glucose, interpreting and using results  Description: Identify recommended blood glucose targets  and personal targets. Outcome: Resolved/Not Met     Problem: Diabetes Self-Management  Goal: *Prevention, detection, treatment of acute complications  Description: List symptoms of hyper- and hypoglycemia; describe how to treat low blood sugar and actions for lowering  high blood glucose level. Outcome: Resolved/Not Met     Problem: Diabetes Self-Management  Goal: *Prevention, detection and treatment of chronic complications  Description: Define the natural course of diabetes and describe the relationship of blood glucose levels to long term complications of diabetes. Outcome: Resolved/Not Met     Problem: Diabetes Self-Management  Goal: *Prevention, detection and treatment of chronic complications  Description: Define the natural course of diabetes and describe the relationship of blood glucose levels to long term complications of diabetes.   Outcome: Resolved/Not Met

## 2022-08-09 NOTE — PROGRESS NOTES
Problem: Patient Education: Go to Patient Education Activity  Goal: Patient/Family Education  Outcome: Progressing Towards Goal     Problem: Pressure Injury - Risk of  Goal: *Prevention of pressure injury  Description: Document Manfred Scale and appropriate interventions in the flowsheet. Outcome: Progressing Towards Goal  Note: Pressure Injury Interventions:  Sensory Interventions: Assess changes in LOC, Maintain/enhance activity level, Turn and reposition approx. every two hours (pillows and wedges if needed), Float heels    Moisture Interventions: Absorbent underpads, Offer toileting Q_hr    Activity Interventions: Increase time out of bed    Mobility Interventions: HOB 30 degrees or less, Turn and reposition approx. every two hours(pillow and wedges)    Nutrition Interventions: Document food/fluid/supplement intake, Offer support with meals,snacks and hydration    Friction and Shear Interventions: HOB 30 degrees or less, Foam dressings/transparent film/skin sealants                Problem: Patient Education: Go to Patient Education Activity  Goal: Patient/Family Education  Outcome: Progressing Towards Goal     Problem: Falls - Risk of  Goal: *Absence of Falls  Description: Document Chay Fall Risk and appropriate interventions in the flowsheet.   Outcome: Progressing Towards Goal  Note: Fall Risk Interventions:  Mobility Interventions: Patient to call before getting OOB, Bed/chair exit alarm, Utilize walker, cane, or other assistive device         Medication Interventions: Bed/chair exit alarm, Patient to call before getting OOB, Teach patient to arise slowly    Elimination Interventions: Bed/chair exit alarm, Call light in reach, Patient to call for help with toileting needs, Toileting schedule/hourly rounds, Toilet paper/wipes in reach              Problem: Patient Education: Go to Patient Education Activity  Goal: Patient/Family Education  Outcome: Progressing Towards Goal     Problem: Diabetes Self-Management  Goal: *Disease process and treatment process  Description: Define diabetes and identify own type of diabetes; list 3 options for treating diabetes. Outcome: Progressing Towards Goal  Goal: *Incorporating nutritional management into lifestyle  Description: Describe effect of type, amount and timing of food on blood glucose; list 3 methods for planning meals. Outcome: Progressing Towards Goal  Goal: *Using medications safely  Description: State effect of diabetes medications on diabetes; name diabetes medication taking, action and side effects. Outcome: Progressing Towards Goal  Goal: *Monitoring blood glucose, interpreting and using results  Description: Identify recommended blood glucose targets  and personal targets.   Outcome: Progressing Towards Goal     Problem: Patient Education: Go to Patient Education Activity  Goal: Patient/Family Education  Outcome: Progressing Towards Goal

## 2022-08-09 NOTE — PROGRESS NOTES
PULMONARY NOTE  [facility]    Name: Hitesh Love MRN: [id number]   : 1940 Hospital: [facility]   Date: 2022  Admission date: 2022 Hospital Day: 8       HPI:     Hospital Problems  Date Reviewed: 2022            Codes Class Noted POA    CHF exacerbation [facility]) ICD-10-CM: I50.9  ICD-9-CM: 428.0  2022 Unknown          [x] High complexity decision making was performed  [x] See my orders for details      Subjective/Initial History:     I was asked by Fred Mireles MD to see Hitesh Love  a 79 y.o.  female in consultation     Excerpts from admission 2022 or consult notes as follows:   62-year-old lady came in because of shortness of breath and dyspnea past medical history of congestive heart failure, hypertension, hyperlipidemia and psychiatric illness she is noncompliant not happy in the emergency room she is complaining about dyspnea on exertion she is on room air also having swelling of the lower extremities chest x-ray shows bilateral pleural effusions the patient admitted and pulmonary consult was called.   She had a remote history of tobacco abuse last 2D echo showed ejection fraction of 35% she has coronary artery disease stent placed 531      Allergies   Allergen Reactions    [place] Itching    Peanut Butter Flavor Not Reported This Time     possible allergy        MAR reviewed and pertinent medications noted or modified as needed     Current Facility-Administered Medications   Medication    albuterol (PROVENTIL HFA, VENTOLIN HFA, PROAIR HFA) inhaler 2 Puff    guaiFENesin (ROBITUSSIN) 100 mg/5 mL oral liquid 100 mg    isosorbide mononitrate ER (IMDUR) tablet 30 mg    levoFLOXacin (LEVAQUIN) tablet 500 mg    dextrose 10% infusion 0-250 mL    insulin glargine (LANTUS) injection 10 Units    budesonide-formoteroL (SYMBICORT) 160-4.5 mcg/actuation HFA inhaler 2 Puff    guaiFENesin (ROBITUSSIN) 100 mg/5 mL oral liquid 100 mg    nitroglycerin (NITROSTAT) tablet 0.4 mg    escitalopram oxalate (LEXAPRO) tablet 10 mg    insulin lispro (HUMALOG) injection    glucose chewable tablet 16 g    glucagon (GLUCAGEN) injection 1 mg    metoprolol succinate (TOPROL-XL) XL tablet 25 mg    atorvastatin (LIPITOR) tablet 40 mg    clopidogreL (PLAVIX) tablet 75 mg    potassium chloride (KLOR-CON) packet for solution 20 mEq    aspirin delayed-release tablet 81 mg    lisinopriL (PRINIVIL, ZESTRIL) tablet 5 mg    pantoprazole (PROTONIX) tablet 40 mg    furosemide (LASIX) injection 40 mg      Patient PCP: Carlo Morales MD  PMH:  has a past medical history of CHF (congestive heart failure) ([facility] [address] Dysphagia, Heart failure ([address] Hyperlipidemia, Hypertension, Ill-defined condition, and Psychiatric disorder. PSH:   has a past surgical history that includes hx coronary stent placement (05/31/2022) and hx heart catheterization (05/27/2022). FHX: family history includes Diabetes in her father, mother, and another family member; Heart Disease in her father, mother, and another family member. SHX:  reports that she quit smoking about 58 years ago. She has never used smokeless tobacco. She reports that she does not drink alcohol and does not use drugs. ROS:    Review of Systems   Constitutional:  Positive for malaise/fatigue. Generalized weakness   HENT: Negative. Eyes: Negative. Respiratory:  Positive for sputum production. Negative for shortness of breath. Cardiovascular:  Positive for leg swelling. Negative for chest pain and orthopnea. Gastrointestinal: Negative. Genitourinary: Negative. Musculoskeletal: Negative. Skin: Negative. Neurological: Negative. Psychiatric/Behavioral: Negative.         Objective:     Vital Signs: Telemetry:    normal sinus rhythm Intake/Output:   Visit Vitals  /65   Pulse 75   Temp 97.6 °F (36.4 °C)   Resp 17   Ht 5' 2.01\" (1.575 m)   Wt 117 lb 1 oz (53.1 kg)   SpO2 96%   Breastfeeding Unknown   BMI 21.41 kg/m²       Temp (24hrs), Av.6 °F (36.4 °C), Min:97.1 °F (36.2 °C), Max:98.3 °F (36.8 °C)        O2 Device: None (Room air) O2 Flow Rate (L/min): 5 l/min       Wt Readings from Last 4 Encounters:   22 117 lb 1 oz (53.1 kg)   22 121 lb (54.9 kg)   22 123 lb 6.4 oz (56 kg)   22 123 lb (55.8 kg)        No intake or output data in the 24 hours ending 22 0941      Last shift:      No intake/output data recorded. Last 3 shifts: No intake/output data recorded. Physical Exam:     Physical Exam  Constitutional:       Appearance: Normal appearance. HENT:      Head: Normocephalic and atraumatic. Nose: Nose normal.      Mouth/Throat:      Mouth: Mucous membranes are moist.   Eyes:      Pupils: Pupils are equal, round, and reactive to light. Cardiovascular:      Rate and Rhythm: Normal rate. Pulses: Normal pulses. Pulmonary:      Breath sounds: Rales present. Abdominal:      General: Abdomen is flat. Bowel sounds are normal.      Palpations: Abdomen is soft. Musculoskeletal:         General: Swelling present. Cervical back: Normal range of motion. Right lower leg: Edema present. Left lower leg: Edema present. Skin:     General: Skin is warm. Neurological:      General: No focal deficit present. Mental Status: She is alert. Psychiatric:         Mood and Affect: Mood normal.        Labs:    Recent Labs     22  0818   WBC 3.9   HGB 11.9          Recent Labs     22  0818      K 3.5      CO2 29   *   BUN 16   CREA 0.51*   CA 9.0   ALB 3.0*   ALT 24       No results for input(s): PH, PCO2, PO2, HCO3, FIO2 in the last 72 hours. No results for input(s): CPK, CKNDX, TROIQ in the last 72 hours.     No lab exists for component: CPKMB  No results found for: BNPP, BNP   Lab Results   Component Value Date/Time    Culture result: No growth 6 days 2022 12:59 PM     No results found for: TSH, TSHEXT, TSHEXT    Imaging:    CXR Results  (Last 48 hours)      None          Results from Hospital Encounter encounter on 08/02/22    XR CHEST PORT    Narrative  EXAM: XR CHEST PORT    HISTORY: sob, h/o CHF. COMPARISON: 6/21/2022    FINDINGS: Portable AP. The hilar is mildly enlarged. There are moderate left and  mild right pleural effusions which have increased in the interval. There is  increased hazy opacification of the right lower lobe. There is increased  atelectasis left lower lobe. There is unchanged mild edema. The bones are  osteopenic. Multilevel spondylosis spine. Impression  1. Increased mild right and moderate left pleural effusions with increased  bibasilar atelectasis. 2. Unchanged mild edema. Results from [place] encounter on 06/21/22    XR CHEST PORT    Narrative  Chest single view. Comparison single view chest May 31, 2022. Left greater than right moderate volume dependent pleural effusions persist.  Mild dependent pulmonary interstitial edema. Bibasilar atelectasis. Cardiac and  mediastinal structures unchanged. No pneumothorax. Results from Hospital Encounter encounter on 05/27/22    XR CHEST PORT    Narrative  XR CHEST PORT    Comparison:  5/27/2022. Single view:  Stable pleural effusions and bibasilar atelectasis/pneumonia (left  greater than right). No pneumothorax apparent. The heart size is stable. Stable  hemodynamic status. Impression  No significant change. No results found for this or any previous visit.         IMPRESSION:   Congestive heart failure  HFrEF  Dilated cardiomyopathy ejection fraction 30 to 35%  Coronary artery disease with history of PCI to mid LAD 5/31/2022 and RCA disease treated medically  Moderate mitral regurgitation  Pleural effusion  Prognosis guarded   Low blood pressure  Chest pain  Hyperglycemia       RECOMMENDATIONS/PLAN:   69-year-old lady came in because of shortness of breath and dyspnea generalized weakness swelling of the lower extremities given history of coronary artery disease congestive heart failure low ejection fraction chest x-ray shows bilateral pleural effusion shortness of breath most likely secondary to underlying congestive heart failure normal white count continue with the Lasix aggressive diuresis  Patient better no chest pain  Examine lower back for sacral ulcer concern. Monitor blood glucose. Normalize diet       8/4: Pt states that she is feeling better. Complaining of sticky, yellow phlegm that she is coughing up. No dyspnea at this time. O2 sat 95%. 8/5: Pt still complaining of sputum production and now increased generalized weakness and anxiety. Pt recently c/o chest pain radiating to her jaw. EKG ordered. BP 85/53, O2 sat 93%. 8/6: Pt states that she is feeling better since yesterday but still feels very weak. She no longer has the chest pain or jaw pain, but still is coughing up sputum. Glucose 142 but vitals are otherwise stable. 8/8: Pt states that she is feeling better but still feels weak. She mentions that she may have had an episode of restless leg yesterday, which she was concerned about. She also mentions that she has pain in her lower back. Examine for potential sacral ulcer. Glucose 167. Ensure pt is receiving her insuline injections. Wound care evaluation  8/9: Pt states that she feels weak and \"quaky\". Mentions that her wound dressing came off overnight and that she is hungry. Albumin 3.0, glucose 107. Wean patient onto more substantial food to address protein deficit and weakness.      Donatasha Perches

## 2022-08-09 NOTE — PROGRESS NOTES
Problem: Patient Education: Go to Patient Education Activity  Goal: Patient/Family Education  Outcome: Resolved/Not Met     Problem: Pressure Injury - Risk of  Goal: *Prevention of pressure injury  Description: Document Manfred Scale and appropriate interventions in the flowsheet. Outcome: Resolved/Not Met     Problem: Falls - Risk of  Goal: *Absence of Falls  Description: Document Clydene Bone Fall Risk and appropriate interventions in the flowsheet. Outcome: Resolved/Not Met     Problem: Diabetes Self-Management  Goal: *Disease process and treatment process  Description: Define diabetes and identify own type of diabetes; list 3 options for treating diabetes. Outcome: Resolved/Not Met  Goal: *Incorporating nutritional management into lifestyle  Description: Describe effect of type, amount and timing of food on blood glucose; list 3 methods for planning meals. Outcome: Resolved/Not Met  Goal: *Incorporating physical activity into lifestyle  Description: State effect of exercise on blood glucose levels. Outcome: Resolved/Not Met  Goal: *Developing strategies to promote health/change behavior  Description: Define the ABC's of diabetes; identify appropriate screenings, schedule and personal plan for screenings. Outcome: Resolved/Not Met  Goal: *Using medications safely  Description: State effect of diabetes medications on diabetes; name diabetes medication taking, action and side effects. Outcome: Resolved/Not Met  Goal: *Monitoring blood glucose, interpreting and using results  Description: Identify recommended blood glucose targets  and personal targets. Outcome: Resolved/Not Met  Goal: *Prevention, detection, treatment of acute complications  Description: List symptoms of hyper- and hypoglycemia; describe how to treat low blood sugar and actions for lowering  high blood glucose level.   Outcome: Resolved/Not Met  Goal: *Prevention, detection and treatment of chronic complications  Description: Define the natural course of diabetes and describe the relationship of blood glucose levels to long term complications of diabetes. Outcome: Resolved/Not Met  Goal: *Developing strategies to address psychosocial issues  Description: Describe feelings about living with diabetes; identify support needed and support network  Outcome: Resolved/Not Met  Goal: *Insulin pump training  Outcome: Resolved/Not Met  Goal: *Sick day guidelines  Outcome: Resolved/Not Met  Goal: *Patient Specific Goal (EDIT GOAL, INSERT TEXT)  Outcome: Resolved/Not Met     Problem: Diabetes Self-Management  Goal: *Incorporating physical activity into lifestyle  Description: State effect of exercise on blood glucose levels. Outcome: Resolved/Not Met     Problem: Diabetes Self-Management  Goal: *Using medications safely  Description: State effect of diabetes medications on diabetes; name diabetes medication taking, action and side effects. Outcome: Resolved/Not Met     Problem: Diabetes Self-Management  Goal: *Monitoring blood glucose, interpreting and using results  Description: Identify recommended blood glucose targets  and personal targets. Outcome: Resolved/Not Met     Problem: Diabetes Self-Management  Goal: *Prevention, detection, treatment of acute complications  Description: List symptoms of hyper- and hypoglycemia; describe how to treat low blood sugar and actions for lowering  high blood glucose level.   Outcome: Resolved/Not Met     Problem: Patient Education: Go to Patient Education Activity  Goal: Patient/Family Education  Outcome: Resolved/Not Met

## 2022-08-09 NOTE — PROGRESS NOTES
PULMONARY NOTE  [facility]    Name: Cheo Barlow MRN: [id number]   : 1940 Hospital: [facility]   Date: 2022  Admission date: 2022 Hospital Day: 8       HPI:     Hospital Problems  Date Reviewed: 2022            Codes Class Noted POA    CHF exacerbation [facility]) ICD-10-CM: I50.9  ICD-9-CM: 428.0  2022 Unknown          [x] High complexity decision making was performed  [x] See my orders for details      Subjective/Initial History:     I was asked by Jose M Carlson MD to see Cheo Barlow  a 79 y.o.  female in consultation     Excerpts from admission 2022 or consult notes as follows:   50-year-old lady came in because of shortness of breath and dyspnea past medical history of congestive heart failure, hypertension, hyperlipidemia and psychiatric illness she is noncompliant not happy in the emergency room she is complaining about dyspnea on exertion she is on room air also having swelling of the lower extremities chest x-ray shows bilateral pleural effusions the patient admitted and pulmonary consult was called.   She had a remote history of tobacco abuse last 2D echo showed ejection fraction of 35% she has coronary artery disease stent placed 531      Allergies   Allergen Reactions    [place] Itching    Peanut Butter Flavor Not Reported This Time     possible allergy        MAR reviewed and pertinent medications noted or modified as needed     Current Facility-Administered Medications   Medication    albuterol (PROVENTIL HFA, VENTOLIN HFA, PROAIR HFA) inhaler 2 Puff    guaiFENesin (ROBITUSSIN) 100 mg/5 mL oral liquid 100 mg    isosorbide mononitrate ER (IMDUR) tablet 30 mg    levoFLOXacin (LEVAQUIN) tablet 500 mg    dextrose 10% infusion 0-250 mL    insulin glargine (LANTUS) injection 10 Units    budesonide-formoteroL (SYMBICORT) 160-4.5 mcg/actuation HFA inhaler 2 Puff    guaiFENesin (ROBITUSSIN) 100 mg/5 mL oral liquid 100 mg    nitroglycerin (NITROSTAT) tablet 0.4 mg    escitalopram oxalate (LEXAPRO) tablet 10 mg    insulin lispro (HUMALOG) injection    glucose chewable tablet 16 g    glucagon (GLUCAGEN) injection 1 mg    metoprolol succinate (TOPROL-XL) XL tablet 25 mg    atorvastatin (LIPITOR) tablet 40 mg    clopidogreL (PLAVIX) tablet 75 mg    potassium chloride (KLOR-CON) packet for solution 20 mEq    aspirin delayed-release tablet 81 mg    lisinopriL (PRINIVIL, ZESTRIL) tablet 5 mg    pantoprazole (PROTONIX) tablet 40 mg    furosemide (LASIX) injection 40 mg      Patient PCP: Krystal Cintron MD  PMH:  has a past medical history of CHF (congestive heart failure) ([facility] [address] Dysphagia, Heart failure ([address] [address] Hyperlipidemia, Hypertension, Ill-defined condition, and Psychiatric disorder. PSH:   has a past surgical history that includes hx coronary stent placement (05/31/2022) and hx heart catheterization (05/27/2022). FHX: family history includes Diabetes in her father, mother, and another family member; Heart Disease in her father, mother, and another family member. SHX:  reports that she quit smoking about 58 years ago. She has never used smokeless tobacco. She reports that she does not drink alcohol and does not use drugs. ROS:    Review of Systems   Constitutional:  Positive for malaise/fatigue. Generalized weakness   HENT: Negative. Eyes: Negative. Respiratory:  Positive for sputum production. Negative for shortness of breath. Cardiovascular:  Positive for leg swelling. Negative for chest pain and orthopnea. Gastrointestinal: Negative. Genitourinary: Negative. Musculoskeletal: Negative. Skin: Negative. Neurological: Negative. Psychiatric/Behavioral: Negative.         Objective:     Vital Signs: Telemetry:    normal sinus rhythm Intake/Output:   Visit Vitals  /65   Pulse 75   Temp 97.6 °F (36.4 °C)   Resp 17   Ht 5' 2.01\" (1.575 m)   Wt 53.1 kg (117 lb 1 oz)   SpO2 96%   Breastfeeding Unknown   BMI 21.41 kg/m²       Temp (24hrs), Av.6 °F (36.4 °C), Min:97.1 °F (36.2 °C), Max:98.3 °F (36.8 °C)        O2 Device: None (Room air) O2 Flow Rate (L/min): 5 l/min       Wt Readings from Last 4 Encounters:   22 53.1 kg (117 lb 1 oz)   22 54.9 kg (121 lb)   22 56 kg (123 lb 6.4 oz)   22 55.8 kg (123 lb)        No intake or output data in the 24 hours ending 22 1033      Last shift:      No intake/output data recorded. Last 3 shifts: No intake/output data recorded. Physical Exam:     Physical Exam  Constitutional:       Appearance: Normal appearance. HENT:      Head: Normocephalic and atraumatic. Nose: Nose normal.      Mouth/Throat:      Mouth: Mucous membranes are moist.   Eyes:      Pupils: Pupils are equal, round, and reactive to light. Cardiovascular:      Rate and Rhythm: Normal rate. Pulses: Normal pulses. Pulmonary:      Breath sounds: Rales present. Abdominal:      General: Abdomen is flat. Bowel sounds are normal.      Palpations: Abdomen is soft. Musculoskeletal:         General: Swelling present. Cervical back: Normal range of motion. Right lower leg: Edema present. Left lower leg: Edema present. Skin:     General: Skin is warm. Neurological:      General: No focal deficit present. Mental Status: She is alert. Psychiatric:         Mood and Affect: Mood normal.        Labs:    Recent Labs     22  0818   WBC 3.9   HGB 11.9          Recent Labs     22  0818      K 3.5      CO2 29   *   BUN 16   CREA 0.51*   CA 9.0   ALB 3.0*   ALT 24       No results for input(s): PH, PCO2, PO2, HCO3, FIO2 in the last 72 hours. No results for input(s): CPK, CKNDX, TROIQ in the last 72 hours.     No lab exists for component: CPKMB  No results found for: BNPP, BNP   Lab Results   Component Value Date/Time    Culture result: No growth 6 days 2022 12:59 PM     No results found for: TSH, TSHEXT, TSHEXT    Imaging:    CXR Results  (Last 48 hours)      None          Results from Hospital Encounter encounter on 08/02/22    XR CHEST PORT    Narrative  EXAM: XR CHEST PORT    HISTORY: sob, h/o CHF. COMPARISON: 6/21/2022    FINDINGS: Portable AP. The hilar is mildly enlarged. There are moderate left and  mild right pleural effusions which have increased in the interval. There is  increased hazy opacification of the right lower lobe. There is increased  atelectasis left lower lobe. There is unchanged mild edema. The bones are  osteopenic. Multilevel spondylosis spine. Impression  1. Increased mild right and moderate left pleural effusions with increased  bibasilar atelectasis. 2. Unchanged mild edema. Results from [place] encounter on 06/21/22    XR CHEST PORT    Narrative  Chest single view. Comparison single view chest May 31, 2022. Left greater than right moderate volume dependent pleural effusions persist.  Mild dependent pulmonary interstitial edema. Bibasilar atelectasis. Cardiac and  mediastinal structures unchanged. No pneumothorax. Results from Hospital Encounter encounter on 05/27/22    XR CHEST PORT    Narrative  XR CHEST PORT    Comparison:  5/27/2022. Single view:  Stable pleural effusions and bibasilar atelectasis/pneumonia (left  greater than right). No pneumothorax apparent. The heart size is stable. Stable  hemodynamic status. Impression  No significant change. No results found for this or any previous visit.         IMPRESSION:   Congestive heart failure  HFrEF  Dilated cardiomyopathy ejection fraction 30 to 35%  Coronary artery disease with history of PCI to mid LAD 5/31/2022 and RCA disease treated medically  Moderate mitral regurgitation  Pleural effusion  Prognosis guarded   Low blood pressure  Chest pain  Hyperglycemia       RECOMMENDATIONS/PLAN:   50-year-old lady came in because of shortness of breath and dyspnea generalized weakness swelling of the lower extremities given history of coronary artery disease congestive heart failure low ejection fraction chest x-ray shows bilateral pleural effusion shortness of breath most likely secondary to underlying congestive heart failure normal white count continue with the Lasix aggressive diuresis  Patient better no chest pain  Examine lower back for sacral ulcer concern. Monitor blood glucose. Normalize diet       8/4: Pt states that she is feeling better. Complaining of sticky, yellow phlegm that she is coughing up. No dyspnea at this time. O2 sat 95%. 8/5: Pt still complaining of sputum production and now increased generalized weakness and anxiety. Pt recently c/o chest pain radiating to her jaw. EKG ordered. BP 85/53, O2 sat 93%. 8/6: Pt states that she is feeling better since yesterday but still feels very weak. She no longer has the chest pain or jaw pain, but still is coughing up sputum. Glucose 142 but vitals are otherwise stable. 8/8: Pt states that she is feeling better but still feels weak. She mentions that she may have had an episode of restless leg yesterday, which she was concerned about. She also mentions that she has pain in her lower back. Examine for potential sacral ulcer. Glucose 167. Ensure pt is receiving her insuline injections. Wound care evaluation  8/9: Pt states that she feels weak and \"quaky\". Mentions that her wound dressing came off overnight and that she is hungry. Albumin 3.0, glucose 107. Wean patient onto more substantial food to address protein deficit and weakness.      Maged Asif MD

## 2022-08-09 NOTE — PROGRESS NOTES
PT treatment attempted at 10:37 however, Patient reported that \" its a terrible morning I cant do anything now\". Patient also had visitors present in room. Will continue to follow pt and will attempt treatment at a later time.  Thank you

## 2022-08-10 ENCOUNTER — TELEPHONE (OUTPATIENT)
Dept: CASE MANAGEMENT | Age: 82
End: 2022-08-10

## 2022-08-10 NOTE — TELEPHONE ENCOUNTER
Discharge Follow-up call    Patient Discharged on: 08/09/2022    Patient Discharge with: SNF/ Rehab  ([address])         RN-NN notes patient discharge to SNF/Rehab. Discharge note/follow-up call note completed due to SNF/Rehab.

## 2022-11-02 ENCOUNTER — HOSPITAL ENCOUNTER (INPATIENT)
Age: 82
LOS: 5 days | Discharge: HOME HEALTH | DRG: 480 | End: 2022-11-07
Attending: EMERGENCY MEDICINE | Admitting: HOSPITALIST
Payer: MEDICARE

## 2022-11-02 ENCOUNTER — APPOINTMENT (OUTPATIENT)
Dept: GENERAL RADIOLOGY | Age: 82
DRG: 480 | End: 2022-11-02
Attending: ORTHOPAEDIC SURGERY
Payer: MEDICARE

## 2022-11-02 ENCOUNTER — APPOINTMENT (OUTPATIENT)
Dept: GENERAL RADIOLOGY | Age: 82
DRG: 480 | End: 2022-11-02
Attending: EMERGENCY MEDICINE
Payer: MEDICARE

## 2022-11-02 ENCOUNTER — APPOINTMENT (OUTPATIENT)
Dept: CT IMAGING | Age: 82
DRG: 480 | End: 2022-11-02
Attending: EMERGENCY MEDICINE
Payer: MEDICARE

## 2022-11-02 DIAGNOSIS — S72.141A CLOSED INTERTROCHANTERIC FRACTURE OF HIP, RIGHT, INITIAL ENCOUNTER (HCC): Primary | ICD-10-CM

## 2022-11-02 DIAGNOSIS — S32.2XXA CLOSED FRACTURE OF COCCYX, INITIAL ENCOUNTER (HCC): ICD-10-CM

## 2022-11-02 PROBLEM — S72.009A HIP FRACTURE (HCC): Status: ACTIVE | Noted: 2022-11-02

## 2022-11-02 LAB
ANION GAP SERPL CALC-SCNC: 6 MMOL/L (ref 5–15)
BASOPHILS # BLD: 0 K/UL (ref 0–0.1)
BASOPHILS NFR BLD: 1 % (ref 0–1)
BUN SERPL-MCNC: 10 MG/DL (ref 6–20)
BUN/CREAT SERPL: 30 (ref 12–20)
CA-I BLD-MCNC: 8.9 MG/DL (ref 8.5–10.1)
CHLORIDE SERPL-SCNC: 102 MMOL/L (ref 97–108)
CO2 SERPL-SCNC: 28 MMOL/L (ref 21–32)
COVID-19 RAPID TEST, COVR: NOT DETECTED
CREAT SERPL-MCNC: 0.33 MG/DL (ref 0.55–1.02)
DIFFERENTIAL METHOD BLD: ABNORMAL
EOSINOPHIL # BLD: 0.2 K/UL (ref 0–0.4)
EOSINOPHIL NFR BLD: 4 % (ref 0–7)
ERYTHROCYTE [DISTWIDTH] IN BLOOD BY AUTOMATED COUNT: 17.9 % (ref 11.5–14.5)
GLUCOSE SERPL-MCNC: 107 MG/DL (ref 65–100)
HCT VFR BLD AUTO: 33.2 % (ref 35–47)
HGB BLD-MCNC: 10.3 G/DL (ref 11.5–16)
IMM GRANULOCYTES # BLD AUTO: 0 K/UL (ref 0–0.04)
IMM GRANULOCYTES NFR BLD AUTO: 0 % (ref 0–0.5)
INR PPP: 1.1 (ref 0.9–1.1)
LYMPHOCYTES # BLD: 0.5 K/UL (ref 0.8–3.5)
LYMPHOCYTES NFR BLD: 14 % (ref 12–49)
MCH RBC QN AUTO: 27.7 PG (ref 26–34)
MCHC RBC AUTO-ENTMCNC: 31 G/DL (ref 30–36.5)
MCV RBC AUTO: 89.2 FL (ref 80–99)
MONOCYTES # BLD: 0.6 K/UL (ref 0–1)
MONOCYTES NFR BLD: 15 % (ref 5–13)
MRSA DNA SPEC QL NAA+PROBE: NOT DETECTED
NEUTS SEG # BLD: 2.6 K/UL (ref 1.8–8)
NEUTS SEG NFR BLD: 66 % (ref 32–75)
NRBC # BLD: 0 K/UL (ref 0–0.01)
NRBC BLD-RTO: 0 PER 100 WBC
PLATELET # BLD AUTO: 344 K/UL (ref 150–400)
PMV BLD AUTO: 8.2 FL (ref 8.9–12.9)
POTASSIUM SERPL-SCNC: 3.4 MMOL/L (ref 3.5–5.1)
PROTHROMBIN TIME: 13.9 SEC (ref 11.9–14.6)
RBC # BLD AUTO: 3.72 M/UL (ref 3.8–5.2)
SODIUM SERPL-SCNC: 136 MMOL/L (ref 136–145)
WBC # BLD AUTO: 3.9 K/UL (ref 3.6–11)

## 2022-11-02 PROCEDURE — 87641 MR-STAPH DNA AMP PROBE: CPT

## 2022-11-02 PROCEDURE — 73501 X-RAY EXAM HIP UNI 1 VIEW: CPT

## 2022-11-02 PROCEDURE — 82306 VITAMIN D 25 HYDROXY: CPT

## 2022-11-02 PROCEDURE — 85025 COMPLETE CBC W/AUTO DIFF WBC: CPT

## 2022-11-02 PROCEDURE — 72192 CT PELVIS W/O DYE: CPT

## 2022-11-02 PROCEDURE — 87635 SARS-COV-2 COVID-19 AMP PRB: CPT

## 2022-11-02 PROCEDURE — 80048 BASIC METABOLIC PNL TOTAL CA: CPT

## 2022-11-02 PROCEDURE — 36415 COLL VENOUS BLD VENIPUNCTURE: CPT

## 2022-11-02 PROCEDURE — 93005 ELECTROCARDIOGRAM TRACING: CPT

## 2022-11-02 PROCEDURE — 74011000250 HC RX REV CODE- 250: Performed by: PHYSICIAN ASSISTANT

## 2022-11-02 PROCEDURE — 74011250637 HC RX REV CODE- 250/637: Performed by: PHYSICIAN ASSISTANT

## 2022-11-02 PROCEDURE — 65270000029 HC RM PRIVATE

## 2022-11-02 PROCEDURE — 71045 X-RAY EXAM CHEST 1 VIEW: CPT

## 2022-11-02 PROCEDURE — 85610 PROTHROMBIN TIME: CPT

## 2022-11-02 PROCEDURE — 99285 EMERGENCY DEPT VISIT HI MDM: CPT

## 2022-11-02 RX ORDER — LISINOPRIL 10 MG/1
5 TABLET ORAL DAILY
Status: DISCONTINUED | OUTPATIENT
Start: 2022-11-03 | End: 2022-11-07 | Stop reason: HOSPADM

## 2022-11-02 RX ORDER — ASPIRIN 81 MG/1
81 TABLET ORAL DAILY
Status: DISCONTINUED | OUTPATIENT
Start: 2022-11-03 | End: 2022-11-03

## 2022-11-02 RX ORDER — SODIUM CHLORIDE, SODIUM LACTATE, POTASSIUM CHLORIDE, CALCIUM CHLORIDE 600; 310; 30; 20 MG/100ML; MG/100ML; MG/100ML; MG/100ML
50 INJECTION, SOLUTION INTRAVENOUS CONTINUOUS
Status: DISCONTINUED | OUTPATIENT
Start: 2022-11-03 | End: 2022-11-03

## 2022-11-02 RX ORDER — ONDANSETRON 4 MG/1
4 TABLET, ORALLY DISINTEGRATING ORAL
Status: DISCONTINUED | OUTPATIENT
Start: 2022-11-02 | End: 2022-11-07 | Stop reason: HOSPADM

## 2022-11-02 RX ORDER — POLYETHYLENE GLYCOL 3350 17 G/17G
17 POWDER, FOR SOLUTION ORAL DAILY PRN
Status: DISCONTINUED | OUTPATIENT
Start: 2022-11-02 | End: 2022-11-07 | Stop reason: HOSPADM

## 2022-11-02 RX ORDER — SODIUM CHLORIDE 0.9 % (FLUSH) 0.9 %
5-40 SYRINGE (ML) INJECTION EVERY 8 HOURS
Status: DISCONTINUED | OUTPATIENT
Start: 2022-11-02 | End: 2022-11-03

## 2022-11-02 RX ORDER — CLOPIDOGREL BISULFATE 75 MG/1
75 TABLET ORAL DAILY
Status: DISCONTINUED | OUTPATIENT
Start: 2022-11-03 | End: 2022-11-07 | Stop reason: HOSPADM

## 2022-11-02 RX ORDER — MORPHINE SULFATE 2 MG/ML
1 INJECTION, SOLUTION INTRAMUSCULAR; INTRAVENOUS
Status: DISPENSED | OUTPATIENT
Start: 2022-11-02 | End: 2022-11-04

## 2022-11-02 RX ORDER — SODIUM CHLORIDE 0.9 % (FLUSH) 0.9 %
5-40 SYRINGE (ML) INJECTION AS NEEDED
Status: DISCONTINUED | OUTPATIENT
Start: 2022-11-02 | End: 2022-11-07 | Stop reason: HOSPADM

## 2022-11-02 RX ORDER — ONDANSETRON 2 MG/ML
4 INJECTION INTRAMUSCULAR; INTRAVENOUS
Status: DISCONTINUED | OUTPATIENT
Start: 2022-11-02 | End: 2022-11-07 | Stop reason: HOSPADM

## 2022-11-02 RX ORDER — ATORVASTATIN CALCIUM 40 MG/1
40 TABLET, FILM COATED ORAL
Status: DISCONTINUED | OUTPATIENT
Start: 2022-11-02 | End: 2022-11-07 | Stop reason: HOSPADM

## 2022-11-02 RX ORDER — TRAMADOL HYDROCHLORIDE 50 MG/1
50 TABLET ORAL
Status: DISCONTINUED | OUTPATIENT
Start: 2022-11-02 | End: 2022-11-03

## 2022-11-02 RX ORDER — ESCITALOPRAM OXALATE 10 MG/1
10 TABLET ORAL DAILY
Status: DISCONTINUED | OUTPATIENT
Start: 2022-11-03 | End: 2022-11-07 | Stop reason: HOSPADM

## 2022-11-02 RX ORDER — ACETAMINOPHEN 650 MG/1
650 SUPPOSITORY RECTAL
Status: DISCONTINUED | OUTPATIENT
Start: 2022-11-02 | End: 2022-11-07 | Stop reason: HOSPADM

## 2022-11-02 RX ORDER — ACETAMINOPHEN 325 MG/1
650 TABLET ORAL
Status: DISCONTINUED | OUTPATIENT
Start: 2022-11-02 | End: 2022-11-07 | Stop reason: HOSPADM

## 2022-11-02 RX ADMIN — ATORVASTATIN CALCIUM 40 MG: 40 TABLET, FILM COATED ORAL at 21:16

## 2022-11-02 RX ADMIN — ACETAMINOPHEN 650 MG: 325 TABLET, FILM COATED ORAL at 22:36

## 2022-11-02 RX ADMIN — SODIUM CHLORIDE, PRESERVATIVE FREE 10 ML: 5 INJECTION INTRAVENOUS at 21:19

## 2022-11-02 NOTE — CONSULTS
Consult Date: 11/2/2022    IP CONSULT TO ORTHOPEDIC SURGERY  Consult performed by: Mariama Calloway MD  Consult ordered by: Anthony Rangel PA-C  Reason for consult: Right hip IT fracture 3weeks old  Assessment/Recommendations: 79-year-old independent ambulator was mostly ambulating using a walker for stability and balance. Sometimes patient walks around without a walker. She suffered a ground-level fall 4 weeks ago and has persisted with hip pain and has mostly become bedbound. Patient came to the ER and on CT scan had a coccygeal fracture along with intertrochanteric hip fracture right side subacute    Patient's CT scan confirms a subacute nature of the intertrochanteric fracture hip x-rays are pending. Options of treatment including nonoperative versus operative fixation with screw in situ with current position with slight shortening versus future possibility of needing a hip replacement was reviewed at length with patient and family all questions risk benefits were discussed and recommendation for ORIF was made to the patient. Patient and family understand and agree with treatment plan and consent process for right hip open reduction total fixation with a short gamma nail and possibility of blood transfusion was discussed. Patient is Religious and does not want any blood transfusion. We will use perioperative measures like TXA to prevent bleeding and monitor hemoglobin postoperatively. No blood transfusion will be used. Patient will plan for surgical care tomorrow evening once patient is optimized. Apparently patient is already been seen by cardiologist and cleared. Postoperatively patient will need a SNF placement and follow-up in the outpatient basis. Family does understand if there is further collapse of fracture or screw cut out patient may need a second surgery involving removal of implant and hemiarthroplasty. Subjective   HPI: Kimberly Asher  Sharad, 79 y.o. female presents to the emergency department with complaint of right hip pain after mechanical fall approximately 4 weeks ago. Patient ports pain has been present since onset, resolved at rest, worse with flexion or standing. Patient denies radiation of the pain. Patient denies associated back pain, denies fevers or chills. Patient reports the pain is moderate in severity, sharp in character. Patient has a past medical history of CHF, hyperlipidemia, coronary artery disease, anxiety that presented to the emergency room on 11/2/2002 for right hip pain after mechanical fall approximately 4 weeks ago. Patient says that she has mostly been bedbound as she was unable to bear weight on her right side. Her sons been helping her pivot to the bathroom. She reports pain during movement and resolves at rest.  Flexing and standing makes it worse. Prior to the event patient had no episodes of dizziness, lightheadedness, chest pain, shortness of breath, loss of consciousness. She said that her leg just gave out. As the pain was not getting any better and she was unable to ambulate son brought her to the emergency room. In the ED vital signs stable. Laboratory data hemoglobin of 10.3, potassium 3.4, remaining electrolytes stable. Chest x-ray showed no acute cardiopulmonary process. CT of the pelvis showed a right intertrochanteric fracture and coccygeal fracture and osteoporosis.   It was requested admission for orthopedic evaluation and for surgery     Past Medical History:   Diagnosis Date    CHF (congestive heart failure) ([facility])     EF 30-35%    Dysphagia     Heart failure ([facility] [address]     Hyperlipidemia     Hypertension     Ill-defined condition     patient unaware of any diagnosis due to recieving minimal medical care for the past 57 years    Psychiatric disorder     anxiety      Past Surgical History:   Procedure Laterality Date    HX CORONARY STENT PLACEMENT  05/31/2022    HX HEART CATHETERIZATION  05/27/2022    STENT PLACED Family History   Problem Relation Age of Onset    Diabetes Other     Heart Disease Other     Heart Disease Mother     Diabetes Mother     Diabetes Father     Heart Disease Father       Social History     Tobacco Use    Smoking status: Former     Types: Cigarettes     Quit date: 1964     Years since quittin.8    Smokeless tobacco: Never   Substance Use Topics    Alcohol use: Never       Current Facility-Administered Medications   Medication Dose Route Frequency Provider Last Rate Last Admin    morphine injection 1 mg  1 mg IntraVENous Q4H PRN Merry Trinidad PA-C        [START ON 11/3/2022] aspirin delayed-release tablet 81 mg  81 mg Oral DAILY Merry Trinidad PA-C        atorvastatin (LIPITOR) tablet 40 mg  40 mg Oral QHS Tatum Trinidad PA-C        [Held by provider] clopidogreL (PLAVIX) tablet 75 mg  75 mg Oral DAILY Merry Trinidad PA-C        [START ON 11/3/2022] escitalopram oxalate (LEXAPRO) tablet 10 mg  10 mg Oral DAILY Merry Trinidad PA-C        [START ON 11/3/2022] lisinopriL (PRINIVIL, ZESTRIL) tablet 5 mg  5 mg Oral DAILY Merry Trinidad PA-C        sodium chloride (NS) flush 5-40 mL  5-40 mL IntraVENous Q8H Merry Trinidad PA-C        sodium chloride (NS) flush 5-40 mL  5-40 mL IntraVENous PRN Merry Trinidad PA-C        acetaminophen (TYLENOL) tablet 650 mg  650 mg Oral Q6H PRN Merry Trinidad PA-C        Or    acetaminophen (TYLENOL) suppository 650 mg  650 mg Rectal Q6H PRN Merry Trinidad PA-C        polyethylene glycol (MIRALAX) packet 17 g  17 g Oral DAILY PRN Merry Trinidad PA-C        ondansetron (ZOFRAN ODT) tablet 4 mg  4 mg Oral Q8H PRN Merry Trinidad PA-C        Or    ondansetron (ZOFRAN) injection 4 mg  4 mg IntraVENous Q6H PRN Merry Trinidad PA-C        traMADoL (ULTRAM) tablet 50 mg  50 mg Oral Q6H PRN Merry Trinidad PA-C        [START ON 11/3/2022] lactated Ringers infusion  50 mL/hr IntraVENous CONTINUOUS Jori Marquez MD         Current Outpatient Medications   Medication Sig Dispense Refill    escitalopram oxalate (LEXAPRO) 10 mg tablet Take 1 Tablet by mouth in the morning. 30 Tablet 0    nitroglycerin (NITROSTAT) 0.4 mg SL tablet 0.4 mg by SubLINGual route every five (5) minutes as needed for Chest Pain. Up to 3 doses. lisinopriL (PRINIVIL, ZESTRIL) 5 mg tablet Take 5 mg by mouth daily. atorvastatin (LIPITOR) 40 mg tablet Take 1 Tablet by mouth nightly. 30 Tablet 0    clopidogreL (PLAVIX) 75 mg tab Take 1 Tablet by mouth daily. Indications: blood clot prevention following percutaneous coronary intervention 90 Tablet 0    aspirin delayed-release 81 mg tablet Take 81 mg by mouth daily. Review of Systems   Constitutional: Negative. HENT: Negative. Respiratory: Negative. Cardiovascular: Negative. Musculoskeletal:  Positive for arthralgias and gait problem. Right hip   Neurological:  Negative for numbness. Hematological: Negative. Psychiatric/Behavioral: Negative. Objective     Vital signs for last 24 hours:  Visit Vitals  /66 (BP 1 Location: Right upper arm, BP Patient Position: Sitting)   Pulse 78   Temp 97.7 °F (36.5 °C)   Resp 17   Ht 5' 2\" (1.575 m)   Wt 49 kg (108 lb)   SpO2 99%   BMI 19.75 kg/m²       Intake/Output this shift:  Current Shift: No intake/output data recorded. Last 3 Shifts: No intake/output data recorded.     Data Review:   Recent Results (from the past 24 hour(s))   CBC WITH AUTOMATED DIFF    Collection Time: 11/02/22 12:51 PM   Result Value Ref Range    WBC 3.9 3.6 - 11.0 K/uL    RBC 3.72 (L) 3.80 - 5.20 M/uL    HGB 10.3 (L) 11.5 - 16.0 g/dL    HCT 33.2 (L) 35.0 - 47.0 %    MCV 89.2 80.0 - 99.0 FL    MCH 27.7 26.0 - 34.0 PG    MCHC 31.0 30.0 - 36.5 g/dL    RDW 17.9 (H) 11.5 - 14.5 %    PLATELET 455 707 - 988 K/uL    MPV 8.2 (L) 8.9 - 12.9 FL    NRBC 0.0 0.0  WBC    ABSOLUTE NRBC 0.00 0.00 - 0.01 K/uL    NEUTROPHILS 66 32 - 75 %    LYMPHOCYTES 14 12 - 49 %    MONOCYTES 15 (H) 5 - 13 %    EOSINOPHILS 4 0 - 7 %    BASOPHILS 1 0 - 1 %    IMMATURE GRANULOCYTES 0 0 - 0.5 %    ABS. NEUTROPHILS 2.6 1.8 - 8.0 K/UL    ABS. LYMPHOCYTES 0.5 (L) 0.8 - 3.5 K/UL    ABS. MONOCYTES 0.6 0.0 - 1.0 K/UL    ABS. EOSINOPHILS 0.2 0.0 - 0.4 K/UL    ABS. BASOPHILS 0.0 0.0 - 0.1 K/UL    ABS. IMM. GRANS. 0.0 0.00 - 0.04 K/UL    DF AUTOMATED     METABOLIC PANEL, BASIC    Collection Time: 11/02/22 12:51 PM   Result Value Ref Range    Sodium 136 136 - 145 mmol/L    Potassium 3.4 (L) 3.5 - 5.1 mmol/L    Chloride 102 97 - 108 mmol/L    CO2 28 21 - 32 mmol/L    Anion gap 6 5 - 15 mmol/L    Glucose 107 (H) 65 - 100 mg/dL    BUN 10 6 - 20 mg/dL    Creatinine 0.33 (L) 0.55 - 1.02 mg/dL    BUN/Creatinine ratio 30 (H) 12 - 20      eGFR >60 >60 ml/min/1.73m2    Calcium 8.9 8.5 - 10.1 mg/dL   PROTHROMBIN TIME + INR    Collection Time: 11/02/22 12:51 PM   Result Value Ref Range    Prothrombin time 13.9 11.9 - 14.6 sec    INR 1.1 0.9 - 1.1     COVID-19 RAPID TEST    Collection Time: 11/02/22  2:07 PM   Result Value Ref Range    COVID-19 rapid test Not Detected Not Detected     MRSA SCREEN - PCR (NASAL)    Collection Time: 11/02/22  2:12 PM   Result Value Ref Range    MRSA by PCR, Nasal Not Detected Not Detected       CT Results  (Last 48 hours)                 11/02/22 1049  CT PELV WO CONT Final result    Impression:  1. Right intertrochanteric fracture. 2. Coccygeal fracture. 3. Osteoporosis. Narrative:  CT PELVIS  WITHOUT CONTRAST. 11/2/2022 10:49 AM        INDICATION: Ground level fall, right hip pain for 4 weeks. COMPARISON: None. TECHNIQUE: CT of the pelvis was performed without contrast. CT dose reduction   was achieved through use of a standardized protocol tailored for this   examination and automatic exposure control for dose modulation. FINDINGS:   A right intertrochanteric fracture is mildly angulated and comminuted. There is   a mildly displaced coccygeal fracture (203-63, 201-51).  The patient is   osteoporotic. No other displaced pelvic fracture. Incidental note is made of   heterotopic ossification in the deep right groin. The unenhanced bladder and   visualized portions of the bowel are normal. No free air or fluid, and no pelvic   lymphadenopathy. Physical Exam  Vitals and nursing note reviewed. Constitutional:       Appearance: Normal appearance. She is normal weight. HENT:      Head: Normocephalic and atraumatic. Right Ear: External ear normal.      Left Ear: External ear normal.      Nose: Nose normal.      Mouth/Throat:      Mouth: Mucous membranes are dry. Pharynx: Oropharynx is clear. Eyes:      Extraocular Movements: Extraocular movements intact. Conjunctiva/sclera: Conjunctivae normal.      Pupils: Pupils are equal, round, and reactive to light. Cardiovascular:      Rate and Rhythm: Normal rate and regular rhythm. Pulmonary:      Effort: Pulmonary effort is normal.   Abdominal:      General: Abdomen is flat. There is no distension. Palpations: Abdomen is soft. Musculoskeletal:         General: Swelling, tenderness and deformity present. Cervical back: Normal range of motion and neck supple. Right lower leg: Edema present. Comments: RIght hip tender with decrease ROM with pain and some LLE shortening   Skin:     General: Skin is warm and dry. Capillary Refill: Capillary refill takes less than 2 seconds. Neurological:      General: No focal deficit present. Mental Status: She is alert and oriented to person, place, and time. Mental status is at baseline. Psychiatric:         Mood and Affect: Mood normal.         Behavior: Behavior normal.         Thought Content:  Thought content normal.         Judgment: Judgment normal.

## 2022-11-02 NOTE — H&P
History and Physical    Patient: Barbara Mandel MRN: [id number]  SSN: [id number]    YOB: 1940  Age: 79 y.o. Sex: female      Subjective:      Barbara Mandel is a 79 y.o. female with a past medical history of CHF, hyperlipidemia, coronary artery disease, anxiety that presented to the emergency room on 11/2/2002 for right hip pain after mechanical fall approximately 4 weeks ago. Patient says that she has mostly been bedbound as she was unable to bear weight on her right side. Her sons been helping her pivot to the bathroom. She reports pain during movement and resolves at rest.  Flexing and standing makes it worse. Prior to the event patient had no episodes of dizziness, lightheadedness, chest pain, shortness of breath, loss of consciousness. She said that her leg just gave out. As the pain was not getting any better and she was unable to ambulate son brought her to the emergency room. In the ED vital signs stable. Laboratory data hemoglobin of 10.3, potassium 3.4, remaining electrolytes stable. Chest x-ray showed no acute cardiopulmonary process. CT of the pelvis showed a right intertrochanteric fracture and coccygeal fracture and osteoporosis.   It was requested admission for orthopedic evaluation and for surgery    Past Medical History:   Diagnosis Date    CHF (congestive heart failure) ([facility])     EF 30-35%    Dysphagia     Heart failure ([facility])     Hyperlipidemia     Hypertension     Ill-defined condition     patient unaware of any diagnosis due to recieving minimal medical care for the past 57 years    Psychiatric disorder     anxiety     Past Surgical History:   Procedure Laterality Date    HX CORONARY STENT PLACEMENT  05/31/2022    HX HEART CATHETERIZATION  05/27/2022    STENT PLACED      Family History   Problem Relation Age of Onset    Diabetes Other     Heart Disease Other     Heart Disease Mother     Diabetes Mother     Diabetes Father     Heart Disease Father      Social History     Tobacco Use    Smoking status: Former     Types: Cigarettes     Quit date: 1964     Years since quittin.8    Smokeless tobacco: Never   Substance Use Topics    Alcohol use: Never      Prior to Admission medications    Medication Sig Start Date End Date Taking? Authorizing Provider   escitalopram oxalate (LEXAPRO) 10 mg tablet Take 1 Tablet by mouth in the morning. 22   Samantha Carrillo MD   nitroglycerin (NITROSTAT) 0.4 mg SL tablet 0.4 mg by SubLINGual route every five (5) minutes as needed for Chest Pain. Up to 3 doses. Provider, Historical   lisinopriL (PRINIVIL, ZESTRIL) 5 mg tablet Take 5 mg by mouth daily. 22   Provider, Historical   atorvastatin (LIPITOR) 40 mg tablet Take 1 Tablet by mouth nightly. 22   Jaida Martinez MD   clopidogreL (PLAVIX) 75 mg tab Take 1 Tablet by mouth daily. Indications: blood clot prevention following percutaneous coronary intervention 22   Jaida Martinez MD   aspirin delayed-release 81 mg tablet Take 81 mg by mouth daily.     Provider, Historical        Allergies   Allergen Reactions    [facility] Itching    Peanut Butter Flavor Not Reported This Time     possible allergy       Review of Systems:  Constitutional: No fevers, No chills, No fatigue, ++weakness  Eyes: No visual disturbance  Ears, Nose, Mouth, Throat, and Face: No nasal congestion, No sore throat  Respiratory: No cough, No sputum, No wheezing, No SOB  Cardiovascular: No chest pain, No lower extremity edema, No Palpitations   Gastrointestinal: No nausea, No vomiting, No diarrhea, No constipation, No abdominal pain  Genitourinary: No frequency, No dysuria, No hematuria  Integument/Breast: No rash, No skin lesion(s), No dryness  Musculoskeletal: No arthralgias, No neck pain, No back pain  Neurological: No headaches, No dizziness, No confusion,  No seizures  Behavioral/Psychiatric: No anxiety, No depression      Objective:     Vitals:    22 0946   BP: 119/66   Pulse: 78   Resp: 17 Temp: 97.7 °F (36.5 °C)   SpO2: 99%   Weight: 49 kg (108 lb)   Height: 5' 2\" (1.575 m)        Physical Exam:  General: alert, appears to be in pain  Eye: conjunctivae/corneas clear. PERRL, EOM's intact. Throat and Neck: normal and no erythema or exudates noted. No mass   Lung: clear to auscultation bilaterally  Heart: regular rate and rhythm,   Abdomen: soft, non-tender. Bowel sounds normal. No masses,  Extremities:  able to move all extremities normal, atraumatic  Skin: Normal.  Neurologic: AOx3. Cranial nerves 2-12 and sensation grossly intact. Psychiatric: non focal    Recent Results (from the past 24 hour(s))   CBC WITH AUTOMATED DIFF    Collection Time: 11/02/22 12:51 PM   Result Value Ref Range    WBC 3.9 3.6 - 11.0 K/uL    RBC 3.72 (L) 3.80 - 5.20 M/uL    HGB 10.3 (L) 11.5 - 16.0 g/dL    HCT 33.2 (L) 35.0 - 47.0 %    MCV 89.2 80.0 - 99.0 FL    MCH 27.7 26.0 - 34.0 PG    MCHC 31.0 30.0 - 36.5 g/dL    RDW 17.9 (H) 11.5 - 14.5 %    PLATELET 287 341 - 987 K/uL    MPV 8.2 (L) 8.9 - 12.9 FL    NRBC 0.0 0.0  WBC    ABSOLUTE NRBC 0.00 0.00 - 0.01 K/uL    NEUTROPHILS 66 32 - 75 %    LYMPHOCYTES 14 12 - 49 %    MONOCYTES 15 (H) 5 - 13 %    EOSINOPHILS 4 0 - 7 %    BASOPHILS 1 0 - 1 %    IMMATURE GRANULOCYTES 0 0 - 0.5 %    ABS. NEUTROPHILS 2.6 1.8 - 8.0 K/UL    ABS. LYMPHOCYTES 0.5 (L) 0.8 - 3.5 K/UL    ABS. MONOCYTES 0.6 0.0 - 1.0 K/UL    ABS. EOSINOPHILS 0.2 0.0 - 0.4 K/UL    ABS. BASOPHILS 0.0 0.0 - 0.1 K/UL    ABS. IMM.  GRANS. 0.0 0.00 - 0.04 K/UL    DF AUTOMATED     METABOLIC PANEL, BASIC    Collection Time: 11/02/22 12:51 PM   Result Value Ref Range    Sodium 136 136 - 145 mmol/L    Potassium 3.4 (L) 3.5 - 5.1 mmol/L    Chloride 102 97 - 108 mmol/L    CO2 28 21 - 32 mmol/L    Anion gap 6 5 - 15 mmol/L    Glucose 107 (H) 65 - 100 mg/dL    BUN 10 6 - 20 mg/dL    Creatinine 0.33 (L) 0.55 - 1.02 mg/dL    BUN/Creatinine ratio 30 (H) 12 - 20      eGFR >60 >60 ml/min/1.73m2    Calcium 8.9 8.5 - 10.1 mg/dL XR Results (maximum last 3): Results from Hospital Encounter encounter on 11/02/22    XR CHEST PORT    Narrative  EXAM:  XR CHEST PORT    INDICATION: Preoperative examination, history of heart failure, hypertension    COMPARISON: 8/2/2022    TECHNIQUE: portable chest AP view    FINDINGS: The cardiac silhouette is within normal limits. The pulmonary  vasculature is within normal limits. No acute abnormality is identified. Degenerative changes are seen in the  thoracic spine. Impression  No acute process on portable chest.      Results from Hospital Encounter encounter on 08/02/22    XR CHEST PORT    Narrative  EXAM: XR CHEST PORT    HISTORY: sob, h/o CHF. COMPARISON: 6/21/2022    FINDINGS: Portable AP. The hilar is mildly enlarged. There are moderate left and  mild right pleural effusions which have increased in the interval. There is  increased hazy opacification of the right lower lobe. There is increased  atelectasis left lower lobe. There is unchanged mild edema. The bones are  osteopenic. Multilevel spondylosis spine. Impression  1. Increased mild right and moderate left pleural effusions with increased  bibasilar atelectasis. 2. Unchanged mild edema. Results from [place] encounter on 06/21/22    XR CHEST PORT    Narrative  Chest single view. Comparison single view chest May 31, 2022. Left greater than right moderate volume dependent pleural effusions persist.  Mild dependent pulmonary interstitial edema. Bibasilar atelectasis. Cardiac and  mediastinal structures unchanged. No pneumothorax. CT Results (maximum last 3): Results from [place] encounter on 11/02/22    CT PELV WO CONT    Narrative  CT PELVIS  WITHOUT CONTRAST. 11/2/2022 10:49 AM    INDICATION: Ground level fall, right hip pain for 4 weeks. COMPARISON: None.     TECHNIQUE: CT of the pelvis was performed without contrast. CT dose reduction  was achieved through use of a standardized protocol tailored for this  examination and automatic exposure control for dose modulation. FINDINGS:  A right intertrochanteric fracture is mildly angulated and comminuted. There is  a mildly displaced coccygeal fracture (203-63, 201-51). The patient is  osteoporotic. No other displaced pelvic fracture. Incidental note is made of  heterotopic ossification in the deep right groin. The unenhanced bladder and  visualized portions of the bowel are normal. No free air or fluid, and no pelvic  lymphadenopathy. Impression  1. Right intertrochanteric fracture. 2. Coccygeal fracture. 3. Osteoporosis. MRI Results (maximum last 3): No results found for this or any previous visit. Nuclear Medicine Results (maximum last 3): No results found for this or any previous visit. US Results (maximum last 3): No results found for this or any previous visit. Assessment:   1. Right Intertrochanteric fracture  2. Coccygeal Fracture  3. Osteoporosis  4. History of heart failure with an EF of 30 to 35%  5. History of coronary artery disease status post PCI x1  6. Hyperlipidemia  7. Anxiety  8. Type 2 diabetes diet controlled    Plan:   1.  CT of the pelvis shows right hip fracture. Keep patient NPO. Orthopedics consulted. IV morphine as needed for severe pain, tramadol for moderate pain, Tylenol for mild pain. PT and OT postop per orthopedics recommendations. 2.  Conservative treatment most likely at this time. Orthopedics consulted  3. Patient will need to follow-up in osteoporosis clinic in the outpatient setting. We will check a vitamin D level in the a.m.  4.  Patient has a history of heart failure. Consult cardiology for preop clearance. Patient currently denies any chest pain. EKG is pending. Patient is on aspirin, statin, ACE. No beta-blocker due to normal to low blood pressure and bradycardia  5. Patient on aspirin, statin, Plavix. We will hold Plavix at this time for possible surgery  6. Continue statin  7. Continue Lexapro  8. Patient's hemoglobin A1c 6.6. No need to treat at this time. Diet controlled  9. CBC BMP in a.m.       LAKEISHAA Howard Burroughs (son) [phone]    NO BLOOD PRODUCTS    Code Status: DNR    DVT prophylaxis: Hold for surgery  GI prophylaxis not indicated    Total Time: 61 min     Signed By: Safia Maguire PA-C     November 2, 2022

## 2022-11-02 NOTE — ED PROVIDER NOTES
EMERGENCY DEPARTMENT HISTORY AND PHYSICAL EXAM      Date: 11/2/2022  Patient Name: Riley Orourke    History of Presenting Illness     Chief Complaint   Patient presents with    Hip Pain       History Provided By: Patient and Patient's Son    HPI: Riley Orourke, 79 y.o. female presents to the emergency department with complaint of right hip pain after mechanical fall approximately 4 weeks ago. Patient ports pain has been present since onset, resolved at rest, worse with flexion or standing. Patient denies radiation of the pain. Patient denies associated back pain, denies fevers or chills. Patient reports the pain is moderate in severity, sharp in character. There are no other complaints, changes, or physical findings at this time. PCP: Demond Neal NP    No current facility-administered medications on file prior to encounter. Current Outpatient Medications on File Prior to Encounter   Medication Sig Dispense Refill    potassium chloride (KLOR-CON) 20 mEq pack Take 1 Packet by mouth two (2) times daily (with meals). Indications: low amount of potassium in the blood 20 Packet 0    budesonide-formoteroL (SYMBICORT) 160-4.5 mcg/actuation HFAA Take 2 Puffs by inhalation two (2) times a day. 10.2 g 1    escitalopram oxalate (LEXAPRO) 10 mg tablet Take 1 Tablet by mouth in the morning. 30 Tablet 0    guaiFENesin (ROBITUSSIN) 100 mg/5 mL liquid Take 5 mL by mouth every four (4) hours as needed for Cough. 500 mL 0    insulin glargine (LANTUS) 100 unit/mL injection 10 units subcu daily 1 mL 1    isosorbide mononitrate ER (IMDUR) 30 mg tablet Take 1 Tablet by mouth in the morning. Indications: prevention of anginal chest pain associated with coronary artery disease 30 Tablet 0    metoprolol succinate (TOPROL-XL) 25 mg XL tablet Take 1 Tablet by mouth in the morning.  30 Tablet 0    furosemide (Lasix) 40 mg tablet 1 tablet twice a day 60 Tablet 1    nitroglycerin (NITROSTAT) 0.4 mg SL tablet 0.4 mg by SubLINGual route every five (5) minutes as needed for Chest Pain. Up to 3 doses. lisinopriL (PRINIVIL, ZESTRIL) 5 mg tablet Take 5 mg by mouth daily. pantoprazole (PROTONIX) 40 mg tablet Take 40 mg by mouth daily. atorvastatin (LIPITOR) 40 mg tablet Take 1 Tablet by mouth nightly. 30 Tablet 0    clopidogreL (PLAVIX) 75 mg tab Take 1 Tablet by mouth daily. Indications: blood clot prevention following percutaneous coronary intervention 90 Tablet 0    aspirin delayed-release 81 mg tablet Take 81 mg by mouth daily. Past History     Past Medical History:  Past Medical History:   Diagnosis Date    CHF (congestive heart failure) ([facility])     EF 30-35%    Dysphagia     Heart failure ([facility])     Hyperlipidemia     Hypertension     Ill-defined condition     patient unaware of any diagnosis due to recieving minimal medical care for the past 57 years    Psychiatric disorder     anxiety       Past Surgical History:  Past Surgical History:   Procedure Laterality Date    HX CORONARY STENT PLACEMENT  2022    HX HEART CATHETERIZATION  2022    STENT PLACED       Family History:  Family History   Problem Relation Age of Onset    Diabetes Other     Heart Disease Other     Heart Disease Mother     Diabetes Mother     Diabetes Father     Heart Disease Father        Social History:  Social History     Tobacco Use    Smoking status: Former     Types: Cigarettes     Quit date: 1964     Years since quittin.8    Smokeless tobacco: Never   Vaping Use    Vaping Use: Never used   Substance Use Topics    Alcohol use: Never    Drug use: Never       Allergies: Allergies   Allergen Reactions    [place] Itching    Peanut Butter Flavor Not Reported This Time     possible allergy       Review of Systems   Review of Systems  Review of Systems   Constitutional: Negative for chills and fever. HENT: Negative for sinus pressure and sinus pain. Eyes: Negative for photophobia and redness.    Respiratory: Negative for shortness of breath and wheezing. Cardiovascular: Negative for chest pain and palpitations. Gastrointestinal: Negative for abdominal pain and nausea. Genitourinary: Negative for flank pain and hematuria. Musculoskeletal: Positive for arthralgias and gait problem. Skin: Negative for color change and pallor. Neurological: Negative for dizziness and weakness. Physical Exam   Physical Exam  Physical Exam  Constitutional:       General: No acute distress. Appearance: Normal appearance. Not toxic-appearing. HENT:      Head: Normocephalic and atraumatic. Nose: Nose normal.      Mouth/Throat:      Mouth: Mucous membranes are moist.   Eyes:      Extraocular Movements: Extraocular movements intact. Pupils: Pupils are equal, round, and reactive to light. Cardiovascular:      Rate and Rhythm: Normal rate. Pulses: Normal pulses. Pulmonary:      Effort: Pulmonary effort is normal.      Breath sounds: No stridor. Abdominal:      General: Abdomen is flat. There is no distension. Musculoskeletal:         General: Normal range of motion. Pelvis: No hip tenderness to palpation. Pain elicited with flexion of the right hip, not with rotation. Neurovascular intact distally. Skin:     General: Skin is warm and dry. Capillary Refill: Capillary refill takes less than 2 seconds. Neurological:      General: No focal deficit present. Mental Status: Aert and oriented to person, place, and time. Psychiatric:         Mood and Affect: Mood normal.         Behavior: Behavior normal.     Lab and Diagnostic Study Results   Labs -   No results found for this or any previous visit (from the past 12 hour(s)). Radiologic Studies -   @lastxrresult@  CT Results  (Last 48 hours)                 11/02/22 1049  CT PELV WO CONT Final result    Impression:  1. Right intertrochanteric fracture. 2. Coccygeal fracture. 3. Osteoporosis. Narrative:  CT PELVIS  WITHOUT CONTRAST. 11/2/2022 10:49 AM        INDICATION: Ground level fall, right hip pain for 4 weeks. COMPARISON: None. TECHNIQUE: CT of the pelvis was performed without contrast. CT dose reduction   was achieved through use of a standardized protocol tailored for this   examination and automatic exposure control for dose modulation. FINDINGS:   A right intertrochanteric fracture is mildly angulated and comminuted. There is   a mildly displaced coccygeal fracture (203-63, 201-51). The patient is   osteoporotic. No other displaced pelvic fracture. Incidental note is made of   heterotopic ossification in the deep right groin. The unenhanced bladder and   visualized portions of the bowel are normal. No free air or fluid, and no pelvic   lymphadenopathy. CXR Results  (Last 48 hours)                 11/02/22 1258  XR CHEST PORT Final result    Impression:      No acute process on portable chest.           Narrative:  EXAM:  XR CHEST PORT       INDICATION: Preoperative examination, history of heart failure, hypertension       COMPARISON: 8/2/2022       TECHNIQUE: portable chest AP view       FINDINGS: The cardiac silhouette is within normal limits. The pulmonary   vasculature is within normal limits. No acute abnormality is identified. Degenerative changes are seen in the   thoracic spine. Medical Decision Making and ED Course   Differential Diagnosis & Medical Decision Making Provider Note:       - I am the first provider for this patient. I reviewed the vital signs, available nursing notes, past medical history, past surgical history, family history and social history. The patients presenting problems have been discussed, and they are in agreement with the care plan formulated and outlined with them. I have encouraged them to ask questions as they arise throughout their visit. Vital Signs-Reviewed the patient's vital signs.   Patient Vitals for the past 12 hrs:   Temp Pulse Resp BP SpO2   11/02/22 0946 97.7 °F (36.5 °C) 78 17 119/66 99 %       E    Disposition   Disposition: Admitted to Floor Surgical Floor the case was discussed with the admitting physician         Diagnosis/Clinical Impression     Clinical Impression:   1. Closed intertrochanteric fracture of hip, right, initial encounter ([facility] [address]    2. Closed fracture of coccyx, initial encounter [facility])        Attestations: Jama Phalen, MD, am the primary clinician of record. Please note that this dictation was completed with [employer] Cart[employer], the computer voice recognition software. Quite often unanticipated grammatical, syntax, homophones, and other interpretive errors are inadvertently transcribed by the computer software. Please disregard these errors. Please excuse any errors that have escaped final proofreading. Thank you.

## 2022-11-02 NOTE — PROGRESS NOTES
8075 received patient from ER with RN Lance Lisa giving telephone report. Report consists SBAR. Son Gwendolyn Wilcox present at bedside. Skin assessment with second RN NANCY Reaves. Call light use education. Bed alarm on. Call light in reach. Bed in lowest position. Dinner tray offered and accepted. Patient denies any pain.

## 2022-11-02 NOTE — ROUTINE PROCESS
TRANSFER - OUT REPORT:    Verbal report given to [place] RN(name) on [facility], [address]. Sharad  being transferred to [facility](unit) for routine progression of care       Report consisted of patients Situation, Background, Assessment and   Recommendations(SBAR). Information from the following report(s) SBAR, Kardex, ED Summary, and MAR was reviewed with the receiving nurse. Lines:   Peripheral IV 11/02/22 Right Antecubital (Active)   Site Assessment Clean, dry, & intact 11/02/22 1252   Phlebitis Assessment 0 11/02/22 1252   Infiltration Assessment 0 11/02/22 1252   Dressing Status Clean, dry, & intact 11/02/22 1252   Dressing Type Transparent 11/02/22 1252   Hub Color/Line Status Pink 11/02/22 1252        Opportunity for questions and clarification was provided.       Patient transported with:   [facility]

## 2022-11-02 NOTE — ED TRIAGE NOTES
Reported pt fell x4 weeks ago and has not been moving around since. Reports no Xrays or treatment since fall; reports PCP \"failed\" to order at home Xrays the past couple of days.  Reports Home Health wants her to get Xrays so they can get a PT order

## 2022-11-02 NOTE — CONSULTS
Consult    Patient: Joanne Briggs MRN: [id number]  SSN: [id number]    YOB: 1940  Age: 79 y.o. Sex: female      Subjective:      Joanne Briggs is a 79 y.o. female who is being seen for preoperative evaluation. Patient with history of ischemic cardiomyopathy, CAD status post PCI of LAD in May 2022, hypertension with hypertensive heart disease and hyperlipidemia. She presented after she sustained a ground-level fall. She never passed out. Denied having any chest pain, palpitation, dizziness. Denied recent change in her weight or lower extremity swelling. Denied easy bruisability or major bleeding. She was noticed to have right intertrochanteric fracture and coccygeal fracture. Patient with advanced osteoporosis. Patient is an 79-year-old white female who was admitted on 5/27/2022 with shortness of breath and dyspnea with exertion. She had orthopnea, PND. Bilateral lower limb swelling. Treated for congestive heart failure. Subsequently echocardiogram showed EF of 30 to 35%, mild mitral annular calcification with moderate mitral regurgitation. Left pleural effusion. Cardiac catheterization subsequently showed 70% stenosis of the right PDA, had a calcified nodule with SIVAN-3 flow proximally, 90% stenosis. Left main had 20% stenosis, LAD had 99% stenosis and treated with a 3.0 x 22 mm drug-eluting stent.   Distal LAD was totally occluded which appears to be  receiving collaterals from obtuse marginal.  Past Medical History:   Diagnosis Date    CHF (congestive heart failure) ([facility])     EF 30-35%    Dysphagia     Heart failure ([address]     Hyperlipidemia     Hypertension     Ill-defined condition     patient unaware of any diagnosis due to recieving minimal medical care for the past 57 years    Psychiatric disorder     anxiety     Past Surgical History:   Procedure Laterality Date    HX CORONARY STENT PLACEMENT  05/31/2022    HX HEART CATHETERIZATION  05/27/2022    STENT PLACED      Family History   Problem Relation Age of Onset    Diabetes Other     Heart Disease Other     Heart Disease Mother     Diabetes Mother     Diabetes Father     Heart Disease Father      Social History     Tobacco Use    Smoking status: Former     Types: Cigarettes     Quit date: 1964     Years since quittin.8    Smokeless tobacco: Never   Substance Use Topics    Alcohol use: Never      Current Facility-Administered Medications   Medication Dose Route Frequency Provider Last Rate Last Admin    morphine injection 1 mg  1 mg IntraVENous Q4H PRN Merry Trinidad PA-C        [START ON 11/3/2022] aspirin delayed-release tablet 81 mg  81 mg Oral DAILY Merry Trinidad PA-C        atorvastatin (LIPITOR) tablet 40 mg  40 mg Oral QHS Merry Trinidad PA-C        [Held by provider] clopidogreL (PLAVIX) tablet 75 mg  75 mg Oral DAILY Merry Trinidad PA-C        [START ON 11/3/2022] escitalopram oxalate (LEXAPRO) tablet 10 mg  10 mg Oral DAILY Merry Trinidad PA-C        [START ON 11/3/2022] lisinopriL (PRINIVIL, ZESTRIL) tablet 5 mg  5 mg Oral DAILY Merry Trinidad PA-C        sodium chloride (NS) flush 5-40 mL  5-40 mL IntraVENous Q8H Merry Trinidad PA-C        sodium chloride (NS) flush 5-40 mL  5-40 mL IntraVENous PRN Merry Trinidad PA-C        acetaminophen (TYLENOL) tablet 650 mg  650 mg Oral Q6H PRN Merry Trinidad PA-C        Or    acetaminophen (TYLENOL) suppository 650 mg  650 mg Rectal Q6H PRN Merry Trinidad PA-C        polyethylene glycol (MIRALAX) packet 17 g  17 g Oral DAILY PRN Merry Trinidad PA-C        ondansetron (ZOFRAN ODT) tablet 4 mg  4 mg Oral Q8H PRN Merry Trinidad PA-C        Or    ondansetron (ZOFRAN) injection 4 mg  4 mg IntraVENous Q6H PRN Merry Trinidad PA-C        traMADoL (ULTRAM) tablet 50 mg  50 mg Oral Q6H PRN Mrery Trinidad PA-C         Current Outpatient Medications   Medication Sig Dispense Refill    escitalopram oxalate (LEXAPRO) 10 mg tablet Take 1 Tablet by mouth in the morning. 30 Tablet 0    nitroglycerin (NITROSTAT) 0.4 mg SL tablet 0.4 mg by SubLINGual route every five (5) minutes as needed for Chest Pain. Up to 3 doses. lisinopriL (PRINIVIL, ZESTRIL) 5 mg tablet Take 5 mg by mouth daily. atorvastatin (LIPITOR) 40 mg tablet Take 1 Tablet by mouth nightly. 30 Tablet 0    clopidogreL (PLAVIX) 75 mg tab Take 1 Tablet by mouth daily. Indications: blood clot prevention following percutaneous coronary intervention 90 Tablet 0    aspirin delayed-release 81 mg tablet Take 81 mg by mouth daily. Allergies   Allergen Reactions    [place] Itching    Peanut Butter Flavor Not Reported This Time     possible allergy       Review of Systems:  A comprehensive review of systems was negative except for that written in the History of Present Illness. Objective:     Vitals:    11/02/22 0946   BP: 119/66   Pulse: 78   Resp: 17   Temp: 97.7 °F (36.5 °C)   SpO2: 99%   Weight: 49 kg (108 lb)   Height: 5' 2\" (1.575 m)        Physical Exam:  General:  Alert, cooperative, no distress, appears stated age. Eyes:  Conjunctivae/corneas clear. PERRL, EOMs intact. Fundi benign   Ears:  Normal TMs and external ear canals both ears. Nose: Nares normal. Septum midline. Mucosa normal. No drainage or sinus tenderness. Mouth/Throat: Lips, mucosa, and tongue normal. Teeth and gums normal.   Neck: Supple, symmetrical, trachea midline, no adenopathy, thyroid: no enlargment/tenderness/nodules, no carotid bruit and no JVD. Back:   Symmetric, no curvature. ROM normal. No CVA tenderness. Lungs:   Clear to auscultation bilaterally. Heart:  Regular rate and rhythm, S1, S2 normal, no murmur, click, rub or gallop. Abdomen:   Soft, non-tender. Bowel sounds normal. No masses,  No organomegaly. Extremities: Extremities normal, atraumatic, no cyanosis or edema. Pulses: 2+ and symmetric all extremities.    Skin: Skin color, texture, turgor normal. No rashes or lesions Lymph nodes: Cervical, supraclavicular, and axillary nodes normal.   Neurologic: CNII-XII intact. Normal strength, sensation and reflexes throughout. Assessment:     Hospital Problems  Date Reviewed: 6/27/2022            Codes Class Noted POA    Hip fracture [facility]) ICD-10-CM: J30.107L  ICD-9-CM: 820.8  11/2/2022 Unknown           Patient is an 18-year-old white female with:  1. Ischemic cardiomyopathy  2. Status post PCI of mid LAD May 2022  3. Hypertensive heart disease  4. Hyperlipidemia  5. Ex-smoker  6. Osteoporosis  7. Right intertrochanteric fracture and coccygeal fracture  8. Plan:     EKG showed normal sinus rhythm, possible left atrial lodgment, nonspecific ST-T wave changes. Recent echo in the office in September 21, 2022 showed EF of 45 to 50% with grade 2 diastolic dysfunction, moderate biatrial enlargement, mild aortic sclerosis without stenosis with mild to moderate MR.  RVSP 40 mmHg. She appears to be euvolemic, hemoglobin 10.3, platelet 551. Creatinine 0.3, BUN 10, potassium is 3.4 and sodium 136. Chest x-ray was nonacute. We will have follow-up patient on telemetry. Patient is low to moderate risk for nonvascular surgery. Okay to proceed as indicated. Awaiting evaluation by orthopedic surgeon. Continue aspirin, atorvastatin. Currently on lisinopril. I agree to hold off Plavix until postoperative period. No further cardiac testing planned at this time as it would not change my management    Thank you  For involving me in Mrs. Sharad wells.     Signed By: Brown Salinas MD     November 2, 2022

## 2022-11-02 NOTE — PROGRESS NOTES
Reason for Admission:  Right hip fracture                     RUR Score:  N/A                   Plan for utilizing home health:  [address]        PCP: First and Last name:  Stewart Potter NP     Name of Practice:    Are you a current patient: Yes/No:    Approximate date of last visit: month ago   Can you participate in a virtual visit with your PCP:                     Current Advanced Directive/Advance Care Plan: DNR      Healthcare Decision Maker:   Click here to complete [address] including selection of the Healthcare Decision Maker Relationship (ie \"Primary\")             Primary Decision Maker: Delvin 145 - [phone]                  Transition of Care Plan:     Met f/f with Pt and her son, Pt son confirmed that the information on the face sheet is correct. Pt son stated that Pt lives with her . Pt son stated that Pt uses [address], she has a walker, cane and wheelchair, and is independent with ADL. Pt uses [address] on [address]. OSBALDO King CM dispo: [facility]. Discussed with Pt and Pt son D/C planning. Pt asked CM to send a referral to [facility]. Pt son signed the choice letter for [facility], will send a referral, also for [address], will place choice letter on Pt chart.

## 2022-11-03 ENCOUNTER — APPOINTMENT (OUTPATIENT)
Dept: GENERAL RADIOLOGY | Age: 82
DRG: 480 | End: 2022-11-03
Attending: ORTHOPAEDIC SURGERY
Payer: MEDICARE

## 2022-11-03 ENCOUNTER — ANESTHESIA (OUTPATIENT)
Dept: SURGERY | Age: 82
DRG: 480 | End: 2022-11-03
Payer: MEDICARE

## 2022-11-03 ENCOUNTER — ANESTHESIA EVENT (OUTPATIENT)
Dept: SURGERY | Age: 82
DRG: 480 | End: 2022-11-03
Payer: MEDICARE

## 2022-11-03 LAB
25(OH)D3 SERPL-MCNC: 22.6 NG/ML (ref 30–100)
ANION GAP SERPL CALC-SCNC: 4 MMOL/L (ref 5–15)
BASOPHILS # BLD: 0 K/UL (ref 0–0.1)
BASOPHILS NFR BLD: 1 % (ref 0–1)
BUN SERPL-MCNC: 10 MG/DL (ref 6–20)
BUN/CREAT SERPL: 30 (ref 12–20)
CA-I BLD-MCNC: 8.6 MG/DL (ref 8.5–10.1)
CHLORIDE SERPL-SCNC: 105 MMOL/L (ref 97–108)
CO2 SERPL-SCNC: 29 MMOL/L (ref 21–32)
CREAT SERPL-MCNC: 0.33 MG/DL (ref 0.55–1.02)
DIFFERENTIAL METHOD BLD: ABNORMAL
EOSINOPHIL # BLD: 0.3 K/UL (ref 0–0.4)
EOSINOPHIL NFR BLD: 8 % (ref 0–7)
ERYTHROCYTE [DISTWIDTH] IN BLOOD BY AUTOMATED COUNT: 17.8 % (ref 11.5–14.5)
GLUCOSE SERPL-MCNC: 99 MG/DL (ref 65–100)
HCT VFR BLD AUTO: 30.1 % (ref 35–47)
HGB BLD-MCNC: 9.5 G/DL (ref 11.5–16)
IMM GRANULOCYTES # BLD AUTO: 0 K/UL (ref 0–0.04)
IMM GRANULOCYTES NFR BLD AUTO: 0 % (ref 0–0.5)
LYMPHOCYTES # BLD: 0.8 K/UL (ref 0.8–3.5)
LYMPHOCYTES NFR BLD: 21 % (ref 12–49)
MCH RBC QN AUTO: 28.1 PG (ref 26–34)
MCHC RBC AUTO-ENTMCNC: 31.6 G/DL (ref 30–36.5)
MCV RBC AUTO: 89.1 FL (ref 80–99)
MONOCYTES # BLD: 0.6 K/UL (ref 0–1)
MONOCYTES NFR BLD: 18 % (ref 5–13)
NEUTS SEG # BLD: 1.9 K/UL (ref 1.8–8)
NEUTS SEG NFR BLD: 52 % (ref 32–75)
NRBC # BLD: 0 K/UL (ref 0–0.01)
NRBC BLD-RTO: 0 PER 100 WBC
PLATELET # BLD AUTO: 324 K/UL (ref 150–400)
PMV BLD AUTO: 8.5 FL (ref 8.9–12.9)
POTASSIUM SERPL-SCNC: 3.6 MMOL/L (ref 3.5–5.1)
RBC # BLD AUTO: 3.38 M/UL (ref 3.8–5.2)
SODIUM SERPL-SCNC: 138 MMOL/L (ref 136–145)
WBC # BLD AUTO: 3.6 K/UL (ref 3.6–11)

## 2022-11-03 PROCEDURE — 2709999900 HC NON-CHARGEABLE SUPPLY: Performed by: ORTHOPAEDIC SURGERY

## 2022-11-03 PROCEDURE — 76210000006 HC OR PH I REC 0.5 TO 1 HR: Performed by: ORTHOPAEDIC SURGERY

## 2022-11-03 PROCEDURE — 65270000029 HC RM PRIVATE

## 2022-11-03 PROCEDURE — 76060000033 HC ANESTHESIA 1 TO 1.5 HR: Performed by: ORTHOPAEDIC SURGERY

## 2022-11-03 PROCEDURE — 36415 COLL VENOUS BLD VENIPUNCTURE: CPT

## 2022-11-03 PROCEDURE — 80048 BASIC METABOLIC PNL TOTAL CA: CPT

## 2022-11-03 PROCEDURE — C1713 ANCHOR/SCREW BN/BN,TIS/BN: HCPCS | Performed by: ORTHOPAEDIC SURGERY

## 2022-11-03 PROCEDURE — 77030031139 HC SUT VCRL2 J&J -A: Performed by: ORTHOPAEDIC SURGERY

## 2022-11-03 PROCEDURE — 85025 COMPLETE CBC W/AUTO DIFF WBC: CPT

## 2022-11-03 PROCEDURE — 74011250636 HC RX REV CODE- 250/636: Performed by: NURSE ANESTHETIST, CERTIFIED REGISTERED

## 2022-11-03 PROCEDURE — 74011000258 HC RX REV CODE- 258: Performed by: NURSE ANESTHETIST, CERTIFIED REGISTERED

## 2022-11-03 PROCEDURE — 0QS606Z REPOSITION RIGHT UPPER FEMUR WITH INTRAMEDULLARY INTERNAL FIXATION DEVICE, OPEN APPROACH: ICD-10-PCS | Performed by: ORTHOPAEDIC SURGERY

## 2022-11-03 PROCEDURE — 74011000250 HC RX REV CODE- 250: Performed by: NURSE ANESTHETIST, CERTIFIED REGISTERED

## 2022-11-03 PROCEDURE — 74011000250 HC RX REV CODE- 250: Performed by: PHYSICIAN ASSISTANT

## 2022-11-03 PROCEDURE — 87086 URINE CULTURE/COLONY COUNT: CPT

## 2022-11-03 PROCEDURE — 77030040361 HC SLV COMPR DVT MDII -B: Performed by: ORTHOPAEDIC SURGERY

## 2022-11-03 PROCEDURE — 76000 FLUOROSCOPY <1 HR PHYS/QHP: CPT

## 2022-11-03 PROCEDURE — 73502 X-RAY EXAM HIP UNI 2-3 VIEWS: CPT

## 2022-11-03 PROCEDURE — 83880 ASSAY OF NATRIURETIC PEPTIDE: CPT

## 2022-11-03 PROCEDURE — 74011000250 HC RX REV CODE- 250: Performed by: ORTHOPAEDIC SURGERY

## 2022-11-03 PROCEDURE — 77030016451 HC BIT DRL AO3 STRY -C: Performed by: ORTHOPAEDIC SURGERY

## 2022-11-03 PROCEDURE — 74011250636 HC RX REV CODE- 250/636: Performed by: ORTHOPAEDIC SURGERY

## 2022-11-03 PROCEDURE — 77030003666 HC NDL SPINAL BD -A: Performed by: ORTHOPAEDIC SURGERY

## 2022-11-03 PROCEDURE — 74011250637 HC RX REV CODE- 250/637: Performed by: ORTHOPAEDIC SURGERY

## 2022-11-03 PROCEDURE — C1769 GUIDE WIRE: HCPCS | Performed by: ORTHOPAEDIC SURGERY

## 2022-11-03 PROCEDURE — 76010000161 HC OR TIME 1 TO 1.5 HR INTENSV-TIER 1: Performed by: ORTHOPAEDIC SURGERY

## 2022-11-03 PROCEDURE — 74011250637 HC RX REV CODE- 250/637: Performed by: PHYSICIAN ASSISTANT

## 2022-11-03 DEVICE — LOCKING SCREW, FULLY THREADED: Type: IMPLANTABLE DEVICE | Site: HIP | Status: FUNCTIONAL

## 2022-11-03 DEVICE — U-BLADE SET, TI
Type: IMPLANTABLE DEVICE | Site: HIP | Status: FUNCTIONAL
Brand: GAMMA

## 2022-11-03 DEVICE — TROCHANTERIC NAIL KIT, TI
Type: IMPLANTABLE DEVICE | Site: HIP | Status: FUNCTIONAL
Brand: GAMMA

## 2022-11-03 RX ORDER — FENTANYL CITRATE 50 UG/ML
50 INJECTION, SOLUTION INTRAMUSCULAR; INTRAVENOUS
Status: DISCONTINUED | OUTPATIENT
Start: 2022-11-03 | End: 2022-11-03 | Stop reason: HOSPADM

## 2022-11-03 RX ORDER — SODIUM CHLORIDE 9 MG/ML
75 INJECTION, SOLUTION INTRAVENOUS CONTINUOUS
Status: DISCONTINUED | OUTPATIENT
Start: 2022-11-03 | End: 2022-11-04

## 2022-11-03 RX ORDER — CALCIUM CARBONATE/VITAMIN D3 600MG-5MCG
1 TABLET ORAL
Status: DISCONTINUED | OUTPATIENT
Start: 2022-11-04 | End: 2022-11-07 | Stop reason: HOSPADM

## 2022-11-03 RX ORDER — AMOXICILLIN 250 MG
1 CAPSULE ORAL 2 TIMES DAILY
Status: DISCONTINUED | OUTPATIENT
Start: 2022-11-03 | End: 2022-11-07 | Stop reason: HOSPADM

## 2022-11-03 RX ORDER — DIPHENHYDRAMINE HCL 12.5MG/5ML
12.5 ELIXIR ORAL
Status: ACTIVE | OUTPATIENT
Start: 2022-11-03 | End: 2022-11-04

## 2022-11-03 RX ORDER — DEXAMETHASONE SODIUM PHOSPHATE 4 MG/ML
INJECTION, SOLUTION INTRA-ARTICULAR; INTRALESIONAL; INTRAMUSCULAR; INTRAVENOUS; SOFT TISSUE AS NEEDED
Status: DISCONTINUED | OUTPATIENT
Start: 2022-11-03 | End: 2022-11-03 | Stop reason: HOSPADM

## 2022-11-03 RX ORDER — NALOXONE HYDROCHLORIDE 0.4 MG/ML
0.4 INJECTION, SOLUTION INTRAMUSCULAR; INTRAVENOUS; SUBCUTANEOUS AS NEEDED
Status: DISCONTINUED | OUTPATIENT
Start: 2022-11-03 | End: 2022-11-07 | Stop reason: HOSPADM

## 2022-11-03 RX ORDER — HYDROMORPHONE HYDROCHLORIDE 1 MG/ML
0.5 INJECTION, SOLUTION INTRAMUSCULAR; INTRAVENOUS; SUBCUTANEOUS
Status: DISCONTINUED | OUTPATIENT
Start: 2022-11-03 | End: 2022-11-03 | Stop reason: HOSPADM

## 2022-11-03 RX ORDER — MORPHINE SULFATE 2 MG/ML
2 INJECTION, SOLUTION INTRAMUSCULAR; INTRAVENOUS
Status: DISCONTINUED | OUTPATIENT
Start: 2022-11-03 | End: 2022-11-03 | Stop reason: HOSPADM

## 2022-11-03 RX ORDER — MIDAZOLAM HYDROCHLORIDE 1 MG/ML
0.5 INJECTION, SOLUTION INTRAMUSCULAR; INTRAVENOUS
Status: DISCONTINUED | OUTPATIENT
Start: 2022-11-03 | End: 2022-11-03 | Stop reason: HOSPADM

## 2022-11-03 RX ORDER — HYDROMORPHONE HYDROCHLORIDE 1 MG/ML
0.5 INJECTION, SOLUTION INTRAMUSCULAR; INTRAVENOUS; SUBCUTANEOUS
Status: ACTIVE | OUTPATIENT
Start: 2022-11-03 | End: 2022-11-04

## 2022-11-03 RX ORDER — ACETAMINOPHEN 325 MG/1
650 TABLET ORAL EVERY 8 HOURS
Status: DISCONTINUED | OUTPATIENT
Start: 2022-11-03 | End: 2022-11-07 | Stop reason: HOSPADM

## 2022-11-03 RX ORDER — POLYETHYLENE GLYCOL 3350 17 G/17G
17 POWDER, FOR SOLUTION ORAL DAILY
Status: DISCONTINUED | OUTPATIENT
Start: 2022-11-04 | End: 2022-11-07 | Stop reason: HOSPADM

## 2022-11-03 RX ORDER — GLYCOPYRROLATE 0.2 MG/ML
INJECTION INTRAMUSCULAR; INTRAVENOUS AS NEEDED
Status: DISCONTINUED | OUTPATIENT
Start: 2022-11-03 | End: 2022-11-03 | Stop reason: HOSPADM

## 2022-11-03 RX ORDER — SODIUM CHLORIDE, SODIUM LACTATE, POTASSIUM CHLORIDE, CALCIUM CHLORIDE 600; 310; 30; 20 MG/100ML; MG/100ML; MG/100ML; MG/100ML
INJECTION, SOLUTION INTRAVENOUS
Status: DISCONTINUED | OUTPATIENT
Start: 2022-11-03 | End: 2022-11-03 | Stop reason: HOSPADM

## 2022-11-03 RX ORDER — NEOSTIGMINE METHYLSULFATE 5 MG/5 ML
SYRINGE (ML) INTRAVENOUS AS NEEDED
Status: DISCONTINUED | OUTPATIENT
Start: 2022-11-03 | End: 2022-11-03 | Stop reason: HOSPADM

## 2022-11-03 RX ORDER — SODIUM CHLORIDE 0.9 % (FLUSH) 0.9 %
5-40 SYRINGE (ML) INJECTION AS NEEDED
Status: DISCONTINUED | OUTPATIENT
Start: 2022-11-03 | End: 2022-11-03 | Stop reason: HOSPADM

## 2022-11-03 RX ORDER — SODIUM CHLORIDE 0.9 % (FLUSH) 0.9 %
5-40 SYRINGE (ML) INJECTION EVERY 8 HOURS
Status: DISCONTINUED | OUTPATIENT
Start: 2022-11-03 | End: 2022-11-03 | Stop reason: HOSPADM

## 2022-11-03 RX ORDER — ONDANSETRON 2 MG/ML
INJECTION INTRAMUSCULAR; INTRAVENOUS AS NEEDED
Status: DISCONTINUED | OUTPATIENT
Start: 2022-11-03 | End: 2022-11-03 | Stop reason: HOSPADM

## 2022-11-03 RX ORDER — ERGOCALCIFEROL 1.25 MG/1
50000 CAPSULE ORAL
Status: DISCONTINUED | OUTPATIENT
Start: 2022-11-03 | End: 2022-11-07 | Stop reason: HOSPADM

## 2022-11-03 RX ORDER — ALBUTEROL SULFATE 0.83 MG/ML
2.5 SOLUTION RESPIRATORY (INHALATION) AS NEEDED
Status: DISCONTINUED | OUTPATIENT
Start: 2022-11-03 | End: 2022-11-03 | Stop reason: HOSPADM

## 2022-11-03 RX ORDER — DIPHENHYDRAMINE HYDROCHLORIDE 50 MG/ML
12.5 INJECTION, SOLUTION INTRAMUSCULAR; INTRAVENOUS AS NEEDED
Status: DISCONTINUED | OUTPATIENT
Start: 2022-11-03 | End: 2022-11-03 | Stop reason: HOSPADM

## 2022-11-03 RX ORDER — LIDOCAINE HYDROCHLORIDE 20 MG/ML
INJECTION, SOLUTION EPIDURAL; INFILTRATION; INTRACAUDAL; PERINEURAL AS NEEDED
Status: DISCONTINUED | OUTPATIENT
Start: 2022-11-03 | End: 2022-11-03 | Stop reason: HOSPADM

## 2022-11-03 RX ORDER — PROPOFOL 10 MG/ML
INJECTION, EMULSION INTRAVENOUS AS NEEDED
Status: DISCONTINUED | OUTPATIENT
Start: 2022-11-03 | End: 2022-11-03 | Stop reason: HOSPADM

## 2022-11-03 RX ORDER — ASPIRIN 81 MG/1
81 TABLET ORAL 2 TIMES DAILY
Status: DISCONTINUED | OUTPATIENT
Start: 2022-11-03 | End: 2022-11-07

## 2022-11-03 RX ORDER — SODIUM CHLORIDE 0.9 % (FLUSH) 0.9 %
5-40 SYRINGE (ML) INJECTION AS NEEDED
Status: DISCONTINUED | OUTPATIENT
Start: 2022-11-03 | End: 2022-11-07 | Stop reason: HOSPADM

## 2022-11-03 RX ORDER — FACIAL-BODY WIPES
10 EACH TOPICAL DAILY PRN
Status: DISCONTINUED | OUTPATIENT
Start: 2022-11-05 | End: 2022-11-07 | Stop reason: HOSPADM

## 2022-11-03 RX ORDER — FENTANYL CITRATE 50 UG/ML
INJECTION, SOLUTION INTRAMUSCULAR; INTRAVENOUS AS NEEDED
Status: DISCONTINUED | OUTPATIENT
Start: 2022-11-03 | End: 2022-11-03 | Stop reason: HOSPADM

## 2022-11-03 RX ORDER — OXYCODONE HYDROCHLORIDE 5 MG/1
5 TABLET ORAL
Status: DISCONTINUED | OUTPATIENT
Start: 2022-11-03 | End: 2022-11-07 | Stop reason: HOSPADM

## 2022-11-03 RX ORDER — SODIUM CHLORIDE 0.9 % (FLUSH) 0.9 %
5-40 SYRINGE (ML) INJECTION EVERY 8 HOURS
Status: DISCONTINUED | OUTPATIENT
Start: 2022-11-03 | End: 2022-11-07 | Stop reason: HOSPADM

## 2022-11-03 RX ORDER — TRAMADOL HYDROCHLORIDE 50 MG/1
50 TABLET ORAL
Status: DISCONTINUED | OUTPATIENT
Start: 2022-11-03 | End: 2022-11-07 | Stop reason: HOSPADM

## 2022-11-03 RX ORDER — ROCURONIUM BROMIDE 10 MG/ML
INJECTION, SOLUTION INTRAVENOUS AS NEEDED
Status: DISCONTINUED | OUTPATIENT
Start: 2022-11-03 | End: 2022-11-03 | Stop reason: HOSPADM

## 2022-11-03 RX ORDER — ONDANSETRON 2 MG/ML
4 INJECTION INTRAMUSCULAR; INTRAVENOUS AS NEEDED
Status: DISCONTINUED | OUTPATIENT
Start: 2022-11-03 | End: 2022-11-03 | Stop reason: HOSPADM

## 2022-11-03 RX ORDER — TRANEXAMIC ACID 100 MG/ML
INJECTION, SOLUTION INTRAVENOUS
Status: DISPENSED
Start: 2022-11-03 | End: 2022-11-04

## 2022-11-03 RX ORDER — BUPIVACAINE HYDROCHLORIDE 5 MG/ML
INJECTION, SOLUTION EPIDURAL; INTRACAUDAL AS NEEDED
Status: DISCONTINUED | OUTPATIENT
Start: 2022-11-03 | End: 2022-11-03 | Stop reason: HOSPADM

## 2022-11-03 RX ORDER — CEFAZOLIN SODIUM 1 G/3ML
INJECTION, POWDER, FOR SOLUTION INTRAMUSCULAR; INTRAVENOUS AS NEEDED
Status: DISCONTINUED | OUTPATIENT
Start: 2022-11-03 | End: 2022-11-03 | Stop reason: HOSPADM

## 2022-11-03 RX ORDER — HYDROCODONE BITARTRATE AND ACETAMINOPHEN 5; 325 MG/1; MG/1
1 TABLET ORAL AS NEEDED
Status: DISCONTINUED | OUTPATIENT
Start: 2022-11-03 | End: 2022-11-03 | Stop reason: HOSPADM

## 2022-11-03 RX ADMIN — ONDANSETRON 4 MG: 2 INJECTION INTRAMUSCULAR; INTRAVENOUS at 19:30

## 2022-11-03 RX ADMIN — CEFAZOLIN SODIUM 1 G: 1 INJECTION, POWDER, FOR SOLUTION INTRAMUSCULAR; INTRAVENOUS at 18:53

## 2022-11-03 RX ADMIN — GLYCOPYRROLATE 0.2 MG: 0.2 INJECTION INTRAMUSCULAR; INTRAVENOUS at 19:32

## 2022-11-03 RX ADMIN — SODIUM CHLORIDE 75 ML/HR: 9 INJECTION, SOLUTION INTRAVENOUS at 21:15

## 2022-11-03 RX ADMIN — PHENYLEPHRINE HYDROCHLORIDE 200 MCG: 10 INJECTION INTRAVENOUS at 18:38

## 2022-11-03 RX ADMIN — TRANEXAMIC ACID 1 G: 1 INJECTION, SOLUTION INTRAVENOUS at 18:51

## 2022-11-03 RX ADMIN — LIDOCAINE HYDROCHLORIDE 60 MG: 20 INJECTION, SOLUTION EPIDURAL; INFILTRATION; INTRACAUDAL; PERINEURAL at 18:30

## 2022-11-03 RX ADMIN — TRAMADOL HYDROCHLORIDE 50 MG: 50 TABLET, COATED ORAL at 00:13

## 2022-11-03 RX ADMIN — SODIUM CHLORIDE, POTASSIUM CHLORIDE, SODIUM LACTATE AND CALCIUM CHLORIDE: 600; 310; 30; 20 INJECTION, SOLUTION INTRAVENOUS at 18:26

## 2022-11-03 RX ADMIN — PHENYLEPHRINE HYDROCHLORIDE 300 MCG: 10 INJECTION INTRAVENOUS at 18:50

## 2022-11-03 RX ADMIN — PHENYLEPHRINE HYDROCHLORIDE 200 MCG: 10 INJECTION INTRAVENOUS at 19:23

## 2022-11-03 RX ADMIN — PROPOFOL 50 MG: 10 INJECTION, EMULSION INTRAVENOUS at 18:30

## 2022-11-03 RX ADMIN — ATORVASTATIN CALCIUM 40 MG: 40 TABLET, FILM COATED ORAL at 22:11

## 2022-11-03 RX ADMIN — SODIUM CHLORIDE, PRESERVATIVE FREE 10 ML: 5 INJECTION INTRAVENOUS at 06:04

## 2022-11-03 RX ADMIN — SODIUM CHLORIDE, POTASSIUM CHLORIDE, SODIUM LACTATE AND CALCIUM CHLORIDE 50 ML/HR: 600; 310; 30; 20 INJECTION, SOLUTION INTRAVENOUS at 00:11

## 2022-11-03 RX ADMIN — PROPOFOL 25 MG: 10 INJECTION, EMULSION INTRAVENOUS at 19:40

## 2022-11-03 RX ADMIN — ERGOCALCIFEROL 50000 UNITS: 1.25 CAPSULE ORAL at 22:13

## 2022-11-03 RX ADMIN — TRAMADOL HYDROCHLORIDE 50 MG: 50 TABLET, COATED ORAL at 10:33

## 2022-11-03 RX ADMIN — Medication 2.5 MG: at 19:32

## 2022-11-03 RX ADMIN — ESCITALOPRAM OXALATE 10 MG: 10 TABLET ORAL at 09:05

## 2022-11-03 RX ADMIN — SODIUM CHLORIDE, PRESERVATIVE FREE 10 ML: 5 INJECTION INTRAVENOUS at 22:30

## 2022-11-03 RX ADMIN — SENNOSIDES AND DOCUSATE SODIUM 1 TABLET: 50; 8.6 TABLET ORAL at 22:11

## 2022-11-03 RX ADMIN — LISINOPRIL 5 MG: 10 TABLET ORAL at 09:05

## 2022-11-03 RX ADMIN — FENTANYL CITRATE 100 MCG: 50 INJECTION, SOLUTION INTRAMUSCULAR; INTRAVENOUS at 18:30

## 2022-11-03 RX ADMIN — ASPIRIN 81 MG: 81 TABLET, COATED ORAL at 22:11

## 2022-11-03 RX ADMIN — ROCURONIUM BROMIDE 30 MG: 10 INJECTION, SOLUTION INTRAVENOUS at 18:30

## 2022-11-03 RX ADMIN — ACETAMINOPHEN 650 MG: 325 TABLET, FILM COATED ORAL at 22:11

## 2022-11-03 RX ADMIN — DEXAMETHASONE SODIUM PHOSPHATE 4 MG: 4 INJECTION, SOLUTION INTRA-ARTICULAR; INTRALESIONAL; INTRAMUSCULAR; INTRAVENOUS; SOFT TISSUE at 18:30

## 2022-11-03 NOTE — PROGRESS NOTES
Hospitalist Progress Note    Subjective:   Daily Progress Note: 11/3/2022 10:17 AM    Hospital Course: Patient is a 79 y.o. female with a past medical history of CHF, hyperlipidemia, coronary artery disease, anxiety that presented to the emergency room on 11/2/2002 for right hip pain after mechanical fall approximately 4 weeks ago. Prior to the event patient had no episodes of dizziness, lightheadedness, chest pain, shortness of breath, loss of consciousness. She said that her leg just gave out. In the ED vital signs stable. Laboratory data hemoglobin of 10.3, potassium 3.4, remaining electrolytes stable. Chest x-ray showed no acute cardiopulmonary process. CT of the pelvis showed a right intertrochanteric fracture and coccygeal fracture and osteoporosis. It was requested admission for orthopedic evaluation and for surgery. Orthopedic has cleared the patient for surgery. Patient will have ORIF of the right hip on 11/3/2022. Subjective: Patient says that she feels okay. Pain is managed.     Current Facility-Administered Medications   Medication Dose Route Frequency    morphine injection 1 mg  1 mg IntraVENous Q4H PRN    aspirin delayed-release tablet 81 mg  81 mg Oral DAILY    atorvastatin (LIPITOR) tablet 40 mg  40 mg Oral QHS    [Held by provider] clopidogreL (PLAVIX) tablet 75 mg  75 mg Oral DAILY    escitalopram oxalate (LEXAPRO) tablet 10 mg  10 mg Oral DAILY    lisinopriL (PRINIVIL, ZESTRIL) tablet 5 mg  5 mg Oral DAILY    sodium chloride (NS) flush 5-40 mL  5-40 mL IntraVENous Q8H    sodium chloride (NS) flush 5-40 mL  5-40 mL IntraVENous PRN    acetaminophen (TYLENOL) tablet 650 mg  650 mg Oral Q6H PRN    Or    acetaminophen (TYLENOL) suppository 650 mg  650 mg Rectal Q6H PRN    polyethylene glycol (MIRALAX) packet 17 g  17 g Oral DAILY PRN    ondansetron (ZOFRAN ODT) tablet 4 mg  4 mg Oral Q8H PRN    Or    ondansetron (ZOFRAN) injection 4 mg  4 mg IntraVENous Q6H PRN    traMADoL (ULTRAM) tablet 50 mg  50 mg Oral Q6H PRN    lactated Ringers infusion  50 mL/hr IntraVENous CONTINUOUS        Review of Systems  Constitutional: No fevers, No chills, No sweats, No fatigue, No Weakness  Eyes: No redness  Ears, nose, mouth, throat, and face: No nasal congestion, No sore throat, No voice change  Respiratory: No Shortness of Breath, No cough, No wheezing  Cardiovascular: No chest pain, No palpitations, No extremity edema  Gastrointestinal: No nausea, No vomiting, No diarrhea, No abdominal pain  Genitourinary: No frequency, No dysuria, No hematuria  Integument/breast: No skin lesion(s)   Neurological: No Confusion, No headaches, No dizziness      Objective:     Visit Vitals  /62   Pulse 74   Temp 97.6 °F (36.4 °C)   Resp 15   Ht 5' 2\" (1.575 m)   Wt 45.4 kg (100 lb 1.4 oz)   SpO2 95%   BMI 18.31 kg/m²      O2 Device: None (Room air)    Temp (24hrs), Av.8 °F (36.6 °C), Min:97.6 °F (36.4 °C), Max:98.5 °F (36.9 °C)      No intake/output data recorded.  1901 -  0700  In: 294.2 [I.V.:294.2]  Out: 400 [Urine:400]    PHYSICAL EXAM:  Constitutional: No acute distress  Skin: Extremities and face reveal no rashes. HEENT: Sclerae anicteric. Extra-occular muscles are intact. No oral ulcers. The neck is supple and no masses. Cardiovascular: Regular rate and rhythm. Respiratory:  Clear breath sounds bilaterally with no wheezes, rales, or rhonchi. GI: Abdomen nondistended, soft, and nontender. Normal active bowel sounds. Musculoskeletal: No pitting edema of the lower legs. Able to move all ext  Neurological:  Patient is alert and oriented.  Cranial nerves II-XII grossly intact  Psychiatric: Mood appears appropriate       Data Review    Recent Results (from the past 24 hour(s))   CBC WITH AUTOMATED DIFF    Collection Time: 22 12:51 PM   Result Value Ref Range    WBC 3.9 3.6 - 11.0 K/uL    RBC 3.72 (L) 3.80 - 5.20 M/uL    HGB 10.3 (L) 11.5 - 16.0 g/dL    HCT 33.2 (L) 35.0 - 47.0 %    MCV 89.2 80.0 - 99.0 FL    MCH 27.7 26.0 - 34.0 PG    MCHC 31.0 30.0 - 36.5 g/dL    RDW 17.9 (H) 11.5 - 14.5 %    PLATELET 789 533 - 776 K/uL    MPV 8.2 (L) 8.9 - 12.9 FL    NRBC 0.0 0.0  WBC    ABSOLUTE NRBC 0.00 0.00 - 0.01 K/uL    NEUTROPHILS 66 32 - 75 %    LYMPHOCYTES 14 12 - 49 %    MONOCYTES 15 (H) 5 - 13 %    EOSINOPHILS 4 0 - 7 %    BASOPHILS 1 0 - 1 %    IMMATURE GRANULOCYTES 0 0 - 0.5 %    ABS. NEUTROPHILS 2.6 1.8 - 8.0 K/UL    ABS. LYMPHOCYTES 0.5 (L) 0.8 - 3.5 K/UL    ABS. MONOCYTES 0.6 0.0 - 1.0 K/UL    ABS. EOSINOPHILS 0.2 0.0 - 0.4 K/UL    ABS. BASOPHILS 0.0 0.0 - 0.1 K/UL    ABS. IMM.  GRANS. 0.0 0.00 - 0.04 K/UL    DF AUTOMATED     METABOLIC PANEL, BASIC    Collection Time: 11/02/22 12:51 PM   Result Value Ref Range    Sodium 136 136 - 145 mmol/L    Potassium 3.4 (L) 3.5 - 5.1 mmol/L    Chloride 102 97 - 108 mmol/L    CO2 28 21 - 32 mmol/L    Anion gap 6 5 - 15 mmol/L    Glucose 107 (H) 65 - 100 mg/dL    BUN 10 6 - 20 mg/dL    Creatinine 0.33 (L) 0.55 - 1.02 mg/dL    BUN/Creatinine ratio 30 (H) 12 - 20      eGFR >60 >60 ml/min/1.73m2    Calcium 8.9 8.5 - 10.1 mg/dL   PROTHROMBIN TIME + INR    Collection Time: 11/02/22 12:51 PM   Result Value Ref Range    Prothrombin time 13.9 11.9 - 14.6 sec    INR 1.1 0.9 - 1.1     COVID-19 RAPID TEST    Collection Time: 11/02/22  2:07 PM   Result Value Ref Range    COVID-19 rapid test Not Detected Not Detected     MRSA SCREEN - PCR (NASAL)    Collection Time: 11/02/22  2:12 PM   Result Value Ref Range    MRSA by PCR, Nasal Not Detected Not Detected     METABOLIC PANEL, BASIC    Collection Time: 11/03/22  6:30 AM   Result Value Ref Range    Sodium 138 136 - 145 mmol/L    Potassium 3.6 3.5 - 5.1 mmol/L    Chloride 105 97 - 108 mmol/L    CO2 29 21 - 32 mmol/L    Anion gap 4 (L) 5 - 15 mmol/L    Glucose 99 65 - 100 mg/dL    BUN 10 6 - 20 mg/dL    Creatinine 0.33 (L) 0.55 - 1.02 mg/dL    BUN/Creatinine ratio 30 (H) 12 - 20      eGFR >60 >60 ml/min/1.73m2    Calcium 8.6 8.5 - 10.1 mg/dL   CBC WITH AUTOMATED DIFF    Collection Time: 11/03/22  6:30 AM   Result Value Ref Range    WBC 3.6 3.6 - 11.0 K/uL    RBC 3.38 (L) 3.80 - 5.20 M/uL    HGB 9.5 (L) 11.5 - 16.0 g/dL    HCT 30.1 (L) 35.0 - 47.0 %    MCV 89.1 80.0 - 99.0 FL    MCH 28.1 26.0 - 34.0 PG    MCHC 31.6 30.0 - 36.5 g/dL    RDW 17.8 (H) 11.5 - 14.5 %    PLATELET 704 895 - 745 K/uL    MPV 8.5 (L) 8.9 - 12.9 FL    NRBC 0.0 0.0  WBC    ABSOLUTE NRBC 0.00 0.00 - 0.01 K/uL    NEUTROPHILS 52 32 - 75 %    LYMPHOCYTES 21 12 - 49 %    MONOCYTES 18 (H) 5 - 13 %    EOSINOPHILS 8 (H) 0 - 7 %    BASOPHILS 1 0 - 1 %    IMMATURE GRANULOCYTES 0 0 - 0.5 %    ABS. NEUTROPHILS 1.9 1.8 - 8.0 K/UL    ABS. LYMPHOCYTES 0.8 0.8 - 3.5 K/UL    ABS. MONOCYTES 0.6 0.0 - 1.0 K/UL    ABS. EOSINOPHILS 0.3 0.0 - 0.4 K/UL    ABS. BASOPHILS 0.0 0.0 - 0.1 K/UL    ABS. IMM. GRANS. 0.0 0.00 - 0.04 K/UL    DF AUTOMATED         Radiology review: CT of pelvis    Assessment:   1. Right Intertrochanteric fracture  2. Coccygeal Fracture  3. Osteoporosis  4. History of heart failure with an EF of 30 to 35%  5. History of coronary artery disease status post PCI x1  6. Hyperlipidemia  7. Anxiety  8. Type 2 diabetes diet controlled     Plan:    1.  CT of the pelvis shows right hip fracture. Keep patient NPO. Orthopedics consulted. IV morphine as needed for severe pain, tramadol for moderate pain, Tylenol for mild pain. PT and OT postop per orthopedics recommendations. Planning for surgery later this afternoon  2. Conservative treatment most likely at this time. Orthopedics consulted  3. Patient will need to follow-up in osteoporosis clinic in the outpatient setting. Vitamin D level is pending. 4.  Patient has a history of heart failure. Cardiology has cleared the patient. .  Patient currently denies any chest pain. EKG is pending. Patient is on aspirin, statin, ACE. No beta-blocker due to normal to low blood pressure and bradycardia  5.   Patient on aspirin, statin, Plavix. Holding aspirin and Plavix. 6.  Continue statin  7. Continue Lexapro  8. Patient's hemoglobin A1c 6.6. No need to treat at this time. Diet controlled  9. CBC BMP in a.m. [address] [facility] (son) [phone]     CODE STATUS full     DVT prophylaxis: Holding at this time due to surgery  Ulcer prophylaxis: Not indicated    Care Plan discussed with: Patient/Family and  and RN    Total time spent with patient: 34 minutes.

## 2022-11-03 NOTE — PROGRESS NOTES
Orthopedic progress note    Date:11/3/2022       XPBJ:803/87  Patient Name:Denisa Orourke     YOB: 1940     Age:81 y.o. Subjective    48-year-old female seen in follow-up for a right hip fracture. The patient is lying comfortably in bed. Her son is present with her at bedside. She has no complaints of pain at rest.  No other complaints at this time. Objective           Vitals Last 24 Hours:  TEMPERATURE:  Temp  Av.8 °F (36.6 °C)  Min: 97.6 °F (36.4 °C)  Max: 98.5 °F (36.9 °C)  RESPIRATIONS RANGE: Resp  Av.8  Min: 15  Max: 25  PULSE OXIMETRY RANGE: SpO2  Av %  Min: 95 %  Max: 99 %  PULSE RANGE: Pulse  Av.3  Min: 61  Max: 79  BLOOD PRESSURE RANGE: Systolic (19JLY), HCO:351 , Min:119 , YGD:665   ; Diastolic (67PUA), ST, Min:53, Max:70    Current Facility-Administered Medications   Medication Dose Route Frequency    morphine injection 1 mg  1 mg IntraVENous Q4H PRN    aspirin delayed-release tablet 81 mg  81 mg Oral DAILY    atorvastatin (LIPITOR) tablet 40 mg  40 mg Oral QHS    [Held by provider] clopidogreL (PLAVIX) tablet 75 mg  75 mg Oral DAILY    escitalopram oxalate (LEXAPRO) tablet 10 mg  10 mg Oral DAILY    lisinopriL (PRINIVIL, ZESTRIL) tablet 5 mg  5 mg Oral DAILY    sodium chloride (NS) flush 5-40 mL  5-40 mL IntraVENous Q8H    sodium chloride (NS) flush 5-40 mL  5-40 mL IntraVENous PRN    acetaminophen (TYLENOL) tablet 650 mg  650 mg Oral Q6H PRN    Or    acetaminophen (TYLENOL) suppository 650 mg  650 mg Rectal Q6H PRN    polyethylene glycol (MIRALAX) packet 17 g  17 g Oral DAILY PRN    ondansetron (ZOFRAN ODT) tablet 4 mg  4 mg Oral Q8H PRN    Or    ondansetron (ZOFRAN) injection 4 mg  4 mg IntraVENous Q6H PRN    traMADoL (ULTRAM) tablet 50 mg  50 mg Oral Q6H PRN    lactated Ringers infusion  50 mL/hr IntraVENous CONTINUOUS          I/O (24Hr):     Intake/Output Summary (Last 24 hours) at 11/3/2022 0843  Last data filed at 11/3/2022 0604  Gross per 24 hour Intake 294.17 ml   Output 400 ml   Net -105.83 ml     Objective:  Vital signs: (most recent): Blood pressure 129/62, pulse 65, temperature 97.6 °F (36.4 °C), resp. rate 15, height 5' 2\" (1.575 m), weight 45.4 kg (100 lb 1.4 oz), SpO2 95 %, unknown if currently breastfeeding. Labs/Imaging/Diagnostics    Labs:  CBC:  Recent Labs     11/03/22  0630 11/02/22  1251   WBC 3.6 3.9   RBC 3.38* 3.72*   HGB 9.5* 10.3*   HCT 30.1* 33.2*   MCV 89.1 89.2   RDW 17.8* 17.9*    344     CHEMISTRIES:  Recent Labs     11/03/22  0630 11/02/22  1251    136   K 3.6 3.4*    102   CO2 29 28   BUN 10 10   CREA 0.33* 0.33*   CA 8.6 8.9   PT/INR:  Recent Labs     11/02/22  1251   INR 1.1     APTT:No results for input(s): APTT in the last 72 hours. LIVER PROFILE:No results for input(s): AST, ALT in the last 72 hours. No lab exists for component: KORINA Huizar  Lab Results   Component Value Date/Time    ALT (SGPT) 24 08/09/2022 08:18 AM    AST (SGOT) 30 08/09/2022 08:18 AM    Alk. phosphatase 45 08/09/2022 08:18 AM    Bilirubin, total 0.8 08/09/2022 08:18 AM       Physical Exam:  Right lower extremity: There is foreshortening and external rotation seen. Skin warm dry and intact. No calf pain to palpation. DP/PT pulse are palpable. EHL/DF/PF is 3 out of 5. Right lower extremity peers neurovascular intact. Assessment//Plan           Patient Active Problem List    Diagnosis Date Noted    Hip fracture ([facility] [address] 11/02/2022    CHF exacerbation ([facility] [address] 08/02/2022    Acute respiratory failure with hypoxia ([facility] [address] 05/29/2022    Acute on chronic systolic congestive heart failure ([facility] [address] 05/29/2022    Dysphagia 05/29/2022    CHF (congestive heart failure) ([facility] [address] 05/27/2022     Right hip fracture  Plan for the OR today with Dr. Shasta Myrick. Patient has been medically and cardiac cleared to proceed with surgery. Will apply SCDs to bilateral lower extremities. Aspirin held today.     Patient is Sikhism and requests no blood transfusions      Electronically signed by Eliseo Taylor PA-C on 11/3/2022 at 8:43 AM

## 2022-11-03 NOTE — ANESTHESIA PREPROCEDURE EVALUATION
Relevant Problems   CARDIOVASCULAR   (+) Acute on chronic systolic congestive heart failure (HCC)   (+) CHF (congestive heart failure) (HCC)   (+) CHF exacerbation (HCC)       Anesthetic History   No history of anesthetic complications            Review of Systems / Medical History  Patient summary reviewed, nursing notes reviewed and pertinent labs reviewed    Pulmonary                   Neuro/Psych         Psychiatric history     Cardiovascular    Hypertension      CHF    Past MI (NSTEMI 5/2022 s/p singular PCI), CAD, cardiac stents and hyperlipidemia      Comments: 5/27/2022 TTE:    Interpretation Summary         Left Ventricle: Severely reduced left ventricular systolic function with a visually estimated EF of 30 - 35%. Left ventricle is mildly dilated. Normal wall thickness. There are regional wall motion abnormalities.   Mitral Valve: Mild annular calcification at the posterior leaflet of the mitral valve. Moderate regurgitation.   Left Atrium: Left atrium is moderately dilated.   Right Atrium: Right atrium is mildly dilated.   Pericardium: Left pleural effusion.   Contrast used: Definity. 5/31/2022 Cardiac Cath:      Conclusion    Indication for procedure:  1. Non-STEMI  2. Cardiomyopathy     Procedure done:  1. Left heart catheterization  2. Selective coronary angiogram via right radial artery  3. PCI of mid LAD  4. Moderate sedation     Medication used:  1. Angiomax anticoagulation  2. Plavix 600 mg  3. Versed and fentanyl for moderate sedation  4.  1% subcu lidocaine  5.     Findings:  1. RCA is a large artery, has a calcified nodule proximally with SIVAN-3 flow. 95% stenosis. Minimal irregularities through its course. Right PDA has 70% stenosis. 2.  Left main coronary artery is large, has 20% stenosis proximally. Is calcified supplies a large LAD and large left circumflex artery  3. Left circumflex artery is large, nondominant, calcified.   Distally it gives to large obtuse marginal that branches into 2 medium sized branches. Distal branch is totally occluded with bridging collaterals from the first proximal branch. Obtuse marginal supplies collaterals to the apical segment of the LAD. 4. LAD is a large artery that reaches the apex, moderately calcified. Ectatic proximally and in the midsegment had a 99% stenosis that was treated with a 3.0 x 22 mm drug-eluting stent. Distal LAD is totally occluded which appears to be .     Conclusion:  1. Severe mid LAD disease status post PCI  2. Severe distal LAD disease that is totally occluded,  that is collateralized from obtuse marginal  3. Heavily calcified LAD, RCA and left circumflex artery  4. Calcified proximal RCA disease, eccentric plaque with a 95% stenosis.     Recommendations:  1. Aggressive risk factor modification  2. Dual antiplatelet therapy for at least 12 months and then aspirin indefinitely  3. Consider staged PCI of RCA     GI/Hepatic/Renal  Within defined limits              Endo/Other        Anemia (HGB 9.5)     Other Findings   Comments: Procedure Information    Case: [id number] Date/Time: 11/03/22 1630  Procedure: Right Hip Gamma Nail Insertion (Right)  Anesthesia type: General  Pre-op diagnosis: Fractured Right Hip  Location: [facility] MAIN OR 08 / [facility] MAIN OR  Surgeons: Lore Heck MD      Study Result    Narrative & Impression  EXAM: XR HIP RT W OR WO PELV  1 VW     INDICATION: right hip fracture.     COMPARISON: None.     FINDINGS: AP single view of the right hip demonstrates an acute right hip  fracture. Fracture extends to the greater trochanter to the base of the lesser  trochanter with varus angulation. Bones are osteopenic. .     IMPRESSION  Acute right hip fracture.              Physical Exam    Airway  Mallampati: I  TM Distance: 4 - 6 cm  Neck ROM: normal range of motion   Mouth opening: Normal     Cardiovascular  Regular rate and rhythm,  S1 and S2 normal,  no murmur, click, rub, or gallop  Rhythm: regular  Rate: normal         Dental    Dentition: Poor dentition     Pulmonary  Breath sounds clear to auscultation               Abdominal  GI exam deferred       Other Findings            Anesthetic Plan    ASA: 4  Anesthesia type: general    Monitoring Plan: Continuous noninvasive hemodynamic monitoring      Induction: Intravenous  Anesthetic plan and risks discussed with: Patient and Family

## 2022-11-03 NOTE — OP NOTES
Operative Note    Patient: Marie Price  YOB: 1940  MRN: [id number]    Date of Procedure: 11/3/2022     Pre-Op Diagnosis: Fractured Right Hip Inter-Trochanteric 3weeks old    Post-Op Diagnosis: Same as preoperative diagnosis. Procedure(s):  Right Open Reduction and Internal Fixation with Hip Gamma Nail Insertion    Surgeon(s):  Daren Hunter MD    Surgical Assistant: Surg Asst-1: Hay Landa    Anesthesia: General     Estimated Blood Loss (mL):  less than 665     Complications: None    Specimens:   ID Type Source Tests Collected by Time Destination   1 : urine Urine Saenz Specimen CULTURE, URINE Daren Hunter MD 11/3/2022 1859 Microbiology        Implants:   Implant Name Type Inv. Item Serial No.  Lot No. LRB No. Used Action   NAIL KT TROCH 125D [id number] TI -- STRL GAMMA 3 - S   NAIL KT TROCH 125D [id number] TI -- STRL GAMMA 3   [employer] [id number] Right 1 Implanted   SCREW BNE L80MM DIA10.5MM TI U BLDE LAG EMMANUEL 3 - S   SCREW BNE L80MM DIA10.5MM TI U BLDE LAG EMMANUEL 3   [employer] [id number] Right 1 Implanted   SCR BNE LCK T2 FT 5X37. 5MM TI -- STRL - SN/A  SCR BNE LCK T2 FT 5X37. 5MM TI -- STRL N/A [employer] [id number] Right 1 Implanted       Drains: * No LDAs found *    Findings: Right hip intertrochanteric fracture with collapse from 4 weeks subacute stable condition    Detailed Description of Procedure:   Patient was brought to the operating table general anesthesia induced with endotracheal tube patient was transferred to [facility] after placing boots on the feet. Renal post was applied right arm was kept across the chest the left leg was slightly extended right leg was slightly flexed with traction and slight internal rotation. C-arm was brought in to confirm adequate reduction and was a stable reduction. The fracture was not disimpacted was allowed to be in the collapse days as on the CT scan.   Preop antibiotics were confirmed. Heparin drip was carried out. Timeout was carried out. Incision at the superior end of the tip of trochanter was carried through skin subtenons tissue fascia was incised. Tibial trunk was palpated and entered perforated with a curved awl. This was first confirmed in AP lateral view in satisfactory position. Beaded tip guidewire was placed across into the distal femur confirmed in AP lateral views. Reaming one-step was carried out off through that 125 degree 11 mm x 180 mm gamma nail was placed it was impacted to appropriate position and then targeting guide was used to place the hip screw incision was made in the skin subtenons tissue the fascia was incised periosteum was elevated and the guide was placed onto the bone. Guidewire was passed this was adjusted in AP view and then and lateral view and passed in the center of the hip the guidewires in center of the hip and lateral view and slightly inferior to the center in the AP view. This was satisfactory. Then single step reaming was carried out after measuring the length which was determined to be 80 mm. After the reaming 80 mm lag screw for derotation clip was placed traction was taken off and compression was applied and then proximal locking screw was applied. Then the derotation clip was placed on and impacted into position. The clip was then locked and final images were taken AP lateral view and satisfactory engagement of the derotation clip and lateral view was seen. Distal lock was applied in standard fashion through a static locking hole 30 7.5 millimeters screw was used. Wounds were copiously irrigated. 30 cc of Marcaine was injected in the skin subtenons tissue for analgesia. The deep fascia of the hip was closed with 0 Vicryl deep fat was closed with 0 Vicryl. Subcutaneous tissue was closed with 2-0 Vicryl. Skin was closed with staples. Distal incisions were similarly closed with 2-0 Vicryl and the staples.   Sterile dressing was applied with Xeroform 4 x 4's ABDs and OpSite. Patient taught procedure well was transferred recovery in stable condition.     Electronically Signed by Andreia Azul MD on 11/3/2022 at 7:49 PM

## 2022-11-03 NOTE — PROGRESS NOTES
Problem: Pressure Injury - Risk of  Goal: *Prevention of pressure injury  Description: Document Manfred Scale and appropriate interventions in the flowsheet. Outcome: Progressing Towards Goal  Note: Pressure Injury Interventions: Activity Interventions: Assess need for specialty bed    Mobility Interventions: Float heels, Turn and reposition approx. every two hours(pillow and wedges)    Nutrition Interventions: Document food/fluid/supplement intake    Friction and Shear Interventions: Foam dressings/transparent film/skin sealants                Problem: Patient Education: Go to Patient Education Activity  Goal: Patient/Family Education  Outcome: Progressing Towards Goal     Problem: Pain  Goal: *Control of Pain  Outcome: Progressing Towards Goal     Problem: Patient Education: Go to Patient Education Activity  Goal: Patient/Family Education  Outcome: Progressing Towards Goal     Problem: Patient Education: Go to Patient Education Activity  Goal: Patient/Family Education  Outcome: Progressing Towards Goal     Problem: Hypertension  Goal: *Blood pressure within specified parameters  Outcome: Progressing Towards Goal     Problem: Falls - Risk of  Goal: *Absence of Falls  Description: Document Chay Fall Risk and appropriate interventions in the flowsheet.   Outcome: Progressing Towards Goal  Note: Fall Risk Interventions:                 Elimination Interventions: Bed/chair exit alarm    History of Falls Interventions: Bed/chair exit alarm, Room close to nurse's station         Problem: Patient Education: Go to Patient Education Activity  Goal: Patient/Family Education  Outcome: Progressing Towards Goal

## 2022-11-03 NOTE — PROGRESS NOTES
Physician Progress Note      Jae Mckinney  [facility] #:                  [id number]  :                       1940  ADMIT DATE:       2022 9:51 AM  DISCH DATE:  RESPONDING  PROVIDER #:        Nancy WILKERSON PA-C          QUERY TEXT:    Pt admitted with right intertrochanteric fracture. Pt noted to have osteoporosis. If possible, please document in progress notes and discharge summary if you are evaluating and/or treating any of the following: The medical record reflects the following:  Risk Factors: 78 yo female with osteoporosis, DM, CAD  Clinical Indicators: Xray shows 'Bones are osteopenic';  mechanical fall 4 weeks ago; Vitamin D level 22.6  Treatment: Patient will need to follow-up in osteoporosis clinic in the outpatient setting    Thank you,  Amilcar Porter RN, CCDS  Options provided:  -- Pathological right intertrochanteric fracture  -- Osteoporotic right intertrochanteric Fracture  -- Osteoporotic right intertrochanteric fracture following fall which would not usually break a normal, healthy bone  -- Traumatic right intertrochanteric fracture  -- Other - I will add my own diagnosis  -- Disagree - Not applicable / Not valid  -- Disagree - Clinically unable to determine / Unknown  -- Refer to Clinical Documentation Reviewer    PROVIDER RESPONSE TEXT:    This patient has an osteoporotic right intertrochanteric fracture.     Query created by: Bia Hernández on 11/3/2022 2:36 PM      Electronically signed by:  Minna Velazquez PA-C 11/3/2022 3:47 PM

## 2022-11-03 NOTE — PROGRESS NOTES
Progress Note    Patient: Stephanie Anne MRN: [id number]  SSN: [id number]    YOB: 1940  Age: 79 y.o. Sex: female      Admit Date: 11/2/2022    LOS: 1 day     Subjective:     No acute events overnight  Objective:     Vitals:    11/03/22 0625 11/03/22 0731 11/03/22 0800 11/03/22 1534   BP:  129/62  127/65   Pulse:  65 74 69   Resp:  15  20   Temp:  97.6 °F (36.4 °C)  98 °F (36.7 °C)   SpO2:  95%  95%   Weight: 45.4 kg (100 lb 1.4 oz)      Height:            Intake and Output:  Current Shift: 11/03 0701 - 11/03 1900  In: -   Out: 400 [Urine:400]  Last three shifts: 11/01 1901 - 11/03 0700  In: 294.2 [I.V.:294.2]  Out: 400 [Urine:400]    Physical Exam:   General:  Alert, cooperative, no distress, appears stated age. Eyes:  Conjunctivae/corneas clear. PERRL, EOMs intact. Fundi benign   Ears:  Normal TMs and external ear canals both ears. Nose: Nares normal. Septum midline. Mucosa normal. No drainage or sinus tenderness. Mouth/Throat: Lips, mucosa, and tongue normal. Teeth and gums normal.   Neck: Supple, symmetrical, trachea midline, no adenopathy, thyroid: no enlargment/tenderness/nodules, no carotid bruit and no JVD. Back:   Symmetric, no curvature. ROM normal. No CVA tenderness. Lungs:   Clear to auscultation bilaterally. Heart:  Regular rate and rhythm, S1, S2 normal, no murmur, click, rub or gallop. Abdomen:   Soft, non-tender. Bowel sounds normal. No masses,  No organomegaly. Extremities: Extremities normal, atraumatic, no cyanosis or edema. Pulses: 2+ and symmetric all extremities. Skin: Skin color, texture, turgor normal. No rashes or lesions   Lymph nodes: Cervical, supraclavicular, and axillary nodes normal.   Neurologic: CNII-XII intact. Normal strength, sensation and reflexes throughout. Lab/Data Review: All lab results for the last 24 hours reviewed.          Assessment:     Active Problems:    Hip fracture ([facility] [address] (11/2/2022)    Patient is an 79-year-old white female with:  1. Ischemic cardiomyopathy  2. Status post PCI of mid LAD May 2022  3. Hypertensive heart disease  4. Hyperlipidemia  5. Ex-smoker  6. Osteoporosis  7. Right intertrochanteric fracture and coccygeal fracture  8. Plan:     Continue current care with expectation for surgery, ORIF. She appears to be euvolemic and denied having any chest pain. We will continue to follow around the time of surgery.     Signed By: Maryan Reed MD     November 3, 2022

## 2022-11-04 PROBLEM — E43 SEVERE PROTEIN-CALORIE MALNUTRITION (HCC): Status: ACTIVE | Noted: 2022-11-04

## 2022-11-04 LAB
ANION GAP SERPL CALC-SCNC: 5 MMOL/L (ref 5–15)
ATRIAL RATE: 68 BPM
BASOPHILS # BLD: 0 K/UL (ref 0–0.1)
BASOPHILS NFR BLD: 0 % (ref 0–1)
BNP SERPL-MCNC: 4072 PG/ML
BUN SERPL-MCNC: 13 MG/DL (ref 6–20)
BUN/CREAT SERPL: 38 (ref 12–20)
CA-I BLD-MCNC: 8.2 MG/DL (ref 8.5–10.1)
CALCULATED P AXIS, ECG09: 18 DEGREES
CALCULATED R AXIS, ECG10: 16 DEGREES
CALCULATED T AXIS, ECG11: 12 DEGREES
CHLORIDE SERPL-SCNC: 104 MMOL/L (ref 97–108)
CO2 SERPL-SCNC: 27 MMOL/L (ref 21–32)
CREAT SERPL-MCNC: 0.34 MG/DL (ref 0.55–1.02)
DIAGNOSIS, 93000: NORMAL
DIFFERENTIAL METHOD BLD: ABNORMAL
EOSINOPHIL # BLD: 0 K/UL (ref 0–0.4)
EOSINOPHIL NFR BLD: 0 % (ref 0–7)
ERYTHROCYTE [DISTWIDTH] IN BLOOD BY AUTOMATED COUNT: 17.5 % (ref 11.5–14.5)
GLUCOSE SERPL-MCNC: 155 MG/DL (ref 65–100)
HCT VFR BLD AUTO: 27.2 % (ref 35–47)
HGB BLD-MCNC: 8.3 G/DL (ref 11.5–16)
IMM GRANULOCYTES # BLD AUTO: 0 K/UL (ref 0–0.04)
IMM GRANULOCYTES NFR BLD AUTO: 1 % (ref 0–0.5)
LYMPHOCYTES # BLD: 0.4 K/UL (ref 0.8–3.5)
LYMPHOCYTES NFR BLD: 7 % (ref 12–49)
MCH RBC QN AUTO: 27.6 PG (ref 26–34)
MCHC RBC AUTO-ENTMCNC: 30.5 G/DL (ref 30–36.5)
MCV RBC AUTO: 90.4 FL (ref 80–99)
MONOCYTES # BLD: 0.3 K/UL (ref 0–1)
MONOCYTES NFR BLD: 5 % (ref 5–13)
NEUTS SEG # BLD: 4.9 K/UL (ref 1.8–8)
NEUTS SEG NFR BLD: 87 % (ref 32–75)
NRBC # BLD: 0 K/UL (ref 0–0.01)
NRBC BLD-RTO: 0 PER 100 WBC
P-R INTERVAL, ECG05: 228 MS
PLATELET # BLD AUTO: 300 K/UL (ref 150–400)
PMV BLD AUTO: 8.5 FL (ref 8.9–12.9)
POTASSIUM SERPL-SCNC: 4.2 MMOL/L (ref 3.5–5.1)
Q-T INTERVAL, ECG07: 458 MS
QRS DURATION, ECG06: 80 MS
QTC CALCULATION (BEZET), ECG08: 487 MS
RBC # BLD AUTO: 3.01 M/UL (ref 3.8–5.2)
SODIUM SERPL-SCNC: 136 MMOL/L (ref 136–145)
VENTRICULAR RATE, ECG03: 68 BPM
WBC # BLD AUTO: 5.7 K/UL (ref 3.6–11)

## 2022-11-04 PROCEDURE — 65270000029 HC RM PRIVATE

## 2022-11-04 PROCEDURE — 74011250637 HC RX REV CODE- 250/637: Performed by: ORTHOPAEDIC SURGERY

## 2022-11-04 PROCEDURE — 74011250636 HC RX REV CODE- 250/636: Performed by: ORTHOPAEDIC SURGERY

## 2022-11-04 PROCEDURE — 97530 THERAPEUTIC ACTIVITIES: CPT

## 2022-11-04 PROCEDURE — 80048 BASIC METABOLIC PNL TOTAL CA: CPT

## 2022-11-04 PROCEDURE — 36415 COLL VENOUS BLD VENIPUNCTURE: CPT

## 2022-11-04 PROCEDURE — 74011250637 HC RX REV CODE- 250/637: Performed by: PHYSICIAN ASSISTANT

## 2022-11-04 PROCEDURE — 85025 COMPLETE CBC W/AUTO DIFF WBC: CPT

## 2022-11-04 PROCEDURE — 97161 PT EVAL LOW COMPLEX 20 MIN: CPT

## 2022-11-04 PROCEDURE — 97165 OT EVAL LOW COMPLEX 30 MIN: CPT

## 2022-11-04 PROCEDURE — 74011000250 HC RX REV CODE- 250: Performed by: ORTHOPAEDIC SURGERY

## 2022-11-04 RX ORDER — CELECOXIB 100 MG/1
100 CAPSULE ORAL 2 TIMES DAILY
Status: DISCONTINUED | OUTPATIENT
Start: 2022-11-04 | End: 2022-11-07 | Stop reason: HOSPADM

## 2022-11-04 RX ADMIN — Medication 1 TABLET: at 18:23

## 2022-11-04 RX ADMIN — CELECOXIB 100 MG: 100 CAPSULE ORAL at 09:41

## 2022-11-04 RX ADMIN — POLYETHYLENE GLYCOL 3350 17 G: 17 POWDER, FOR SOLUTION ORAL at 09:34

## 2022-11-04 RX ADMIN — Medication 1 TABLET: at 09:35

## 2022-11-04 RX ADMIN — SENNOSIDES AND DOCUSATE SODIUM 1 TABLET: 50; 8.6 TABLET ORAL at 20:30

## 2022-11-04 RX ADMIN — SODIUM CHLORIDE, PRESERVATIVE FREE 10 ML: 5 INJECTION INTRAVENOUS at 18:30

## 2022-11-04 RX ADMIN — SODIUM CHLORIDE 75 ML/HR: 9 INJECTION, SOLUTION INTRAVENOUS at 18:34

## 2022-11-04 RX ADMIN — TRAMADOL HYDROCHLORIDE 50 MG: 50 TABLET, COATED ORAL at 05:13

## 2022-11-04 RX ADMIN — ATORVASTATIN CALCIUM 40 MG: 40 TABLET, FILM COATED ORAL at 20:30

## 2022-11-04 RX ADMIN — TRAMADOL HYDROCHLORIDE 50 MG: 50 TABLET, COATED ORAL at 00:40

## 2022-11-04 RX ADMIN — ACETAMINOPHEN 650 MG: 325 TABLET, FILM COATED ORAL at 22:36

## 2022-11-04 RX ADMIN — ESCITALOPRAM OXALATE 10 MG: 10 TABLET ORAL at 09:35

## 2022-11-04 RX ADMIN — SODIUM CHLORIDE, PRESERVATIVE FREE 10 ML: 5 INJECTION INTRAVENOUS at 22:47

## 2022-11-04 RX ADMIN — CELECOXIB 100 MG: 100 CAPSULE ORAL at 20:30

## 2022-11-04 RX ADMIN — SODIUM CHLORIDE, PRESERVATIVE FREE 10 ML: 5 INJECTION INTRAVENOUS at 06:21

## 2022-11-04 RX ADMIN — CEFAZOLIN 2 G: 1 INJECTION, POWDER, FOR SOLUTION INTRAMUSCULAR; INTRAVENOUS at 10:23

## 2022-11-04 RX ADMIN — CEFAZOLIN 2 G: 1 INJECTION, POWDER, FOR SOLUTION INTRAMUSCULAR; INTRAVENOUS at 04:00

## 2022-11-04 RX ADMIN — ACETAMINOPHEN 650 MG: 325 TABLET, FILM COATED ORAL at 14:27

## 2022-11-04 RX ADMIN — LISINOPRIL 5 MG: 10 TABLET ORAL at 09:36

## 2022-11-04 RX ADMIN — Medication 1 TABLET: at 12:12

## 2022-11-04 RX ADMIN — SENNOSIDES AND DOCUSATE SODIUM 1 TABLET: 50; 8.6 TABLET ORAL at 09:33

## 2022-11-04 NOTE — PROGRESS NOTES
Orthopedic progress note    Date:2022       AZGD:840/70  Patient Name:Denisa Orourke     YOB: 1940     Age:81 y.o. Subjective    79year old female status post ORIF right hip. P.O.D. #.1  Patient doing well. She complains of minimal pain of her right hip. Pain medication helps. Patient is complaining of being hungry. Nursing reports patient had increased drainage at wound site had to be reinforced last night. No other complaints at this time.   Objective           Vitals Last 24 Hours:  TEMPERATURE:  Temp  Av.5 °F (36.4 °C)  Min: 96.7 °F (35.9 °C)  Max: 98.1 °F (36.7 °C)  RESPIRATIONS RANGE: Resp  Av.9  Min: 12  Max: 25  PULSE OXIMETRY RANGE: SpO2  Av.7 %  Min: 91 %  Max: 96 %  PULSE RANGE: Pulse  Av.7  Min: 66  Max: 85  BLOOD PRESSURE RANGE: Systolic (56NDV), DAH:550 , Min:105 , IXE:768   ; Diastolic (18BRM), ZGF:43, Min:48, Max:66    Current Facility-Administered Medications   Medication Dose Route Frequency    0.9% sodium chloride infusion  75 mL/hr IntraVENous CONTINUOUS    sodium chloride (NS) flush 5-40 mL  5-40 mL IntraVENous Q8H    sodium chloride (NS) flush 5-40 mL  5-40 mL IntraVENous PRN    naloxone (NARCAN) injection 0.4 mg  0.4 mg IntraVENous PRN    calcium-vitamin D 600 mg-5 mcg (200 unit) per tablet 1 Tablet  1 Tablet Oral TID WITH MEALS    senna-docusate (PERICOLACE) 8.6-50 mg per tablet 1 Tablet  1 Tablet Oral BID    polyethylene glycol (MIRALAX) packet 17 g  17 g Oral DAILY    [START ON 2022] bisacodyL (DULCOLAX) suppository 10 mg  10 mg Rectal DAILY PRN    acetaminophen (TYLENOL) tablet 650 mg  650 mg Oral Q8H    traMADoL (ULTRAM) tablet 50 mg  50 mg Oral Q6H PRN    oxyCODONE IR (ROXICODONE) tablet 5 mg  5 mg Oral Q4H PRN    HYDROmorphone (DILAUDID) syringe 0.5 mg  0.5 mg IntraVENous Q4H PRN    diphenhydrAMINE (BENADRYL) 12.5 mg/5 mL oral elixir 12.5 mg  12.5 mg Oral Q6H PRN    ceFAZolin (ANCEF) 2 g in sterile water (preservative free) 20 mL IV syringe  2 g IntraVENous Q8H    aspirin delayed-release tablet 81 mg  81 mg Oral BID    ergocalciferol capsule 50,000 Units  50,000 Units Oral Q7D    morphine injection 1 mg  1 mg IntraVENous Q4H PRN    atorvastatin (LIPITOR) tablet 40 mg  40 mg Oral QHS    [Held by provider] clopidogreL (PLAVIX) tablet 75 mg  75 mg Oral DAILY    escitalopram oxalate (LEXAPRO) tablet 10 mg  10 mg Oral DAILY    lisinopriL (PRINIVIL, ZESTRIL) tablet 5 mg  5 mg Oral DAILY    sodium chloride (NS) flush 5-40 mL  5-40 mL IntraVENous PRN    acetaminophen (TYLENOL) tablet 650 mg  650 mg Oral Q6H PRN    Or    acetaminophen (TYLENOL) suppository 650 mg  650 mg Rectal Q6H PRN    polyethylene glycol (MIRALAX) packet 17 g  17 g Oral DAILY PRN    ondansetron (ZOFRAN ODT) tablet 4 mg  4 mg Oral Q8H PRN    Or    ondansetron (ZOFRAN) injection 4 mg  4 mg IntraVENous Q6H PRN      Review of Systems   Constitutional: Negative for malaise/fatigue. Respiratory: Negative for cough, shortness of breath and wheezing. Cardiovascular: Negative for chest pain and palpitations. Gastrointestinal: Negative for abdominal pain, heartburn, nausea and vomiting. Neurological: Negative for headaches. Musculoskeletal: Denies any numbness/tingling of operative extremity     I/O (24Hr): Intake/Output Summary (Last 24 hours) at 11/4/2022 7175  Last data filed at 11/4/2022 0515  Gross per 24 hour   Intake 560 ml   Output 650 ml   Net -90 ml     Objective:  Vital signs: (most recent): Blood pressure 112/63, pulse 78, temperature 98 °F (36.7 °C), resp. rate 19, height 5' 2\" (1.575 m), weight 45.4 kg (100 lb 1.4 oz), SpO2 94 %, unknown if currently breastfeeding.     Labs/Imaging/Diagnostics    Labs:  CBC:  Recent Labs     11/04/22  0552 11/03/22  0630 11/02/22  1251   WBC 5.7 3.6 3.9   RBC 3.01* 3.38* 3.72*   HGB 8.3* 9.5* 10.3*   HCT 27.2* 30.1* 33.2*   MCV 90.4 89.1 89.2   RDW 17.5* 17.8* 17.9*    324 344     CHEMISTRIES:  Recent Labs 11/04/22  0552 11/03/22  0630 11/02/22  1251    138 136   K 4.2 3.6 3.4*    105 102   CO2 27 29 28   BUN 13 10 10   CREA 0.34* 0.33* 0.33*   CA 8.2* 8.6 8.9   PT/INR:  Recent Labs     11/02/22  1251   INR 1.1     APTT:No results for input(s): APTT in the last 72 hours. LIVER PROFILE:No results for input(s): AST, ALT in the last 72 hours. No lab exists for component: Qing Ge ALKPHOS  Lab Results   Component Value Date/Time    ALT (SGPT) 24 08/09/2022 08:18 AM    AST (SGOT) 30 08/09/2022 08:18 AM    Alk. phosphatase 45 08/09/2022 08:18 AM    Bilirubin, total 0.8 08/09/2022 08:18 AM       Physical Exam:  Right hip: Dressing reinforced. Mild bloody drainage seen at middle and proximal incision site. No swelling seen the right thigh. No erythema ecchymosis seen. No tenderness palpation throughout right thigh. No calf pain to palpation. DP/PT pulses palpable. Cap refill is 2 seconds. EHL/DF/PF is 5 out of 5. Negative Homans. Right lower extremity neurovascular intact. Assessment//Plan           Patient Active Problem List    Diagnosis Date Noted    Hip fracture ([facility] [address] 11/02/2022    CHF exacerbation ([address] 08/02/2022    Acute respiratory failure with hypoxia ([address] 05/29/2022    Acute on chronic systolic congestive heart failure ([address] 05/29/2022    Dysphagia 05/29/2022    CHF (congestive heart failure) ([address] 05/27/2022     Status post ORIF right hip. Postop day #1. Will start therapy today. Patient's home medication of Plavix and aspirin were resumed. We will hold aspirin for next 48 hours. Acute on chronic blood loss anemia. Asymptomatic. Continue to monitor. Spirometer and exercises encouraged. Continue to monitor wound site for drainage. Anticipate patient be ready for discharge tomorrow.       Electronically signed by Loida Weir PA-C on 11/4/2022 at 8:22 AM

## 2022-11-04 NOTE — PROGRESS NOTES
CM met with patient at bedside. Patient currently only has Medicare part A, and would have to pay out of pocket $1500-$2000 for a stay at inpatient rehab. Patient and son do not want to pay that amount for rehab and have declined Inpatient rehab for that reason. Patient is agreeable to home health and is already established with [facility]. Disposition is home with home health.

## 2022-11-04 NOTE — PROGRESS NOTES
Comprehensive Nutrition Assessment    Type and Reason for Visit: Consult, Initial (ONS)    Nutrition Recommendations/Plan:   Continue current diet   Add Ensure Hpx2/d (320kcals, 16g pro)  Continue to monitor and document all PO intakes and Bms in I/Os     Malnutrition Assessment:  Malnutrition Status:  Severe malnutrition (11/04/22 1337)    Context:  Acute illness     Findings of the 6 clinical characteristics of malnutrition:   Energy Intake:  Mild decrease in energy intake (specify) (x3d)  Weight Loss:  Greater than 7.5% over 3 months (23%x5mo)     Body Fat Loss: Moderate body fat loss, Buccal region, Orbital, Triceps   Muscle Mass Loss: Moderate muscle mass loss, Calf, Clavicles (pectoralis & deltoids), Scapula (trapezius), Temples (temporalis)  Fluid Accumulation:  No significant fluid accumulation,     Strength:  Not performed     Nutrition Assessment:    Admitted for GLF x4 wks ago w/worsening of pain, remains inpt for surgical recovery. Pt reports decr appetite and intakes with 100# wt lossx couple years pta. Per EMR, 23#wt lossx 5mo (23%, significant). On 11/3 pt had R open reduction and internal fixation w/hip gamma nail insertion d/t GLF. Pt endorses strong appetite today, ate >75% bfast this morning and was \"starving\" for lunch. RD to add ONS r/t wt loss. Labs: H/H: 8.3/27.2, creat 0.34, gluc 155, Ca 8.2, D1 22.6. Meds: lipitor, vit D, lisinopril, miralax, ultram.    Nutrition Related Findings:    Per NFPE, muscle and fat loss noted. +hx dysphagia, on S+BS. No N/V/D, +C, pt requested miralax. Last BM 11/2. No edema. Wound Type: Surgical incision (R upper leg, hip)    Current Nutrition Intake & Therapies:  Average Meal Intake: %  Average Supplement Intake: None ordered  ADULT DIET Dysphagia - Soft & Bite Sized    Anthropometric Measures:  Height: 5' 2\" (157.5 cm)  Ideal Body Weight (IBW): 110 lbs (50 kg)     Current Body Wt:  45.4 kg (100 lb), 90.9 % IBW.  Bed scale  Current BMI (kg/m2): 18.3 Weight Adjustment: No adjustment                 BMI Category: Underweight (BMI less than 22) age over 72    Estimated Daily Nutrient Needs:  Energy Requirements Based On: Kcal/kg  Weight Used for Energy Requirements: Current  Energy (kcal/day): 1362-1589kcal/d (30-35kcal/kg)  Weight Used for Protein Requirements: Current  Protein (g/day): 54-63g/day (1.2-1.4g/kg)  Method Used for Fluid Requirements: 1 ml/kcal  Fluid (ml/day): 1362-1589mL    Nutrition Diagnosis:   Severe malnutrition, In context of acute illness or injury related to catabolic illness, swallowing difficulty, biting/chewing (masticatory) difficulty as evidenced by poor intake prior to admission, moderate loss of subcutaneous fat, moderate muscle loss, weight loss 7.5% in 3 months (23%x5mo)    Nutrition Interventions:   Food and/or Nutrient Delivery: Continue current diet, Start oral nutrition supplement  Nutrition Education/Counseling: No recommendations at this time  Coordination of Nutrition Care: Continue to monitor while inpatient, Speech therapy  Plan of Care discussed with: Pt and son    Goals:     Goals: Meet at least 75% of estimated needs, by next RD assessment       Nutrition Monitoring and Evaluation:   Behavioral-Environmental Outcomes: None identified  Food/Nutrient Intake Outcomes: Diet advancement/tolerance, Food and nutrient intake, Supplement intake  Physical Signs/Symptoms Outcomes: Weight, Nutrition focused physical findings, Chewing or swallowing, Meal time behavior, Constipation    Discharge Planning:    Continue oral nutrition supplement, Continue current diet    Antonette Juárez  Contact: [phone]

## 2022-11-04 NOTE — PROGRESS NOTES
Progress Note    [facility] MRN: [id number]  SSN: [id number]    YOB: 1940  Age: 79 y.o. Sex: female      Admit Date: 11/2/2022    LOS: 2 days     Subjective:     No acute events overnight  Objective:     Vitals:    11/04/22 0200 11/04/22 0400 11/04/22 0510 11/04/22 0711   BP:   (!) 111/58 112/63   Pulse: 72 70 77 78   Resp:   16 19   Temp:   (!) 96.7 °F (35.9 °C) 98 °F (36.7 °C)   SpO2:   96% (!) 82%   Weight:       Height:            Intake and Output:  Current Shift: No intake/output data recorded. Last three shifts: 11/02 1901 - 11/04 0700  In: 854.2 [I.V.:854.2]  Out: 1050 [Urine:1050]    Physical Exam:   General:  Alert, cooperative, no distress, appears stated age. Eyes:  Conjunctivae/corneas clear. PERRL, EOMs intact. Fundi benign   Ears:  Normal TMs and external ear canals both ears. Nose: Nares normal. Septum midline. Mucosa normal. No drainage or sinus tenderness. Mouth/Throat: Lips, mucosa, and tongue normal. Teeth and gums normal.   Neck: Supple, symmetrical, trachea midline, no adenopathy, thyroid: no enlargment/tenderness/nodules, no carotid bruit and no JVD. Back:   Symmetric, no curvature. ROM normal. No CVA tenderness. Lungs:   Clear to auscultation bilaterally. Heart:  Regular rate and rhythm, S1, S2 normal, no murmur, click, rub or gallop. Abdomen:   Soft, non-tender. Bowel sounds normal. No masses,  No organomegaly. Extremities: Extremities normal, atraumatic, no cyanosis or edema. Pulses: 2+ and symmetric all extremities. Skin: Skin color, texture, turgor normal. No rashes or lesions   Lymph nodes: Cervical, supraclavicular, and axillary nodes normal.   Neurologic: CNII-XII intact. Normal strength, sensation and reflexes throughout. Lab/Data Review: All lab results for the last 24 hours reviewed. Assessment:     Active Problems:    Hip fracture ([address] (11/2/2022)  Patient is an 50-year-old white female with:  1.   Ischemic cardiomyopathy  2. Status post PCI of mid LAD May 2022  3. Hypertensive heart disease  4. Hyperlipidemia  5. Ex-smoker  6. Osteoporosis  7. Right intertrochanteric fracture and coccygeal fracture  8. Plan:     Blood pressure is well controlled. She underwent right ORIF. No further cardiac testing planned  . Currently on aspirin, atorvastatin, lisinopril. Resume Plavix when okay with surgeon. Hemoglobin 8.3. Kidney function is stable.   Monitor hemoglobin closely  Signed By: Jessica Bravo MD     November 4, 2022

## 2022-11-04 NOTE — PROGRESS NOTES
PHYSICAL THERAPY EVALUATION  Patient: Kylie Hearn (79 y.o. female)  Date: 11/4/2022  Primary Diagnosis: Hip fracture ([facility] [address] [S72.009A]  Procedure(s) (LRB):  Right Hip Gamma Nail Insertion (Right) 1 Day Post-Op   Precautions: falls, TTWB RLE      ASSESSMENT  Pt is a 79 y.o. female admitted on 11/2/2022 for right hip pain following fall 4 weeks ago; pt currently being treated for right intertrochanteric fracture, coccygeal fx, osteoporosis, HLD, CAD, anxiety, and DM type 2. Pt underwent right hip ORIF with gamma nail insertion on 11/3/22 performed by Dr. Thierry Martínez. Per medical records pt is TTWB on RLE. Pt semi-supine in bed upon PT/OT arrival, agreeable to evaluation. Pt A&O x 4. Based on the objective data described, the patient presents with generalized weakness, impaired functional mobility, impaired amb, impaired balance, and decreased activity tolerance. TTWB reviewed with pt prior to mobility, verbalized understanding. (See below for objective details and assist levels). Overall pt tolerated session fair today with c/o 0-1/10 pain throughout session. Pt demonstrates slightly unsteady, shuffling pattern with bed to chair transfers with 1 episode of right knee buckling noted requiring min A from PT to correct. Pt demonstrates difficulty maintaining TTWB on RLE with bed to chair transfers this session, requiring max verbal cues. Pt then completed right LE therex including seated HR, TR, and LAQ; reviewed semi-supine therex including heel slides, ankle pumps, and hip abduction/adduction. Pt will benefit from continued skilled PT to address above deficits and return to PLOF. Current PT DC recommendation Home with Home Health Therapy once medically appropriate.     Current Level of Function Impacting Discharge (mobility/balance): CGA to min A    Other factors to consider for discharge: severity of deficits, good family support      PLAN :  Recommendations and Planned Interventions: bed mobility training, transfer training, gait training, therapeutic exercises, patient and family training/education, and therapeutic activities      Recommend for staff: Out of bed to chair for meals, Encourage HEP in prep for ADLs/mobility, and stand pivot with gt belt and RW to [facility] for toileting     Frequency/Duration: Patient will be followed by physical therapy:  3-5x/week to address goals. Recommendation for discharge: (in order for the patient to meet his/her long term goals)  Home with [address] [place] [address]; pt and family declined IRF and SNF placement, would benefit from IRF following surgery    This discharge recommendation:  Has been made in collaboration with the attending provider and/or case management    IF patient discharges home will need the following DME: rolling walker and wheelchair         SUBJECTIVE:   Patient stated i've been laying down for 4 weeks.     OBJECTIVE DATA SUMMARY:   HISTORY:    Past Medical History:   Diagnosis Date    CHF (congestive heart failure) (HCC)     EF 30-35%    Dysphagia     Heart failure (HCC)     Hyperlipidemia     Hypertension     Ill-defined condition     patient unaware of any diagnosis due to recieving minimal medical care for the past 57 years    Psychiatric disorder     anxiety     Past Surgical History:   Procedure Laterality Date    HX CORONARY STENT PLACEMENT  05/31/2022    HX HEART CATHETERIZATION  05/27/2022    STENT PLACED       Home Situation  Home Environment: Private residence  # Steps to Enter: 0  Wheelchair Ramp: Yes  One/Two Story Residence: One story  Living Alone: No  Support Systems: Spouse/Significant Other, Child(bryant)  Patient Expects to be Discharged to[de-identified] Skilled nursing facility  Current DME Used/Available at Home: Elnoria Chi, rollator, Adaptive bathing aides, Adaptive dressing aides, Commode, bedside  Tub or Shower Type:  (sink bathes)    EXAMINATION/PRESENTATION/DECISION MAKING:   Critical Behavior:  Neurologic State: Alert  Orientation Level: Oriented X4  Cognition: Follows commands     Hearing: Auditory  Auditory Impairment: None  Skin:  bandage right hip visualized, drainage noted ortho PA aware  Edema: localized right hip, ice applied at end of session  Range Of Motion:  AROM: Generally decreased, functional           PROM: Within functional limits           Strength:    Strength: Generally decreased, functional          Functional Mobility:  Bed Mobility:  Rolling: Contact guard assistance  Supine to Sit: Minimum assistance     Scooting: Contact guard assistance  Transfers:  Sit to Stand: Contact guard assistance  Stand to Sit: Contact guard assistance        Bed to Chair: Minimum assistance              Balance:   Sitting: Intact; Without support  Standing: Impaired; Without support  Standing - Static: Fair;Constant support  Standing - Dynamic : Poor;Constant support  Ambulation/Gait Training:  Distance (ft): 3 Feet (ft)  Assistive Device: Gait belt;Walker, rolling  Ambulation - Level of Assistance: Minimal assistance (verbal cues for safety)     Gait Description (WDL): Exceptions to WDL     Right Side Weight Bearing: Toe touch     Base of Support: Narrowed; Shift to left     Speed/Ayana: Slow;Shuffled    Therapeutic Exercises:   Pt educated on LE HEP to include seated HR, TR, LAQ in chair (reviewed heelslides, ankle pumps, and hip abduction/adduction), instructed to complete throughout the day to improve ROM and strength with good understanding verbalized and demonstrated. Functional Measure:  [employer] AM-PAC 6 Clicks         Basic Mobility Inpatient Short Form  How much difficulty does the patient currently have. .. Unable A Lot A Little None   1. Turning over in bed (including adjusting bedclothes, sheets and blankets)? [] 1   [] 2   [x] 3   [] 4   2. Sitting down on and standing up from a chair with arms ( e.g., wheelchair, bedside commode, etc.)   [] 1   [] 2   [x] 3   [] 4   3.   Moving from lying on back to sitting on the side of the bed? [] 1   [] 2   [x] 3   [] 4          How much help from another person does the patient currently need. .. Total A Lot A Little None   4. Moving to and from a bed to a chair (including a wheelchair)? [] 1   [] 2   [x] 3   [] 4   5. Need to walk in hospital room? [] 1   [x] 2   [] 3   [] 4   6. Climbing 3-5 steps with a railing? [] 1   [x] 2   [] 3   [] 4   © , [employer], under license to [employer].. All rights reserved     Score:  Initial:  Most Recent: X (Date: 22 )   Interpretation of Tool:  Represents activities that are increasingly more difficult (i.e. Bed mobility, Transfers, Gait). Score 24 23 22-20 19-15 14-10 9-7 6   Modifier CH CI CJ CK CL CM CN         Physical Therapy Evaluation Charge Determination   History Examination Presentation Decision-Making   HIGH Complexity :3+ comorbidities / personal factors will impact the outcome/ POC  HIGH Complexity : 4+ Standardized tests and measures addressing body structure, function, activity limitation and / or participation in recreation  LOW Complexity : Stable, uncomplicated  Other outcome measures ampac 6  mod      Based on the above components, the patient evaluation is determined to be of the following complexity level: LOW     Pain Ratin-1/10 right hip    Activity Tolerance:   Fair and requires rest breaks    After treatment patient left in no apparent distress:   Bed locked and in lowest position Sitting in chair and Call bell within reach and nsg updated. GOALS:    Problem: Mobility Impaired (Adult and Pediatric)  Goal: *Acute Goals and Plan of Care (Insert Text)  Description: FUNCTIONAL STATUS PRIOR TO ADMISSION: Patient required minimal assistance for basic and instrumental ADLs. Pt was mod I with rollator prior to fall 4 weeks ago, since then she has been bedbound and required assistance to stand pivot transfers to [facility].      HOME SUPPORT PRIOR TO ADMISSION: The patient lived with  and required moderate assistance  for stand pivot transfers to [facility] from son, who lives across the street from her. Physical Therapy Goals  Initiated 11/4/2022  Pt stated goal: to get better  Pt will be I with LE HEP in 7 days. Pt will perform bed mobility with mod I in 7 days. Pt will perform transfers with mod I in 7 days. Pt will amb 5-10 feet with LRAD safely with mod I in 7 days. Outcome: Not Met       COMMUNICATION/EDUCATION:   The patients plan of care was discussed with: Occupational therapist, Registered nurse, Case management, and ortho PA . Fall prevention education was provided and the patient/caregiver indicated understanding., Patient/family have participated as able in goal setting and plan of care. , and Patient/family agree to work toward stated goals and plan of care. PT/OT sessions occurred together for increased safety of pt and clinician.        Thank you for this referral.  Eleni Garcia, PT, DPT   Time Calculation: 27 mins

## 2022-11-04 NOTE — PROGRESS NOTES
OCCUPATIONAL THERAPY EVALUATION  Patient: Lesa Hays (79 y.o. female)  Date: 11/4/2022  Primary Diagnosis: Hip fracture ([address] [S72.009A]  Procedure(s) (LRB):  Right Hip Gamma Nail Insertion (Right) 1 Day Post-Op   Precautions: fall risk, TTWB RLE       ASSESSMENT  Pt is a 79 y.o. female presenting to [facility] on 1//2 following mechanical fall ~4 weeks ago w/ c/o R hip pain. X-ray shwos R intertrochanteric fracture and coccygeal fraxture as well as osteoporosis. Pt is s/p ORIF R hip on 11/3 by Dr. Guanako Keenan; per medical chart, pt is TTWB RLE. Pt received semi-supine in bed upon arrival, AXO x4, and agreeable to OT/PT evaluation. Based on current observations, pt presents with deficits in generalized strength, bed mobility, dynamic sitting balance, static/dynamic standing balance (see PT note for gait details), and functional activity tolerance currently impacting overall performance of ADLs and functional transfers/mobility (see below for objective details and assist levels). Overall, pt tolerates session fair w/out c/o of pain during session, however, while upright pt reported she felt unsteady. She req'd min A for bed mobility and bed>chair using RW; no LOB but was noted to have unsteady balance req'ding physical A to maintain. She tolerated sitting at EOB for ~5-6 min while completing ADLs w/ set-up. She demo'd good understanding of WB status during session. Pt would benefit from continued skilled OT services to address current impairments and improve IND and safety with self cares and functional transfers/mobility. Current OT d/c recommendation Home with Home Health Therapy and 24/7 A once medically appropriate. Other factors to consider for discharge: family/social support, DME, time since onset, severity of deficits, functional baseline     Patient will benefit from skilled therapy intervention to address the above noted impairments.        PLAN :  Recommendations and Planned Interventions: self care training, functional mobility training, therapeutic exercise, balance training, therapeutic activities, endurance activities, patient education, and home safety training    Recommend with staff: bed>BSC transfers    Recommend next session: education on LB bathing/dressing; pt has AD    Frequency/Duration: Patient will be followed by occupational therapy:  3-5x/week to address goals. Recommendation for discharge: (in order for the patient to meet his/her long term goals)  Home with [address] [place] [address] and 24/7 care    This discharge recommendation:  Has been made in collaboration with the attending provider and/or case management    IF patient discharges home will need the following DME: pt would benefit from RW for increased safety. She may benefit from a w/c.       SUBJECTIVE:   Patient stated I just feel unsteady while I am up.     OBJECTIVE DATA SUMMARY:   HISTORY:   Past Medical History:   Diagnosis Date    CHF (congestive heart failure) (HCC)     EF 30-35%    Dysphagia     Heart failure (HCC)     Hyperlipidemia     Hypertension     Ill-defined condition     patient unaware of any diagnosis due to recieving minimal medical care for the past 57 years    Psychiatric disorder     anxiety     Past Surgical History:   Procedure Laterality Date    HX CORONARY STENT PLACEMENT  05/31/2022    HX HEART CATHETERIZATION  05/27/2022    STENT PLACED       Per pt:   Home Situation  Home Environment: Private residence  # Steps to Enter: 0  Wheelchair Ramp: Yes  One/Two Story Residence: One story  Living Alone: No  Support Systems: Spouse/Significant Other, Child(bryant)  Patient Expects to be Discharged to[de-identified] Skilled nursing facility  Current DME Used/Available at Home: [employer], rollator, Adaptive bathing aides, Adaptive dressing aides, Commode, bedside  Tub or Shower Type:  (sink bathes)    EXAMINATION OF PERFORMANCE DEFICITS:  Cognitive/Behavioral Status:  Neurologic State: Alert  Orientation Level: Oriented X4  Cognition: Follows commands           Skin:  Bandage in place; unable to assess surgical site  Hearing: Auditory  Auditory Impairment: None    Vision/Perceptual:                                     Range of Motion:  AROM: Generally decreased, functional  PROM: Within functional limits                      Strength:  Strength: Generally decreased, functional                Coordination:     Fine Motor Skills-Upper: Left Intact; Right Intact    Gross Motor Skills-Upper: Left Intact; Right Intact    Tone & Sensation:                            Balance:  Sitting: Intact; Without support  Standing: Impaired; Without support  Standing - Static: Fair;Constant support  Standing - Dynamic : Poor;Constant support    Functional Mobility and Transfers for ADLs:  Bed Mobility:  Rolling: Contact guard assistance  Supine to Sit: Minimum assistance  Scooting: Contact guard assistance    Transfers:  Sit to Stand: Contact guard assistance  Stand to Sit: Contact guard assistance  Bed to Chair: Minimum assistance  Assistive Device : Walker, rolling      ADL Intervention and task modifications:       Grooming  Washing Face: Set-up (sitting at EOB)         Lower Body Bathing  Lower Body : Set-up (sitting at EOB)    Upper Body [address]: Stand-by assistance (2' lines)                   Therapeutic Exercise:  Pt will benefit from BUE HEP to improve participation in ADLs and mobility. Plan will be initiated at next session. Functional Measure:    [employer] AM-PAC \"6 Clicks\"                                                       Daily Activity Inpatient Short Form  How much help from another person does the patient currently need. .. Total; A Lot A Little None   1. Putting on and taking off regular lower body clothing? []  1 [x]  2 []  3 []  4   2. Bathing (including washing, rinsing, drying)? []  1 [x]  2 []  3 []  4   3. Toileting, which includes using toilet, bedpan or urinal? [] 1 [x]  2 []  3 []  4   4. Putting on and taking off regular upper body clothing? []  1 []  2 [x]  3 []  4   5. Taking care of personal grooming such as brushing teeth? []  1 []  2 [x]  3 []  4   6. Eating meals? []  1 []  2 []  3 [x]  4   © 2007, Trustees of [facility], under license to [employer]. All rights reserved     Score: 16/24     Interpretation of Tool:  Represents clinically-significant functional categories (i.e. Activities of daily living). Percentage of Impairment CH    0%   CI    1-19% CJ    20-39% CK    40-59% CL    60-79% CM    80-99% CN     100%   [facility]  Score 6-24 24 23 20-22 15-19 10-14 7-9 6     Occupational Therapy Evaluation Charge Determination   History Examination Decision-Making   LOW Complexity : Brief history review  LOW Complexity : 1-3 performance deficits relating to physical, cognitive , or psychosocial skils that result in activity limitations and / or participation restrictions  MEDIUM Complexity : Patient may present with comorbidities that affect occupational performnce. Miniml to moderate modification of tasks or assistance (eg, physical or verbal ) with assesment(s) is necessary to enable patient to complete evaluation       Based on the above components, the patient evaluation is determined to be of the following complexity level: LOW   Pain Rating:  No reports of pain     Activity Tolerance:   Fair and requires rest breaks    After treatment patient left in no apparent distress:    Sitting in chair and Call bell within reach    COMMUNICATION/EDUCATION:   The patients plan of care was discussed with: Physical therapist and Registered nurse. Patient/family have participated as able in goal setting and plan of care. and Patient/family agree to work toward stated goals and plan of care. This patients plan of care is appropriate for delegation to [facility]. PT/OT sessions occurred together for increased safety of pt and clinician as pt has decreased activity tolerance.      Thank you for this referral.  Kumar Fernández, OT  Time Calculation: 30 mins   Problem: Self Care Deficits Care Plan (Adult)  Goal: *Acute Goals and Plan of Care (Insert Text)  Description: FUNCTIONAL STATUS PRIOR TO ADMISSION: Pt reports had one fall within the last three months. She fell ~4 weeks and has been mostly bed bound but did complete SPT to [facility]. She completed most ADLs in bed w/ min A. Prior to her fall, she was IND. Her son helps daily. HOME SUPPORT: The patient lived with spouse. Son lives across the street.     Occupational Therapy Goals  Initiated 11/4/2022    Pt stated goal \"I want to get better\"  Pt will be IND sup <> sit in prep for EOB ADLs  Pt will be Mod I grooming standing sink side LRAD  Pt will be IND UB dressing sitting EOB/long sit   Pt will be Mod I LE dressing sitting EOB/long sit  Pt will be Mod I sit <>  prep for toileting LRAD  Pt will be Mod I toileting/toilet transfer/cloth mgmt LRAD  Pt will be IND following UE HEP in prep for self care tasks   Outcome: Not Met

## 2022-11-04 NOTE — PROGRESS NOTES
Hospitalist Progress Note    Subjective:   Daily Progress Note: 11/4/2022 10:17 AM    Hospital Course: Patient is a 79 y.o. female with a past medical history of CHF, hyperlipidemia, coronary artery disease, anxiety that presented to the emergency room on 11/2/2002 for right hip pain after mechanical fall approximately 4 weeks ago. Prior to the event patient had no episodes of dizziness, lightheadedness, chest pain, shortness of breath, loss of consciousness. She said that her leg just gave out. In the ED vital signs stable. Laboratory data hemoglobin of 10.3, potassium 3.4, remaining electrolytes stable. Chest x-ray showed no acute cardiopulmonary process. CT of the pelvis showed a right intertrochanteric fracture and coccygeal fracture and osteoporosis. It was requested admission for orthopedic evaluation and for surgery. Orthopedic has cleared the patient for surgery. Had ORIF of the right hip on 11/3/2022. Subjective: Patient states her pain right now is a 2/10 after repositioning.  No chest pain or SOB    Current Facility-Administered Medications   Medication Dose Route Frequency    0.9% sodium chloride infusion  75 mL/hr IntraVENous CONTINUOUS    sodium chloride (NS) flush 5-40 mL  5-40 mL IntraVENous Q8H    sodium chloride (NS) flush 5-40 mL  5-40 mL IntraVENous PRN    naloxone (NARCAN) injection 0.4 mg  0.4 mg IntraVENous PRN    calcium-vitamin D 600 mg-5 mcg (200 unit) per tablet 1 Tablet  1 Tablet Oral TID WITH MEALS    senna-docusate (PERICOLACE) 8.6-50 mg per tablet 1 Tablet  1 Tablet Oral BID    polyethylene glycol (MIRALAX) packet 17 g  17 g Oral DAILY    [START ON 11/5/2022] bisacodyL (DULCOLAX) suppository 10 mg  10 mg Rectal DAILY PRN    acetaminophen (TYLENOL) tablet 650 mg  650 mg Oral Q8H    traMADoL (ULTRAM) tablet 50 mg  50 mg Oral Q6H PRN    oxyCODONE IR (ROXICODONE) tablet 5 mg  5 mg Oral Q4H PRN    HYDROmorphone (DILAUDID) syringe 0.5 mg  0.5 mg IntraVENous Q4H PRN diphenhydrAMINE (BENADRYL) 12.5 mg/5 mL oral elixir 12.5 mg  12.5 mg Oral Q6H PRN    ceFAZolin (ANCEF) 2 g in sterile water (preservative free) 20 mL IV syringe  2 g IntraVENous Q8H    aspirin delayed-release tablet 81 mg  81 mg Oral BID    ergocalciferol capsule 50,000 Units  50,000 Units Oral Q7D    morphine injection 1 mg  1 mg IntraVENous Q4H PRN    atorvastatin (LIPITOR) tablet 40 mg  40 mg Oral QHS    [Held by provider] clopidogreL (PLAVIX) tablet 75 mg  75 mg Oral DAILY    escitalopram oxalate (LEXAPRO) tablet 10 mg  10 mg Oral DAILY    lisinopriL (PRINIVIL, ZESTRIL) tablet 5 mg  5 mg Oral DAILY    sodium chloride (NS) flush 5-40 mL  5-40 mL IntraVENous PRN    acetaminophen (TYLENOL) tablet 650 mg  650 mg Oral Q6H PRN    Or    acetaminophen (TYLENOL) suppository 650 mg  650 mg Rectal Q6H PRN    polyethylene glycol (MIRALAX) packet 17 g  17 g Oral DAILY PRN    ondansetron (ZOFRAN ODT) tablet 4 mg  4 mg Oral Q8H PRN    Or    ondansetron (ZOFRAN) injection 4 mg  4 mg IntraVENous Q6H PRN        Review of Systems  Constitutional: No fevers, No chills, No sweats, No fatigue, + Weakness  Eyes: No redness  Ears, nose, mouth, throat, and face: No nasal congestion, No sore throat, No voice change  Respiratory: No Shortness of Breath, No cough, No wheezing  Cardiovascular: No chest pain, No palpitations, No extremity edema  Gastrointestinal: No nausea, No vomiting, No diarrhea, No abdominal pain  Genitourinary: No frequency, No dysuria, No hematuria  Integument/breast: No skin lesion(s)   Neurological: No Confusion, No headaches, No dizziness      Objective:     Visit Vitals  /63 (BP 1 Location: Left upper arm, BP Patient Position: At rest)   Pulse 78   Temp 98 °F (36.7 °C)   Resp 19   Ht 5' 2\" (1.575 m)   Wt 45.4 kg (100 lb 1.4 oz)   SpO2 94%   BMI 18.31 kg/m²      O2 Device: None (Room air)    Temp (24hrs), Av.5 °F (36.4 °C), Min:96.7 °F (35.9 °C), Max:98.1 °F (36.7 °C)      No intake/output data recorded. 11/02 1901 - 11/04 0700  In: 854.2 [I.V.:854.2]  Out: 1050 [Urine:1050]    PHYSICAL EXAM:  Constitutional: No acute distress  Skin: Extremities and face reveal no rashes. HEENT: Sclerae anicteric. Extra-occular muscles are intact. Cardiovascular: Regular rate and rhythm. Respiratory:  Clear breath sounds bilaterally with no wheezes, rales, or rhonchi. GI: Abdomen nondistended, soft, and nontender. Normal active bowel sounds. Musculoskeletal: No pitting edema of the lower legs. Able to move all ext  Neurological:  Patient is alert and oriented. Cranial nerves II-XII grossly intact  Psychiatric: Mood appears appropriate       Data Review    Recent Results (from the past 24 hour(s))   CBC WITH AUTOMATED DIFF    Collection Time: 11/04/22  5:52 AM   Result Value Ref Range    WBC 5.7 3.6 - 11.0 K/uL    RBC 3.01 (L) 3.80 - 5.20 M/uL    HGB 8.3 (L) 11.5 - 16.0 g/dL    HCT 27.2 (L) 35.0 - 47.0 %    MCV 90.4 80.0 - 99.0 FL    MCH 27.6 26.0 - 34.0 PG    MCHC 30.5 30.0 - 36.5 g/dL    RDW 17.5 (H) 11.5 - 14.5 %    PLATELET 443 965 - 485 K/uL    MPV 8.5 (L) 8.9 - 12.9 FL    NRBC 0.0 0.0  WBC    ABSOLUTE NRBC 0.00 0.00 - 0.01 K/uL    NEUTROPHILS 87 (H) 32 - 75 %    LYMPHOCYTES 7 (L) 12 - 49 %    MONOCYTES 5 5 - 13 %    EOSINOPHILS 0 0 - 7 %    BASOPHILS 0 0 - 1 %    IMMATURE GRANULOCYTES 1 (H) 0 - 0.5 %    ABS. NEUTROPHILS 4.9 1.8 - 8.0 K/UL    ABS. LYMPHOCYTES 0.4 (L) 0.8 - 3.5 K/UL    ABS. MONOCYTES 0.3 0.0 - 1.0 K/UL    ABS. EOSINOPHILS 0.0 0.0 - 0.4 K/UL    ABS. BASOPHILS 0.0 0.0 - 0.1 K/UL    ABS. IMM. GRANS. 0.0 0.00 - 0.04 K/UL    DF AUTOMATED         Radiology review: CT of pelvis    Assessment:   1. Right Intertrochanteric fracture s/p ORIF #1  2. Coccygeal Fracture  3. Osteoporosis  4. History of heart failure with an EF of 30 to 35%  5. History of coronary artery disease status post PCI x1  6. Hyperlipidemia  7. Anxiety  8.   Type 2 diabetes diet controlled     Plan:    1.  CT of the pelvis shows right hip fracture. Had surgery on 11/3. Orthopedics consulted. IV morphine as needed for severe pain, tramadol for moderate pain, Tylenol for mild pain. PT and OT postop per orthopedics recommendations. 2.  Conservative treatment most likely at this time. Orthopedics consulted  3. Patient will need to follow-up in osteoporosis clinic in the outpatient setting. Vitamin D level 22.6. Start supplementatiion  4. Patient has a history of heart failure. Cardiology has cleared the patient. .  Patient currently denies any chest pain. EKG is pending. Patient is on aspirin, statin, ACE. No beta-blocker due to normal to low blood pressure and bradycardia  5. Patient on aspirin, statin, Plavix. Holding aspirin and Plavix. 6.  Continue statin  7. Continue Lexapro  8. Patient's hemoglobin A1c 6.6. No need to treat at this time. Diet controlled  9. CBC BMP in a.m. Dispo: 24-48 hours. Barriers include post-op day #2, clearance from ortho. Mostly likely will require placement. Patient does not want to have referrals sent to [facility]. [address] [facility] (son) [phone]     CODE STATUS full     DVT prophylaxis: Holding due to anemia   Ulcer prophylaxis: Not indicated    Care Plan discussed with: Patient/Family and  and RN    Total time spent with patient: 33 minutes.

## 2022-11-04 NOTE — PROGRESS NOTES
Approximately [address] telephone report given to JENNIFER Deleon consisting of SBAR. Opportunity to ask questions. Son Tianna Anthony at bedside has packed up and will removed  items form patient room including cell phone. Patient will be transferred to room 514 as soon as it is ready.

## 2022-11-04 NOTE — PROGRESS NOTES
CM met with patient and her son to discuss DCP, patient recc for rolling walker and may benefit from wheelchair, CM contacted [facility], patient's insurance does not cover DME as it is Part A only. CM discussed this with patient and her son, they verbalized understanding, stated they have a rollator and wheelchair at home. Patient has been accepted by [facility], updates have been sent via [employer] to Inocencio Gonzales. Medicare pt has received, reviewed, and signed 2nd IM letter informing them of their right to appeal the discharge. Signed copied has been placed on pt bedside chart.

## 2022-11-04 NOTE — PROGRESS NOTES
Received bedside shift report from HELEN Sprague RN. Call light in reach. Bed in lowest position with wheels locked and bed alarm on. Patient denies pain.

## 2022-11-05 LAB
ANION GAP SERPL CALC-SCNC: 5 MMOL/L (ref 5–15)
BACTERIA SPEC CULT: NORMAL
BASOPHILS # BLD: 0 K/UL (ref 0–0.1)
BASOPHILS NFR BLD: 1 % (ref 0–1)
BUN SERPL-MCNC: 14 MG/DL (ref 6–20)
BUN/CREAT SERPL: 45 (ref 12–20)
CA-I BLD-MCNC: 9 MG/DL (ref 8.5–10.1)
CHLORIDE SERPL-SCNC: 104 MMOL/L (ref 97–108)
CO2 SERPL-SCNC: 28 MMOL/L (ref 21–32)
CREAT SERPL-MCNC: 0.31 MG/DL (ref 0.55–1.02)
DIFFERENTIAL METHOD BLD: ABNORMAL
EOSINOPHIL # BLD: 0.1 K/UL (ref 0–0.4)
EOSINOPHIL NFR BLD: 2 % (ref 0–7)
ERYTHROCYTE [DISTWIDTH] IN BLOOD BY AUTOMATED COUNT: 18 % (ref 11.5–14.5)
GLUCOSE SERPL-MCNC: 113 MG/DL (ref 65–100)
HCT VFR BLD AUTO: 23.9 % (ref 35–47)
HGB BLD-MCNC: 7.5 G/DL (ref 11.5–16)
IMM GRANULOCYTES # BLD AUTO: 0 K/UL (ref 0–0.04)
IMM GRANULOCYTES NFR BLD AUTO: 1 % (ref 0–0.5)
IRON SATN MFR SERPL: 23 % (ref 20–50)
IRON SERPL-MCNC: 38 UG/DL (ref 35–150)
LYMPHOCYTES # BLD: 1.1 K/UL (ref 0.8–3.5)
LYMPHOCYTES NFR BLD: 19 % (ref 12–49)
MCH RBC QN AUTO: 28.2 PG (ref 26–34)
MCHC RBC AUTO-ENTMCNC: 31.4 G/DL (ref 30–36.5)
MCV RBC AUTO: 89.8 FL (ref 80–99)
MONOCYTES # BLD: 0.8 K/UL (ref 0–1)
MONOCYTES NFR BLD: 15 % (ref 5–13)
NEUTS SEG # BLD: 3.5 K/UL (ref 1.8–8)
NEUTS SEG NFR BLD: 62 % (ref 32–75)
NRBC # BLD: 0 K/UL (ref 0–0.01)
NRBC BLD-RTO: 0 PER 100 WBC
PLATELET # BLD AUTO: 294 K/UL (ref 150–400)
PMV BLD AUTO: 8.6 FL (ref 8.9–12.9)
POTASSIUM SERPL-SCNC: 4 MMOL/L (ref 3.5–5.1)
RBC # BLD AUTO: 2.66 M/UL (ref 3.8–5.2)
SODIUM SERPL-SCNC: 137 MMOL/L (ref 136–145)
SPECIAL REQUESTS,SREQ: NORMAL
TIBC SERPL-MCNC: 168 UG/DL (ref 250–450)
WBC # BLD AUTO: 5.6 K/UL (ref 3.6–11)

## 2022-11-05 PROCEDURE — 74011250637 HC RX REV CODE- 250/637: Performed by: PHYSICIAN ASSISTANT

## 2022-11-05 PROCEDURE — 65270000029 HC RM PRIVATE

## 2022-11-05 PROCEDURE — 83540 ASSAY OF IRON: CPT

## 2022-11-05 PROCEDURE — 74011000250 HC RX REV CODE- 250: Performed by: ORTHOPAEDIC SURGERY

## 2022-11-05 PROCEDURE — 80048 BASIC METABOLIC PNL TOTAL CA: CPT

## 2022-11-05 PROCEDURE — 85025 COMPLETE CBC W/AUTO DIFF WBC: CPT

## 2022-11-05 PROCEDURE — 97110 THERAPEUTIC EXERCISES: CPT

## 2022-11-05 PROCEDURE — 36415 COLL VENOUS BLD VENIPUNCTURE: CPT

## 2022-11-05 PROCEDURE — 74011250637 HC RX REV CODE- 250/637: Performed by: ORTHOPAEDIC SURGERY

## 2022-11-05 PROCEDURE — 97530 THERAPEUTIC ACTIVITIES: CPT

## 2022-11-05 RX ORDER — LANOLIN ALCOHOL/MO/W.PET/CERES
1 CREAM (GRAM) TOPICAL 2 TIMES DAILY WITH MEALS
Status: DISCONTINUED | OUTPATIENT
Start: 2022-11-05 | End: 2022-11-07 | Stop reason: HOSPADM

## 2022-11-05 RX ADMIN — CELECOXIB 100 MG: 100 CAPSULE ORAL at 21:43

## 2022-11-05 RX ADMIN — Medication 1 TABLET: at 11:29

## 2022-11-05 RX ADMIN — ACETAMINOPHEN 650 MG: 325 TABLET, FILM COATED ORAL at 06:19

## 2022-11-05 RX ADMIN — FERROUS SULFATE TAB 325 MG (65 MG ELEMENTAL FE) 325 MG: 325 (65 FE) TAB at 17:25

## 2022-11-05 RX ADMIN — SENNOSIDES AND DOCUSATE SODIUM 1 TABLET: 50; 8.6 TABLET ORAL at 21:43

## 2022-11-05 RX ADMIN — FERROUS SULFATE TAB 325 MG (65 MG ELEMENTAL FE) 325 MG: 325 (65 FE) TAB at 11:29

## 2022-11-05 RX ADMIN — ESCITALOPRAM OXALATE 10 MG: 10 TABLET ORAL at 09:01

## 2022-11-05 RX ADMIN — SODIUM CHLORIDE, PRESERVATIVE FREE 10 ML: 5 INJECTION INTRAVENOUS at 06:19

## 2022-11-05 RX ADMIN — Medication 1 TABLET: at 09:00

## 2022-11-05 RX ADMIN — ATORVASTATIN CALCIUM 40 MG: 40 TABLET, FILM COATED ORAL at 21:43

## 2022-11-05 RX ADMIN — SENNOSIDES AND DOCUSATE SODIUM 1 TABLET: 50; 8.6 TABLET ORAL at 09:00

## 2022-11-05 RX ADMIN — ACETAMINOPHEN 650 MG: 325 TABLET, FILM COATED ORAL at 15:14

## 2022-11-05 RX ADMIN — SODIUM CHLORIDE, PRESERVATIVE FREE 10 ML: 5 INJECTION INTRAVENOUS at 21:43

## 2022-11-05 RX ADMIN — CELECOXIB 100 MG: 100 CAPSULE ORAL at 09:00

## 2022-11-05 RX ADMIN — POLYETHYLENE GLYCOL 3350 17 G: 17 POWDER, FOR SOLUTION ORAL at 09:01

## 2022-11-05 RX ADMIN — Medication 1 TABLET: at 17:25

## 2022-11-05 RX ADMIN — SODIUM CHLORIDE, PRESERVATIVE FREE 10 ML: 5 INJECTION INTRAVENOUS at 14:00

## 2022-11-05 RX ADMIN — ACETAMINOPHEN 650 MG: 325 TABLET, FILM COATED ORAL at 21:43

## 2022-11-05 RX ADMIN — TRAMADOL HYDROCHLORIDE 50 MG: 50 TABLET, COATED ORAL at 09:00

## 2022-11-05 NOTE — PROGRESS NOTES
Hospitalist Progress Note    Subjective:   Daily Progress Note: 11/5/2022 10:17 AM    Hospital Course: Patient is a 79 y.o. female with a past medical history of CHF, hyperlipidemia, coronary artery disease, anxiety that presented to the emergency room on 11/2/2002 for right hip pain after mechanical fall approximately 4 weeks ago. Prior to the event patient had no episodes of dizziness, lightheadedness, chest pain, shortness of breath, loss of consciousness. She said that her leg just gave out. In the ED vital signs stable. Laboratory data hemoglobin of 10.3, potassium 3.4, remaining electrolytes stable. Chest x-ray showed no acute cardiopulmonary process. CT of the pelvis showed a right intertrochanteric fracture and coccygeal fracture and osteoporosis. It was requested admission for orthopedic evaluation and for surgery. Orthopedic has cleared the patient for surgery. Had ORIF of the right hip on 11/3/2022. Subjective: Patient says she is hungry. Son in the room.      Current Facility-Administered Medications   Medication Dose Route Frequency    ferrous sulfate tablet 325 mg  1 Tablet Oral BID WITH MEALS    celecoxib (CELEBREX) capsule 100 mg  100 mg Oral BID    sodium chloride (NS) flush 5-40 mL  5-40 mL IntraVENous Q8H    sodium chloride (NS) flush 5-40 mL  5-40 mL IntraVENous PRN    naloxone (NARCAN) injection 0.4 mg  0.4 mg IntraVENous PRN    calcium-vitamin D 600 mg-5 mcg (200 unit) per tablet 1 Tablet  1 Tablet Oral TID WITH MEALS    senna-docusate (PERICOLACE) 8.6-50 mg per tablet 1 Tablet  1 Tablet Oral BID    polyethylene glycol (MIRALAX) packet 17 g  17 g Oral DAILY    bisacodyL (DULCOLAX) suppository 10 mg  10 mg Rectal DAILY PRN    acetaminophen (TYLENOL) tablet 650 mg  650 mg Oral Q8H    traMADoL (ULTRAM) tablet 50 mg  50 mg Oral Q6H PRN    oxyCODONE IR (ROXICODONE) tablet 5 mg  5 mg Oral Q4H PRN    [Held by provider] aspirin delayed-release tablet 81 mg  81 mg Oral BID    ergocalciferol capsule 50,000 Units  50,000 Units Oral Q7D    atorvastatin (LIPITOR) tablet 40 mg  40 mg Oral QHS    [Held by provider] clopidogreL (PLAVIX) tablet 75 mg  75 mg Oral DAILY    escitalopram oxalate (LEXAPRO) tablet 10 mg  10 mg Oral DAILY    lisinopriL (PRINIVIL, ZESTRIL) tablet 5 mg  5 mg Oral DAILY    sodium chloride (NS) flush 5-40 mL  5-40 mL IntraVENous PRN    acetaminophen (TYLENOL) tablet 650 mg  650 mg Oral Q6H PRN    Or    acetaminophen (TYLENOL) suppository 650 mg  650 mg Rectal Q6H PRN    polyethylene glycol (MIRALAX) packet 17 g  17 g Oral DAILY PRN    ondansetron (ZOFRAN ODT) tablet 4 mg  4 mg Oral Q8H PRN    Or    ondansetron (ZOFRAN) injection 4 mg  4 mg IntraVENous Q6H PRN        Review of Systems  Constitutional: No fevers, No chills, No sweats, No fatigue, + Weakness  Eyes: No redness  Ears, nose, mouth, throat, and face: No nasal congestion, No sore throat, No voice change  Respiratory: No Shortness of Breath, No cough, No wheezing  Cardiovascular: No chest pain, No palpitations, No extremity edema  Gastrointestinal: No nausea, No vomiting, No diarrhea, No abdominal pain  Genitourinary: No frequency, No dysuria, No hematuria  Integument/breast: No skin lesion(s)   Neurological: No Confusion, No headaches, No dizziness      Objective:     Visit Vitals  BP (!) 118/56 (BP 1 Location: Left upper arm, BP Patient Position: Supine)   Pulse 78   Temp 98 °F (36.7 °C)   Resp 17   Ht 5' 2\" (1.575 m)   Wt 55.2 kg (121 lb 11.1 oz)   SpO2 93%   BMI 22.26 kg/m²      O2 Device: None (Room air)    Temp (24hrs), Av °F (36.7 °C), Min:97.6 °F (36.4 °C), Max:98.6 °F (37 °C)      No intake/output data recorded.  1901 -  0700  In: 450 [I.V.:450]  Out: 250 [Urine:250]    PHYSICAL EXAM:  Constitutional: No acute distress  Skin: Extremities and face reveal no rashes. HEENT: Sclerae anicteric. Extra-occular muscles are intact. Cardiovascular: Regular rate and rhythm.    Respiratory:  Clear breath sounds bilaterally with no wheezes, rales, or rhonchi. GI: Abdomen nondistended, soft, and nontender. Normal active bowel sounds. Musculoskeletal: No pitting edema of the lower legs. Able to move all ext  Neurological:  Patient is alert and oriented. Cranial nerves II-XII grossly intact  Psychiatric: flat affect      Data Review    Recent Results (from the past 24 hour(s))   METABOLIC PANEL, BASIC    Collection Time: 11/05/22  7:05 AM   Result Value Ref Range    Sodium 137 136 - 145 mmol/L    Potassium 4.0 3.5 - 5.1 mmol/L    Chloride 104 97 - 108 mmol/L    CO2 28 21 - 32 mmol/L    Anion gap 5 5 - 15 mmol/L    Glucose 113 (H) 65 - 100 mg/dL    BUN 14 6 - 20 mg/dL    Creatinine 0.31 (L) 0.55 - 1.02 mg/dL    BUN/Creatinine ratio 45 (H) 12 - 20      eGFR >60 >60 ml/min/1.73m2    Calcium 9.0 8.5 - 10.1 mg/dL   CBC WITH AUTOMATED DIFF    Collection Time: 11/05/22  7:05 AM   Result Value Ref Range    WBC 5.6 3.6 - 11.0 K/uL    RBC 2.66 (L) 3.80 - 5.20 M/uL    HGB 7.5 (L) 11.5 - 16.0 g/dL    HCT 23.9 (L) 35.0 - 47.0 %    MCV 89.8 80.0 - 99.0 FL    MCH 28.2 26.0 - 34.0 PG    MCHC 31.4 30.0 - 36.5 g/dL    RDW 18.0 (H) 11.5 - 14.5 %    PLATELET 426 379 - 226 K/uL    MPV 8.6 (L) 8.9 - 12.9 FL    NRBC 0.0 0.0  WBC    ABSOLUTE NRBC 0.00 0.00 - 0.01 K/uL    NEUTROPHILS 62 32 - 75 %    LYMPHOCYTES 19 12 - 49 %    MONOCYTES 15 (H) 5 - 13 %    EOSINOPHILS 2 0 - 7 %    BASOPHILS 1 0 - 1 %    IMMATURE GRANULOCYTES 1 (H) 0 - 0.5 %    ABS. NEUTROPHILS 3.5 1.8 - 8.0 K/UL    ABS. LYMPHOCYTES 1.1 0.8 - 3.5 K/UL    ABS. MONOCYTES 0.8 0.0 - 1.0 K/UL    ABS. EOSINOPHILS 0.1 0.0 - 0.4 K/UL    ABS. BASOPHILS 0.0 0.0 - 0.1 K/UL    ABS. IMM. GRANS. 0.0 0.00 - 0.04 K/UL    DF AUTOMATED         Radiology review: CT of pelvis    Assessment:   1. Right Intertrochanteric fracture s/p ORIF #2  2. Coccygeal Fracture  3. Osteoporosis  4. History of heart failure with an EF of 30 to 35%  5. History of coronary artery disease status post PCI x1  6. Hyperlipidemia  7. Anxiety  8. Type 2 diabetes diet controlled  9. Acute on chronic anemia      Plan:    1.  CT of the pelvis shows right hip fracture. Had surgery on 11/3. Orthopedics consulted. IV morphine as needed for severe pain, tramadol for moderate pain, Tylenol for mild pain. PT and OT postop per orthopedics recommendations. 2.  Conservative treatment most likely at this time. Orthopedics consulted  3. Patient will need to follow-up in osteoporosis clinic in the outpatient setting. Vitamin D level 22.6. Start supplementatiion  4. Patient has a history of heart failure. Cardiology has cleared the patient. .  Patient currently denies any chest pain. Patient is on aspirin, statin, ACE. No beta-blocker due to normal to low blood pressure and bradycardia  5. Patient on aspirin, statin, Plavix. Holding aspirin and Plavix. 6.  Continue statin  7. Continue Lexapro  8. Patient's hemoglobin A1c 6.6. No need to treat at this time. Diet controlled  9. HGB trending down. Patient is asymptomatic at this time. We will start iron supplementation. Check iron profile. Patient does not accept blood products at this time. Dispo: 24-48 hours. Barriers include stabilization of hemoglobin. Unfortunately patient's insurance does not cover rehab so patient will go home with home health     [address] [place] [address] (son) [phone]     CODE STATUS full     DVT prophylaxis: Holding due to anemia   Ulcer prophylaxis: Not indicated    Care Plan discussed with: Patient/Family and  and RN    Total time spent with patient: 34 minutes.

## 2022-11-05 NOTE — PROGRESS NOTES
Doing well. Dressing change today - some bloody drainage overnight. Pain is well controlled. Acute blood loss anemia - HBG 7.5  asymptomatic. Recent Results (from the past 24 hour(s))   METABOLIC PANEL, BASIC    Collection Time: 11/05/22  7:05 AM   Result Value Ref Range    Sodium 137 136 - 145 mmol/L    Potassium 4.0 3.5 - 5.1 mmol/L    Chloride 104 97 - 108 mmol/L    CO2 28 21 - 32 mmol/L    Anion gap 5 5 - 15 mmol/L    Glucose 113 (H) 65 - 100 mg/dL    BUN 14 6 - 20 mg/dL    Creatinine 0.31 (L) 0.55 - 1.02 mg/dL    BUN/Creatinine ratio 45 (H) 12 - 20      eGFR >60 >60 ml/min/1.73m2    Calcium 9.0 8.5 - 10.1 mg/dL   CBC WITH AUTOMATED DIFF    Collection Time: 11/05/22  7:05 AM   Result Value Ref Range    WBC 5.6 3.6 - 11.0 K/uL    RBC 2.66 (L) 3.80 - 5.20 M/uL    HGB 7.5 (L) 11.5 - 16.0 g/dL    HCT 23.9 (L) 35.0 - 47.0 %    MCV 89.8 80.0 - 99.0 FL    MCH 28.2 26.0 - 34.0 PG    MCHC 31.4 30.0 - 36.5 g/dL    RDW 18.0 (H) 11.5 - 14.5 %    PLATELET 989 072 - 775 K/uL    MPV 8.6 (L) 8.9 - 12.9 FL    NRBC 0.0 0.0  WBC    ABSOLUTE NRBC 0.00 0.00 - 0.01 K/uL    NEUTROPHILS 62 32 - 75 %    LYMPHOCYTES 19 12 - 49 %    MONOCYTES 15 (H) 5 - 13 %    EOSINOPHILS 2 0 - 7 %    BASOPHILS 1 0 - 1 %    IMMATURE GRANULOCYTES 1 (H) 0 - 0.5 %    ABS. NEUTROPHILS 3.5 1.8 - 8.0 K/UL    ABS. LYMPHOCYTES 1.1 0.8 - 3.5 K/UL    ABS. MONOCYTES 0.8 0.0 - 1.0 K/UL    ABS. EOSINOPHILS 0.1 0.0 - 0.4 K/UL    ABS. BASOPHILS 0.0 0.0 - 0.1 K/UL    ABS. IMM.  GRANS. 0.0 0.00 - 0.04 K/UL    DF AUTOMATED

## 2022-11-05 NOTE — PROGRESS NOTES
Problem: Pressure Injury - Risk of  Goal: *Prevention of pressure injury  Description: Document Manfred Scale and appropriate interventions in the flowsheet. Outcome: Progressing Towards Goal  Note: Pressure Injury Interventions:  Sensory Interventions: Keep linens dry and wrinkle-free, Minimize linen layers    Moisture Interventions: Minimize layers    Activity Interventions: PT/OT evaluation    Mobility Interventions: PT/OT evaluation, Float heels    Nutrition Interventions: Document food/fluid/supplement intake    Friction and Shear Interventions: HOB 30 degrees or less, Minimize layers                Problem: Patient Education: Go to Patient Education Activity  Goal: Patient/Family Education  Outcome: Progressing Towards Goal     Problem: Pain  Goal: *Control of Pain  Outcome: Progressing Towards Goal     Problem: Patient Education: Go to Patient Education Activity  Goal: Patient/Family Education  Outcome: Progressing Towards Goal     Problem: Discharge Planning  Goal: *Discharge to safe environment  Outcome: Progressing Towards Goal  Goal: *Knowledge of medication management  Outcome: Progressing Towards Goal  Goal: *Knowledge of discharge instructions  Outcome: Progressing Towards Goal     Problem: Patient Education: Go to Patient Education Activity  Goal: Patient/Family Education  Outcome: Progressing Towards Goal     Problem: Hypertension  Goal: *Blood pressure within specified parameters  Outcome: Progressing Towards Goal  Goal: *Fluid volume balance  Outcome: Progressing Towards Goal  Goal: *Labs within defined limits  Outcome: Progressing Towards Goal     Problem: Patient Education: Go to Patient Education Activity  Goal: Patient/Family Education  Outcome: Progressing Towards Goal     Problem: Falls - Risk of  Goal: *Absence of Falls  Description: Document Chay Fall Risk and appropriate interventions in the flowsheet.   Outcome: Progressing Towards Goal     Problem: Patient Education: Go to Patient Education Activity  Goal: Patient/Family Education  Outcome: Progressing Towards Goal     Problem: Patient Education: Go to Patient Education Activity  Goal: Patient/Family Education  Outcome: Progressing Towards Goal     Problem: Patient Education: Go to Patient Education Activity  Goal: Patient/Family Education  Outcome: Progressing Towards Goal

## 2022-11-05 NOTE — ROUTINE PROCESS
Bedside verbal shift change report given to Nany Dennis RN (oncoming nurse) by Ashlee Ordonez RN (offgoing nurse). Report included the following information from night  shift events and SBAR.

## 2022-11-05 NOTE — PROGRESS NOTES
PHYSICAL THERAPY TREATMENT  Patient: Riley Orourke (79 y.o. female)  Date: 11/5/2022  Diagnosis: Hip fracture ([facility] [S72.009A] <principal problem not specified>  Procedure(s) (LRB):  Right Hip Gamma Nail Insertion (Right) 2 Days Post-Op  Precautions:    Chart, physical therapy assessment, plan of care and goals were reviewed. ASSESSMENT  Patient continues with skilled PT services and is progressing towards goals. Pt sitting in recliner upon PT arrival, agreeable to session. (See below for objective details and assist levels). Overall pt tolerated session well today with therex. Reviewed seated HEP, pt will require further review next tx. Reviewed TTWB, will also require further education on this next tx. Will continue to benefit from skilled PT services, and will continue to progress as tolerated. PLAN :  Patient continues to benefit from skilled intervention to address the above impairments. Continue treatment per established plan of care to address goals. Recommend with staff: Out of bed to chair for meals and Encourage HEP in prep for ADLs/mobility    Recommendation for discharge: (in order for the patient to meet his/her long term goals)  Home with [address] [place] [address]  Pt verbalized feeling nervous to return home. SUBJECTIVE:   Patient stated I'm not ready to go home.     OBJECTIVE DATA SUMMARY:   Critical Behavior:  Neurologic State: Alert  Orientation Level: Oriented X4  Cognition: Follows commands     Functional Mobility Training:  Bed Mobility:  Rolling: Stand-by assistance  Supine to Sit: Minimum assistance;Assist x1     Scooting: Contact guard assistance        Transfers:  Sit to Stand: Minimum assistance;Assist x1           Bed to Chair: Minimum assistance;Assist x1                    Balance:  Sitting: Impaired; Without support  Sitting - Static: Good (unsupported)  Sitting - Dynamic: Fair (occasional)  Standing: Impaired; Without support  Standing - Static: Fair;Constant support  Standing - Dynamic : Fair;Constant support  Ambulation/Gait Training:                                                        Stairs: Therapeutic Exercises:       EXERCISE   Sets   Reps   Active Active Assist   Passive Self ROM   Comments   Ankle Pumps 1 15 [x] [] [] []    Quad Sets/Glut Sets   [] [] [] []    Hamstring Sets   [] [] [] []    Short Arc Quads   [] [] [] []    Heel Slides 1 15 [x] [] [] [] LLE only   Straight Leg Raises   [] [] [] []    Hip abd/add 1 10 [x] [x] [] []    Long Arc Quads 1 10 [x] [] [] []    Marching   [] [] [] []       [] [] [] []       Pain Ratin/10    Activity Tolerance:   Fair    After treatment patient left in no apparent distress:   Bed locked and returned to lowest position, Sitting in chair and Call bell within reach    GOALS:    Problem: Mobility Impaired (Adult and Pediatric)  Goal: *Acute Goals and Plan of Care (Insert Text)  Description: FUNCTIONAL STATUS PRIOR TO ADMISSION: Patient required minimal assistance for basic and instrumental ADLs. Pt was mod I with rollator prior to fall 4 weeks ago, since then she has been bedbound and required assistance to stand pivot transfers to [facility]. HOME SUPPORT PRIOR TO ADMISSION: The patient lived with  and required moderate assistance  for stand pivot transfers to [facility] from son, who lives across the street from her. Physical Therapy Goals  Initiated 2022  Pt stated goal: to get better  Pt will be I with LE HEP in 7 days. Pt will perform bed mobility with mod I in 7 days. Pt will perform transfers with mod I in 7 days. Pt will amb 5-10 feet with LRAD safely with mod I in 7 days.        Outcome: Progressing Towards Goal             Maribel Kwong PT   Time Calculation: 13 mins

## 2022-11-05 NOTE — PROGRESS NOTES
Problem: Self Care Deficits Care Plan (Adult)  Goal: *Acute Goals and Plan of Care (Insert Text)  Description: FUNCTIONAL STATUS PRIOR TO ADMISSION: Pt reports had one fall within the last three months. She fell ~4 weeks and has been mostly bed bound but did complete SPT to [facility]. She completed most ADLs in bed w/ min A. Prior to her fall, she was IND. Her son helps daily. HOME SUPPORT: The patient lived with spouse. Son lives across the street. Occupational Therapy Goals  Initiated 11/4/2022    Pt stated goal \"I want to get better\"  Pt will be IND sup <> sit in prep for EOB ADLs  Pt will be Mod I grooming standing sink side LRAD  Pt will be IND UB dressing sitting EOB/long sit   Pt will be Mod I LE dressing sitting EOB/long sit  Pt will be Mod I sit <>  prep for toileting LRAD  Pt will be Mod I toileting/toilet transfer/cloth mgmt LRAD  Pt will be IND following UE HEP in prep for self care tasks   Outcome: Progressing Towards Goal     Problem: Patient Education: Go to Patient Education Activity  Goal: Patient/Family Education  Outcome: Progressing Towards Goal    OCCUPATIONAL THERAPY TREATMENT  Patient: Lesa Orourke (79 y.o. female)  Date: 11/5/2022  [facility]: [facility] [S72.009A] <principal problem not specified>  Procedure(s) (LRB):  Right Hip Gamma Nail Insertion (Right) 2 Days Post-Op  Precautions:    Chart, occupational therapy assessment, plan of care, and goals were reviewed. ASSESSMENT  Pt continues with skilled OT services and is progressing towards goals. Pt received semi-supine in bed upon arrival, AXO x4 and agreeable to OT tx at this time. Overall, pt continues to present with deficits in generalized strength/AROM, coordination, bed mobility, static/dynamic sitting and standing balance and functional activity tolerance during performance of ADLs/mobility (see below for objective details and assist levels).  Patient received supine in bed agreeable to working with therapy services. Once seated EOB, pt reviewed and performed single sest of 15 UB AROM exercises. Once finished, single STS performed using RW from EOB min Ax1. Pt stood approximately 1 min before requesting to sit secondary to fatigue through LLE. After resting, pt performed stand pivot to recliner min A with cueing for proper hand placement on recliner in preparation for safe transfer. Left pt semi supine in recliner with call bell within reach and all needs met. Will continue to progress. Recommend d/c to Home with Home Health Therapy once medically appropriate. Other factors to consider for discharge: family support         PLAN :  Patient continues to benefit from skilled intervention to address the above impairments. Continue treatment per established plan of care. to address goals. Recommendation for discharge: (in order for the patient to meet his/her long term goals)  Home with [address] [place] [address] and 24/7 family care    This discharge recommendation:  Has been made in collaboration with the attending provider and/or case management    IF patient discharges home will need the following DME: TBD       SUBJECTIVE:   Patient stated I'm scared to go home.     OBJECTIVE DATA SUMMARY:   Cognitive/Behavioral Status:  Neurologic State: Alert  Orientation Level: Oriented X4  Cognition: Follows commands    Functional Mobility and Transfers for ADLs:  Bed Mobility:  Rolling: Stand-by assistance  Supine to Sit: Minimum assistance;Assist x1  Scooting: Contact guard assistance    Transfers:  Sit to Stand: Minimum assistance;Assist x1     Bed to Chair: Minimum assistance;Assist x1    Balance:  Sitting: Impaired; Without support  Sitting - Static: Good (unsupported)  Sitting - Dynamic: Fair (occasional)  Standing: Impaired; Without support  Standing - Static: Fair;Constant support  Standing - Dynamic : Fair;Constant support      Therapeutic Exercises:   Exercise Sets Reps AROM AAROM PROM Self PROM Comments Shoulder flex/ext   [x] [] [] []    Elbow flex/ext   [x] [] [] []    Wrist flex/ext   [x] [] [] []      Pain:  0/10    Activity Tolerance:   Good    After treatment patient left in no apparent distress:   Sitting in chair, Heels elevated for pressure relief, and Call bell within reach, bed locked and in lowest position    COMMUNICATION/COLLABORATION:   The patients plan of care was discussed with: Physical therapy assistant.      Manuel Rondon  Time Calculation: 33 mins

## 2022-11-05 NOTE — PROGRESS NOTES
Bedside shift change report given to JENNIFER Forman (oncoming nurse) by Braden Quinones (offgoing nurse). Report included the following information SBAR.

## 2022-11-06 LAB
ANION GAP SERPL CALC-SCNC: 7 MMOL/L (ref 5–15)
BASOPHILS # BLD: 0.1 K/UL (ref 0–0.1)
BASOPHILS NFR BLD: 1 % (ref 0–1)
BUN SERPL-MCNC: 12 MG/DL (ref 6–20)
BUN/CREAT SERPL: 43 (ref 12–20)
CA-I BLD-MCNC: 8.7 MG/DL (ref 8.5–10.1)
CHLORIDE SERPL-SCNC: 104 MMOL/L (ref 97–108)
CO2 SERPL-SCNC: 28 MMOL/L (ref 21–32)
CREAT SERPL-MCNC: 0.28 MG/DL (ref 0.55–1.02)
DIFFERENTIAL METHOD BLD: ABNORMAL
EOSINOPHIL # BLD: 0.4 K/UL (ref 0–0.4)
EOSINOPHIL NFR BLD: 6 % (ref 0–7)
ERYTHROCYTE [DISTWIDTH] IN BLOOD BY AUTOMATED COUNT: 18.1 % (ref 11.5–14.5)
GLUCOSE BLD STRIP.AUTO-MCNC: 119 MG/DL (ref 65–100)
GLUCOSE BLD STRIP.AUTO-MCNC: 146 MG/DL (ref 65–100)
GLUCOSE BLD STRIP.AUTO-MCNC: 159 MG/DL (ref 65–100)
GLUCOSE SERPL-MCNC: 115 MG/DL (ref 65–100)
HCT VFR BLD AUTO: 26 % (ref 35–47)
HGB BLD-MCNC: 7.9 G/DL (ref 11.5–16)
IMM GRANULOCYTES # BLD AUTO: 0 K/UL (ref 0–0.04)
IMM GRANULOCYTES NFR BLD AUTO: 1 % (ref 0–0.5)
LYMPHOCYTES # BLD: 1 K/UL (ref 0.8–3.5)
LYMPHOCYTES NFR BLD: 15 % (ref 12–49)
MCH RBC QN AUTO: 27.4 PG (ref 26–34)
MCHC RBC AUTO-ENTMCNC: 30.4 G/DL (ref 30–36.5)
MCV RBC AUTO: 90.3 FL (ref 80–99)
MONOCYTES # BLD: 0.7 K/UL (ref 0–1)
MONOCYTES NFR BLD: 12 % (ref 5–13)
NEUTS SEG # BLD: 4.1 K/UL (ref 1.8–8)
NEUTS SEG NFR BLD: 65 % (ref 32–75)
NRBC # BLD: 0 K/UL (ref 0–0.01)
NRBC BLD-RTO: 0 PER 100 WBC
PERFORMED BY, TECHID: ABNORMAL
PLATELET # BLD AUTO: 315 K/UL (ref 150–400)
PMV BLD AUTO: 8.5 FL (ref 8.9–12.9)
POTASSIUM SERPL-SCNC: 4.3 MMOL/L (ref 3.5–5.1)
RBC # BLD AUTO: 2.88 M/UL (ref 3.8–5.2)
SODIUM SERPL-SCNC: 139 MMOL/L (ref 136–145)
WBC # BLD AUTO: 6.2 K/UL (ref 3.6–11)

## 2022-11-06 PROCEDURE — 74011000250 HC RX REV CODE- 250: Performed by: ORTHOPAEDIC SURGERY

## 2022-11-06 PROCEDURE — 74011250637 HC RX REV CODE- 250/637: Performed by: ORTHOPAEDIC SURGERY

## 2022-11-06 PROCEDURE — 65270000029 HC RM PRIVATE

## 2022-11-06 PROCEDURE — 36415 COLL VENOUS BLD VENIPUNCTURE: CPT

## 2022-11-06 PROCEDURE — 85025 COMPLETE CBC W/AUTO DIFF WBC: CPT

## 2022-11-06 PROCEDURE — 97530 THERAPEUTIC ACTIVITIES: CPT

## 2022-11-06 PROCEDURE — 74011250637 HC RX REV CODE- 250/637: Performed by: PHYSICIAN ASSISTANT

## 2022-11-06 PROCEDURE — 82962 GLUCOSE BLOOD TEST: CPT

## 2022-11-06 PROCEDURE — 80048 BASIC METABOLIC PNL TOTAL CA: CPT

## 2022-11-06 RX ADMIN — FERROUS SULFATE TAB 325 MG (65 MG ELEMENTAL FE) 325 MG: 325 (65 FE) TAB at 17:40

## 2022-11-06 RX ADMIN — SODIUM CHLORIDE, PRESERVATIVE FREE 10 ML: 5 INJECTION INTRAVENOUS at 06:26

## 2022-11-06 RX ADMIN — SODIUM CHLORIDE, PRESERVATIVE FREE 10 ML: 5 INJECTION INTRAVENOUS at 20:50

## 2022-11-06 RX ADMIN — ACETAMINOPHEN 650 MG: 325 TABLET, FILM COATED ORAL at 12:56

## 2022-11-06 RX ADMIN — Medication 1 TABLET: at 12:54

## 2022-11-06 RX ADMIN — SENNOSIDES AND DOCUSATE SODIUM 1 TABLET: 50; 8.6 TABLET ORAL at 10:12

## 2022-11-06 RX ADMIN — Medication 1 TABLET: at 10:11

## 2022-11-06 RX ADMIN — POLYETHYLENE GLYCOL 3350 17 G: 17 POWDER, FOR SOLUTION ORAL at 10:12

## 2022-11-06 RX ADMIN — CELECOXIB 100 MG: 100 CAPSULE ORAL at 21:00

## 2022-11-06 RX ADMIN — SODIUM CHLORIDE, PRESERVATIVE FREE 10 ML: 5 INJECTION INTRAVENOUS at 12:57

## 2022-11-06 RX ADMIN — ACETAMINOPHEN 650 MG: 325 TABLET, FILM COATED ORAL at 06:16

## 2022-11-06 RX ADMIN — OXYCODONE 5 MG: 5 TABLET ORAL at 17:40

## 2022-11-06 RX ADMIN — SENNOSIDES AND DOCUSATE SODIUM 1 TABLET: 50; 8.6 TABLET ORAL at 20:46

## 2022-11-06 RX ADMIN — ATORVASTATIN CALCIUM 40 MG: 40 TABLET, FILM COATED ORAL at 20:47

## 2022-11-06 RX ADMIN — ACETAMINOPHEN 650 MG: 325 TABLET, FILM COATED ORAL at 20:47

## 2022-11-06 RX ADMIN — ESCITALOPRAM OXALATE 10 MG: 10 TABLET ORAL at 10:11

## 2022-11-06 RX ADMIN — LISINOPRIL 5 MG: 10 TABLET ORAL at 10:12

## 2022-11-06 RX ADMIN — Medication 1 TABLET: at 17:40

## 2022-11-06 RX ADMIN — CELECOXIB 100 MG: 100 CAPSULE ORAL at 10:11

## 2022-11-06 RX ADMIN — FERROUS SULFATE TAB 325 MG (65 MG ELEMENTAL FE) 325 MG: 325 (65 FE) TAB at 10:11

## 2022-11-06 NOTE — PROGRESS NOTES
Hospitalist Progress Note    Subjective:   Daily Progress Note: 11/6/2022 10:17 AM    Hospital Course: Patient is a 79 y.o. female with a past medical history of CHF, hyperlipidemia, coronary artery disease, anxiety that presented to the emergency room on 11/2/2002 for right hip pain after mechanical fall approximately 4 weeks ago. Prior to the event patient had no episodes of dizziness, lightheadedness, chest pain, shortness of breath, loss of consciousness. She said that her leg just gave out. In the ED vital signs stable. Laboratory data hemoglobin of 10.3, potassium 3.4, remaining electrolytes stable. Chest x-ray showed no acute cardiopulmonary process. CT of the pelvis showed a right intertrochanteric fracture and coccygeal fracture and osteoporosis. It was requested admission for orthopedic evaluation and for surgery. Orthopedic has cleared the patient for surgery. Had ORIF of the right hip on 11/3/2022. HGB dropped to 7.5. Iron  38, TIBC 168, Iron % saturation 23%. Started on oral iron.        Subjective: Patient says she has some pain in her leg    Current Facility-Administered Medications   Medication Dose Route Frequency    ferrous sulfate tablet 325 mg  1 Tablet Oral BID WITH MEALS    celecoxib (CELEBREX) capsule 100 mg  100 mg Oral BID    sodium chloride (NS) flush 5-40 mL  5-40 mL IntraVENous Q8H    sodium chloride (NS) flush 5-40 mL  5-40 mL IntraVENous PRN    naloxone (NARCAN) injection 0.4 mg  0.4 mg IntraVENous PRN    calcium-vitamin D 600 mg-5 mcg (200 unit) per tablet 1 Tablet  1 Tablet Oral TID WITH MEALS    senna-docusate (PERICOLACE) 8.6-50 mg per tablet 1 Tablet  1 Tablet Oral BID    polyethylene glycol (MIRALAX) packet 17 g  17 g Oral DAILY    bisacodyL (DULCOLAX) suppository 10 mg  10 mg Rectal DAILY PRN    acetaminophen (TYLENOL) tablet 650 mg  650 mg Oral Q8H    traMADoL (ULTRAM) tablet 50 mg  50 mg Oral Q6H PRN    oxyCODONE IR (ROXICODONE) tablet 5 mg  5 mg Oral Q4H PRN    [Held by provider] aspirin delayed-release tablet 81 mg  81 mg Oral BID    ergocalciferol capsule 50,000 Units  50,000 Units Oral Q7D    atorvastatin (LIPITOR) tablet 40 mg  40 mg Oral QHS    [Held by provider] clopidogreL (PLAVIX) tablet 75 mg  75 mg Oral DAILY    escitalopram oxalate (LEXAPRO) tablet 10 mg  10 mg Oral DAILY    lisinopriL (PRINIVIL, ZESTRIL) tablet 5 mg  5 mg Oral DAILY    sodium chloride (NS) flush 5-40 mL  5-40 mL IntraVENous PRN    acetaminophen (TYLENOL) tablet 650 mg  650 mg Oral Q6H PRN    Or    acetaminophen (TYLENOL) suppository 650 mg  650 mg Rectal Q6H PRN    polyethylene glycol (MIRALAX) packet 17 g  17 g Oral DAILY PRN    ondansetron (ZOFRAN ODT) tablet 4 mg  4 mg Oral Q8H PRN    Or    ondansetron (ZOFRAN) injection 4 mg  4 mg IntraVENous Q6H PRN        Review of Systems  Constitutional: No fevers, No chills, No sweats, No fatigue, + Weakness  Eyes: No redness  Ears, nose, mouth, throat, and face: No nasal congestion, No sore throat, No voice change  Respiratory: No Shortness of Breath, No cough, No wheezing  Cardiovascular: No chest pain, No palpitations, No extremity edema  Gastrointestinal: No nausea, No vomiting, No diarrhea, No abdominal pain  Genitourinary: No frequency, No dysuria, No hematuria  Integument/breast: No skin lesion(s)   Neurological: No Confusion, No headaches, No dizziness      Objective:     Visit Vitals  /64 (BP 1 Location: Left upper arm, BP Patient Position: At rest)   Pulse 82   Temp 98 °F (36.7 °C)   Resp 17   Ht 5' 2\" (1.575 m)   Wt 55.7 kg (122 lb 12.7 oz)   SpO2 91%   BMI 22.46 kg/m²      O2 Device: None (Room air)    Temp (24hrs), Av.9 °F (36.6 °C), Min:97.7 °F (36.5 °C), Max:98 °F (36.7 °C)      No intake/output data recorded.  1901 -  0700  In: 550 [P.O.:550]  Out: -     PHYSICAL EXAM:  Constitutional: No acute distress  Skin: Extremities and face reveal no rashes. HEENT: Sclerae anicteric. Extra-occular muscles are intact. Cardiovascular: RRR  Respiratory:  Clear breath sounds bilaterally with no wheezes, rales, or rhonchi. GI: Abdomen nondistended, soft, and nontender. Normal active bowel sounds. Musculoskeletal: No pitting edema of the lower legs. Able to move all ext  Neurological:  Patient is alert and oriented. Cranial nerves II-XII grossly intact  Psychiatric: flat affect      Data Review    Recent Results (from the past 24 hour(s))   CBC WITH AUTOMATED DIFF    Collection Time: 11/06/22  7:10 AM   Result Value Ref Range    WBC 6.2 3.6 - 11.0 K/uL    RBC 2.88 (L) 3.80 - 5.20 M/uL    HGB 7.9 (L) 11.5 - 16.0 g/dL    HCT 26.0 (L) 35.0 - 47.0 %    MCV 90.3 80.0 - 99.0 FL    MCH 27.4 26.0 - 34.0 PG    MCHC 30.4 30.0 - 36.5 g/dL    RDW 18.1 (H) 11.5 - 14.5 %    PLATELET 997 989 - 839 K/uL    MPV 8.5 (L) 8.9 - 12.9 FL    NRBC 0.0 0.0  WBC    ABSOLUTE NRBC 0.00 0.00 - 0.01 K/uL    NEUTROPHILS 65 32 - 75 %    LYMPHOCYTES 15 12 - 49 %    MONOCYTES 12 5 - 13 %    EOSINOPHILS 6 0 - 7 %    BASOPHILS 1 0 - 1 %    IMMATURE GRANULOCYTES 1 (H) 0 - 0.5 %    ABS. NEUTROPHILS 4.1 1.8 - 8.0 K/UL    ABS. LYMPHOCYTES 1.0 0.8 - 3.5 K/UL    ABS. MONOCYTES 0.7 0.0 - 1.0 K/UL    ABS. EOSINOPHILS 0.4 0.0 - 0.4 K/UL    ABS. BASOPHILS 0.1 0.0 - 0.1 K/UL    ABS. IMM. GRANS. 0.0 0.00 - 0.04 K/UL    DF AUTOMATED         Radiology review: CT of pelvis    Assessment:   1. Right Intertrochanteric fracture s/p ORIF #3  2. Coccygeal Fracture  3. Osteoporosis  4. History of heart failure with an EF of 30 to 35%  5. History of coronary artery disease status post PCI x1  6. Hyperlipidemia  7. Anxiety  8. Type 2 diabetes diet controlled  9. Acute on chronic anemia      Plan:    1.  CT of the pelvis shows right hip fracture. Had surgery on 11/3. Orthopedics consulted. IV morphine as needed for severe pain, tramadol for moderate pain, Tylenol for mild pain. PT and OT postop per orthopedics recommendations.    2.  Conservative treatment most likely at this time. Orthopedics consulted  3. Patient will need to follow-up in osteoporosis clinic in the outpatient setting. Vitamin D level 22.6. Started supplementation  4. Patient has a history of heart failure. Cardiology has cleared the patient. .  Patient currently denies any chest pain. Patient is on aspirin, statin, ACE. No beta-blocker due to normal to low blood pressure and bradycardia  5. Patient on aspirin, statin, Plavix. Holding aspirin and Plavix. 6.  Continue statin  7. Continue Lexapro  8. Patient's hemoglobin A1c 6.6. No need to treat at this time. Diet controlled  9. HGB trending down. Patient is asymptomatic at this time. We will start iron supplementation. Iron profile reviewed. Patient does not accept blood products at this time. Asymptomatic. Dispo: 24-48 hours. Barriers include stabilization of hemoglobin. Unfortunately patient's insurance does not cover rehab so patient will go home with home health. She states she does not feel ready to go home yet. [address] [facility] (son) [phone]     CODE STATUS full     DVT prophylaxis: Holding due to anemia   Ulcer prophylaxis: Not indicated    Care Plan discussed with: Patient/Family and  and RN    Total time spent with patient: 33 minutes.

## 2022-11-06 NOTE — PROGRESS NOTES
Problem: Self Care Deficits Care Plan (Adult)  Goal: *Acute Goals and Plan of Care (Insert Text)  Description: FUNCTIONAL STATUS PRIOR TO ADMISSION: Pt reports had one fall within the last three months. She fell ~4 weeks and has been mostly bed bound but did complete SPT to [facility]. She completed most ADLs in bed w/ min A. Prior to her fall, she was IND. Her son helps daily. HOME SUPPORT: The patient lived with spouse. Son lives across the street. Occupational Therapy Goals  Initiated 11/4/2022    Pt stated goal \"I want to get better\"  Pt will be IND sup <> sit in prep for EOB ADLs  Pt will be Mod I grooming standing sink side LRAD  Pt will be IND UB dressing sitting EOB/long sit   Pt will be Mod I LE dressing sitting EOB/long sit  Pt will be Mod I sit <>  prep for toileting LRAD  Pt will be Mod I toileting/toilet transfer/cloth mgmt LRAD  Pt will be IND following UE HEP in prep for self care tasks   Outcome: Progressing Towards Goal     Problem: Patient Education: Go to Patient Education Activity  Goal: Patient/Family Education  Outcome: Progressing Towards Goal    OCCUPATIONAL THERAPY TREATMENT  Patient: Barbara Horta. Arthuro Locus (79 y.o. female)  Date: 11/6/2022  Diagnosis: Hip fracture ([facility] [address] [S72.009A] <principal problem not specified>  Procedure(s) (LRB):  Right Hip Gamma Nail Insertion (Right) 3 Days Post-Op  Precautions:    Chart, occupational therapy assessment, plan of care, and goals were reviewed. ASSESSMENT  Pt continues with skilled OT services and is progressing towards goals. Pt received semi-supine in bed upon arrival, AXO x4 and agreeable to OT tx at this time. Overall, pt continues to present with deficits in generalized strength/AROM, coordination, bed mobility, static/dynamic sitting and standing balance and functional activity tolerance during performance of ADLs/mobility (see below for objective details and assist levels).  Patient received supine in bed agreeable to participate with therapy services. Pt completed bed mobility with initially requesting assistance but once training on use of bed rails, pt able to complete task herself. Once EOB, family training and patient training provided on safe transfer technique of stand pivot since family taking pt home. Due to two therapists present, demonstrated with pt hopping few steps to recliner. While hopping, pt educated on bearing weight through arms, staying TTWB through RLE and L knee buckling some and pt complaining of increased shoulder strain. Once in recliner, pt educated on both UE and LE exercises to complete throughout the day with pt returning demonstration. Left pt semi supine in recliner with call bell within reach and all needs met. Will continue to progress. Educated pt son on gait belt usage, proper placement of BSC and chair next to hospital bed for at home. Recommend d/c to Home with Home Health Therapy once medically appropriate. Other factors to consider for discharge: PLOF, family support         PLAN :  Patient continues to benefit from skilled intervention to address the above impairments. Continue treatment per established plan of care. to address goals. Recommend with staff: Encourage HEP in prep for ADLs/mobility    Recommend next session: Toileting    Recommendation for discharge: (in order for the patient to meet his/her long term goals)  Home with [facility]    This discharge recommendation:  Has been made in collaboration with the attending provider and/or case management    IF patient discharges home will need the following DME: TBD       SUBJECTIVE:   Patient stated I'm glad you're here to educate my son too.     OBJECTIVE DATA SUMMARY:   Cognitive/Behavioral Status:  Neurologic State: Alert (Simultaneous filing. User may not have seen previous data.)  Orientation Level: Oriented X4 (Simultaneous filing. User may not have seen previous data.)  Cognition: Follows commands (Simultaneous filing.  User may not have seen previous data.)             Functional Mobility and Transfers for ADLs:  Bed Mobility:  Rolling: Stand-by assistance  Supine to Sit: Contact guard assistance  Scooting: Contact guard assistance    Transfers:  Sit to Stand: Minimum assistance;Assist x2     Bed to Chair: Moderate assistance;Assist x2    Balance:  Sitting: Without support; Impaired  Sitting - Static: Good (unsupported)  Sitting - Dynamic: Fair (occasional)  Standing: Impaired; Without support  Standing - Static: Constant support;Good  Standing - Dynamic : Fair;Constant support    Therapeutic Exercises:   Exercise Sets Reps AROM AAROM PROM Self PROM Comments   Shoulder flex/ext   [x] [] [] []    Elbow flex/ext   [x] [] [] []    Wrist flex/ext   [x] [] [] []                  Pain:  0/10    Activity Tolerance:   Good    After treatment patient left in no apparent distress:   Sitting in chair, Call bell within reach, and Caregiver / family present, bed locked and in lowest position    COMMUNICATION/COLLABORATION:   The patients plan of care was discussed with: Physical therapy assistant. Co treatment provided with PTA this date for family training purposes and in order to trial steps towards reclinerJosé Sánchez  Time Calculation: 28 mins

## 2022-11-06 NOTE — PROGRESS NOTES
Problem: Mobility Impaired (Adult and Pediatric)  Goal: *Acute Goals and Plan of Care (Insert Text)  Description: FUNCTIONAL STATUS PRIOR TO ADMISSION: Patient required minimal assistance for basic and instrumental ADLs. Pt was mod I with rollator prior to fall 4 weeks ago, since then she has been bedbound and required assistance to stand pivot transfers to [facility]. HOME SUPPORT PRIOR TO ADMISSION: The patient lived with  and required moderate assistance  for stand pivot transfers to [facility] from son, who lives across the street from her. Physical Therapy Goals  Initiated 11/4/2022  Pt stated goal: to get better  Pt will be I with LE HEP in 7 days. Pt will perform bed mobility with mod I in 7 days. Pt will perform transfers with mod I in 7 days. Pt will amb 5-10 feet with LRAD safely with mod I in 7 days. Outcome: Progressing Towards Goal   PHYSICAL THERAPY TREATMENT  Patient: Milana Gallego (79 y.o. female)  Date: 11/6/2022  Diagnosis: Hip fracture ([address] [S72.009A] <principal problem not specified>  Procedure(s) (LRB):  Right Hip Gamma Nail Insertion (Right) 3 Days Post-Op  Precautions:    Chart, physical therapy assessment, plan of care and goals were reviewed. ASSESSMENT  Patient continues with skilled PT services and is progressing towards goals. Pt semi-supine upon PT arrival, agreeable to session. (See below for objective details and assist levels). Co-treat today due to low Hgb levels and wanted to perform \"hopping\" with patient for OOB transfers to simulate home setup. Son present for session so discussed home transfers and safety with him and patient. Overall pt tolerated session well today with ability to hop a few feet bed>recliner with RW and modA x 2 as well as additional time to complete and cues needed for sequencing as well as compliance to NWB status and with good carryover from patient. She did have difficulty during the pivot maintaining TTWB slightly.  Will continue to benefit from skilled PT services, and will continue to progress as tolerated. Current Level of Function Impacting Discharge (mobility/balance): decr strength, decr mobility, decr endurance    Other factors to consider for discharge: fall risk, TTWB RLE          PLAN :  Patient continues to benefit from skilled intervention to address the above impairments. Continue treatment per established plan of care to address goals. Recommend with staff: Out of bed to chair for meals, Encourage HEP in prep for ADLs/mobility, Use of bed/chair alarm for safety, and LE elevation for management of edema    Recommendation for discharge: (in order for the patient to meet his/her long term goals)  Home with [address] [place] [address]    This discharge recommendation:  Has been made in collaboration with the attending provider and/or case management    IF patient discharges home will need the following DME: rolling walker and to be determined (TBD)       SUBJECTIVE:   Patient stated I don't have pain but I can't lift that leg.     OBJECTIVE DATA SUMMARY:   Critical Behavior:  Neurologic State: Alert (Simultaneous filing. User may not have seen previous data.)  Orientation Level: Oriented X4 (Simultaneous filing. User may not have seen previous data.)  Cognition: Follows commands (Simultaneous filing. User may not have seen previous data.)     Functional Mobility Training:  Bed Mobility:  Rolling: Stand-by assistance  Supine to Sit: Contact guard assistance     Scooting: Contact guard assistance        Transfers:  Sit to Stand: Minimum assistance;Assist x2  Stand to Sit: Minimum assistance;Assist x2        Bed to Chair: Moderate assistance;Assist x2    Balance:  Sitting: Without support; Impaired  Sitting - Static: Good (unsupported)  Sitting - Dynamic: Fair (occasional)  Standing: Impaired; Without support  Standing - Static: Constant support;Good  Standing - Dynamic : Fair;Constant support  Ambulation/Gait Training:     Patient performed \"hopping\" bed>chair for ~4 feet with RW and modA x 2 with additional time. Good compliance to WBing status of RLE     Therapeutic Exercises:       EXERCISE   Sets   Reps   Active Active Assist   Passive Self ROM   Comments   Ankle Pumps 1 20 [x] [] [] []    Quad Sets/Glut Sets   [] [] [] []    Hamstring Sets   [] [] [] []    Short Arc Quads   [] [] [] []    Heel Slides   [] [] [] []    Straight Leg Raises   [] [] [] []    Hip abd/add   [] [] [] []    Long Arc Quads 1 10 [x] [] [] []    Marching 1 10 [x] [] [] []       [] [] [] []    All exercises performed seated in recliner        Pain Ratin/10     Activity Tolerance:   Good, SpO2 stable on RA, and requires rest breaks    After treatment patient left in no apparent distress:   Bed locked and returned to lowest position, Sitting in chair, Call bell within reach, and Caregiver / family present      COMMUNICATION/COLLABORATION:   The patients plan of care was discussed with: Physical therapist, Occupational therapy assistant, and Registered nurse.      Tiburcio Brown, PT   Time Calculation: 28 mins

## 2022-11-06 NOTE — PROGRESS NOTES
Problem: Pressure Injury - Risk of  Goal: *Prevention of pressure injury  Description: Document Manfred Scale and appropriate interventions in the flowsheet. Outcome: Progressing Towards Goal  Note: Pressure Injury Interventions:  Sensory Interventions: Keep linens dry and wrinkle-free    Moisture Interventions: Minimize layers    Activity Interventions: PT/OT evaluation    Mobility Interventions: HOB 30 degrees or less, PT/OT evaluation    Nutrition Interventions: Document food/fluid/supplement intake    Friction and Shear Interventions: HOB 30 degrees or less                Problem: Patient Education: Go to Patient Education Activity  Goal: Patient/Family Education  Outcome: Progressing Towards Goal     Problem: Pain  Goal: *Control of Pain  Outcome: Progressing Towards Goal     Problem: Patient Education: Go to Patient Education Activity  Goal: Patient/Family Education  Outcome: Progressing Towards Goal     Problem: Discharge Planning  Goal: *Discharge to safe environment  Outcome: Progressing Towards Goal  Goal: *Knowledge of medication management  Outcome: Progressing Towards Goal  Goal: *Knowledge of discharge instructions  Outcome: Progressing Towards Goal     Problem: Patient Education: Go to Patient Education Activity  Goal: Patient/Family Education  Outcome: Progressing Towards Goal     Problem: Hypertension  Goal: *Blood pressure within specified parameters  Outcome: Progressing Towards Goal  Goal: *Fluid volume balance  Outcome: Progressing Towards Goal  Goal: *Labs within defined limits  Outcome: Progressing Towards Goal     Problem: Patient Education: Go to Patient Education Activity  Goal: Patient/Family Education  Outcome: Progressing Towards Goal     Problem: Falls - Risk of  Goal: *Absence of Falls  Description: Document Chay Fall Risk and appropriate interventions in the flowsheet.   Outcome: Progressing Towards Goal  Note: Fall Risk Interventions:  Mobility Interventions: Bed/chair exit alarm, Patient to call before getting OOB         Medication Interventions: Bed/chair exit alarm    Elimination Interventions: Bed/chair exit alarm, Call light in reach    History of Falls Interventions: Bed/chair exit alarm         Problem: Patient Education: Go to Patient Education Activity  Goal: Patient/Family Education  Outcome: Progressing Towards Goal     Problem: Patient Education: Go to Patient Education Activity  Goal: Patient/Family Education  Outcome: Progressing Towards Goal     Problem: Patient Education: Go to Patient Education Activity  Goal: Patient/Family Education  Outcome: Progressing Towards Goal

## 2022-11-07 VITALS
OXYGEN SATURATION: 96 % | HEIGHT: 62 IN | WEIGHT: 122.8 LBS | BODY MASS INDEX: 22.6 KG/M2 | HEART RATE: 75 BPM | RESPIRATION RATE: 16 BRPM | SYSTOLIC BLOOD PRESSURE: 115 MMHG | DIASTOLIC BLOOD PRESSURE: 56 MMHG | TEMPERATURE: 98.3 F

## 2022-11-07 LAB
BASOPHILS # BLD: 0 K/UL (ref 0–0.1)
BASOPHILS NFR BLD: 1 % (ref 0–1)
DIFFERENTIAL METHOD BLD: ABNORMAL
EOSINOPHIL # BLD: 0.4 K/UL (ref 0–0.4)
EOSINOPHIL NFR BLD: 8 % (ref 0–7)
ERYTHROCYTE [DISTWIDTH] IN BLOOD BY AUTOMATED COUNT: 18.2 % (ref 11.5–14.5)
GLUCOSE BLD STRIP.AUTO-MCNC: 128 MG/DL (ref 65–100)
HCT VFR BLD AUTO: 27.7 % (ref 35–47)
HGB BLD-MCNC: 8.5 G/DL (ref 11.5–16)
IMM GRANULOCYTES # BLD AUTO: 0 K/UL (ref 0–0.04)
IMM GRANULOCYTES NFR BLD AUTO: 1 % (ref 0–0.5)
LYMPHOCYTES # BLD: 1 K/UL (ref 0.8–3.5)
LYMPHOCYTES NFR BLD: 17 % (ref 12–49)
MCH RBC QN AUTO: 28 PG (ref 26–34)
MCHC RBC AUTO-ENTMCNC: 30.7 G/DL (ref 30–36.5)
MCV RBC AUTO: 91.1 FL (ref 80–99)
MONOCYTES # BLD: 0.6 K/UL (ref 0–1)
MONOCYTES NFR BLD: 11 % (ref 5–13)
NEUTS SEG # BLD: 3.5 K/UL (ref 1.8–8)
NEUTS SEG NFR BLD: 62 % (ref 32–75)
NRBC # BLD: 0 K/UL (ref 0–0.01)
NRBC BLD-RTO: 0 PER 100 WBC
PERFORMED BY, TECHID: ABNORMAL
PLATELET # BLD AUTO: 351 K/UL (ref 150–400)
PMV BLD AUTO: 8.6 FL (ref 8.9–12.9)
RBC # BLD AUTO: 3.04 M/UL (ref 3.8–5.2)
WBC # BLD AUTO: 5.6 K/UL (ref 3.6–11)

## 2022-11-07 PROCEDURE — 36415 COLL VENOUS BLD VENIPUNCTURE: CPT

## 2022-11-07 PROCEDURE — 97530 THERAPEUTIC ACTIVITIES: CPT

## 2022-11-07 PROCEDURE — 74011250637 HC RX REV CODE- 250/637: Performed by: PHYSICIAN ASSISTANT

## 2022-11-07 PROCEDURE — 74011250637 HC RX REV CODE- 250/637: Performed by: ORTHOPAEDIC SURGERY

## 2022-11-07 PROCEDURE — 74011000250 HC RX REV CODE- 250: Performed by: ORTHOPAEDIC SURGERY

## 2022-11-07 PROCEDURE — 74011250637 HC RX REV CODE- 250/637: Performed by: INTERNAL MEDICINE

## 2022-11-07 PROCEDURE — 85025 COMPLETE CBC W/AUTO DIFF WBC: CPT

## 2022-11-07 PROCEDURE — 82962 GLUCOSE BLOOD TEST: CPT

## 2022-11-07 RX ORDER — CALCIUM CARBONATE/VITAMIN D3 600MG-5MCG
1 TABLET ORAL
Qty: 90 TABLET | Refills: 0 | Status: SHIPPED | OUTPATIENT
Start: 2022-11-07 | End: 2022-12-07

## 2022-11-07 RX ORDER — LANOLIN ALCOHOL/MO/W.PET/CERES
325 CREAM (GRAM) TOPICAL 2 TIMES DAILY WITH MEALS
Qty: 60 TABLET | Refills: 0 | Status: SHIPPED | OUTPATIENT
Start: 2022-11-07 | End: 2022-12-07

## 2022-11-07 RX ORDER — GUAIFENESIN 100 MG/5ML
81 LIQUID (ML) ORAL DAILY
Status: DISCONTINUED | OUTPATIENT
Start: 2022-11-07 | End: 2022-11-07 | Stop reason: HOSPADM

## 2022-11-07 RX ORDER — AMOXICILLIN 250 MG
1 CAPSULE ORAL 2 TIMES DAILY
Qty: 60 TABLET | Refills: 0 | Status: SHIPPED | OUTPATIENT
Start: 2022-11-07 | End: 2022-12-07

## 2022-11-07 RX ORDER — TRAMADOL HYDROCHLORIDE 50 MG/1
50 TABLET ORAL
Qty: 12 TABLET | Refills: 0 | Status: SHIPPED | OUTPATIENT
Start: 2022-11-07 | End: 2022-11-10

## 2022-11-07 RX ORDER — ACETAMINOPHEN 325 MG/1
650 TABLET ORAL
Qty: 120 TABLET | Refills: 0 | Status: SHIPPED
Start: 2022-11-07 | End: 2022-12-07

## 2022-11-07 RX ADMIN — SODIUM CHLORIDE, PRESERVATIVE FREE 10 ML: 5 INJECTION INTRAVENOUS at 06:20

## 2022-11-07 RX ADMIN — FERROUS SULFATE TAB 325 MG (65 MG ELEMENTAL FE) 325 MG: 325 (65 FE) TAB at 08:43

## 2022-11-07 RX ADMIN — Medication 1 TABLET: at 11:21

## 2022-11-07 RX ADMIN — ESCITALOPRAM OXALATE 10 MG: 10 TABLET ORAL at 08:43

## 2022-11-07 RX ADMIN — SENNOSIDES AND DOCUSATE SODIUM 1 TABLET: 50; 8.6 TABLET ORAL at 08:43

## 2022-11-07 RX ADMIN — ASPIRIN 81 MG 81 MG: 81 TABLET ORAL at 11:21

## 2022-11-07 RX ADMIN — TRAMADOL HYDROCHLORIDE 50 MG: 50 TABLET, COATED ORAL at 11:21

## 2022-11-07 RX ADMIN — ACETAMINOPHEN 650 MG: 325 TABLET, FILM COATED ORAL at 14:11

## 2022-11-07 RX ADMIN — Medication 1 TABLET: at 08:43

## 2022-11-07 RX ADMIN — LISINOPRIL 5 MG: 10 TABLET ORAL at 08:43

## 2022-11-07 RX ADMIN — OXYCODONE 5 MG: 5 TABLET ORAL at 08:43

## 2022-11-07 RX ADMIN — CELECOXIB 100 MG: 100 CAPSULE ORAL at 08:43

## 2022-11-07 RX ADMIN — TRAMADOL HYDROCHLORIDE 50 MG: 50 TABLET, COATED ORAL at 03:33

## 2022-11-07 RX ADMIN — POLYETHYLENE GLYCOL 3350 17 G: 17 POWDER, FOR SOLUTION ORAL at 08:49

## 2022-11-07 NOTE — PROGRESS NOTES
Progress Note    Patient: Magalie Hernández MRN: [id number]  SSN: [id number]    YOB: 1940  Age: 79 y.o. Sex: female      Admit Date: 11/2/2022    LOS: 5 days     Subjective:     No acute events overnight  Objective:     Vitals:    11/06/22 2007 11/07/22 0152 11/07/22 0248 11/07/22 0759   BP: 129/68 123/65 112/61 135/72   Pulse: 81  78 73   Resp: 19 20 18 18   Temp: 97.8 °F (36.6 °C) 97.7 °F (36.5 °C) 97.4 °F (36.3 °C) 97.7 °F (36.5 °C)   SpO2: 95% 96% 97% 98%   Weight:       Height:            Intake and Output:  Current Shift: No intake/output data recorded. Last three shifts: 11/05 1901 - 11/07 0700  In: -   Out: 300 [Urine:300]    Physical Exam:   General:  Alert, cooperative, no distress, appears stated age. Eyes:  Conjunctivae/corneas clear. PERRL, EOMs intact. Fundi benign   Ears:  Normal TMs and external ear canals both ears. Nose: Nares normal. Septum midline. Mucosa normal. No drainage or sinus tenderness. Mouth/Throat: Lips, mucosa, and tongue normal. Teeth and gums normal.   Neck: Supple, symmetrical, trachea midline, no adenopathy, thyroid: no enlargment/tenderness/nodules, no carotid bruit and no JVD. Back:   Symmetric, no curvature. ROM normal. No CVA tenderness. Lungs:   Clear to auscultation bilaterally. Heart:  Regular rate and rhythm, S1, S2 normal, no murmur, click, rub or gallop. Abdomen:   Soft, non-tender. Bowel sounds normal. No masses,  No organomegaly. Extremities: Extremities normal, atraumatic, no cyanosis or edema. Pulses: 2+ and symmetric all extremities. Skin: Skin color, texture, turgor normal. No rashes or lesions   Lymph nodes: Cervical, supraclavicular, and axillary nodes normal.   Neurologic: CNII-XII intact. Normal strength, sensation and reflexes throughout. Lab/Data Review: All lab results for the last 24 hours reviewed.          Assessment:     Active Problems:    Hip fracture ([facility] [address] (11/2/2022)      Severe protein-calorie malnutrition ([facility] [address] (11/4/2022)  Patient is an 78-year-old white female with:  1. Ischemic cardiomyopathy  2. Status post PCI of mid LAD May 2022  3. Hypertensive heart disease  4. Hyperlipidemia  5. Ex-smoker  6. Osteoporosis  7. Right intertrochanteric fracture and coccygeal fracture  8. Plan:     I resumed aspirin 81 mg daily. Continue holding off Plavix. May be will consider resuming in 2 weeks once hemoglobin is steady and currently on atorvastatin. Currently lisinopril. She appears to be euvolemic.       Signed By: Mary Steinberg MD     November 7, 2022

## 2022-11-07 NOTE — DISCHARGE SUMMARY
Hospitalist Discharge Summary     Patient ID:    Salbador Hernández  [id number]  89 y.o.  1940    Admit date: 11/2/2022    Discharge date : 11/7/2022        Final Diagnoses: Active Problems:    Hip fracture ([facility] (11/2/2022)      Severe protein-calorie malnutrition ([facility] (11/4/2022)        Reason for Hospitalization:  [facility] Course:   Pt admitted for right intertrochanteric fracture sustained from a fall at home. She had and ORIF of right hip on 11/3/22. She was evaluated by physical and occupational therapy, recommended inpatient rehabilitation, however, pt's insurance would not cover either inpatient or skilled nursing facility. Pt will discharge home today with home health for OT and PT services. PT and son are in agreement with plan. Discharge Medications:   Current Discharge Medication List        START taking these medications    Details   acetaminophen (TYLENOL) 325 mg tablet Take 2 Tablets by mouth every four (4) hours as needed for Fever or Pain for up to 30 days. Qty: 120 Tablet, Refills: 0  Start date: 11/7/2022, End date: 12/7/2022      calcium-vitamin D 600 mg-5 mcg (200 unit) tab Take 1 Tablet by mouth three (3) times daily (with meals) for 30 days. Indications: osteoporosis, a condition of weak bones  Qty: 90 Tablet, Refills: 0  Start date: 11/7/2022, End date: 12/7/2022      senna-docusate (PERICOLACE) 8.6-50 mg per tablet Take 1 Tablet by mouth two (2) times a day for 30 days. Qty: 60 Tablet, Refills: 0  Start date: 11/7/2022, End date: 12/7/2022      ferrous sulfate 325 mg (65 mg iron) tablet Take 1 Tablet by mouth two (2) times daily (with meals) for 30 days. Qty: 60 Tablet, Refills: 0  Start date: 11/7/2022, End date: 12/7/2022      traMADoL (ULTRAM) 50 mg tablet Take 1 Tablet by mouth every six (6) hours as needed for Pain for up to 3 days. Max Daily Amount: 200 mg.  Indications: pain  Qty: 12 Tablet, Refills: 0  Start date: 11/7/2022, End date: 11/10/2022    Associated Diagnoses: Closed intertrochanteric fracture of hip, right, initial encounter ([facility]           CONTINUE these medications which have NOT CHANGED    Details   escitalopram oxalate (LEXAPRO) 10 mg tablet Take 1 Tablet by mouth in the morning. Qty: 30 Tablet, Refills: 0      lisinopriL (PRINIVIL, ZESTRIL) 5 mg tablet Take 5 mg by mouth daily. atorvastatin (LIPITOR) 40 mg tablet Take 1 Tablet by mouth nightly. Qty: 30 Tablet, Refills: 0      clopidogreL (PLAVIX) 75 mg tab Take 1 Tablet by mouth daily. Indications: blood clot prevention following percutaneous coronary intervention  Qty: 90 Tablet, Refills: 0      aspirin delayed-release 81 mg tablet Take 81 mg by mouth daily. STOP taking these medications       nitroglycerin (NITROSTAT) 0.4 mg SL tablet Comments:   Reason for Stopping: Follow up Care:    1. Elisabeth Gu NP in 1month    Follow-up Information       Follow up With Specialties Details Why Kunal Vidal MD Orthopedic Surgery Follow up on 11/7/2022  [facility]  [facility] [address] [place] [id number] [phone]      Elisabeth Gu NP Nurse Practitioner Follow up in 1 month(s)  [facility] [address]  [phone]                * Follow-up Care/Patient Instructions: Activity: Activity as tolerated  Diet: Regular Diet  Wound Care: As directed, right hip dry dressing change as needed    Condition at Discharge:  Stable  __________________________________________________________________    Disposition  Home Health Care [facility]  ____________________________________________________________________    Code Status:  DNR  ___________________________________________________________________    Discharge Exam:  Patient seen and examined by me on discharge day. Physical Exam  Constitutional:       General: She is not in acute distress.   Cardiovascular:      Rate and Rhythm: Normal rate and regular rhythm. Pulses: Normal pulses. Heart sounds: Normal heart sounds. Pulmonary:      Effort: Pulmonary effort is normal.      Breath sounds: Normal breath sounds. Abdominal:      General: Bowel sounds are normal.      Palpations: Abdomen is soft. Musculoskeletal:      Comments: Decreased ROM in right leg/hip   Skin:     General: Skin is warm and dry. Comments: Right hip dressing intact, staples   Neurological:      Mental Status: She is oriented to person, place, and time. Psychiatric:         Mood and Affect: Mood normal.         Behavior: Behavior normal.        CONSULTATIONS: Orthopedic Surgery, Cardiology    Significant Diagnostic Studies:   Recent Results (from the past 24 hour(s))   GLUCOSE, POC    Collection Time: 11/06/22  3:47 PM   Result Value Ref Range    Glucose (POC) 146 (H) 65 - 100 mg/dL    Performed by Sebastián Tabares    CBC WITH AUTOMATED DIFF    Collection Time: 11/07/22  6:48 AM   Result Value Ref Range    WBC 5.6 3.6 - 11.0 K/uL    RBC 3.04 (L) 3.80 - 5.20 M/uL    HGB 8.5 (L) 11.5 - 16.0 g/dL    HCT 27.7 (L) 35.0 - 47.0 %    MCV 91.1 80.0 - 99.0 FL    MCH 28.0 26.0 - 34.0 PG    MCHC 30.7 30.0 - 36.5 g/dL    RDW 18.2 (H) 11.5 - 14.5 %    PLATELET 429 884 - 948 K/uL    MPV 8.6 (L) 8.9 - 12.9 FL    NRBC 0.0 0.0  WBC    ABSOLUTE NRBC 0.00 0.00 - 0.01 K/uL    NEUTROPHILS 62 32 - 75 %    LYMPHOCYTES 17 12 - 49 %    MONOCYTES 11 5 - 13 %    EOSINOPHILS 8 (H) 0 - 7 %    BASOPHILS 1 0 - 1 %    IMMATURE GRANULOCYTES 1 (H) 0 - 0.5 %    ABS. NEUTROPHILS 3.5 1.8 - 8.0 K/UL    ABS. LYMPHOCYTES 1.0 0.8 - 3.5 K/UL    ABS. MONOCYTES 0.6 0.0 - 1.0 K/UL    ABS. EOSINOPHILS 0.4 0.0 - 0.4 K/UL    ABS. BASOPHILS 0.0 0.0 - 0.1 K/UL    ABS. IMM.  GRANS. 0.0 0.00 - 0.04 K/UL    DF AUTOMATED     GLUCOSE, POC    Collection Time: 11/07/22  8:32 AM   Result Value Ref Range    Glucose (POC) 128 (H) 65 - 100 mg/dL    Performed by Daily Varsha      XR HIP RT W OR CHRISTIANO PELV 2-3 VWS   Final Result Stable postoperative appearance. XR FLUOROSCOPY UNDER 60 MINUTES   Final Result   Fluoroscopic imaging during procedure. REPORT PROVIDED FOR COMPLIANCE ONLY AT NO CHARGE. XR HIP RT W OR WO PELV  1 VW   Final Result   Acute right hip fracture. XR CHEST PORT   Final Result      No acute process on portable chest.         CT PELV WO CONT   Final Result   1. Right intertrochanteric fracture. 2. Coccygeal fracture. 3. Osteoporosis. Discharge: time spent 35  minutes in discharge  Education and counseling.      Signed:  Angel Chu NP  11/7/2022  12:10 PM

## 2022-11-07 NOTE — PROGRESS NOTES
Problem: Mobility Impaired (Adult and Pediatric)  Goal: *Acute Goals and Plan of Care (Insert Text)  Description: FUNCTIONAL STATUS PRIOR TO ADMISSION: Patient required minimal assistance for basic and instrumental ADLs. Pt was mod I with rollator prior to fall 4 weeks ago, since then she has been bedbound and required assistance to stand pivot transfers to [facility]. HOME SUPPORT PRIOR TO ADMISSION: The patient lived with  and required moderate assistance  for stand pivot transfers to [facility] from son, who lives across the street from her. Physical Therapy Goals  Initiated 11/4/2022  Pt stated goal: to get better  Pt will be I with LE HEP in 7 days. Pt will perform bed mobility with mod I in 7 days. Pt will perform transfers with mod I in 7 days. Pt will amb 5-10 feet with LRAD safely with mod I in 7 days. Outcome: Progressing Towards Goal   PHYSICAL THERAPY TREATMENT  Patient: Michelle Galvin (79 y.o. female)  Date: 11/7/2022  Diagnosis: Hip fracture ([facility] [S72.009A] <principal problem not specified>  Procedure(s) (LRB):  Right Hip Gamma Nail Insertion (Right) 4 Days Post-Op  Precautions:  TTWB R LE  Chart, physical therapy assessment, plan of care and goals were reviewed. ASSESSMENT  Patient continues with skilled PT services and is progressing towards goals. Pt. Sitting on BSC upon PT /OT arrival, agreeable to session. (See below for objective details and assist levels). Pt. Continues to require assistance with Tfs. Pt. Only able to ambulate 3' with RW and NWB R LE due to decrease strength in upper body and pain through shoulders. Pt. Was only able to hop 3 times then began sliding and pivoting on left foot to get to chair. Pt. Stated she was not able to place weight through R LE per Dr. Tayo Sampson clarified with PA after gt. Pt. Is TTWB R LE; however, pt. States she is NWB R LE per Dr. Kraig Capps. Order was clarified with PA and stated it is TTWB R LE. Explained/re educated  pt.  And son on WB precautions and both understand now. Pt. Fatigues easily and requires rest breaks often during session. Pt. Was able to tolerate LE exercises while sitting in chair. Overall pt tolerated session fair  today with Will continue to benefit from skilled PT services, and will continue to progress as tolerated. Current Level of Function Impacting Discharge (mobility/balance): MIN? Mod A for mobility    Other factors to consider for discharge: safety/needs 24/7 care/WB precautions         PLAN :  Patient continues to benefit from skilled intervention to address the above impairments. Continue treatment per established plan of care to address goals. Recommend with staff: Out of bed to chair for meals, Encourage HEP in prep for ADLs/mobility, Use of bed/chair alarm for safety, and LE elevation for management of edema    Recommendation for discharge: (in order for the patient to meet his/her long term goals)  Home with [address] [place] [address] with 24/7 care    This discharge recommendation:  Has been made in collaboration with the attending provider and/or case management    IF patient discharges home will need the following DME: bedside commode, rolling walker, and wheelchair Pt. Does not want to go to SNF       SUBJECTIVE:   Patient stated I'm just not ready to go home, I hurt so bad on my hip. \" Son present during session. OBJECTIVE DATA SUMMARY:   Critical Behavior:  Neurologic State: Alert  Orientation Level: Oriented X4  Cognition: Follows commands, Appropriate for age attention/concentration     Functional Mobility Training:  Bed Mobility:   Pt. Sitting on BSC. Transfers:  Sit to Stand: Minimum assistance;Assist x2  Stand to Sit: Minimum assistance;Assist x2        Bed to Chair: Minimum assistance;Assist x2           Tf off commode OT assisted with josé care. Balance:  Sitting: Intact  Standing: Impaired; With support  Standing - Static: Constant support; Fair  Standing - Dynamic : Constant support; Fair  Ambulation/Gait Training:  Distance (ft): 3 Feet (ft) (pt. is TTWB R LE but pt. insisted on maintaining NWB R LE.)  Assistive Device: Walker, rolling;Gait belt  Ambulation - Level of Assistance: Minimal assistance;Assist x2                       Speed/Ayana: Slow        Pt. Has a ramp at home  and stated she will not go up steps. Therapeutic Exercises:       EXERCISE   Sets   Reps   Active Active Assist   Passive Self ROM   Comments   Ankle Pumps  20 [x] [] [] []    Quad Sets/Glut Sets  15 [x] [] [] []    Hamstring Sets   [] [] [] []    Short Arc Quads   [] [] [] []    Heel Slides  15 [x] [x] [] [] R LE   Straight Leg Raises   [] [] [] []    Hip abd/add  15 [] [x] [] [] R LE min assist   Long Arc Quads  10 [x] [] [] []    Marching   [] [] [] []       [] [] [] []    Instructed and  Gave HEP as stated above to pt. And son. For 2 times a day to perform. Educated both pt. And son on LE exercises and reviewed WB again. Pain Ratin/10 R hip      Activity Tolerance:   Fair and requires frequent rest breaks    After treatment patient left in no apparent distress:   Bed locked and returned to lowest position, Sitting in chair, Call bell within reach, Caregiver / family present, and Notified RN of session. COMMUNICATION/COLLABORATION:   The patients plan of care was discussed with: Occupational therapy assistant, Registered nurse, and PA regarding WB status clarification  . [place] with OT for increased safety and mobility for pt and clinician.      Judi Mendez, PT   Time Calculation: 42 mins

## 2022-11-07 NOTE — PROGRESS NOTES
Orthopedic progress note    Date:2022       BKZN:[id number]  Patient Name:Denisa Orourke     YOB: 1940     Age:81 y.o. Subjective    Status post ORIF right hip. Postop day #4. Patient is complaining of difficulty swallowing. The patient her son states that this has been an ongoing problem she has recently had esophageal dilatation performed as outpatient. She is complaining of mild to moderate pain of her right hip. She does feel pain medication helps. Progressing well with therapy. No other complaints at this time.   Objective           Vitals Last 24 Hours:  TEMPERATURE:  Temp  Av.7 °F (36.5 °C)  Min: 97.4 °F (36.3 °C)  Max: 97.9 °F (36.6 °C)  RESPIRATIONS RANGE: Resp  Av.6  Min: 18  Max: 20  PULSE OXIMETRY RANGE: SpO2  Av %  Min: 94 %  Max: 98 %  PULSE RANGE: Pulse  Av  Min: 73  Max: 81  BLOOD PRESSURE RANGE: Systolic (16YCK), BZD:807 , Min:112 , UM   ; Diastolic (15KOC), KXZ:76, Min:61, Max:72    Current Facility-Administered Medications   Medication Dose Route Frequency    aspirin chewable tablet 81 mg  81 mg Oral DAILY    benzocaine-menthoL (CEPACOL) lozenge 1 Lozenge  1 Lozenge Mucous Membrane Q2H PRN    ferrous sulfate tablet 325 mg  1 Tablet Oral BID WITH MEALS    celecoxib (CELEBREX) capsule 100 mg  100 mg Oral BID    sodium chloride (NS) flush 5-40 mL  5-40 mL IntraVENous Q8H    sodium chloride (NS) flush 5-40 mL  5-40 mL IntraVENous PRN    naloxone (NARCAN) injection 0.4 mg  0.4 mg IntraVENous PRN    calcium-vitamin D 600 mg-5 mcg (200 unit) per tablet 1 Tablet  1 Tablet Oral TID WITH MEALS    senna-docusate (PERICOLACE) 8.6-50 mg per tablet 1 Tablet  1 Tablet Oral BID    polyethylene glycol (MIRALAX) packet 17 g  17 g Oral DAILY    bisacodyL (DULCOLAX) suppository 10 mg  10 mg Rectal DAILY PRN    acetaminophen (TYLENOL) tablet 650 mg  650 mg Oral Q8H    traMADoL (ULTRAM) tablet 50 mg  50 mg Oral Q6H PRN    oxyCODONE IR (ROXICODONE) tablet 5 mg  5 mg Oral Q4H PRN    ergocalciferol capsule 50,000 Units  50,000 Units Oral Q7D    atorvastatin (LIPITOR) tablet 40 mg  40 mg Oral QHS    [Held by provider] clopidogreL (PLAVIX) tablet 75 mg  75 mg Oral DAILY    escitalopram oxalate (LEXAPRO) tablet 10 mg  10 mg Oral DAILY    lisinopriL (PRINIVIL, ZESTRIL) tablet 5 mg  5 mg Oral DAILY    sodium chloride (NS) flush 5-40 mL  5-40 mL IntraVENous PRN    acetaminophen (TYLENOL) tablet 650 mg  650 mg Oral Q6H PRN    Or    acetaminophen (TYLENOL) suppository 650 mg  650 mg Rectal Q6H PRN    polyethylene glycol (MIRALAX) packet 17 g  17 g Oral DAILY PRN    ondansetron (ZOFRAN ODT) tablet 4 mg  4 mg Oral Q8H PRN    Or    ondansetron (ZOFRAN) injection 4 mg  4 mg IntraVENous Q6H PRN          I/O (24Hr): Intake/Output Summary (Last 24 hours) at 11/7/2022 0958  Last data filed at 11/7/2022 0212  Gross per 24 hour   Intake --   Output 300 ml   Net -300 ml     Objective:  Vital signs: (most recent): Blood pressure 135/72, pulse 73, temperature 97.7 °F (36.5 °C), resp. rate 18, height 5' 2\" (1.575 m), weight 55.7 kg (122 lb 12.7 oz), SpO2 98 %, unknown if currently breastfeeding. Labs/Imaging/Diagnostics    Labs:  CBC:  Recent Labs     11/07/22  0648 11/06/22  0710 11/05/22  0705   WBC 5.6 6.2 5.6   RBC 3.04* 2.88* 2.66*   HGB 8.5* 7.9* 7.5*   HCT 27.7* 26.0* 23.9*   MCV 91.1 90.3 89.8   RDW 18.2* 18.1* 18.0*    315 294     CHEMISTRIES:  Recent Labs     11/06/22  0710 11/05/22  0705    137   K 4.3 4.0    104   CO2 28 28   BUN 12 14   CREA 0.28* 0.31*   CA 8.7 9.0   PT/INR:No results for input(s): INR, INREXT in the last 72 hours. No lab exists for component: PROTIME  APTT:No results for input(s): APTT in the last 72 hours. LIVER PROFILE:No results for input(s): AST, ALT in the last 72 hours.     No lab exists for component: Chris Bustamante  Lab Results   Component Value Date/Time    ALT (SGPT) 24 08/09/2022 08:18 AM    AST (SGOT) 30 08/09/2022 08:18 AM    Alk. phosphatase 45 08/09/2022 08:18 AM    Bilirubin, total 0.8 08/09/2022 08:18 AM       Physical Exam:  Right hip: Dressing shows moderate bloody drainage. No active bleeding seen. No swelling seen the right thigh. No calf pain to palpation. Assessment//Plan           Patient Active Problem List    Diagnosis Date Noted    Severe protein-calorie malnutrition ([address] 11/04/2022    Hip fracture ([address] 11/02/2022    CHF exacerbation ([address] 08/02/2022    Acute respiratory failure with hypoxia ([address] 05/29/2022    Acute on chronic systolic congestive heart failure ([address] 05/29/2022    Dysphagia 05/29/2022    CHF (congestive heart failure) ([address] 05/27/2022     Status post ORIF right hip. Postop day #4. We will add Cepacol throat lozenge for swallowing difficulty/throat irritation. Continue with therapy as tolerated. Acute on chronic blood loss anemia. Hemoglobin is stable at 8.5 today. Asymptomatic. Okay to discharge home from orthopedics when cleared by medicine. Patient to follow-up with Dr. Luís Randle as outpatient in approximately 2 weeks.     Electronically signed by Susannah Dutta PA-C on 11/7/2022 at 9:58 AM

## 2022-11-07 NOTE — PROGRESS NOTES
Patient discharging home today with home health through [facility]. Discharge orders sent via careport. Patient and son aware for discharge and plan. Discharge plan of care/case management plan validated with provider discharge order. Medicare pt has received, reviewed, and signed 2nd IM letter informing them of their right to appeal the discharge. Signed copied has been placed on pt bedside chart.

## 2022-11-07 NOTE — PROGRESS NOTES
Bedside shift change report given to [facility] (oncoming nurse) by Amauri Argueta (offgoing nurse). Report included the following information SBAR.

## 2022-11-07 NOTE — PROGRESS NOTES
OCCUPATIONAL THERAPY TREATMENT  Patient: Bassem Canela. Roslyn Phipps (79 y.o. female)  Date: 11/7/2022  Diagnosis: Hip fracture ([facility] [S72.009A] <principal problem not specified>  Procedure(s) (LRB):  Right Hip Gamma Nail Insertion (Right) 4 Days Post-Op  Precautions: FALL  Chart, occupational therapy assessment, plan of care, and goals were reviewed. ASSESSMENT  Pt continues with skilled OT services and is progressing towards goals. Pt received seated on BSC upon arrival, AXO x4 and agreeable to RUSS/PTA tx at this time. Pt cooperative and demonstrated fair effort during activities. Overall, pt continues to present with deficits in generalized strength/AROM, pain, static/dynamic standing balance and functional activity tolerance during performance of ADLs/mobility (see below for objective details and assist levels). Pt req'd x2 A for BSC>chair tf with vc's for correct technique using RW. Pt and son req'd clarification on WB status as they thought it was NWB. Per physician, pt is TTWB. Pt and son re-educated on TTWB status and presented good understanding of concepts taught. Pt req'd SBA/setup for all self care task, seated in chair. Pt engaged in christian UE exercises to improve strength/activity tolerance with increased reps with vc's to maximize ROM, see grid below. Will continue to progress. Recommend d/c to [facility] with family care once medically appropriate. Other factors to consider for discharge: Time of onset, medical prognosis/diagnosis, severity of deficits, PLOF, functional baseline, home environment, and family support          PLAN :  Patient continues to benefit from skilled intervention to address the above impairments. Continue treatment per established plan of care. to address goals. Recommend with staff: Out of bed to chair for meals and Encourage HEP in prep for ADLs/mobility    Recommend next session:  Toileting, LB dressing , and Seated grooming    Recommendation for discharge: (in order for the patient to meet his/her long term goals)  [facility]    This discharge recommendation:  Has been made in collaboration with the attending provider and/or case management       IF patient discharges home will need the following DME: 3 in 1 commode riser       SUBJECTIVE:   Patient stated I can't put any weight on it.     OBJECTIVE DATA SUMMARY:   Cognitive/Behavioral Status:  Neurologic State: Alert  Orientation Level: Oriented X4  Cognition: Follows commands; Appropriate for age attention/concentration             Functional Mobility and Transfers for ADLs:  Bed Mobility:       Transfers:  Sit to Stand: Minimum assistance;Assist x2  Functional Transfers  Toilet Transfer : Minimum assistance;Assist x2  Bed to Chair: Minimum assistance;Assist x2    Balance:  Sitting: Intact  Standing: Impaired; With support  Standing - Static: Constant support; Fair  Standing - Dynamic : Constant support; Fair    ADL Intervention:       Grooming  Grooming Assistance: Set-up  Position Performed: Seated in chair  Washing Face: Set-up  Washing Hands: Set-up  Brushing/Combing Hair: Set-up        Toileting  Toileting Assistance: Set-up; Stand-by assistance  Bowel Hygiene: Set-up; Stand-by assistance          Exercise Sets Reps AROM AAROM PROM Self PROM Comments   Elbow E/F 1 12 [x] [] [] [] Chair level   Chest press 1 12 [x] [] [] []    Shoulder flex/ext 1 12 X       Ceiling punches 1 12 [x] [] [] []    Wrist E/F 2 12 X  R only            Pain:  2/10 R HIP    Activity Tolerance:   Fair and requires rest breaks    After treatment patient left in no apparent distress:   Sitting in chair, Call bell within reach, and Caregiver / family present, bed locked and in lowest position    COMMUNICATION/COLLABORATION:   The patients plan of care was discussed with: Physical therapy assistant and Registered nurse.  Co-tx with PTA for increased pt/clinician safety with OOB activity tolerance    JEB Benz  Time Calculation: 42 mins     Problem: Self Care Deficits Care Plan (Adult)  Goal: *Acute Goals and Plan of Care (Insert Text)  Description: FUNCTIONAL STATUS PRIOR TO ADMISSION: Pt reports had one fall within the last three months. She fell ~4 weeks and has been mostly bed bound but did complete SPT to [facility]. She completed most ADLs in bed w/ min A. Prior to her fall, she was IND. Her son helps daily. HOME SUPPORT: The patient lived with spouse. Son lives across the street.     Occupational Therapy Goals  Initiated 11/4/2022    Pt stated goal \"I want to get better\"  Pt will be IND sup <> sit in prep for EOB ADLs  Pt will be Mod I grooming standing sink side LRAD  Pt will be IND UB dressing sitting EOB/long sit   Pt will be Mod I LE dressing sitting EOB/long sit  Pt will be Mod I sit <>  prep for toileting LRAD  Pt will be Mod I toileting/toilet transfer/cloth mgmt LRAD  Pt will be IND following UE HEP in prep for self care tasks   Outcome: Progressing Towards Goal

## 2022-11-30 NOTE — ANESTHESIA POSTPROCEDURE EVALUATION
Procedure(s):  Right Hip Gamma Nail Insertion.     general    Anesthesia Post Evaluation      Multimodal analgesia: multimodal analgesia used between 6 hours prior to anesthesia start to PACU discharge  Patient location during evaluation: PACU  Patient participation: complete - patient participated  Level of consciousness: awake  Pain score: 0  Pain management: adequate  Airway patency: patent  Anesthetic complications: no  Cardiovascular status: acceptable  Respiratory status: acceptable  Hydration status: acceptable  Post anesthesia nausea and vomiting:  controlled  Final Post Anesthesia Temperature Assessment:  Normothermia (36.0-37.5 degrees C)      INITIAL Post-op Vital signs:   Vitals Value Taken Time   /59 11/03/22 2015   Temp 36.7 °C (98.1 °F) 11/03/22 1953   Pulse 69 11/03/22 2015   Resp 25 11/03/22 2015   SpO2 93 % 11/03/22 2015

## 2023-11-08 NOTE — PROGRESS NOTES
ENDOCRINE/DIABETES PROGRESS/FOLLOWUP NOTE    ADMISSION DATE:  10/31/2023  DATE:  11/8/2023  CURRENT HOSPITAL DAY:  Hospital Day: 9  CONSULTING PHYSICIAN:  Stephanie Chapin DO  ATTENDING PHYSICIAN:  Carley Norton MD    S:  Patient seen at the bedside this morning. No acute events overnight.  Patient inquired regarding planned medications at discharge. AM glucose stable with no episodes of hypoglycemia.  Denies chest pain, nausea, vomiting, or alterations to mentation.    REVIEW OF SYSTEMS    Review of Systems   Constitutional: Negative.    HENT: Negative.    Eyes: Negative.    Respiratory: Negative.    Cardiovascular: Negative.    Gastrointestinal: Negative.    Endocrine: Negative.    Genitourinary: Negative.    Musculoskeletal: Negative.    Skin: Negative.    Allergic/Immunologic: Negative.    Neurological: Negative.    Hematological: Negative.    Psychiatric/Behavioral: Negative.    All other systems reviewed and are negative.      OBJECTIVE:    VITAL SIGNS:    Vital Last Value 24 Hour Range   Temperature 97.9 °F (36.6 °C) (11/08/23 1252) Temp  Min: 97.5 °F (36.4 °C)  Max: 98.1 °F (36.7 °C)   Pulse 90 (11/08/23 1252) Pulse  Min: 76  Max: 100   Respiratory 16 (11/08/23 1252) Resp  Min: 16  Max: 16   Non-Invasive  Blood Pressure 109/72 (11/08/23 1252) BP  Min: 101/64  Max: 147/85   Pulse Oximetry 96 % (11/08/23 1252) SpO2  Min: 93 %  Max: 97 %     Vital Today Admitted   Weight 67.5 kg (148 lb 13 oz) (11/03/23 1650) Weight: 66.7 kg (147 lb 0.8 oz) (10/31/23 1645)   Height N/A Height: 5' 3\" (160 cm) (10/31/23 1645)   BMI N/A BMI (Calculated): 26.05 (10/31/23 1645)     PHYSICAL EXAM  Blood pressure 109/72, pulse 90, temperature 97.9 °F (36.6 °C), temperature source Oral, resp. rate 16, height 5' 3\" (1.6 m), weight 67.5 kg (148 lb 13 oz), SpO2 96 %. Body mass index is 26.36 kg/m².  Physical Exam  Vitals reviewed.   Constitutional:       General: She is not in acute distress.     Appearance: Normal appearance. She is not  Patient stats running 98 % without O2. ill-appearing.   HENT:      Head: Normocephalic and atraumatic.   Cardiovascular:      Rate and Rhythm: Normal rate and regular rhythm.   Pulmonary:      Effort: Pulmonary effort is normal. No respiratory distress.   Skin:     General: Skin is warm and dry.   Neurological:      Mental Status: She is alert and oriented to person, place, and time.   Psychiatric:         Mood and Affect: Mood normal.         Behavior: Behavior normal.         Thought Content: Thought content normal.         Judgment: Judgment normal.         LABORATORY/STUDIES  Hemoglobin A1C (%)   Date Value   10/26/2023 10.8 (H)     No components found for: \"CHOLHDL\"  Triglycerides (mg/dL)   Date Value   10/27/2023 70     HDL (mg/dL)   Date Value   10/27/2023 75     LDL (mg/dL)   Date Value   10/27/2023 79     No components found for: \"NONHDLCALC\"  Sodium (mmol/L)   Date Value   11/07/2023 139     Potassium (mmol/L)   Date Value   11/07/2023 4.1     Chloride (mmol/L)   Date Value   11/07/2023 108     Glucose (mg/dL)   Date Value   11/07/2023 89     Calcium (mg/dL)   Date Value   11/07/2023 9.0     Carbon Dioxide (mmol/L)   Date Value   11/07/2023 26     BUN (mg/dL)   Date Value   11/07/2023 22 (H)     Creatinine (mg/dL)   Date Value   11/07/2023 0.57     TSH (mcUnits/mL)   Date Value   10/08/2023 0.958     WBC (K/mcL)   Date Value   11/07/2023 6.1     RBC (mil/mcL)   Date Value   11/07/2023 4.84     HCT (%)   Date Value   11/07/2023 44.7     HGB (g/dL)   Date Value   11/07/2023 14.6     PLT (K/mcL)   Date Value   11/07/2023 293     GOT/AST (Units/L)   Date Value   11/07/2023 31     GPT/ALT (Units/L)   Date Value   11/07/2023 57     No results found for: \"GGTP\"  Alkaline Phosphatase (Units/L)   Date Value   11/07/2023 84     Bilirubin, Total (mg/dL)   Date Value   11/07/2023 0.9     No results found for: \"POCGLU\"    Blood sugar review:     Recent Labs   Lab 11/07/23  1655 11/07/23  2055 11/08/23  0718 11/08/23  1158   GLUCOSE BEDSIDE 109* 123* 136*  195*       ASSESSMENT/PLAN  1. Right sided weakness    2. Thalamic stroke (CMD)    3. Other depression    4. Impaired mobility and ADLs    5. Left thalamic infarction (CMD)    6. Lumbar disc disease      T2DM  - Continue Nvohez19H nightly  - Begin Lispro 8U TID with meals and LDSSI  - Accu-Chek ACHS  - Hypoglycemic protocol   - At discharge, outpatient recommendations include: glipizide 5mg BID, meformin 500mg BID, and lantus 18U nightly     Fernando Yu, MS4     Dr. Stephanie Chapin DO Endocrinology  [facility]   [address] [id number] [place] [address]  Office: [phone]  Fax: [phone]    Note: The 21st Century Cures Act makes medical notes like these available to patients in the interest of transparency. However, be advised this is a medical document. It is intended as peer to peer communication. It is written in medical language and may contain abbreviations or verbiage that are unfamiliar. It may appear blunt or direct. Medical documents are intended to carry relevant information, facts as evident, and the clinical opinion of the practitioner.  This note may have been transcribed using a voice dictation system. Voice recognition errors may occur. This should not be taken to alter the content or meaning of this note.

## 2023-11-15 ENCOUNTER — TRANSCRIBE ORDERS (OUTPATIENT)
Facility: HOSPITAL | Age: 83
End: 2023-11-15

## 2023-11-15 DIAGNOSIS — R07.9 CHEST PAIN, UNSPECIFIED TYPE: ICD-10-CM

## 2023-11-15 DIAGNOSIS — R00.2 PALPITATIONS: Primary | ICD-10-CM

## 2023-11-16 NOTE — Clinical Note
Balloon catheter removed. Minocycline Counseling: Patient advised regarding possible photosensitivity and discoloration of the teeth, skin, lips, tongue and gums.  Patient instructed to avoid sunlight, if possible.  When exposed to sunlight, patients should wear protective clothing, sunglasses, and sunscreen.  The patient was instructed to call the office immediately if the following severe adverse effects occur:  hearing changes, easy bruising/bleeding, severe headache, or vision changes.  The patient verbalized understanding of the proper use and possible adverse effects of minocycline.  All of the patient's questions and concerns were addressed.

## 2023-12-01 ENCOUNTER — HOSPITAL ENCOUNTER (OUTPATIENT)
Facility: HOSPITAL | Age: 83
End: 2023-12-01
Attending: INTERNAL MEDICINE
Payer: MEDICARE

## 2023-12-01 DIAGNOSIS — R00.2 PALPITATIONS: ICD-10-CM

## 2023-12-01 DIAGNOSIS — R07.9 CHEST PAIN, UNSPECIFIED TYPE: ICD-10-CM

## 2023-12-01 LAB
STRESS BASELINE DIAS BP: 75 MMHG
STRESS BASELINE HR: 67 BPM
STRESS BASELINE SYS BP: 173 MMHG
STRESS STAGE 1 BP: NORMAL MMHG
STRESS STAGE 1 DURATION: 1 MIN:SEC
STRESS STAGE 1 HR: 85 BPM
STRESS STAGE 2 DURATION: 2 MIN:SEC
STRESS STAGE 2 HR: 85 BPM
STRESS STAGE 3 DURATION: 3 MIN:SEC
STRESS STAGE 3 HR: 81 BPM
STRESS STAGE 4 BP: NORMAL MMHG
STRESS STAGE 4 DURATION: 4 MIN:SEC
STRESS STAGE 4 HR: 77 BPM
STRESS STAGE 5 DURATION: 5 MIN:SEC
STRESS STAGE 5 HR: 78 BPM
STRESS TARGET HR: 137 BPM

## 2023-12-01 PROCEDURE — 93017 CV STRESS TEST TRACING ONLY: CPT

## 2023-12-01 PROCEDURE — A9500 TC99M SESTAMIBI: HCPCS | Performed by: INTERNAL MEDICINE

## 2023-12-01 PROCEDURE — 3430000000 HC RX DIAGNOSTIC RADIOPHARMACEUTICAL: Performed by: INTERNAL MEDICINE

## 2023-12-01 PROCEDURE — 6360000002 HC RX W HCPCS

## 2023-12-01 RX ORDER — TETRAKIS(2-METHOXYISOBUTYLISOCYANIDE)COPPER(I) TETRAFLUOROBORATE 1 MG/ML
10.43 INJECTION, POWDER, LYOPHILIZED, FOR SOLUTION INTRAVENOUS
Status: COMPLETED | OUTPATIENT
Start: 2023-12-01 | End: 2023-12-01

## 2023-12-01 RX ORDER — REGADENOSON 0.08 MG/ML
INJECTION, SOLUTION INTRAVENOUS
Status: COMPLETED
Start: 2023-12-01 | End: 2023-12-01

## 2023-12-01 RX ORDER — TETRAKIS(2-METHOXYISOBUTYLISOCYANIDE)COPPER(I) TETRAFLUOROBORATE 1 MG/ML
28.3 INJECTION, POWDER, LYOPHILIZED, FOR SOLUTION INTRAVENOUS
Status: COMPLETED | OUTPATIENT
Start: 2023-12-01 | End: 2023-12-01

## 2023-12-01 RX ADMIN — REGADENOSON 0.4 MG: 0.08 INJECTION, SOLUTION INTRAVENOUS at 11:25

## 2023-12-01 RX ADMIN — TETRAKIS(2-METHOXYISOBUTYLISOCYANIDE)COPPER(I) TETRAFLUOROBORATE 28.3 MILLICURIE: 1 INJECTION, POWDER, LYOPHILIZED, FOR SOLUTION INTRAVENOUS at 11:15

## 2023-12-01 RX ADMIN — TETRAKIS(2-METHOXYISOBUTYLISOCYANIDE)COPPER(I) TETRAFLUOROBORATE 10.43 MILLICURIE: 1 INJECTION, POWDER, LYOPHILIZED, FOR SOLUTION INTRAVENOUS at 09:15

## 2023-12-04 LAB
NUC STRESS EJECTION FRACTION: 59 %
STRESS BASELINE DIAS BP: 75 MMHG
STRESS BASELINE HR: 67 BPM
STRESS BASELINE SYS BP: 173 MMHG
STRESS STAGE 1 BP: NORMAL MMHG
STRESS STAGE 1 DURATION: 1 MIN:SEC
STRESS STAGE 1 HR: 85 BPM
STRESS STAGE 2 DURATION: 2 MIN:SEC
STRESS STAGE 2 HR: 85 BPM
STRESS STAGE 3 DURATION: 3 MIN:SEC
STRESS STAGE 3 HR: 81 BPM
STRESS STAGE 4 BP: NORMAL MMHG
STRESS STAGE 4 DURATION: 4 MIN:SEC
STRESS STAGE 4 HR: 77 BPM
STRESS STAGE 5 DURATION: 5 MIN:SEC
STRESS STAGE 5 HR: 78 BPM
STRESS TARGET HR: 137 BPM

## 2024-11-08 NOTE — PROGRESS NOTES
Leader rounding on [address] was working with Miss Jamel Crews this morning. Miss Jamel Crews only nodded today and appeared to be a bit discouraged. When asked if there was anything staff could do to help her she nodded no. Provided words of encouragement.   Jeronimo Coleman., MS., [facility] can be reached by calling the  at [facility]  [phone]

## (undated) DEVICE — 3M™ TEGADERM™ TRANSPARENT FILM DRESSING FIRST AID STYLE, 1620, 2-3/8 IN X 2-3/4 IN, 100 BAGS/CARTON 6 CARTONS/CASE: Brand: 3M™ TEGADERM™

## (undated) DEVICE — DRESSING,GAUZE,XEROFORM,CURAD,5"X9",ST: Brand: CURAD

## (undated) DEVICE — DRAPE,ISOLATION,INCISE,INVISISHIELD: Brand: MEDLINE

## (undated) DEVICE — SUT VCRL + 2-0 36IN CT1 UD --

## (undated) DEVICE — GAUZE,SPONGE,4"X4",16PLY,STRL,LF,10/TRAY: Brand: MEDLINE

## (undated) DEVICE — 6F .070 XB 3.5 100CM: Brand: VISTA BRITE TIP

## (undated) DEVICE — GLIDESHEATH SLENDER STAINLESS STEEL KIT: Brand: GLIDESHEATH SLENDER

## (undated) DEVICE — DRAPE,U/ SHT,SPLIT,PLAS,STERIL: Brand: MEDLINE

## (undated) DEVICE — CATHETER COR DIAG AMPLATZ R MOD CRV 5FR 100CM 0 SIDE H

## (undated) DEVICE — PADDING CAST W4INXL4YD ST COT COHESIVE HND TEARABLE SPEC

## (undated) DEVICE — PAD,PREPPING,CUFFED,24X48,7",NONSTERILE: Brand: MEDLINE

## (undated) DEVICE — ROCKER SWITCH PENCIL BLADE ELECTRODE, HOLSTER: Brand: EDGE

## (undated) DEVICE — GUIDE WIRE, BALL-TIPPED, STERILE

## (undated) DEVICE — NDL SINE QNCKE 22GAX1.5IN BLK --

## (undated) DEVICE — TOOL INSRT ANGI GDWIRE MTL SS --

## (undated) DEVICE — SUTURE VCRL RAPIDE SZ 3-0 L18IN ABSRB UD PS-2 L19MM 3/8 CIR VR497

## (undated) DEVICE — 3M™ STERI-DRAPE™ SMALL DRAPE WITH ADHESIVE APERTURE 1092 25/BX,4/CS&#X20;: Brand: STERI-DRAPE™

## (undated) DEVICE — SPONGE GZ W4XL4IN COT 12 PLY TYP VII WVN C FLD DSGN

## (undated) DEVICE — PAD ABSORBENT 40X28 IN POLY BACKING BLU DRI-SAFE

## (undated) DEVICE — COPILOT BLEEDBACK CONTROL VALVE: Brand: COPILOT

## (undated) DEVICE — COVER,TABLE,HEAVY DUTY,79"X110",STRL: Brand: MEDLINE

## (undated) DEVICE — BASIC SINGLE BASIN-LF: Brand: MEDLINE INDUSTRIES, INC.

## (undated) DEVICE — TUBING, SUCTION, 9/32" X 10', STRAIGHT: Brand: MEDLINE

## (undated) DEVICE — NDL SPNE QNCKE 22GX3.5IN LF --

## (undated) DEVICE — DRAPE EQUIP C ARM 74X42 IN MOB XR W/ TIE RUBBER BND LF

## (undated) DEVICE — K-WIRE

## (undated) DEVICE — SYR 20ML LL STRL LF --

## (undated) DEVICE — SURGICAL PROCEDURE TRAY CRD CATH 3 PRT

## (undated) DEVICE — GLOVE ORANGE PI 7 1/2   MSG9075

## (undated) DEVICE — TUBING PRESS INJ FLX SH 30IN --

## (undated) DEVICE — CATH BLLN DIL 2.5 X15MM RX -- EUPHORA

## (undated) DEVICE — SOLUTION IRRIG 1000ML STRL H2O USP PLAS POUR BTL

## (undated) DEVICE — DRILL, AO SMALL: Brand: GAMMA

## (undated) DEVICE — GLOVE SURG SZ 85 L12IN FNGR THK79MIL GRN LTX FREE

## (undated) DEVICE — CATHETER ANGIO JR4 0.035 IN AD 4 FRX100 CM PROPAC MP INFIN

## (undated) DEVICE — SYRINGE ANGIO 20ML WHT POLYCARB VAC PRSS CAP PLUNG FIX M

## (undated) DEVICE — SOLUTION SURG PREP 26 CC PURPREP

## (undated) DEVICE — GARMENT,MEDLINE,DVT,INT,CALF,MED, GEN2: Brand: MEDLINE

## (undated) DEVICE — FCPS RAD JAW 4 SC 240CM W/NDL --

## (undated) DEVICE — BAND RADIAL COMPR ARTERY 24CM -- REG BX/10

## (undated) DEVICE — SC 3W MP RA OFF PB - PG: Brand: NAMIC

## (undated) DEVICE — WASH CLOTH INCONT LO LINT PREM 12X13 IN LF DISP

## (undated) DEVICE — DRESSING,GAUZE,XEROFORM,CURAD,1"X8",ST: Brand: CURAD

## (undated) DEVICE — SYRINGE MED 10ML RED POLYCARB BRL FIX M LUER CONN FLAT GRP

## (undated) DEVICE — COVER LT HNDL BLU PLAS

## (undated) DEVICE — SUTURE VCRL + SZ 0 L27IN ABSRB UD CT-1 L36MM 1/2 CIR TAPR VCP260H

## (undated) DEVICE — LAMINECTOMY ARM CRADLE FOAM POSITIONER: Brand: CARDINAL HEALTH

## (undated) DEVICE — PAD,ABDOMINAL,8"X7.5",STERILE,LF,1/PK: Brand: MEDLINE

## (undated) DEVICE — RUNTHROUGH NS EXTRA FLOPPY PTCA GUIDEWIRE: Brand: RUNTHROUGH

## (undated) DEVICE — MAJOR ORTHO PROCEDURE PACK: Brand: MEDLINE INDUSTRIES, INC.

## (undated) DEVICE — SOUTHSIDE TURNOVER: Brand: MEDLINE INDUSTRIES, INC.

## (undated) DEVICE — VALVE HEMSTAS 9FR POLYCARB L BOR DBL Y ACCS +

## (undated) DEVICE — LOTION PREP REMV 5OZ IODO CLR TINC OF BENZ DURAPREP

## (undated) DEVICE — 3M™ TEGADERM™ TRANSPARENT FILM DRESSING FRAME STYLE, 1629, 8 IN X 12 IN (20 CM X 30 CM), 10/CT 8CT/CASE: Brand: 3M™ TEGADERM™

## (undated) DEVICE — STERILE POLYISOPRENE POWDER-FREE SURGICAL GLOVES: Brand: PROTEXIS

## (undated) DEVICE — GUIDEWIRE VASC J 1.5 MM 0.035 INX260 CM FIX EXCHANGE INQWIRE

## (undated) DEVICE — CATH ANGI BLLN DIL 2.75X20MM -- NC EUPHORA

## (undated) DEVICE — SYR 10ML LUER LOK 1/5ML GRAD --

## (undated) DEVICE — JELLY,LUBE,STERILE,FLIP TOP,TUBE,4-OZ: Brand: MEDLINE

## (undated) DEVICE — WASTEBAG DRIP/ADAPTER: Brand: MEDLINE INDUSTRIES, INC.

## (undated) DEVICE — INTENDED FOR TISSUE SEPARATION, AND OTHER PROCEDURES THAT REQUIRE A SHARP SURGICAL BLADE TO PUNCTURE OR CUT.: Brand: BARD-PARKER ® CARBON RIB-BACK BLADES

## (undated) DEVICE — SOLUTION IRRIG 500ML 0.9% SOD CHL USP POUR PLAS BTL

## (undated) DEVICE — HYPODERMIC SAFETY NEEDLE: Brand: MONOJECT

## (undated) DEVICE — 1200CC GUARDIAN II: Brand: GUARDIAN

## (undated) DEVICE — PRESSURE TUBING: Brand: TRUWAVE

## (undated) DEVICE — BOWL UTIL 16OZ STRL --

## (undated) DEVICE — PADDING CAST W6INXL4YD ST COT COHESIVE HND TEARABLE SPEC

## (undated) DEVICE — 3M™ TEGADERM™ TRANSPARENT FILM DRESSING FRAME STYLE, 1626W, 4 IN X 4-3/4 IN (10 CM X 12 CM), 50/CT 4CT/CASE: Brand: 3M™ TEGADERM™

## (undated) DEVICE — DEVICE INFL SYR BLLN ENDO 30 -- INTELLI